# Patient Record
Sex: MALE | Race: WHITE | Employment: OTHER | URBAN - METROPOLITAN AREA
[De-identification: names, ages, dates, MRNs, and addresses within clinical notes are randomized per-mention and may not be internally consistent; named-entity substitution may affect disease eponyms.]

---

## 2023-06-08 ENCOUNTER — TELEPHONE (OUTPATIENT)
Dept: ONCOLOGY | Age: 68
End: 2023-06-08

## 2023-06-09 ENCOUNTER — APPOINTMENT (OUTPATIENT)
Dept: GENERAL RADIOLOGY | Age: 68
DRG: 841 | End: 2023-06-09
Attending: STUDENT IN AN ORGANIZED HEALTH CARE EDUCATION/TRAINING PROGRAM
Payer: COMMERCIAL

## 2023-06-09 ENCOUNTER — HOSPITAL ENCOUNTER (INPATIENT)
Age: 68
LOS: 1 days | Discharge: HOME | DRG: 841 | End: 2023-06-10
Attending: STUDENT IN AN ORGANIZED HEALTH CARE EDUCATION/TRAINING PROGRAM | Admitting: STUDENT IN AN ORGANIZED HEALTH CARE EDUCATION/TRAINING PROGRAM
Payer: COMMERCIAL

## 2023-06-09 DIAGNOSIS — C92.10 CML (CHRONIC MYELOCYTIC LEUKEMIA) (HCC): Primary | ICD-10-CM

## 2023-06-09 PROBLEM — D64.9 SYMPTOMATIC ANEMIA: Status: ACTIVE | Noted: 2023-06-09

## 2023-06-09 PROBLEM — E78.5 DYSLIPIDEMIA: Status: ACTIVE | Noted: 2023-06-09

## 2023-06-09 PROBLEM — D64.9 LOW HEMOGLOBIN: Status: RESOLVED | Noted: 2023-06-09 | Resolved: 2023-06-09

## 2023-06-09 PROBLEM — J96.11 CHRONIC RESPIRATORY FAILURE WITH HYPOXIA (HCC): Status: ACTIVE | Noted: 2023-06-09

## 2023-06-09 PROBLEM — D64.9 LOW HEMOGLOBIN: Status: ACTIVE | Noted: 2023-06-09

## 2023-06-09 LAB
ABO GROUP BLD: NORMAL
ABO GROUP BLD: NORMAL
ALBUMIN SERPL BCG-MCNC: 3.4 G/DL (ref 3.5–5.1)
ALP SERPL-CCNC: 73 U/L (ref 38–126)
ALT SERPL W/O P-5'-P-CCNC: 24 U/L (ref 11–66)
ANION GAP SERPL CALC-SCNC: 11 MEQ/L (ref 8–16)
ANTIBODY SCREEN: NORMAL
AST SERPL-CCNC: 13 U/L (ref 5–40)
AUTO DIFF PNL BLD: ABNORMAL
BASOPHILS ABSOLUTE: 0 THOU/MM3 (ref 0–0.1)
BASOPHILS NFR BLD AUTO: 0 %
BILIRUB SERPL-MCNC: 0.4 MG/DL (ref 0.3–1.2)
BLASTS: 7 % (ref 0–1)
BUN SERPL-MCNC: 26 MG/DL (ref 7–22)
CALCIUM SERPL-MCNC: 8.1 MG/DL (ref 8.5–10.5)
CHLORIDE SERPL-SCNC: 105 MEQ/L (ref 98–111)
CO2 SERPL-SCNC: 24 MEQ/L (ref 23–33)
CREAT SERPL-MCNC: 1.1 MG/DL (ref 0.4–1.2)
CRP SERPL-MCNC: 0.71 MG/DL (ref 0–1)
DAT POLY-SP REAG RBC QL: NORMAL
DEPRECATED RDW RBC AUTO: 64.3 FL (ref 35–45)
DEPRECATED RDW RBC AUTO: 67.9 FL (ref 35–45)
EKG ATRIAL RATE: 74 BPM
EKG P AXIS: 46 DEGREES
EKG P-R INTERVAL: 166 MS
EKG Q-T INTERVAL: 406 MS
EKG QRS DURATION: 90 MS
EKG QTC CALCULATION (BAZETT): 450 MS
EKG R AXIS: 22 DEGREES
EKG T AXIS: 42 DEGREES
EKG VENTRICULAR RATE: 74 BPM
EOSINOPHIL NFR BLD AUTO: 0 %
EOSINOPHILS ABSOLUTE: 0 THOU/MM3 (ref 0–0.4)
ERYTHROCYTE [DISTWIDTH] IN BLOOD BY AUTOMATED COUNT: 18.5 % (ref 11.5–14.5)
ERYTHROCYTE [DISTWIDTH] IN BLOOD BY AUTOMATED COUNT: 19.7 % (ref 11.5–14.5)
FERRITIN SERPL IA-MCNC: 1165 NG/ML (ref 22–322)
FOLATE SERPL-MCNC: 6.9 NG/ML (ref 4.8–24.2)
GFR SERPL CREATININE-BSD FRML MDRD: > 60 ML/MIN/1.73M2
GLUCOSE SERPL-MCNC: 107 MG/DL (ref 70–108)
HCT VFR BLD AUTO: 16.4 % (ref 42–52)
HCT VFR BLD AUTO: 18.8 % (ref 42–52)
HCT VFR BLD AUTO: 18.9 % (ref 42–52)
HCT VFR BLD AUTO: 19.8 % (ref 42–52)
HCT VFR BLD AUTO: 19.9 % (ref 42–52)
HGB BLD-MCNC: 5.4 GM/DL (ref 14–18)
HGB BLD-MCNC: 6.1 GM/DL (ref 14–18)
HGB BLD-MCNC: 6.2 GM/DL (ref 14–18)
HGB BLD-MCNC: 6.5 GM/DL (ref 14–18)
HGB BLD-MCNC: 6.5 GM/DL (ref 14–18)
HGB RETIC QN AUTO: 41.1 PG (ref 28.2–35.7)
IMM RETICS NFR: 14.6 % (ref 2.3–13.4)
IRON SERPL-MCNC: 208 UG/DL (ref 65–195)
LDH SERPL L TO P-CCNC: 529 U/L (ref 100–190)
LYMPHOCYTES ABSOLUTE: 3 THOU/MM3 (ref 1–4.8)
LYMPHOCYTES NFR BLD AUTO: 22 %
MANUAL DIFF BLD: NORMAL
MCH RBC QN AUTO: 33.3 PG (ref 26–33)
MCH RBC QN AUTO: 35.1 PG (ref 26–33)
MCHC RBC AUTO-ENTMCNC: 32.8 GM/DL (ref 32.2–35.5)
MCHC RBC AUTO-ENTMCNC: 32.9 GM/DL (ref 32.2–35.5)
MCV RBC AUTO: 101.6 FL (ref 80–94)
MCV RBC AUTO: 106.5 FL (ref 80–94)
METAMYELOCYTES: 2 %
MONOCYTES ABSOLUTE: 4.5 THOU/MM3 (ref 0.4–1.3)
MONOCYTES NFR BLD AUTO: 33 %
MYELOCYTES: 2 %
NEUTROPHILS NFR BLD AUTO: 34 %
NRBC BLD AUTO-RTO: 0 /100 WBC
NT-PROBNP SERPL IA-MCNC: 273.8 PG/ML (ref 0–124)
PATHOLOGIST REVIEW: ABNORMAL
PH BLDV: 7.45 [PH] (ref 7.31–7.41)
PLATELET # BLD AUTO: 14 THOU/MM3 (ref 130–400)
PLATELET # BLD AUTO: 24 THOU/MM3 (ref 130–400)
PLATELET BLD QL SMEAR: ABNORMAL
PMV BLD AUTO: 10.3 FL (ref 9.4–12.4)
PMV BLD AUTO: 10.8 FL (ref 9.4–12.4)
POTASSIUM SERPL-SCNC: 4.1 MEQ/L (ref 3.5–5.2)
PROCALCITONIN SERPL IA-MCNC: 0.11 NG/ML (ref 0.01–0.09)
PROT SERPL-MCNC: 6.4 G/DL (ref 6.1–8)
RBC # BLD AUTO: 1.54 MILL/MM3 (ref 4.7–6.1)
RBC # BLD AUTO: 1.86 MILL/MM3 (ref 4.7–6.1)
RETICS # AUTO: 11 THOU/MM3 (ref 20–115)
RETICS/RBC NFR AUTO: 0.7 % (ref 0.5–2)
REVIEWED BY: NORMAL
RH BLD: NORMAL
RH BLD: NORMAL
SEGMENTED NEUTROPHILS ABSOLUTE COUNT: 4.6 THOU/MM3 (ref 1.8–7.7)
SMEAR REVIEW: NORMAL
SODIUM SERPL-SCNC: 140 MEQ/L (ref 135–145)
VIT B12 SERPL-MCNC: 1042 PG/ML (ref 211–911)
WBC # BLD AUTO: 13.3 THOU/MM3 (ref 4.8–10.8)
WBC # BLD AUTO: 13.5 THOU/MM3 (ref 4.8–10.8)

## 2023-06-09 PROCEDURE — 93010 ELECTROCARDIOGRAM REPORT: CPT | Performed by: INTERNAL MEDICINE

## 2023-06-09 PROCEDURE — 93005 ELECTROCARDIOGRAM TRACING: CPT | Performed by: NURSE PRACTITIONER

## 2023-06-09 PROCEDURE — 86900 BLOOD TYPING SEROLOGIC ABO: CPT

## 2023-06-09 PROCEDURE — 85025 COMPLETE CBC W/AUTO DIFF WBC: CPT

## 2023-06-09 PROCEDURE — 86850 RBC ANTIBODY SCREEN: CPT

## 2023-06-09 PROCEDURE — 86901 BLOOD TYPING SEROLOGIC RH(D): CPT

## 2023-06-09 PROCEDURE — 85018 HEMOGLOBIN: CPT

## 2023-06-09 PROCEDURE — 82728 ASSAY OF FERRITIN: CPT

## 2023-06-09 PROCEDURE — 80053 COMPREHEN METABOLIC PANEL: CPT

## 2023-06-09 PROCEDURE — 82800 BLOOD PH: CPT

## 2023-06-09 PROCEDURE — 36415 COLL VENOUS BLD VENIPUNCTURE: CPT

## 2023-06-09 PROCEDURE — 6370000000 HC RX 637 (ALT 250 FOR IP): Performed by: STUDENT IN AN ORGANIZED HEALTH CARE EDUCATION/TRAINING PROGRAM

## 2023-06-09 PROCEDURE — 99222 1ST HOSP IP/OBS MODERATE 55: CPT | Performed by: NURSE PRACTITIONER

## 2023-06-09 PROCEDURE — 2580000003 HC RX 258: Performed by: NURSE PRACTITIONER

## 2023-06-09 PROCEDURE — 36430 TRANSFUSION BLD/BLD COMPNT: CPT

## 2023-06-09 PROCEDURE — 83615 LACTATE (LD) (LDH) ENZYME: CPT

## 2023-06-09 PROCEDURE — 82746 ASSAY OF FOLIC ACID SERUM: CPT

## 2023-06-09 PROCEDURE — 96361 HYDRATE IV INFUSION ADD-ON: CPT

## 2023-06-09 PROCEDURE — 94640 AIRWAY INHALATION TREATMENT: CPT

## 2023-06-09 PROCEDURE — G0378 HOSPITAL OBSERVATION PER HR: HCPCS

## 2023-06-09 PROCEDURE — P9037 PLATE PHERES LEUKOREDU IRRAD: HCPCS

## 2023-06-09 PROCEDURE — 85046 RETICYTE/HGB CONCENTRATE: CPT

## 2023-06-09 PROCEDURE — 86140 C-REACTIVE PROTEIN: CPT

## 2023-06-09 PROCEDURE — 86923 COMPATIBILITY TEST ELECTRIC: CPT

## 2023-06-09 PROCEDURE — G0379 DIRECT REFER HOSPITAL OBSERV: HCPCS

## 2023-06-09 PROCEDURE — 83880 ASSAY OF NATRIURETIC PEPTIDE: CPT

## 2023-06-09 PROCEDURE — P9040 RBC LEUKOREDUCED IRRADIATED: HCPCS

## 2023-06-09 PROCEDURE — 85027 COMPLETE CBC AUTOMATED: CPT

## 2023-06-09 PROCEDURE — 83010 ASSAY OF HAPTOGLOBIN QUANT: CPT

## 2023-06-09 PROCEDURE — 2140000000 HC CCU INTERMEDIATE R&B

## 2023-06-09 PROCEDURE — 30233N1 TRANSFUSION OF NONAUTOLOGOUS RED BLOOD CELLS INTO PERIPHERAL VEIN, PERCUTANEOUS APPROACH: ICD-10-PCS | Performed by: STUDENT IN AN ORGANIZED HEALTH CARE EDUCATION/TRAINING PROGRAM

## 2023-06-09 PROCEDURE — 82607 VITAMIN B-12: CPT

## 2023-06-09 PROCEDURE — 83540 ASSAY OF IRON: CPT

## 2023-06-09 PROCEDURE — 86880 COOMBS TEST DIRECT: CPT

## 2023-06-09 PROCEDURE — 96360 HYDRATION IV INFUSION INIT: CPT

## 2023-06-09 PROCEDURE — 30233R1 TRANSFUSION OF NONAUTOLOGOUS PLATELETS INTO PERIPHERAL VEIN, PERCUTANEOUS APPROACH: ICD-10-PCS | Performed by: STUDENT IN AN ORGANIZED HEALTH CARE EDUCATION/TRAINING PROGRAM

## 2023-06-09 PROCEDURE — 85014 HEMATOCRIT: CPT

## 2023-06-09 PROCEDURE — 84145 PROCALCITONIN (PCT): CPT

## 2023-06-09 PROCEDURE — 71045 X-RAY EXAM CHEST 1 VIEW: CPT

## 2023-06-09 RX ORDER — SODIUM CHLORIDE 9 MG/ML
INJECTION, SOLUTION INTRAVENOUS PRN
Status: DISCONTINUED | OUTPATIENT
Start: 2023-06-09 | End: 2023-06-09

## 2023-06-09 RX ORDER — ALLOPURINOL 300 MG/1
300 TABLET ORAL DAILY
Status: DISCONTINUED | OUTPATIENT
Start: 2023-06-09 | End: 2023-06-10 | Stop reason: HOSPADM

## 2023-06-09 RX ORDER — ACETAMINOPHEN 325 MG/1
650 TABLET ORAL EVERY 6 HOURS PRN
Status: DISCONTINUED | OUTPATIENT
Start: 2023-06-09 | End: 2023-06-10 | Stop reason: HOSPADM

## 2023-06-09 RX ORDER — ALBUTEROL SULFATE 2.5 MG/3ML
2.5 SOLUTION RESPIRATORY (INHALATION) EVERY 6 HOURS PRN
Status: DISCONTINUED | OUTPATIENT
Start: 2023-06-09 | End: 2023-06-10 | Stop reason: HOSPADM

## 2023-06-09 RX ORDER — TAMSULOSIN HYDROCHLORIDE 0.4 MG/1
0.8 CAPSULE ORAL DAILY
COMMUNITY
Start: 2023-03-24

## 2023-06-09 RX ORDER — SODIUM CHLORIDE 9 MG/ML
INJECTION, SOLUTION INTRAVENOUS PRN
Status: DISCONTINUED | OUTPATIENT
Start: 2023-06-09 | End: 2023-06-10 | Stop reason: HOSPADM

## 2023-06-09 RX ORDER — QUETIAPINE FUMARATE 25 MG/1
25 TABLET, FILM COATED ORAL NIGHTLY
Status: DISCONTINUED | OUTPATIENT
Start: 2023-06-09 | End: 2023-06-10 | Stop reason: HOSPADM

## 2023-06-09 RX ORDER — ACETAMINOPHEN 325 MG/1
325 TABLET ORAL EVERY 4 HOURS PRN
COMMUNITY

## 2023-06-09 RX ORDER — ATORVASTATIN CALCIUM 80 MG/1
80 TABLET, FILM COATED ORAL DAILY
COMMUNITY
Start: 2023-02-04 | End: 2023-08-16

## 2023-06-09 RX ORDER — ALBUTEROL SULFATE 90 UG/1
2 AEROSOL, METERED RESPIRATORY (INHALATION) EVERY 4 HOURS PRN
COMMUNITY

## 2023-06-09 RX ORDER — HYDROXYUREA 500 MG/1
500 CAPSULE ORAL DAILY
Status: DISCONTINUED | OUTPATIENT
Start: 2023-06-09 | End: 2023-06-10 | Stop reason: HOSPADM

## 2023-06-09 RX ORDER — LORATADINE 10 MG/1
10 TABLET ORAL DAILY
COMMUNITY
Start: 2023-04-14

## 2023-06-09 RX ORDER — ACYCLOVIR 200 MG/1
200 CAPSULE ORAL DAILY
Status: DISCONTINUED | OUTPATIENT
Start: 2023-06-09 | End: 2023-06-10 | Stop reason: HOSPADM

## 2023-06-09 RX ORDER — ALBUTEROL SULFATE 2.5 MG/3ML
2.5 SOLUTION RESPIRATORY (INHALATION) EVERY 6 HOURS PRN
COMMUNITY

## 2023-06-09 RX ORDER — QUETIAPINE FUMARATE 25 MG/1
25 TABLET, FILM COATED ORAL NIGHTLY
COMMUNITY
Start: 2023-06-08

## 2023-06-09 RX ORDER — ALBUTEROL SULFATE 90 UG/1
2 AEROSOL, METERED RESPIRATORY (INHALATION) EVERY 4 HOURS PRN
Status: DISCONTINUED | OUTPATIENT
Start: 2023-06-09 | End: 2023-06-10 | Stop reason: HOSPADM

## 2023-06-09 RX ORDER — CETIRIZINE HYDROCHLORIDE 10 MG/1
10 TABLET ORAL DAILY
Status: DISCONTINUED | OUTPATIENT
Start: 2023-06-09 | End: 2023-06-10 | Stop reason: HOSPADM

## 2023-06-09 RX ORDER — ATORVASTATIN CALCIUM 80 MG/1
80 TABLET, FILM COATED ORAL NIGHTLY
Status: DISCONTINUED | OUTPATIENT
Start: 2023-06-09 | End: 2023-06-10 | Stop reason: HOSPADM

## 2023-06-09 RX ORDER — ONDANSETRON 4 MG/1
4 TABLET, ORALLY DISINTEGRATING ORAL EVERY 8 HOURS PRN
Status: DISCONTINUED | OUTPATIENT
Start: 2023-06-09 | End: 2023-06-10 | Stop reason: HOSPADM

## 2023-06-09 RX ORDER — POLYETHYLENE GLYCOL 3350 17 G/17G
17 POWDER, FOR SOLUTION ORAL DAILY PRN
Status: DISCONTINUED | OUTPATIENT
Start: 2023-06-09 | End: 2023-06-10 | Stop reason: HOSPADM

## 2023-06-09 RX ORDER — AMLODIPINE BESYLATE 5 MG/1
5 TABLET ORAL NIGHTLY
Status: DISCONTINUED | OUTPATIENT
Start: 2023-06-09 | End: 2023-06-10 | Stop reason: HOSPADM

## 2023-06-09 RX ORDER — AMLODIPINE BESYLATE 5 MG/1
5 TABLET ORAL NIGHTLY
COMMUNITY
Start: 2023-05-04 | End: 2023-08-16

## 2023-06-09 RX ORDER — ALLOPURINOL 300 MG/1
300 TABLET ORAL DAILY
COMMUNITY
End: 2023-06-21 | Stop reason: SDUPTHER

## 2023-06-09 RX ORDER — ONDANSETRON 2 MG/ML
4 INJECTION INTRAMUSCULAR; INTRAVENOUS EVERY 6 HOURS PRN
Status: DISCONTINUED | OUTPATIENT
Start: 2023-06-09 | End: 2023-06-10 | Stop reason: HOSPADM

## 2023-06-09 RX ORDER — TAMSULOSIN HYDROCHLORIDE 0.4 MG/1
0.8 CAPSULE ORAL NIGHTLY
Status: DISCONTINUED | OUTPATIENT
Start: 2023-06-09 | End: 2023-06-10 | Stop reason: HOSPADM

## 2023-06-09 RX ORDER — ACETAMINOPHEN 650 MG/1
650 SUPPOSITORY RECTAL EVERY 6 HOURS PRN
Status: DISCONTINUED | OUTPATIENT
Start: 2023-06-09 | End: 2023-06-10 | Stop reason: HOSPADM

## 2023-06-09 RX ORDER — SODIUM CHLORIDE 0.9 % (FLUSH) 0.9 %
5-40 SYRINGE (ML) INJECTION PRN
Status: DISCONTINUED | OUTPATIENT
Start: 2023-06-09 | End: 2023-06-10 | Stop reason: HOSPADM

## 2023-06-09 RX ORDER — SODIUM CHLORIDE 0.9 % (FLUSH) 0.9 %
5-40 SYRINGE (ML) INJECTION EVERY 12 HOURS SCHEDULED
Status: DISCONTINUED | OUTPATIENT
Start: 2023-06-09 | End: 2023-06-10 | Stop reason: HOSPADM

## 2023-06-09 RX ORDER — ACYCLOVIR 200 MG/1
200 CAPSULE ORAL DAILY
COMMUNITY
Start: 2023-05-25 | End: 2023-06-21 | Stop reason: SDUPTHER

## 2023-06-09 RX ORDER — HYDROXYUREA 500 MG/1
500 CAPSULE ORAL DAILY
Status: ON HOLD | COMMUNITY
Start: 2023-05-25 | End: 2023-06-10 | Stop reason: HOSPADM

## 2023-06-09 RX ADMIN — QUETIAPINE FUMARATE 25 MG: 25 TABLET ORAL at 20:42

## 2023-06-09 RX ADMIN — ALLOPURINOL 300 MG: 300 TABLET ORAL at 16:27

## 2023-06-09 RX ADMIN — SODIUM CHLORIDE, PRESERVATIVE FREE 10 ML: 5 INJECTION INTRAVENOUS at 20:42

## 2023-06-09 RX ADMIN — SODIUM CHLORIDE: 9 INJECTION, SOLUTION INTRAVENOUS at 11:17

## 2023-06-09 RX ADMIN — AMLODIPINE BESYLATE 5 MG: 5 TABLET ORAL at 20:42

## 2023-06-09 RX ADMIN — SODIUM CHLORIDE: 9 INJECTION, SOLUTION INTRAVENOUS at 14:57

## 2023-06-09 RX ADMIN — SODIUM CHLORIDE, PRESERVATIVE FREE 10 ML: 5 INJECTION INTRAVENOUS at 11:48

## 2023-06-09 RX ADMIN — ATORVASTATIN CALCIUM 80 MG: 80 TABLET, FILM COATED ORAL at 20:42

## 2023-06-09 RX ADMIN — TAMSULOSIN HYDROCHLORIDE 0.8 MG: 0.4 CAPSULE ORAL at 20:42

## 2023-06-09 RX ADMIN — CETIRIZINE HYDROCHLORIDE 10 MG: 10 TABLET, FILM COATED ORAL at 16:27

## 2023-06-09 RX ADMIN — MOMETASONE FUROATE AND FORMOTEROL FUMARATE DIHYDRATE 2 PUFF: 200; 5 AEROSOL RESPIRATORY (INHALATION) at 17:21

## 2023-06-09 RX ADMIN — ACYCLOVIR 200 MG: 200 CAPSULE ORAL at 16:27

## 2023-06-09 NOTE — PROGRESS NOTES
Spoke with Dr. Dari Carter @ [address] ER re; [place] [place]    C/O weakness and increased SOB.  (2L @ home now requiring 3L)    Was there for same last month and transferred to [facility]. DX with CML    Dr. Dari Carter spoke with hematology here and said they would consult. Hemoglobin=5  Platelets=16    No bleeding noted    98.3, 80, 22, 135/57. 97%-3L    CXR-pulmonary edema. Although DR. Dari Carter says he does not present that clinically.      Accepted on behalf of hospitalist.

## 2023-06-09 NOTE — PROGRESS NOTES
Internal Medicine Resident Progress Note    Patient:  Junaid Mckeon    YOB: 1955  Unit/Bed:3B-33/033-A  MRN: [id number]    Acct: [de-identified]   PCP: YESY Nunez CNP    Date of Admission: 6/9/2023      Assessment/Plan:  Symptomatic anemia: Patient dorsals dyspnea and fatigue likely 2/2 hydroxyurea and bone marrow suppression 2/2 CML. Monitor H&H 4.9/14. Received 2 units PRBC prior to transport. At [facility] has received 2 more units PRBC and 1 unit platelets. Awaiting H&H. No sign of hematochezia, melena.  -H&H every 6 hours  -Transfuse <7  -RBCs leukoreduced and irradiated  Chronic hypoxic respiratory failure: On 2-3 L NC at home. CML type I: Recently diagnosed at [facility]. Scheduled with oncology Dr. Alejandro South in [facility] on 6/13/2023.  -Continue home Zovirax 200 qd, allopurinol 300 qd, currently holding hydroxyurea 500  COPD: Patient on Trelegy at home. Changed to Dulera 200-5 2 puffs bid and Spiriva 2 puffs qd. Albuterol neb or inhaler prn  HLD: Continue home Lipitor 80 mg nightly  HTN: Continue home Norvasc 5 mg nightly  BPH: Continue home Flomax 0.8 mg nightly  Obesity: BMI 39.7    Chronic Conditions (reviewed and stable unless otherwise stated)  Continue home Zyrtec 10 mg qd, Seroquel 25 mg nightly    Expected discharge date:  TBD    Disposition:   [x] Home  [] TCU  [] Rehab  [] Psych  [] SNF  [] [address]  [] Other-    ===================================================================      Chief Complaint: Symptomatic anemia    Hospital Course: Patient is 37-year-old male with PMHx of recently diagnosed CML, former smoker, chronic respiratory failure on 2 L NC, HLD, HTN, COPD, RA who was transferred to [facility] from [address] [place] [address] on 6/9/2023 for 60: Progressive fatigue and shortness of breath. Patient was sent to ED 2/2 symptomatic anemia. See H&P for complete history, in short patient under went pulmonary biopsy 5/17/2023 results CML.   Heme-onc placed patient on --   --  24*     Recent Labs     06/09/23  0338      K 4.1      CO2 24   BUN 26*   CREATININE 1.1   CALCIUM 8.1*     Recent Labs     06/09/23  0338   AST 13   ALT 24   BILITOT 0.4   ALKPHOS 73     No results for input(s): INR in the last 72 hours. No results for input(s): Yeimy Bradley in the last 72 hours. Recent Labs     06/09/23  0338   PROCAL 0.11*    No results found for: Oz Castles, BACTERIA, RBCUA, BLOODU, SPECGRAV, GLUCOSEU    Radiology (48 hours):  XR CHEST PORTABLE    Result Date: 6/9/2023  Impression: Subsegmental left basilar atelectasis and probable scarring in the right upper lobe. This document has been electronically signed by: Andreina Burroughs MD on 06/09/2023 03:18 AM       DVT prophylaxis:    [] Lovenox  [x] SCDs  [] SQ Heparin  [] Encourage ambulation   [] Already on Anticoagulation       Diet: ADULT DIET;  Regular  Code Status: Full Code  PT/OT: No  Tele: Yes  IVF: No    Electronically signed by Dane Larose DO on 6/9/2023 at 5:47 PM    Case was discussed with Attending, Dr. Bailye Nim

## 2023-06-09 NOTE — CONSENT
Informed Consent for Blood Component Transfusion Note    I have discussed with the patient and spouse the rationale for blood component transfusion; its benefits in treating or preventing fatigue, organ damage, or death; and its risk which includes mild transfusion reactions, rare risk of blood borne infection, or more serious but rare reactions. I have discussed the alternatives to transfusion, including the risk and consequences of not receiving transfusion. The patient and spouse had an opportunity to ask questions and had agreed to proceed with transfusion of blood components.     Electronically signed by YESY Hong CNP on 6/9/23 at 4:51 AM EDT

## 2023-06-09 NOTE — CARE COORDINATION
Case Management Assessment  Initial Evaluation    Date/Time of Evaluation: 6/9/2023 1:03 PM  Assessment Completed by: Jamey Guzman RN    If patient is discharged prior to next notation, then this note serves as note for discharge by case management. Patient Name: Eric Andrews                   YOB: 1955  Diagnosis: Low hemoglobin [D64.9]                   Date / Time: 6/9/2023  1:14 AM  Location: [address] [place] [address]     Patient Admission Status: Inpatient   Readmission Risk Low 0-14, Mod 15-19), High > 20: Readmission Risk Score: 14.1    Current PCP: YESY Manzano CNP  PCP verified by CM? Yes    Chart Reviewed: Yes      History Provided by: Patient  Patient Orientation: Alert and Oriented    Patient Cognition: Alert    Hospitalization in the last 30 days (Readmission):  No    If yes, Readmission Assessment in CM Navigator will be completed. Advance Directives:      Code Status: Full Code   Patient's Primary Decision Maker is: Legal Next of Kin (Has paperwork at home)    Primary Decision Maker: Orlena Closs - Spouse - [phone]    Secondary Decision Maker: Violet Caitlin Child - [phone]    Discharge Planning:    Patient lives with: Spouse/Significant Other Type of Home: House  Primary Care Giver: Self  Patient Support Systems include: Spouse/Significant Other, Children   Current Financial resources: Medicare, Medicaid  Current community resources: None  Current services prior to admission: Durable Medical Equipment            Current DME: Oxygen Therapy (Comment) (2L ATC, [employer])            Type of Home Care services:  None    ADLS  Prior functional level: Independent in ADLs/IADLs  Current functional level: Independent in ADLs/IADLs    Family can provide assistance at DC: Yes  Would you like Case Management to discuss the discharge plan with any other family members/significant others, and if so, who?  No  Plans to Return to Present Housing: Yes  Other Identified Issues/Barriers to RETURNING to current housing: None  Potential Assistance needed at discharge: N/A            Potential DME:    Patient expects to discharge to: [address] [place] [address] for transportation at discharge: Family    Financial    Payor: Sujit Ybarra / Plan: Oleg Byrd / Product Type: *No Product type* /     Does insurance require precert for SNF: Yes    Potential assistance Purchasing Medications: No  Meds-to-Beds request: Yes      [address] [address] [phone] Jada Laureano [address]  [address] [place] [address]  Phone: [phone] Fax: [phone]      Notes:    Factors facilitating achievement of predicted outcomes: Family support, Cooperative, Pleasant, and Has needed Durable Medical Equipment at home    Barriers to discharge: Medical complications and Medication managment    Additional Case Management Notes: Transferred from [facility] with SOB and weakness. Pt recently diagnosed with CML. Hgb 5.4 upon arrival, transfused a unit of blood, Hgb up to 6.5. Anticipate more blood being transfused. Procedure:   6/9 CXR: Subsegmental left basilar atelectasis and probable scarring in the right upper lobe. The Plan for Transition of Care is related to the following treatment goals of Low hemoglobin [D64.9]    Patient Goals/Plan/Treatment Preferences: Spoke with pt, he lives at home with his wife. Plans to return there. He has home O2 through [employer] (he thinks), and wears at 2L/nc ATC. Tank in room, but no tag to say where from. Transportation/Food Security/Housekeeping Addressed: No issues identified.      Delisa Harvey RN  Case Management Department

## 2023-06-09 NOTE — PLAN OF CARE
Problem: Discharge Planning  Goal: Discharge to home or other facility with appropriate resources  6/9/2023 1209 by Luis Angel Angela RN  Outcome: Progressing  Flowsheets  Taken 6/9/2023 1209  Discharge to home or other facility with appropriate resources:   Identify barriers to discharge with patient and caregiver   Identify discharge learning needs (meds, wound care, etc)  Taken 6/9/2023 0800  Discharge to home or other facility with appropriate resources:   Identify barriers to discharge with patient and caregiver   Identify discharge learning needs (meds, wound care, etc)  Note: Discharge to home with wife. Continued assessing discharge learning needs. Problem: Safety - Adult  Goal: Free from fall injury  6/9/2023 1209 by Luis Angel Angela RN  Outcome: Progressing  Flowsheets (Taken 6/9/2023 1209)  Free From Fall Injury: Based on caregiver fall risk screen, instruct family/caregiver to ask for assistance with transferring infant if caregiver noted to have fall risk factors  Note: Standard fall precaution observed. Hourly rounds done. Problem: ABCDS Injury Assessment  Goal: Absence of physical injury  6/9/2023 1209 by Luis Angel Angela RN  Outcome: Progressing  Flowsheets (Taken 6/9/2023 1209)  Absence of Physical Injury: Implement safety measures based on patient assessment  Note: Instructed to use call light when getting out on bed. Standard fall precaution applied. Problem: Respiratory - Adult  Goal: Achieves optimal ventilation and oxygenation  6/9/2023 1209 by Luis Angel Angela RN  Outcome: Progressing  Flowsheets  Taken 6/9/2023 1209  Achieves optimal ventilation and oxygenation:   Assess for changes in respiratory status   Position to facilitate oxygenation and minimize respiratory effort  Taken 6/9/2023 0800  Achieves optimal ventilation and oxygenation: Assess for changes in respiratory status  Note: Maintained on O2 via nasal cannula. Watched out for changes in respiratory pattern. Problem: Infection - Adult  Goal: Absence of infection at discharge  6/9/2023 1209 by Raji Sanchez RN  Outcome: Progressing  Flowsheets  Taken 6/9/2023 1209  Absence of infection at discharge:   Assess and monitor for signs and symptoms of infection   Monitor lab/diagnostic results   Administer medications as ordered  Taken 6/9/2023 0800  Absence of infection at discharge: Assess and monitor for signs and symptoms of infection  Note: Prone to infection, hand hygiene observed. Vital signs checked 4 hourly. Problem: Hematologic - Adult  Goal: Maintains hematologic stability  6/9/2023 1209 by Raji Sanchez RN  Outcome: Progressing  Flowsheets  Taken 6/9/2023 1209  Maintains hematologic stability:   Assess for signs and symptoms of bleeding or hemorrhage   Administer blood products/factors as ordered   Monitor labs for bleeding or clotting disorders  Taken 6/9/2023 0800  Maintains hematologic stability: Assess for signs and symptoms of bleeding or hemorrhage  Note: Blood transfusion of 1 unit redblood cells given. Platelet concentrate transfusion done Monitor Hgb and platelet levels post transfusions. Care plan reviewed with patient. Patient verbalize understanding of the plan of care and contribute to goal setting.

## 2023-06-09 NOTE — TELEPHONE ENCOUNTER
Received call from [facility] ED as currently on-call provider. Patient is scheduled as a new patient with Dr. Saba García on 6/13/2023 for newly diagnosed chronic myelomonocytic leukemia. He presented to ED with abnormal labs drawn today per PCP. As noted in Care Everywhere:  WBC count 18.9, Hgb 5.0, Hct 15.6, .1, platelet count 23,765. He was directed to ED and recheck of CBC showed WBC count 14.4, Hgb 4.9, Hct 14.0, .5, platelet count 46,014. No active bleeding reported. Called by ED, Dr. Bill Lombardo. Discussed recommendation for blood transfusion and platelet transfusion. Recommended irradiated blood if able. Discussed recommendation for admission for close monitoring of blood counts and need for continued blood products. Dr. Bill Lombardo states patient is hesitant to be transferred at [address] [place] [address] and prefers local admission. She will discuss with local Hospitalist. Discussed if unable to remain at local hospital recommend transfer to [facility]. Yvonne

## 2023-06-09 NOTE — DISCHARGE INSTRUCTIONS
If you continue to have bloody stools contact GI to have colonoscopy sooner rather than later. Stop taking Hydroxyurea until your Heme/Onc appt.

## 2023-06-09 NOTE — PLAN OF CARE
Problem: Discharge Planning  Goal: Discharge to home or other facility with appropriate resources  6/9/2023 0230 by Charity Santos RN  Outcome: Progressing  6/9/2023 0228 by Charity Santos RN  Outcome: Progressing     Problem: Safety - Adult  Goal: Free from fall injury  Outcome: Progressing     Problem: ABCDS Injury Assessment  Goal: Absence of physical injury  Outcome: Progressing     Problem: Respiratory - Adult  Goal: Achieves optimal ventilation and oxygenation  Outcome: Progressing  Flowsheets (Taken 6/9/2023 0230)  Achieves optimal ventilation and oxygenation:   Assess for changes in respiratory status   Assess for changes in mentation and behavior   Position to facilitate oxygenation and minimize respiratory effort   Encourage broncho-pulmonary hygiene including cough, deep breathe, incentive spirometry   Assess and instruct to report shortness of breath or any respiratory difficulty   Respiratory therapy support as indicated     Problem: Infection - Adult  Goal: Absence of infection at discharge  Outcome: Progressing  Flowsheets (Taken 6/9/2023 0230)  Absence of infection at discharge:   Assess and monitor for signs and symptoms of infection   Monitor lab/diagnostic results   Monitor all insertion sites i.e., indwelling lines, tubes and drains   Administer medications as ordered   Instruct and encourage patient and family to use good hand hygiene technique     Problem: Metabolic/Fluid and Electrolytes - Adult  Goal: Electrolytes maintained within normal limits  Outcome: Progressing  Flowsheets (Taken 6/9/2023 0230)  Electrolytes maintained within normal limits:   Monitor labs and assess patient for signs and symptoms of electrolyte imbalances   Administer electrolyte replacement as ordered     Problem: Hematologic - Adult  Goal: Maintains hematologic stability  Outcome: Progressing  Flowsheets (Taken 6/9/2023 0230)  Maintains hematologic stability:   Assess for signs and symptoms of bleeding

## 2023-06-09 NOTE — H&P
Hospitalist  History and Physical    Patient:  Sam Alvarez  MRN: [id number]    CHIEF COMPLAINT:  abnormal lab results    History Obtained From:  patient, electronic medical record  PCP: YESY Matute CNP    HISTORY OF PRESENT ILLNESS:   The patient is a 79 y.o. male who presents 6/9/2023 to [facility] as a transfer from [facility]. Patient has a past medical history significant for former smoker, chronic respiratory failure continuous oxygen use 2L/NC, dyslipidemia, arthritis,  Recent hospitalization at [facility] with chief complaints of progressive fatigue and shortness of breath. Patient had notable leukocytosis and concern for leukemia. Patient underwent bone marrow biopsy on 5/17/2023 with results consistent with chronic myelomonocytic leukemia. Patient was evaluated by infectious disease for concern of fever with possible density and echocardiogram suspicious for endocarditis. However ID felt fever most likely secondary to leukemia without concern for endocarditis. Blood cultures are negative. Dyspnea on exertion ultimately felt secondary to anemia. Patient was evaluated by hematology/oncology throughout the stay and was started on acyclovir, allopurinol and high hydroxyurea. Patient scheduled for follow-up with hematology in [place] on 6/13/2023. Since discharge patient reports ongoing issues with fatigue and progressive worsening of shortness of breath. More dyspnea on exertion with even shorter distances despite wearing oxygen at 2L/NC consistently. Shortness of breath resolves after a few minutes of rest.  Transitioned from Trelegy to Flovent with nebs in the hospital however during PCP appointment patient was encouraged to restart Trelegy. Home blood pressure monitoring -130.  5/25/2023 hemoglobin was 7.2 with a platelet count of 17. PCP had given prescription to have blood work drawn.   Patient was sitting at home eating dinner when he received a call from PCP office to

## 2023-06-10 VITALS
SYSTOLIC BLOOD PRESSURE: 126 MMHG | HEART RATE: 77 BPM | RESPIRATION RATE: 20 BRPM | HEIGHT: 71 IN | DIASTOLIC BLOOD PRESSURE: 60 MMHG | WEIGHT: 284.9 LBS | BODY MASS INDEX: 39.89 KG/M2 | TEMPERATURE: 98.1 F | OXYGEN SATURATION: 92 %

## 2023-06-10 LAB
ALBUMIN SERPL BCG-MCNC: 3.6 G/DL (ref 3.5–5.1)
ALP SERPL-CCNC: 74 U/L (ref 38–126)
ALT SERPL W/O P-5'-P-CCNC: 22 U/L (ref 11–66)
ANION GAP SERPL CALC-SCNC: 10 MEQ/L (ref 8–16)
AST SERPL-CCNC: 13 U/L (ref 5–40)
BILIRUB CONJ SERPL-MCNC: < 0.2 MG/DL (ref 0–0.3)
BILIRUB SERPL-MCNC: 0.4 MG/DL (ref 0.3–1.2)
BUN SERPL-MCNC: 16 MG/DL (ref 7–22)
CALCIUM SERPL-MCNC: 8.1 MG/DL (ref 8.5–10.5)
CHLORIDE SERPL-SCNC: 106 MEQ/L (ref 98–111)
CO2 SERPL-SCNC: 24 MEQ/L (ref 23–33)
CREAT SERPL-MCNC: 1 MG/DL (ref 0.4–1.2)
DEPRECATED RDW RBC AUTO: 63.7 FL (ref 35–45)
DEPRECATED RDW RBC AUTO: 65.3 FL (ref 35–45)
ERYTHROCYTE [DISTWIDTH] IN BLOOD BY AUTOMATED COUNT: 19.4 % (ref 11.5–14.5)
ERYTHROCYTE [DISTWIDTH] IN BLOOD BY AUTOMATED COUNT: 20.5 % (ref 11.5–14.5)
GFR SERPL CREATININE-BSD FRML MDRD: > 60 ML/MIN/1.73M2
GLUCOSE SERPL-MCNC: 88 MG/DL (ref 70–108)
HAPTOGLOB SERPL-MCNC: 237 MG/DL (ref 30–200)
HCT VFR BLD AUTO: 21.9 % (ref 42–52)
HCT VFR BLD AUTO: 22.5 % (ref 42–52)
HGB BLD-MCNC: 7.3 GM/DL (ref 14–18)
HGB BLD-MCNC: 7.6 GM/DL (ref 14–18)
LDH SERPL L TO P-CCNC: 523 U/L (ref 100–190)
MCH RBC QN AUTO: 32.9 PG (ref 26–33)
MCH RBC QN AUTO: 33.8 PG (ref 26–33)
MCHC RBC AUTO-ENTMCNC: 33.3 GM/DL (ref 32.2–35.5)
MCHC RBC AUTO-ENTMCNC: 33.8 GM/DL (ref 32.2–35.5)
MCV RBC AUTO: 101.4 FL (ref 80–94)
MCV RBC AUTO: 97.4 FL (ref 80–94)
PLATELET # BLD AUTO: 18 THOU/MM3 (ref 130–400)
PLATELET # BLD AUTO: 20 THOU/MM3 (ref 130–400)
PMV BLD AUTO: 10 FL (ref 9.4–12.4)
PMV BLD AUTO: 10.9 FL (ref 9.4–12.4)
POTASSIUM SERPL-SCNC: 4.3 MEQ/L (ref 3.5–5.2)
PROT SERPL-MCNC: 6.5 G/DL (ref 6.1–8)
RBC # BLD AUTO: 2.16 MILL/MM3 (ref 4.7–6.1)
RBC # BLD AUTO: 2.31 MILL/MM3 (ref 4.7–6.1)
SODIUM SERPL-SCNC: 140 MEQ/L (ref 135–145)
WBC # BLD AUTO: 10.5 THOU/MM3 (ref 4.8–10.8)
WBC # BLD AUTO: 12.5 THOU/MM3 (ref 4.8–10.8)

## 2023-06-10 PROCEDURE — 94640 AIRWAY INHALATION TREATMENT: CPT

## 2023-06-10 PROCEDURE — 6370000000 HC RX 637 (ALT 250 FOR IP): Performed by: STUDENT IN AN ORGANIZED HEALTH CARE EDUCATION/TRAINING PROGRAM

## 2023-06-10 PROCEDURE — G0378 HOSPITAL OBSERVATION PER HR: HCPCS

## 2023-06-10 PROCEDURE — 36415 COLL VENOUS BLD VENIPUNCTURE: CPT

## 2023-06-10 PROCEDURE — 82248 BILIRUBIN DIRECT: CPT

## 2023-06-10 PROCEDURE — 85027 COMPLETE CBC AUTOMATED: CPT

## 2023-06-10 PROCEDURE — 2580000003 HC RX 258: Performed by: NURSE PRACTITIONER

## 2023-06-10 PROCEDURE — 99239 HOSP IP/OBS DSCHRG MGMT >30: CPT | Performed by: STUDENT IN AN ORGANIZED HEALTH CARE EDUCATION/TRAINING PROGRAM

## 2023-06-10 PROCEDURE — 80053 COMPREHEN METABOLIC PANEL: CPT

## 2023-06-10 PROCEDURE — 83615 LACTATE (LD) (LDH) ENZYME: CPT

## 2023-06-10 RX ORDER — FOLIC ACID 1 MG/1
1 TABLET ORAL DAILY
Status: DISCONTINUED | OUTPATIENT
Start: 2023-06-10 | End: 2023-06-10 | Stop reason: HOSPADM

## 2023-06-10 RX ORDER — FOLIC ACID 1 MG/1
1 TABLET ORAL DAILY
Qty: 30 TABLET | Refills: 3 | Status: SHIPPED | OUTPATIENT
Start: 2023-06-11 | End: 2023-08-28 | Stop reason: SDUPTHER

## 2023-06-10 RX ADMIN — TIOTROPIUM BROMIDE INHALATION SPRAY 2 PUFF: 3.12 SPRAY, METERED RESPIRATORY (INHALATION) at 08:04

## 2023-06-10 RX ADMIN — ALLOPURINOL 300 MG: 300 TABLET ORAL at 07:48

## 2023-06-10 RX ADMIN — CETIRIZINE HYDROCHLORIDE 10 MG: 10 TABLET, FILM COATED ORAL at 07:48

## 2023-06-10 RX ADMIN — SODIUM CHLORIDE, PRESERVATIVE FREE 10 ML: 5 INJECTION INTRAVENOUS at 07:49

## 2023-06-10 RX ADMIN — ACYCLOVIR 200 MG: 200 CAPSULE ORAL at 07:48

## 2023-06-10 RX ADMIN — MOMETASONE FUROATE AND FORMOTEROL FUMARATE DIHYDRATE 2 PUFF: 200; 5 AEROSOL RESPIRATORY (INHALATION) at 08:05

## 2023-06-10 RX ADMIN — FOLIC ACID 1 MG: 1 TABLET ORAL at 11:09

## 2023-06-10 ASSESSMENT — PAIN SCALES - GENERAL
PAINLEVEL_OUTOF10: 0
PAINLEVEL_OUTOF10: 0

## 2023-06-10 NOTE — PLAN OF CARE
Problem: Discharge Planning  Goal: Discharge to home or other facility with appropriate resources  6/9/2023 2141 by Anatoliy Wolff RN  Outcome: Katie Santiago (Taken 6/9/2023 1209 by Dayo Robertson RN)  Discharge to home or other facility with appropriate resources:   Identify barriers to discharge with patient and caregiver   Identify discharge learning needs (meds, wound care, etc)  6/9/2023 1209 by Dayo Robertson RN  Outcome: Progressing  Flowsheets  Taken 6/9/2023 1209  Discharge to home or other facility with appropriate resources:   Identify barriers to discharge with patient and caregiver   Identify discharge learning needs (meds, wound care, etc)  Taken 6/9/2023 0800  Discharge to home or other facility with appropriate resources:   Identify barriers to discharge with patient and caregiver   Identify discharge learning needs (meds, wound care, etc)  Note: Discharge to home with wife. Continued assessing discharge learning needs. Problem: Safety - Adult  Goal: Free from fall injury  6/9/2023 2141 by Anatoliy Wolff RN  Outcome: Progressing  Flowsheets (Taken 6/9/2023 1209 by Dayo Robertson RN)  Free From Fall Injury: Based on caregiver fall risk screen, instruct family/caregiver to ask for assistance with transferring infant if caregiver noted to have fall risk factors  6/9/2023 1209 by Dayo Robertson RN  Outcome: Progressing  Flowsheets (Taken 6/9/2023 1209)  Free From Fall Injury: Based on caregiver fall risk screen, instruct family/caregiver to ask for assistance with transferring infant if caregiver noted to have fall risk factors  Note: Standard fall precaution observed. Hourly rounds done.      Problem: ABCDS Injury Assessment  Goal: Absence of physical injury  6/9/2023 2141 by Anatoliy Wolff RN  Outcome: Katie Santiago (Taken 6/9/2023 1209 by Dayo Robertson RN)  Absence of Physical Injury: Implement safety measures based on patient assessment  6/9/2023

## 2023-06-10 NOTE — PROGRESS NOTES
Discharge Note    Progress reporting period is from initial evaluation date (please see noted date below) to May 6, 2019.  Linked Episodes   Type: Episode: Status: Noted: Resolved: Last update: Updated by:   PHYSICAL THERAPY low back 4-26-19 Active 4/26/2019 5/6/2019  8:57 AM Miki Ruano, PT      Comments:       Faheem failed to follow up and current status is unknown.  Please see information below for last relevant information on current status.  Patient seen for 3 visits.    SUBJECTIVE  Subjective changes noted by patient:  15 min late  .  Current pain level is 5/10.     Previous pain level was  7/10.   Changes in function:  Yes (See Goal flowsheet attached for changes in current functional level)  Adverse reaction to treatment or activity: None    OBJECTIVE  Changes noted in objective findings: Coret Strength = 2/5 on functiona assessment     ASSESSMENT/PLAN  Diagnosis: acute on chronic LBP   Updated problem list and treatment plan:   Pain - HEP  STG/LTGs have been met or progress has been made towards goals:  Yes, please see goal flowsheet for most current information  Assessment of Progress: current status is unknown.    Last current status: Pt is progressing as expected   Self Management Plans:  HEP  I have re-evaluated this patient and find that the nature, scope, duration and intensity of the therapy is appropriate for the medical condition of the patient.  Faheem continues to require the following intervention to meet STG and LTG's:  HEP.    Recommendations:  Discharge with current home program.  Patient to follow up with MD as needed.    Please refer to the daily flowsheet for treatment today, total treatment time and time spent performing 1:1 timed codes.     Physician Progress Note      PATIENTClmayte Spine  CSN #:                  [id number]  :                       1955  ADMIT DATE:       2023 1:14 AM  DISCH DATE:  RESPONDING  PROVIDER #:        Olive Mata MD          QUERY TEXT:    Pt admitted with anemia. Pt noted to have likely hemorrhoidal bleeding. If   possible, please document in the progress notes and discharge summary if you   are evaluating and/or treating any of the following: The medical record reflects the following:  Risk Factors: anemia  Clinical Indicators: on arrival hgb 5.4 and hct 16.4; melena - likely   hemorrhoidal bleeding  Treatment: Labs, imaging, transfusion PRBC  Options provided:  -- Acute blood loss anemia  -- Chronic blood loss anemia  -- Acute on chronic blood loss anemia  -- Iron deficiency anemia  -- Anemia of chronic disease due to CML  -- Anemia due to antineoplastic chemotherapy  -- Dilutional anemia  -- Precipitous drop in Hemoglobin and Hematocrit  -- Other - I will add my own diagnosis  -- Disagree - Not applicable / Not valid  -- Disagree - Clinically unable to determine / Unknown  -- Refer to Clinical Documentation Reviewer    PROVIDER RESPONSE TEXT:    This patient has anemia of chronic disease due to CML. Query created by: Abelardo Castrejon on 2023 9:37 AM      Electronically signed by:   Olive Mata MD 6/10/2023 8:09 AM

## 2023-06-10 NOTE — PLAN OF CARE
Problem: Respiratory - Adult  Goal: Achieves optimal ventilation and oxygenation  6/10/2023 0948 by Aaron Telles RCP  Outcome: Progressing     Problem: Respiratory - Adult  Goal: Clear lung sounds  Outcome: Progressing     Patient is receiving tiotropium and mometasone-formoterol to promote and preserve a clear, open airway. Patient breath sounds improved post-treatment and will continue to improve with further therapy. Patient mutually agreed on goals.

## 2023-06-10 NOTE — PLAN OF CARE
Problem: Discharge Planning  Goal: Discharge to home or other facility with appropriate resources  6/10/2023 1015 by Rylee Vasquez RN  Outcome: Progressing  Flowsheets (Taken 6/10/2023 1015)  Discharge to home or other facility with appropriate resources:   Identify barriers to discharge with patient and caregiver   Arrange for needed discharge resources and transportation as appropriate   Identify discharge learning needs (meds, wound care, etc)   Refer to discharge planning if patient needs post-hospital services based on physician order or complex needs related to functional status, cognitive ability or social support system     Problem: Safety - Adult  Goal: Free from fall injury  6/10/2023 1015 by Rylee Vasquez RN  Outcome: Progressing  Flowsheets (Taken 6/10/2023 1015)  Free From Fall Injury: Instruct family/caregiver on patient safety  Note: Bed locked & in low position, call light in reach, side-rails up x2, bed/chair alarm utilized, non-slip socks on when ambulating, reminded patient to use call light to call for assistance.       Problem: ABCDS Injury Assessment  Goal: Absence of physical injury  6/10/2023 1015 by Rylee Vasquez RN  Outcome: Progressing  Flowsheets (Taken 6/10/2023 1015)  Absence of Physical Injury: Implement safety measures based on patient assessment     Problem: Respiratory - Adult  Goal: Achieves optimal ventilation and oxygenation  6/10/2023 1015 by Rylee Vasquez RN  Outcome: Progressing  Flowsheets (Taken 6/10/2023 1015)  Achieves optimal ventilation and oxygenation:   Assess for changes in respiratory status   Assess for changes in mentation and behavior   Position to facilitate oxygenation and minimize respiratory effort   Oxygen supplementation based on oxygen saturation or arterial blood gases   Assess and instruct to report shortness of breath or any respiratory difficulty   Respiratory therapy support as indicated  Note: Lung sounds, pulse ox, and breathing monitored

## 2023-06-11 NOTE — DISCHARGE SUMMARY
Discharge Summary     Patient Identification:  [place] Nena De La O  : 1955  MRN: [id number]   Account: [de-identified]     [address] date: 2023  Discharge date: 6/10/2023   Attending provider: No att. providers found        Primary care provider: YESY Wilkinson CNP     Discharge Diagnoses:   Principal Problem (Resolved): Low hemoglobin  Active Problems:    Symptomatic anemia    Chronic respiratory failure with hypoxia (HCC)    CML (chronic myelocytic leukemia) (HCC)    Dyslipidemia    Symptomatic anemia: Patient dorsals dyspnea and fatigue likely 2/2 hydroxyurea and bone marrow suppression 2/2 CML. Monitor H&H 4.. Received 2 units PRBC prior to transport. At [facility] has received 2 more units PRBC and 1 unit platelets. Awaiting H&H. No sign of hematochezia, melena.  -H&H every 6 hours  -Transfuse <7  -RBCs leukoreduced and irradiated  Chronic hypoxic respiratory failure: On 2-3 L NC at home. CML type I: Recently diagnosed at [facility]. Scheduled with oncology Dr. Ramiro Sanchez in [facility] on 2023.  -Continue home Zovirax 200 qd, allopurinol 300 qd, currently holding hydroxyurea 500  COPD: Patient on Trelegy at home. Changed to Dulera 200-5 2 puffs bid and Spiriva 2 puffs qd. Albuterol neb or inhaler prn  HLD: Continue home Lipitor 80 mg nightly  HTN: Continue home Norvasc 5 mg nightly  BPH: Continue home Flomax 0.8 mg nightly  Obesity: BMI 39.7     From prior note: \"  Hospital Course: Patient is 53-year-old male with PMHx of recently diagnosed CML, former smoker, chronic respiratory failure on 2 L NC, HLD, HTN, COPD, RA who was transferred to [facility] from [address] [place] [address] on 2023 for 60: Progressive fatigue and shortness of breath. Patient was sent to ED 2/2 symptomatic anemia. See H&P for complete history, in short patient under went pulmonary biopsy 2023 results CML. Heme-onc placed patient on acyclovir, allopurinol, hydroxyurea.   Patient scheduled to follow-up with Dr. Ramiro Sanchez in [facility] on

## 2023-06-13 ENCOUNTER — HOSPITAL ENCOUNTER (OUTPATIENT)
Dept: INFUSION THERAPY | Age: 68
Discharge: HOME | End: 2023-06-13
Payer: COMMERCIAL

## 2023-06-13 VITALS
SYSTOLIC BLOOD PRESSURE: 139 MMHG | TEMPERATURE: 97.1 F | DIASTOLIC BLOOD PRESSURE: 63 MMHG | BODY MASS INDEX: 39.84 KG/M2 | HEART RATE: 83 BPM | WEIGHT: 284.6 LBS | RESPIRATION RATE: 20 BRPM | OXYGEN SATURATION: 91 % | HEIGHT: 71 IN

## 2023-06-13 DIAGNOSIS — D72.821 MONOCYTOSIS: ICD-10-CM

## 2023-06-13 DIAGNOSIS — D69.6 THROMBOCYTOPENIA (HCC): ICD-10-CM

## 2023-06-13 DIAGNOSIS — D64.9 ANEMIA, UNSPECIFIED TYPE: ICD-10-CM

## 2023-06-13 LAB — SCAN OF BLOOD SMEAR: NORMAL

## 2023-06-13 PROCEDURE — 99211 OFF/OP EST MAY X REQ PHY/QHP: CPT

## 2023-06-13 PROCEDURE — 85025 COMPLETE CBC W/AUTO DIFF WBC: CPT

## 2023-06-13 PROCEDURE — 80053 COMPREHEN METABOLIC PANEL: CPT

## 2023-06-13 PROCEDURE — 81270 JAK2 GENE: CPT

## 2023-06-13 PROCEDURE — 84550 ASSAY OF BLOOD/URIC ACID: CPT

## 2023-06-13 PROCEDURE — 81206 BCR/ABL1 GENE MAJOR BP: CPT

## 2023-06-14 LAB
ALBUMIN SERPL BCG-MCNC: 3.9 G/DL (ref 3.5–5.1)
ALP SERPL-CCNC: 85 U/L (ref 38–126)
ALT SERPL W/O P-5'-P-CCNC: 21 U/L (ref 11–66)
ANION GAP SERPL CALC-SCNC: 12 MEQ/L (ref 8–16)
AST SERPL-CCNC: 12 U/L (ref 5–40)
AUTO DIFF PNL BLD: ABNORMAL
BASOPHILS ABSOLUTE: 0 THOU/MM3 (ref 0–0.1)
BASOPHILS NFR BLD AUTO: 0 %
BILIRUB SERPL-MCNC: 0.3 MG/DL (ref 0.3–1.2)
BLASTS: 5 % (ref 0–1)
BUN SERPL-MCNC: 21 MG/DL (ref 7–22)
CALCIUM SERPL-MCNC: 8.7 MG/DL (ref 8.5–10.5)
CHLORIDE SERPL-SCNC: 102 MEQ/L (ref 98–111)
CO2 SERPL-SCNC: 25 MEQ/L (ref 23–33)
CREAT SERPL-MCNC: 1.2 MG/DL (ref 0.4–1.2)
DEPRECATED RDW RBC AUTO: 61.8 FL (ref 35–45)
EOSINOPHIL NFR BLD AUTO: 1 %
EOSINOPHILS ABSOLUTE: 0.2 THOU/MM3 (ref 0–0.4)
ERYTHROCYTE [DISTWIDTH] IN BLOOD BY AUTOMATED COUNT: 19.1 % (ref 11.5–14.5)
GFR SERPL CREATININE-BSD FRML MDRD: > 60 ML/MIN/1.73M2
GLUCOSE SERPL-MCNC: 88 MG/DL (ref 70–108)
HCT VFR BLD AUTO: 22.5 % (ref 42–52)
HGB BLD-MCNC: 7.8 GM/DL (ref 14–18)
IMM GRANULOCYTES # BLD AUTO: 0.64 THOU/MM3 (ref 0–0.07)
IMM GRANULOCYTES NFR BLD AUTO: 4 %
LYMPHOCYTES ABSOLUTE: 3.5 THOU/MM3 (ref 1–4.8)
LYMPHOCYTES NFR BLD AUTO: 22 %
MCH RBC QN AUTO: 35.9 PG (ref 26–33)
MCHC RBC AUTO-ENTMCNC: 34.7 GM/DL (ref 32.2–35.5)
MCV RBC AUTO: 103.7 FL (ref 80–94)
METAMYELOCYTES: 2 %
MONOCYTES ABSOLUTE: 4.7 THOU/MM3 (ref 0.4–1.3)
MONOCYTES NFR BLD AUTO: 29 %
MYELOCYTES: 2 %
NEUTROPHILS NFR BLD AUTO: 39 %
NRBC BLD AUTO-RTO: 0 /100 WBC
PATHOLOGIST REVIEW: ABNORMAL
PLATELET # BLD AUTO: 21 THOU/MM3 (ref 130–400)
PLATELET BLD QL SMEAR: ABNORMAL
PMV BLD AUTO: 12.1 FL (ref 9.4–12.4)
POTASSIUM SERPL-SCNC: 4.3 MEQ/L (ref 3.5–5.2)
PROT SERPL-MCNC: 7.2 G/DL (ref 6.1–8)
RBC # BLD AUTO: 2.17 MILL/MM3 (ref 4.7–6.1)
SEGMENTED NEUTROPHILS ABSOLUTE COUNT: 6.3 THOU/MM3 (ref 1.8–7.7)
SODIUM SERPL-SCNC: 139 MEQ/L (ref 135–145)
URATE SERPL-MCNC: 4.3 MG/DL (ref 3.7–7)
WBC # BLD AUTO: 16.1 THOU/MM3 (ref 4.8–10.8)

## 2023-06-20 LAB — MISC. #1 REFERENCE GROUP TEST: NORMAL

## 2023-06-21 ENCOUNTER — HOSPITAL ENCOUNTER (OUTPATIENT)
Dept: INFUSION THERAPY | Age: 68
Discharge: HOME | End: 2023-06-21
Payer: COMMERCIAL

## 2023-06-21 ENCOUNTER — OFFICE VISIT (OUTPATIENT)
Dept: ONCOLOGY | Age: 68
End: 2023-06-21
Payer: MEDICARE

## 2023-06-21 VITALS
DIASTOLIC BLOOD PRESSURE: 67 MMHG | TEMPERATURE: 99.1 F | OXYGEN SATURATION: 90 % | SYSTOLIC BLOOD PRESSURE: 138 MMHG | RESPIRATION RATE: 20 BRPM | HEART RATE: 98 BPM

## 2023-06-21 VITALS
RESPIRATION RATE: 20 BRPM | WEIGHT: 277.4 LBS | TEMPERATURE: 99.1 F | DIASTOLIC BLOOD PRESSURE: 67 MMHG | SYSTOLIC BLOOD PRESSURE: 138 MMHG | BODY MASS INDEX: 38.84 KG/M2 | OXYGEN SATURATION: 90 % | HEART RATE: 98 BPM | HEIGHT: 71 IN

## 2023-06-21 DIAGNOSIS — D69.6 THROMBOCYTOPENIA (HCC): ICD-10-CM

## 2023-06-21 DIAGNOSIS — D63.8 ANEMIA IN OTHER CHRONIC DISEASES CLASSIFIED ELSEWHERE: Primary | ICD-10-CM

## 2023-06-21 DIAGNOSIS — D72.821 MONOCYTOSIS: ICD-10-CM

## 2023-06-21 DIAGNOSIS — D46.9 MDS (MYELODYSPLASTIC SYNDROME) (HCC): ICD-10-CM

## 2023-06-21 DIAGNOSIS — D64.9 ANEMIA, UNSPECIFIED TYPE: ICD-10-CM

## 2023-06-21 DIAGNOSIS — D46.Z MDS (MYELODYSPLASTIC SYNDROME), HIGH GRADE (HCC): Primary | ICD-10-CM

## 2023-06-21 DIAGNOSIS — D64.9 ANEMIA, UNSPECIFIED TYPE: Primary | ICD-10-CM

## 2023-06-21 LAB
JAK2 P.V617F BLD/T QL: NOT DETECTED
MANUAL DIFF BLD: NORMAL
SPECIMEN SOURCE: NORMAL

## 2023-06-21 PROCEDURE — 99214 OFFICE O/P EST MOD 30 MIN: CPT | Performed by: INTERNAL MEDICINE

## 2023-06-21 PROCEDURE — 1036F TOBACCO NON-USER: CPT | Performed by: INTERNAL MEDICINE

## 2023-06-21 PROCEDURE — 85027 COMPLETE CBC AUTOMATED: CPT

## 2023-06-21 PROCEDURE — 1123F ACP DISCUSS/DSCN MKR DOCD: CPT | Performed by: INTERNAL MEDICINE

## 2023-06-21 PROCEDURE — G8417 CALC BMI ABV UP PARAM F/U: HCPCS | Performed by: INTERNAL MEDICINE

## 2023-06-21 PROCEDURE — 99211 OFF/OP EST MAY X REQ PHY/QHP: CPT

## 2023-06-21 PROCEDURE — 1111F DSCHRG MED/CURRENT MED MERGE: CPT | Performed by: INTERNAL MEDICINE

## 2023-06-21 PROCEDURE — 3017F COLORECTAL CA SCREEN DOC REV: CPT | Performed by: INTERNAL MEDICINE

## 2023-06-21 PROCEDURE — G8427 DOCREV CUR MEDS BY ELIG CLIN: HCPCS | Performed by: INTERNAL MEDICINE

## 2023-06-21 RX ORDER — FAMOTIDINE 10 MG/ML
20 INJECTION, SOLUTION INTRAVENOUS
OUTPATIENT
Start: 2023-06-28

## 2023-06-21 RX ORDER — ALBUTEROL SULFATE 90 UG/1
4 AEROSOL, METERED RESPIRATORY (INHALATION) PRN
OUTPATIENT
Start: 2023-06-27

## 2023-06-21 RX ORDER — ALLOPURINOL 300 MG/1
300 TABLET ORAL DAILY
Qty: 30 TABLET | Refills: 5 | Status: SHIPPED | OUTPATIENT
Start: 2023-06-21

## 2023-06-21 RX ORDER — HEPARIN SODIUM (PORCINE) LOCK FLUSH IV SOLN 100 UNIT/ML 100 UNIT/ML
500 SOLUTION INTRAVENOUS PRN
OUTPATIENT
Start: 2023-06-27

## 2023-06-21 RX ORDER — ONDANSETRON 2 MG/ML
8 INJECTION INTRAMUSCULAR; INTRAVENOUS
OUTPATIENT
Start: 2023-06-30

## 2023-06-21 RX ORDER — ONDANSETRON 4 MG/1
8 TABLET, ORALLY DISINTEGRATING ORAL ONCE
OUTPATIENT
Start: 2023-06-28 | End: 2023-06-28

## 2023-06-21 RX ORDER — EPINEPHRINE 1 MG/ML
0.3 INJECTION, SOLUTION, CONCENTRATE INTRAVENOUS PRN
OUTPATIENT
Start: 2023-06-28

## 2023-06-21 RX ORDER — ACYCLOVIR 200 MG/1
200 CAPSULE ORAL 2 TIMES DAILY
Qty: 60 CAPSULE | Refills: 5 | Status: SHIPPED | OUTPATIENT
Start: 2023-06-21 | End: 2023-12-18

## 2023-06-21 RX ORDER — DIPHENHYDRAMINE HYDROCHLORIDE 50 MG/ML
50 INJECTION INTRAMUSCULAR; INTRAVENOUS
OUTPATIENT
Start: 2023-06-27

## 2023-06-21 RX ORDER — DIPHENHYDRAMINE HYDROCHLORIDE 50 MG/ML
50 INJECTION INTRAMUSCULAR; INTRAVENOUS
OUTPATIENT
Start: 2023-06-26

## 2023-06-21 RX ORDER — HEPARIN SODIUM (PORCINE) LOCK FLUSH IV SOLN 100 UNIT/ML 100 UNIT/ML
500 SOLUTION INTRAVENOUS PRN
OUTPATIENT
Start: 2023-06-26

## 2023-06-21 RX ORDER — SODIUM CHLORIDE 9 MG/ML
INJECTION, SOLUTION INTRAVENOUS CONTINUOUS
OUTPATIENT
Start: 2023-06-28

## 2023-06-21 RX ORDER — SODIUM CHLORIDE 9 MG/ML
5-250 INJECTION, SOLUTION INTRAVENOUS PRN
OUTPATIENT
Start: 2023-06-27

## 2023-06-21 RX ORDER — MEPERIDINE HYDROCHLORIDE 50 MG/ML
12.5 INJECTION INTRAMUSCULAR; INTRAVENOUS; SUBCUTANEOUS PRN
OUTPATIENT
Start: 2023-06-30

## 2023-06-21 RX ORDER — EPINEPHRINE 1 MG/ML
0.3 INJECTION, SOLUTION, CONCENTRATE INTRAVENOUS PRN
OUTPATIENT
Start: 2023-06-27

## 2023-06-21 RX ORDER — SODIUM CHLORIDE 0.9 % (FLUSH) 0.9 %
5-40 SYRINGE (ML) INJECTION PRN
OUTPATIENT
Start: 2023-06-26

## 2023-06-21 RX ORDER — SODIUM CHLORIDE 9 MG/ML
INJECTION, SOLUTION INTRAVENOUS CONTINUOUS
OUTPATIENT
Start: 2023-06-26

## 2023-06-21 RX ORDER — ONDANSETRON 2 MG/ML
8 INJECTION INTRAMUSCULAR; INTRAVENOUS
OUTPATIENT
Start: 2023-06-28

## 2023-06-21 RX ORDER — EPINEPHRINE 1 MG/ML
0.3 INJECTION, SOLUTION, CONCENTRATE INTRAVENOUS PRN
OUTPATIENT
Start: 2023-06-26

## 2023-06-21 RX ORDER — ACETAMINOPHEN 325 MG/1
650 TABLET ORAL
OUTPATIENT
Start: 2023-06-30

## 2023-06-21 RX ORDER — ACETAMINOPHEN 325 MG/1
650 TABLET ORAL
OUTPATIENT
Start: 2023-06-29

## 2023-06-21 RX ORDER — EPINEPHRINE 1 MG/ML
0.3 INJECTION, SOLUTION, CONCENTRATE INTRAVENOUS PRN
OUTPATIENT
Start: 2023-06-30

## 2023-06-21 RX ORDER — DIPHENHYDRAMINE HYDROCHLORIDE 50 MG/ML
50 INJECTION INTRAMUSCULAR; INTRAVENOUS
OUTPATIENT
Start: 2023-06-29

## 2023-06-21 RX ORDER — ACETAMINOPHEN 325 MG/1
650 TABLET ORAL
OUTPATIENT
Start: 2023-06-26

## 2023-06-21 RX ORDER — SODIUM CHLORIDE 9 MG/ML
INJECTION, SOLUTION INTRAVENOUS CONTINUOUS
OUTPATIENT
Start: 2023-06-30

## 2023-06-21 RX ORDER — ONDANSETRON 4 MG/1
8 TABLET, ORALLY DISINTEGRATING ORAL ONCE
OUTPATIENT
Start: 2023-06-30 | End: 2023-06-30

## 2023-06-21 RX ORDER — ONDANSETRON 2 MG/ML
8 INJECTION INTRAMUSCULAR; INTRAVENOUS
OUTPATIENT
Start: 2023-06-26

## 2023-06-21 RX ORDER — ALBUTEROL SULFATE 90 UG/1
4 AEROSOL, METERED RESPIRATORY (INHALATION) PRN
OUTPATIENT
Start: 2023-06-26

## 2023-06-21 RX ORDER — MEPERIDINE HYDROCHLORIDE 50 MG/ML
12.5 INJECTION INTRAMUSCULAR; INTRAVENOUS; SUBCUTANEOUS PRN
OUTPATIENT
Start: 2023-06-27

## 2023-06-21 RX ORDER — SODIUM CHLORIDE 9 MG/ML
5-250 INJECTION, SOLUTION INTRAVENOUS PRN
OUTPATIENT
Start: 2023-06-29

## 2023-06-21 RX ORDER — SODIUM CHLORIDE 0.9 % (FLUSH) 0.9 %
5-40 SYRINGE (ML) INJECTION PRN
OUTPATIENT
Start: 2023-06-29

## 2023-06-21 RX ORDER — SODIUM CHLORIDE 9 MG/ML
INJECTION, SOLUTION INTRAVENOUS CONTINUOUS
OUTPATIENT
Start: 2023-06-29

## 2023-06-21 RX ORDER — ACETAMINOPHEN 325 MG/1
650 TABLET ORAL
OUTPATIENT
Start: 2023-06-28

## 2023-06-21 RX ORDER — SODIUM CHLORIDE 0.9 % (FLUSH) 0.9 %
5-40 SYRINGE (ML) INJECTION PRN
OUTPATIENT
Start: 2023-06-30

## 2023-06-21 RX ORDER — ALBUTEROL SULFATE 90 UG/1
4 AEROSOL, METERED RESPIRATORY (INHALATION) PRN
OUTPATIENT
Start: 2023-06-30

## 2023-06-21 RX ORDER — ONDANSETRON 2 MG/ML
8 INJECTION INTRAMUSCULAR; INTRAVENOUS
OUTPATIENT
Start: 2023-06-29

## 2023-06-21 RX ORDER — HEPARIN SODIUM (PORCINE) LOCK FLUSH IV SOLN 100 UNIT/ML 100 UNIT/ML
500 SOLUTION INTRAVENOUS PRN
OUTPATIENT
Start: 2023-06-28

## 2023-06-21 RX ORDER — SODIUM CHLORIDE 9 MG/ML
5-250 INJECTION, SOLUTION INTRAVENOUS PRN
OUTPATIENT
Start: 2023-06-26

## 2023-06-21 RX ORDER — FAMOTIDINE 10 MG/ML
20 INJECTION, SOLUTION INTRAVENOUS
OUTPATIENT
Start: 2023-06-30

## 2023-06-21 RX ORDER — DEXAMETHASONE 0.5 MG/1
12 TABLET ORAL ONCE
OUTPATIENT
Start: 2023-06-29 | End: 2023-06-29

## 2023-06-21 RX ORDER — FAMOTIDINE 10 MG/ML
20 INJECTION, SOLUTION INTRAVENOUS
OUTPATIENT
Start: 2023-06-27

## 2023-06-21 RX ORDER — ONDANSETRON 2 MG/ML
8 INJECTION INTRAMUSCULAR; INTRAVENOUS
OUTPATIENT
Start: 2023-06-27

## 2023-06-21 RX ORDER — DEXAMETHASONE 0.5 MG/1
12 TABLET ORAL ONCE
OUTPATIENT
Start: 2023-06-28 | End: 2023-06-28

## 2023-06-21 RX ORDER — ALBUTEROL SULFATE 90 UG/1
4 AEROSOL, METERED RESPIRATORY (INHALATION) PRN
OUTPATIENT
Start: 2023-06-28

## 2023-06-21 RX ORDER — ACETAMINOPHEN 325 MG/1
650 TABLET ORAL
OUTPATIENT
Start: 2023-06-27

## 2023-06-21 RX ORDER — DIPHENHYDRAMINE HYDROCHLORIDE 50 MG/ML
50 INJECTION INTRAMUSCULAR; INTRAVENOUS
OUTPATIENT
Start: 2023-06-28

## 2023-06-21 RX ORDER — DEXAMETHASONE 0.5 MG/1
12 TABLET ORAL ONCE
OUTPATIENT
Start: 2023-06-30 | End: 2023-06-30

## 2023-06-21 RX ORDER — SODIUM CHLORIDE 9 MG/ML
5-250 INJECTION, SOLUTION INTRAVENOUS PRN
OUTPATIENT
Start: 2023-06-28

## 2023-06-21 RX ORDER — SODIUM CHLORIDE 0.9 % (FLUSH) 0.9 %
5-40 SYRINGE (ML) INJECTION PRN
OUTPATIENT
Start: 2023-06-27

## 2023-06-21 RX ORDER — MEPERIDINE HYDROCHLORIDE 50 MG/ML
12.5 INJECTION INTRAMUSCULAR; INTRAVENOUS; SUBCUTANEOUS PRN
OUTPATIENT
Start: 2023-06-26

## 2023-06-21 RX ORDER — ONDANSETRON 4 MG/1
8 TABLET, ORALLY DISINTEGRATING ORAL ONCE
OUTPATIENT
Start: 2023-06-26 | End: 2023-06-26

## 2023-06-21 RX ORDER — ONDANSETRON 4 MG/1
8 TABLET, ORALLY DISINTEGRATING ORAL ONCE
OUTPATIENT
Start: 2023-06-29 | End: 2023-06-29

## 2023-06-21 RX ORDER — HEPARIN SODIUM (PORCINE) LOCK FLUSH IV SOLN 100 UNIT/ML 100 UNIT/ML
500 SOLUTION INTRAVENOUS PRN
OUTPATIENT
Start: 2023-06-30

## 2023-06-21 RX ORDER — DEXAMETHASONE 0.5 MG/1
12 TABLET ORAL ONCE
OUTPATIENT
Start: 2023-06-27 | End: 2023-06-27

## 2023-06-21 RX ORDER — FAMOTIDINE 10 MG/ML
20 INJECTION, SOLUTION INTRAVENOUS
OUTPATIENT
Start: 2023-06-29

## 2023-06-21 RX ORDER — MEPERIDINE HYDROCHLORIDE 50 MG/ML
12.5 INJECTION INTRAMUSCULAR; INTRAVENOUS; SUBCUTANEOUS PRN
OUTPATIENT
Start: 2023-06-28

## 2023-06-21 RX ORDER — SODIUM CHLORIDE 9 MG/ML
5-250 INJECTION, SOLUTION INTRAVENOUS PRN
OUTPATIENT
Start: 2023-06-30

## 2023-06-21 RX ORDER — EPINEPHRINE 1 MG/ML
0.3 INJECTION, SOLUTION, CONCENTRATE INTRAVENOUS PRN
OUTPATIENT
Start: 2023-06-29

## 2023-06-21 RX ORDER — SODIUM CHLORIDE 9 MG/ML
INJECTION, SOLUTION INTRAVENOUS CONTINUOUS
OUTPATIENT
Start: 2023-06-27

## 2023-06-21 RX ORDER — FAMOTIDINE 10 MG/ML
20 INJECTION, SOLUTION INTRAVENOUS
OUTPATIENT
Start: 2023-06-26

## 2023-06-21 RX ORDER — ALBUTEROL SULFATE 90 UG/1
4 AEROSOL, METERED RESPIRATORY (INHALATION) PRN
OUTPATIENT
Start: 2023-06-29

## 2023-06-21 RX ORDER — HEPARIN SODIUM (PORCINE) LOCK FLUSH IV SOLN 100 UNIT/ML 100 UNIT/ML
500 SOLUTION INTRAVENOUS PRN
OUTPATIENT
Start: 2023-06-29

## 2023-06-21 RX ORDER — DEXAMETHASONE 0.5 MG/1
12 TABLET ORAL ONCE
OUTPATIENT
Start: 2023-06-26 | End: 2023-06-26

## 2023-06-21 RX ORDER — ONDANSETRON 4 MG/1
8 TABLET, ORALLY DISINTEGRATING ORAL ONCE
OUTPATIENT
Start: 2023-06-27 | End: 2023-06-27

## 2023-06-21 RX ORDER — MEPERIDINE HYDROCHLORIDE 50 MG/ML
12.5 INJECTION INTRAMUSCULAR; INTRAVENOUS; SUBCUTANEOUS PRN
OUTPATIENT
Start: 2023-06-29

## 2023-06-21 RX ORDER — DIPHENHYDRAMINE HYDROCHLORIDE 50 MG/ML
50 INJECTION INTRAMUSCULAR; INTRAVENOUS
OUTPATIENT
Start: 2023-06-30

## 2023-06-21 RX ORDER — SODIUM CHLORIDE 0.9 % (FLUSH) 0.9 %
5-40 SYRINGE (ML) INJECTION PRN
OUTPATIENT
Start: 2023-06-28

## 2023-06-21 NOTE — PROGRESS NOTES
[address] [facility]  [address] [facility] [address]  Dept: [phone]  Loc: [phone]   Hematology/Oncology Progress Note (Clinic)        Darion Grahambutch  1955 6/21/2023     YESY Giraldo CNP     Diagnosis:   LEUKOCYTOSIS  ANEMIA  THROMOBOCYTOPENIA  MIXED MYELODYSPLASTIC/MYELOPROLIFERATIVE NEOPLASM                                                       Treatment:     Treatment plan:  BCR/ABL;JAK2;CBC;CMP;URIC ACID TODAY    Followable Disease:   CBC      Comorbidities:    Arthritis, COPD, hypertension    Subjective:     HPI:  The patient is a 80-year-old male who was initially seen by [facility] in [place] by Carol Rowley. He was found to have an abnormal CBC with leukocytosis, anemia and thrombocytopenia and a diagnosis of chronic myelogenous leukemia was made. He also received some blood transfusions according to the patient, with 5 units of packed red blood cells and 1 unit of platelets. A bone marrow biopsy and aspirate were done on May 16, 2023 at [facility] in [place]. We received the report with a final pathologic diagnosis of mixed myelodysplastic/myeloproliferative neoplasm. Blasts were 3% in the peripheral blood and 6% in the bone marrow. A comment was made that the presence of absolute monocytes in the peripheral blood is morphologically most compatible with chronic myelomonocytic leukemia type I. Correlation was recommended with cytogenetic FISH and mutational results. Bone marrow aspirate described as a dry tap. Touch prep of dysgranulopoiesis manifesting as markedly hypergranular granulocytic elements and increased blasts. On a 500 cell count there were 6% blasts 91% myeloids 2% erythroids and 1% lymphs. Decalcified bone marrow biopsy showed markedly increased granulopoiesis and disc granulopoiesis with monocytic differentiation.   Erythropoiesis was markedly

## 2023-06-21 NOTE — PATIENT INSTRUCTIONS
ASSESSMENT/PLAN:    1: Diagnosis:   MIXED MYELODYSPLASTIC/MYELOPROLIFERATIVE NEOPLASM  LEUKOCYTOSIS  ANEMIA  THROMOBOCYTOPENIA                                               2) Treatment goal:      Treatment plan:      5-azacytidine days 1 through 7 (day 1 through 5; 8, 9) every 28 days      Retacrit 40,000 units weekly for Hgb<11      CBC weekly       Transfuse one unit of PRBC's tomorrow for Hgb <8.0 and severe hypoxic chronic lung disease      3) Follow Up: Office appointment 3 weeks

## 2023-06-22 LAB
AUTO DIFF PNL BLD: ABNORMAL
BASOPHILS ABSOLUTE: 0 THOU/MM3 (ref 0–0.1)
BASOPHILS NFR BLD AUTO: 0 %
BLASTS: 4 % (ref 0–1)
DEPRECATED RDW RBC AUTO: 60.7 FL (ref 35–45)
EOSINOPHIL NFR BLD AUTO: 1 %
EOSINOPHILS ABSOLUTE: 0.3 THOU/MM3 (ref 0–0.4)
ERYTHROCYTE [DISTWIDTH] IN BLOOD BY AUTOMATED COUNT: 18.7 % (ref 11.5–14.5)
HCT VFR BLD AUTO: 22.1 % (ref 42–52)
HGB BLD-MCNC: 7.3 GM/DL (ref 14–18)
LYMPHOCYTES ABSOLUTE: 8.6 THOU/MM3 (ref 1–4.8)
LYMPHOCYTES NFR BLD AUTO: 26 %
MCH RBC QN AUTO: 34.6 PG (ref 26–33)
MCHC RBC AUTO-ENTMCNC: 33 GM/DL (ref 32.2–35.5)
MCV RBC AUTO: 104.7 FL (ref 80–94)
METAMYELOCYTES: 3 %
MONOCYTES ABSOLUTE: 1 THOU/MM3 (ref 0.4–1.3)
MONOCYTES NFR BLD AUTO: 3 %
MYELOCYTES: 15 %
NEUTROPHILS NFR BLD AUTO: 48 %
NRBC BLD AUTO-RTO: 0 /100 WBC
PATHOLOGIST REVIEW: ABNORMAL
PLATELET # BLD AUTO: 36 THOU/MM3 (ref 130–400)
PLATELET BLD QL SMEAR: ABNORMAL
PMV BLD AUTO: 11.6 FL (ref 9.4–12.4)
RBC # BLD AUTO: 2.11 MILL/MM3 (ref 4.7–6.1)
SEGMENTED NEUTROPHILS ABSOLUTE COUNT: 15.9 THOU/MM3 (ref 1.8–7.7)
VARIANT LYMPHS BLD QL SMEAR: ABNORMAL %
WBC # BLD AUTO: 33.1 THOU/MM3 (ref 4.8–10.8)

## 2023-06-26 ENCOUNTER — HOSPITAL ENCOUNTER (OUTPATIENT)
Dept: INFUSION THERAPY | Age: 68
Discharge: HOME | End: 2023-06-26
Payer: COMMERCIAL

## 2023-06-26 VITALS
OXYGEN SATURATION: 95 % | WEIGHT: 281 LBS | TEMPERATURE: 98.9 F | DIASTOLIC BLOOD PRESSURE: 63 MMHG | HEIGHT: 71 IN | SYSTOLIC BLOOD PRESSURE: 137 MMHG | RESPIRATION RATE: 18 BRPM | BODY MASS INDEX: 39.34 KG/M2 | HEART RATE: 87 BPM

## 2023-06-26 DIAGNOSIS — D46.9 MDS (MYELODYSPLASTIC SYNDROME) (HCC): Primary | ICD-10-CM

## 2023-06-26 LAB
ALBUMIN SERPL BCG-MCNC: 3.6 G/DL (ref 3.5–5.1)
ALP SERPL-CCNC: 86 U/L (ref 38–126)
ALT SERPL W/O P-5'-P-CCNC: 14 U/L (ref 11–66)
AST SERPL-CCNC: 15 U/L (ref 5–40)
BILIRUB CONJ SERPL-MCNC: < 0.2 MG/DL (ref 0–0.3)
BILIRUB SERPL-MCNC: 0.2 MG/DL (ref 0.3–1.2)
BUN BLDP-MCNC: 16 MG/DL (ref 8–26)
CHLORIDE BLD-SCNC: 102 MEQ/L (ref 98–109)
CREAT BLD-MCNC: 1.4 MG/DL (ref 0.5–1.2)
GENE XXX MUT ANL BLD/T: NOT DETECTED
GFR SERPL CREATININE-BSD FRML MDRD: 55 ML/MIN/1.73M2
GLUCOSE BLD-MCNC: 156 MG/DL (ref 70–108)
IONIZED CALCIUM, WHOLE BLOOD: 1.17 MMOL/L (ref 1.12–1.32)
MANUAL DIFF BLD: NORMAL
MPL GENE MUT TESTED BLD/T: NOT DETECTED
POTASSIUM BLD-SCNC: 3.6 MEQ/L (ref 3.5–4.9)
PROT SERPL-MCNC: 7.2 G/DL (ref 6.1–8)
SODIUM BLD-SCNC: 137 MEQ/L (ref 138–146)
TOTAL CO2, WHOLE BLOOD: 28 MEQ/L (ref 23–33)

## 2023-06-26 PROCEDURE — 6360000002 HC RX W HCPCS: Performed by: INTERNAL MEDICINE

## 2023-06-26 PROCEDURE — 87340 HEPATITIS B SURFACE AG IA: CPT

## 2023-06-26 PROCEDURE — 99212 OFFICE O/P EST SF 10 MIN: CPT

## 2023-06-26 PROCEDURE — 86706 HEP B SURFACE ANTIBODY: CPT

## 2023-06-26 PROCEDURE — 2580000003 HC RX 258: Performed by: INTERNAL MEDICINE

## 2023-06-26 PROCEDURE — 96401 CHEMO ANTI-NEOPL SQ/IM: CPT

## 2023-06-26 PROCEDURE — 86704 HEP B CORE ANTIBODY TOTAL: CPT

## 2023-06-26 PROCEDURE — 80076 HEPATIC FUNCTION PANEL: CPT

## 2023-06-26 PROCEDURE — 6370000000 HC RX 637 (ALT 250 FOR IP): Performed by: INTERNAL MEDICINE

## 2023-06-26 PROCEDURE — 85025 COMPLETE CBC W/AUTO DIFF WBC: CPT

## 2023-06-26 PROCEDURE — 80047 BASIC METABLC PNL IONIZED CA: CPT

## 2023-06-26 RX ORDER — DEXAMETHASONE 4 MG/1
12 TABLET ORAL ONCE
Status: COMPLETED | OUTPATIENT
Start: 2023-06-26 | End: 2023-06-26

## 2023-06-26 RX ORDER — ONDANSETRON 4 MG/1
4 TABLET, FILM COATED ORAL EVERY 8 HOURS PRN
COMMUNITY
End: 2023-06-26 | Stop reason: SDUPTHER

## 2023-06-26 RX ORDER — ONDANSETRON 4 MG/1
4 TABLET, FILM COATED ORAL EVERY 8 HOURS PRN
Qty: 90 TABLET | Refills: 3 | Status: SHIPPED | OUTPATIENT
Start: 2023-06-26

## 2023-06-26 RX ORDER — ONDANSETRON 4 MG/1
8 TABLET, ORALLY DISINTEGRATING ORAL ONCE
Status: COMPLETED | OUTPATIENT
Start: 2023-06-26 | End: 2023-06-26

## 2023-06-26 RX ADMIN — ONDANSETRON 8 MG: 4 TABLET, ORALLY DISINTEGRATING ORAL at 12:14

## 2023-06-26 RX ADMIN — WATER 188.25 MG: 1 INJECTION INTRAMUSCULAR; INTRAVENOUS; SUBCUTANEOUS at 12:29

## 2023-06-26 RX ADMIN — DEXAMETHASONE 12 MG: 4 TABLET ORAL at 12:14

## 2023-06-27 ENCOUNTER — HOSPITAL ENCOUNTER (OUTPATIENT)
Dept: INFUSION THERAPY | Age: 68
Discharge: HOME | End: 2023-06-27
Payer: COMMERCIAL

## 2023-06-27 VITALS
OXYGEN SATURATION: 95 % | HEART RATE: 95 BPM | SYSTOLIC BLOOD PRESSURE: 114 MMHG | TEMPERATURE: 98 F | DIASTOLIC BLOOD PRESSURE: 55 MMHG | RESPIRATION RATE: 18 BRPM

## 2023-06-27 DIAGNOSIS — D63.8 ANEMIA IN OTHER CHRONIC DISEASES CLASSIFIED ELSEWHERE: ICD-10-CM

## 2023-06-27 DIAGNOSIS — D46.9 MDS (MYELODYSPLASTIC SYNDROME) (HCC): Primary | ICD-10-CM

## 2023-06-27 LAB
AUTO DIFF PNL BLD: ABNORMAL
BASOPHILS ABSOLUTE: 0 THOU/MM3 (ref 0–0.1)
BASOPHILS NFR BLD AUTO: 0 %
DEPRECATED RDW RBC AUTO: 60.9 FL (ref 35–45)
EOSINOPHIL NFR BLD AUTO: 0 %
EOSINOPHILS ABSOLUTE: 0 THOU/MM3 (ref 0–0.4)
ERYTHROCYTE [DISTWIDTH] IN BLOOD BY AUTOMATED COUNT: 18.6 % (ref 11.5–14.5)
HBV CORE IGG+IGM SERPL QL IA: NONREACTIVE
HBV SURFACE AB SER QL IA: NEGATIVE
HBV SURFACE AG SERPL QL IA: NEGATIVE
HCT VFR BLD AUTO: 23.2 % (ref 42–52)
HGB BLD-MCNC: 7.6 GM/DL (ref 14–18)
LYMPHOCYTES ABSOLUTE: 3.7 THOU/MM3 (ref 1–4.8)
LYMPHOCYTES NFR BLD AUTO: 7 %
MCH RBC QN AUTO: 34.9 PG (ref 26–33)
MCHC RBC AUTO-ENTMCNC: 32.8 GM/DL (ref 32.2–35.5)
MCV RBC AUTO: 106.4 FL (ref 80–94)
METAMYELOCYTES: 15 %
MONOCYTES ABSOLUTE: 13.9 THOU/MM3 (ref 0.4–1.3)
MONOCYTES NFR BLD AUTO: 26 %
MYELOCYTES: 22 %
NEUTROPHILS NFR BLD AUTO: 30 %
NRBC BLD AUTO-RTO: 0 /100 WBC
PATHOLOGIST REVIEW: ABNORMAL
PLATELET # BLD AUTO: 25 THOU/MM3 (ref 130–400)
PMV BLD AUTO: 11.6 FL (ref 9.4–12.4)
RBC # BLD AUTO: 2.18 MILL/MM3 (ref 4.7–6.1)
SEGMENTED NEUTROPHILS ABSOLUTE COUNT: 16 THOU/MM3 (ref 1.8–7.7)
WBC # BLD AUTO: 53.4 THOU/MM3 (ref 4.8–10.8)

## 2023-06-27 PROCEDURE — 6360000002 HC RX W HCPCS: Performed by: INTERNAL MEDICINE

## 2023-06-27 PROCEDURE — 6370000000 HC RX 637 (ALT 250 FOR IP): Performed by: INTERNAL MEDICINE

## 2023-06-27 PROCEDURE — 96401 CHEMO ANTI-NEOPL SQ/IM: CPT

## 2023-06-27 PROCEDURE — 2580000003 HC RX 258: Performed by: INTERNAL MEDICINE

## 2023-06-27 PROCEDURE — 96372 THER/PROPH/DIAG INJ SC/IM: CPT

## 2023-06-27 PROCEDURE — 6360000002 HC RX W HCPCS: Performed by: NURSE PRACTITIONER

## 2023-06-27 RX ORDER — SODIUM CHLORIDE 9 MG/ML
INJECTION, SOLUTION INTRAVENOUS CONTINUOUS
Status: CANCELLED | OUTPATIENT
Start: 2023-07-04

## 2023-06-27 RX ORDER — DIPHENHYDRAMINE HYDROCHLORIDE 50 MG/ML
50 INJECTION INTRAMUSCULAR; INTRAVENOUS
Status: CANCELLED | OUTPATIENT
Start: 2023-07-04

## 2023-06-27 RX ORDER — ONDANSETRON 2 MG/ML
8 INJECTION INTRAMUSCULAR; INTRAVENOUS
Status: CANCELLED | OUTPATIENT
Start: 2023-07-04

## 2023-06-27 RX ORDER — ONDANSETRON 4 MG/1
8 TABLET, ORALLY DISINTEGRATING ORAL ONCE
Status: COMPLETED | OUTPATIENT
Start: 2023-06-27 | End: 2023-06-27

## 2023-06-27 RX ORDER — ALBUTEROL SULFATE 90 UG/1
4 AEROSOL, METERED RESPIRATORY (INHALATION) PRN
Status: CANCELLED | OUTPATIENT
Start: 2023-07-04

## 2023-06-27 RX ORDER — DEXAMETHASONE 4 MG/1
12 TABLET ORAL ONCE
Status: COMPLETED | OUTPATIENT
Start: 2023-06-27 | End: 2023-06-27

## 2023-06-27 RX ORDER — ACETAMINOPHEN 325 MG/1
650 TABLET ORAL
Status: CANCELLED | OUTPATIENT
Start: 2023-07-04

## 2023-06-27 RX ORDER — EPINEPHRINE 1 MG/ML
0.3 INJECTION, SOLUTION INTRAMUSCULAR; SUBCUTANEOUS PRN
Status: CANCELLED | OUTPATIENT
Start: 2023-07-04

## 2023-06-27 RX ADMIN — ONDANSETRON 8 MG: 4 TABLET, ORALLY DISINTEGRATING ORAL at 08:39

## 2023-06-27 RX ADMIN — AZACITIDINE 188.25 MG: 100 INJECTION, POWDER, LYOPHILIZED, FOR SOLUTION INTRAVENOUS; SUBCUTANEOUS at 09:05

## 2023-06-27 RX ADMIN — EPOETIN ALFA-EPBX 40000 UNITS: 40000 INJECTION, SOLUTION INTRAVENOUS; SUBCUTANEOUS at 08:40

## 2023-06-27 RX ADMIN — DEXAMETHASONE 12 MG: 4 TABLET ORAL at 08:39

## 2023-06-28 ENCOUNTER — HOSPITAL ENCOUNTER (OUTPATIENT)
Dept: INFUSION THERAPY | Age: 68
Discharge: HOME | End: 2023-06-28
Payer: COMMERCIAL

## 2023-06-28 VITALS
OXYGEN SATURATION: 95 % | TEMPERATURE: 97.8 F | DIASTOLIC BLOOD PRESSURE: 63 MMHG | SYSTOLIC BLOOD PRESSURE: 130 MMHG | HEART RATE: 92 BPM | RESPIRATION RATE: 22 BRPM

## 2023-06-28 DIAGNOSIS — D46.9 MDS (MYELODYSPLASTIC SYNDROME) (HCC): Primary | ICD-10-CM

## 2023-06-28 PROCEDURE — 2580000003 HC RX 258: Performed by: INTERNAL MEDICINE

## 2023-06-28 PROCEDURE — 6360000002 HC RX W HCPCS: Performed by: INTERNAL MEDICINE

## 2023-06-28 PROCEDURE — 96401 CHEMO ANTI-NEOPL SQ/IM: CPT

## 2023-06-28 PROCEDURE — 6370000000 HC RX 637 (ALT 250 FOR IP): Performed by: INTERNAL MEDICINE

## 2023-06-28 RX ORDER — ACETAMINOPHEN 325 MG/1
650 TABLET ORAL
OUTPATIENT
Start: 2023-08-01

## 2023-06-28 RX ORDER — EPINEPHRINE 1 MG/ML
0.3 INJECTION, SOLUTION, CONCENTRATE INTRAVENOUS PRN
OUTPATIENT
Start: 2023-07-25

## 2023-06-28 RX ORDER — ACETAMINOPHEN 325 MG/1
650 TABLET ORAL
OUTPATIENT
Start: 2023-07-24

## 2023-06-28 RX ORDER — DEXAMETHASONE 4 MG/1
12 TABLET ORAL ONCE
Status: COMPLETED | OUTPATIENT
Start: 2023-06-28 | End: 2023-06-28

## 2023-06-28 RX ORDER — HEPARIN SODIUM (PORCINE) LOCK FLUSH IV SOLN 100 UNIT/ML 100 UNIT/ML
500 SOLUTION INTRAVENOUS PRN
OUTPATIENT
Start: 2023-07-24

## 2023-06-28 RX ORDER — ONDANSETRON 4 MG/1
8 TABLET, ORALLY DISINTEGRATING ORAL ONCE
OUTPATIENT
Start: 2023-07-31 | End: 2023-07-31

## 2023-06-28 RX ORDER — ACETAMINOPHEN 325 MG/1
650 TABLET ORAL
OUTPATIENT
Start: 2023-07-28

## 2023-06-28 RX ORDER — DEXAMETHASONE 0.5 MG/1
12 TABLET ORAL ONCE
OUTPATIENT
Start: 2023-07-03 | End: 2023-07-03

## 2023-06-28 RX ORDER — ONDANSETRON 4 MG/1
8 TABLET, ORALLY DISINTEGRATING ORAL ONCE
OUTPATIENT
Start: 2023-07-04 | End: 2023-07-04

## 2023-06-28 RX ORDER — SODIUM CHLORIDE 0.9 % (FLUSH) 0.9 %
5-40 SYRINGE (ML) INJECTION PRN
OUTPATIENT
Start: 2023-07-03

## 2023-06-28 RX ORDER — ONDANSETRON 4 MG/1
8 TABLET, ORALLY DISINTEGRATING ORAL ONCE
Status: COMPLETED | OUTPATIENT
Start: 2023-06-28 | End: 2023-06-28

## 2023-06-28 RX ORDER — SODIUM CHLORIDE 9 MG/ML
INJECTION, SOLUTION INTRAVENOUS CONTINUOUS
OUTPATIENT
Start: 2023-07-25

## 2023-06-28 RX ORDER — FAMOTIDINE 10 MG/ML
20 INJECTION, SOLUTION INTRAVENOUS
OUTPATIENT
Start: 2023-07-26

## 2023-06-28 RX ORDER — SODIUM CHLORIDE 0.9 % (FLUSH) 0.9 %
5-40 SYRINGE (ML) INJECTION PRN
OUTPATIENT
Start: 2023-07-24

## 2023-06-28 RX ORDER — MEPERIDINE HYDROCHLORIDE 50 MG/ML
12.5 INJECTION INTRAMUSCULAR; INTRAVENOUS; SUBCUTANEOUS PRN
OUTPATIENT
Start: 2023-07-03

## 2023-06-28 RX ORDER — FAMOTIDINE 10 MG/ML
20 INJECTION, SOLUTION INTRAVENOUS
OUTPATIENT
Start: 2023-07-25

## 2023-06-28 RX ORDER — ALBUTEROL SULFATE 90 UG/1
4 AEROSOL, METERED RESPIRATORY (INHALATION) PRN
OUTPATIENT
Start: 2023-07-03

## 2023-06-28 RX ORDER — EPINEPHRINE 1 MG/ML
0.3 INJECTION, SOLUTION, CONCENTRATE INTRAVENOUS PRN
OUTPATIENT
Start: 2023-08-01

## 2023-06-28 RX ORDER — MEPERIDINE HYDROCHLORIDE 50 MG/ML
12.5 INJECTION INTRAMUSCULAR; INTRAVENOUS; SUBCUTANEOUS PRN
OUTPATIENT
Start: 2023-07-04

## 2023-06-28 RX ORDER — ALBUTEROL SULFATE 90 UG/1
4 AEROSOL, METERED RESPIRATORY (INHALATION) PRN
OUTPATIENT
Start: 2023-07-04

## 2023-06-28 RX ORDER — SODIUM CHLORIDE 9 MG/ML
INJECTION, SOLUTION INTRAVENOUS CONTINUOUS
OUTPATIENT
Start: 2023-07-31

## 2023-06-28 RX ORDER — MEPERIDINE HYDROCHLORIDE 50 MG/ML
12.5 INJECTION INTRAMUSCULAR; INTRAVENOUS; SUBCUTANEOUS PRN
OUTPATIENT
Start: 2023-07-31

## 2023-06-28 RX ORDER — ONDANSETRON 4 MG/1
8 TABLET, ORALLY DISINTEGRATING ORAL ONCE
OUTPATIENT
Start: 2023-08-01 | End: 2023-08-01

## 2023-06-28 RX ORDER — EPINEPHRINE 1 MG/ML
0.3 INJECTION, SOLUTION, CONCENTRATE INTRAVENOUS PRN
OUTPATIENT
Start: 2023-07-27

## 2023-06-28 RX ORDER — MEPERIDINE HYDROCHLORIDE 50 MG/ML
12.5 INJECTION INTRAMUSCULAR; INTRAVENOUS; SUBCUTANEOUS PRN
OUTPATIENT
Start: 2023-07-26

## 2023-06-28 RX ORDER — SODIUM CHLORIDE 9 MG/ML
INJECTION, SOLUTION INTRAVENOUS CONTINUOUS
OUTPATIENT
Start: 2023-07-27

## 2023-06-28 RX ORDER — ONDANSETRON 4 MG/1
8 TABLET, ORALLY DISINTEGRATING ORAL ONCE
OUTPATIENT
Start: 2023-07-25 | End: 2023-07-25

## 2023-06-28 RX ORDER — SODIUM CHLORIDE 0.9 % (FLUSH) 0.9 %
5-40 SYRINGE (ML) INJECTION PRN
OUTPATIENT
Start: 2023-08-01

## 2023-06-28 RX ORDER — ONDANSETRON 2 MG/ML
8 INJECTION INTRAMUSCULAR; INTRAVENOUS
OUTPATIENT
Start: 2023-07-04

## 2023-06-28 RX ORDER — EPINEPHRINE 1 MG/ML
0.3 INJECTION, SOLUTION, CONCENTRATE INTRAVENOUS PRN
OUTPATIENT
Start: 2023-07-31

## 2023-06-28 RX ORDER — ONDANSETRON 4 MG/1
8 TABLET, ORALLY DISINTEGRATING ORAL ONCE
OUTPATIENT
Start: 2023-07-26 | End: 2023-07-26

## 2023-06-28 RX ORDER — MEPERIDINE HYDROCHLORIDE 50 MG/ML
12.5 INJECTION INTRAMUSCULAR; INTRAVENOUS; SUBCUTANEOUS PRN
OUTPATIENT
Start: 2023-07-25

## 2023-06-28 RX ORDER — SODIUM CHLORIDE 9 MG/ML
5-250 INJECTION, SOLUTION INTRAVENOUS PRN
OUTPATIENT
Start: 2023-07-31

## 2023-06-28 RX ORDER — FAMOTIDINE 10 MG/ML
20 INJECTION, SOLUTION INTRAVENOUS
OUTPATIENT
Start: 2023-07-04

## 2023-06-28 RX ORDER — ACETAMINOPHEN 325 MG/1
650 TABLET ORAL
OUTPATIENT
Start: 2023-07-25

## 2023-06-28 RX ORDER — MEPERIDINE HYDROCHLORIDE 50 MG/ML
12.5 INJECTION INTRAMUSCULAR; INTRAVENOUS; SUBCUTANEOUS PRN
OUTPATIENT
Start: 2023-08-01

## 2023-06-28 RX ORDER — ALBUTEROL SULFATE 90 UG/1
4 AEROSOL, METERED RESPIRATORY (INHALATION) PRN
OUTPATIENT
Start: 2023-07-26

## 2023-06-28 RX ORDER — HEPARIN SODIUM (PORCINE) LOCK FLUSH IV SOLN 100 UNIT/ML 100 UNIT/ML
500 SOLUTION INTRAVENOUS PRN
OUTPATIENT
Start: 2023-08-01

## 2023-06-28 RX ORDER — HEPARIN SODIUM (PORCINE) LOCK FLUSH IV SOLN 100 UNIT/ML 100 UNIT/ML
500 SOLUTION INTRAVENOUS PRN
OUTPATIENT
Start: 2023-07-26

## 2023-06-28 RX ORDER — HEPARIN SODIUM (PORCINE) LOCK FLUSH IV SOLN 100 UNIT/ML 100 UNIT/ML
500 SOLUTION INTRAVENOUS PRN
OUTPATIENT
Start: 2023-07-27

## 2023-06-28 RX ORDER — HEPARIN SODIUM (PORCINE) LOCK FLUSH IV SOLN 100 UNIT/ML 100 UNIT/ML
500 SOLUTION INTRAVENOUS PRN
OUTPATIENT
Start: 2023-07-31

## 2023-06-28 RX ORDER — FAMOTIDINE 10 MG/ML
20 INJECTION, SOLUTION INTRAVENOUS
OUTPATIENT
Start: 2023-07-31

## 2023-06-28 RX ORDER — ACETAMINOPHEN 325 MG/1
650 TABLET ORAL
OUTPATIENT
Start: 2023-07-04

## 2023-06-28 RX ORDER — SODIUM CHLORIDE 0.9 % (FLUSH) 0.9 %
5-40 SYRINGE (ML) INJECTION PRN
OUTPATIENT
Start: 2023-07-31

## 2023-06-28 RX ORDER — ACETAMINOPHEN 325 MG/1
650 TABLET ORAL
OUTPATIENT
Start: 2023-07-26

## 2023-06-28 RX ORDER — ONDANSETRON 2 MG/ML
8 INJECTION INTRAMUSCULAR; INTRAVENOUS
OUTPATIENT
Start: 2023-07-24

## 2023-06-28 RX ORDER — SODIUM CHLORIDE 0.9 % (FLUSH) 0.9 %
5-40 SYRINGE (ML) INJECTION PRN
OUTPATIENT
Start: 2023-07-04

## 2023-06-28 RX ORDER — ONDANSETRON 2 MG/ML
8 INJECTION INTRAMUSCULAR; INTRAVENOUS
OUTPATIENT
Start: 2023-07-03

## 2023-06-28 RX ORDER — ONDANSETRON 2 MG/ML
8 INJECTION INTRAMUSCULAR; INTRAVENOUS
OUTPATIENT
Start: 2023-07-28

## 2023-06-28 RX ORDER — SODIUM CHLORIDE 9 MG/ML
5-250 INJECTION, SOLUTION INTRAVENOUS PRN
OUTPATIENT
Start: 2023-07-26

## 2023-06-28 RX ORDER — DEXAMETHASONE 0.5 MG/1
12 TABLET ORAL ONCE
OUTPATIENT
Start: 2023-08-01 | End: 2023-08-01

## 2023-06-28 RX ORDER — EPINEPHRINE 1 MG/ML
0.3 INJECTION, SOLUTION, CONCENTRATE INTRAVENOUS PRN
OUTPATIENT
Start: 2023-07-26

## 2023-06-28 RX ORDER — SODIUM CHLORIDE 9 MG/ML
INJECTION, SOLUTION INTRAVENOUS CONTINUOUS
OUTPATIENT
Start: 2023-07-03

## 2023-06-28 RX ORDER — DIPHENHYDRAMINE HYDROCHLORIDE 50 MG/ML
50 INJECTION INTRAMUSCULAR; INTRAVENOUS
OUTPATIENT
Start: 2023-07-28

## 2023-06-28 RX ORDER — ALBUTEROL SULFATE 90 UG/1
4 AEROSOL, METERED RESPIRATORY (INHALATION) PRN
OUTPATIENT
Start: 2023-07-31

## 2023-06-28 RX ORDER — SODIUM CHLORIDE 9 MG/ML
5-250 INJECTION, SOLUTION INTRAVENOUS PRN
OUTPATIENT
Start: 2023-07-27

## 2023-06-28 RX ORDER — ACETAMINOPHEN 325 MG/1
650 TABLET ORAL
OUTPATIENT
Start: 2023-07-31

## 2023-06-28 RX ORDER — ONDANSETRON 4 MG/1
8 TABLET, ORALLY DISINTEGRATING ORAL ONCE
OUTPATIENT
Start: 2023-07-27 | End: 2023-07-27

## 2023-06-28 RX ORDER — FAMOTIDINE 10 MG/ML
20 INJECTION, SOLUTION INTRAVENOUS
OUTPATIENT
Start: 2023-07-28

## 2023-06-28 RX ORDER — ACETAMINOPHEN 325 MG/1
650 TABLET ORAL
OUTPATIENT
Start: 2023-07-27

## 2023-06-28 RX ORDER — SODIUM CHLORIDE 9 MG/ML
5-250 INJECTION, SOLUTION INTRAVENOUS PRN
OUTPATIENT
Start: 2023-07-03

## 2023-06-28 RX ORDER — HEPARIN SODIUM (PORCINE) LOCK FLUSH IV SOLN 100 UNIT/ML 100 UNIT/ML
500 SOLUTION INTRAVENOUS PRN
OUTPATIENT
Start: 2023-07-03

## 2023-06-28 RX ORDER — SODIUM CHLORIDE 9 MG/ML
5-250 INJECTION, SOLUTION INTRAVENOUS PRN
OUTPATIENT
Start: 2023-07-24

## 2023-06-28 RX ORDER — DEXAMETHASONE 0.5 MG/1
12 TABLET ORAL ONCE
OUTPATIENT
Start: 2023-07-28 | End: 2023-07-28

## 2023-06-28 RX ORDER — DIPHENHYDRAMINE HYDROCHLORIDE 50 MG/ML
50 INJECTION INTRAMUSCULAR; INTRAVENOUS
OUTPATIENT
Start: 2023-07-24

## 2023-06-28 RX ORDER — ONDANSETRON 4 MG/1
8 TABLET, ORALLY DISINTEGRATING ORAL ONCE
OUTPATIENT
Start: 2023-07-03 | End: 2023-07-03

## 2023-06-28 RX ORDER — ALBUTEROL SULFATE 90 UG/1
4 AEROSOL, METERED RESPIRATORY (INHALATION) PRN
OUTPATIENT
Start: 2023-07-25

## 2023-06-28 RX ORDER — SODIUM CHLORIDE 9 MG/ML
INJECTION, SOLUTION INTRAVENOUS CONTINUOUS
OUTPATIENT
Start: 2023-07-28

## 2023-06-28 RX ORDER — DIPHENHYDRAMINE HYDROCHLORIDE 50 MG/ML
50 INJECTION INTRAMUSCULAR; INTRAVENOUS
OUTPATIENT
Start: 2023-07-03

## 2023-06-28 RX ORDER — FAMOTIDINE 10 MG/ML
20 INJECTION, SOLUTION INTRAVENOUS
OUTPATIENT
Start: 2023-07-27

## 2023-06-28 RX ORDER — SODIUM CHLORIDE 0.9 % (FLUSH) 0.9 %
5-40 SYRINGE (ML) INJECTION PRN
OUTPATIENT
Start: 2023-07-25

## 2023-06-28 RX ORDER — FAMOTIDINE 10 MG/ML
20 INJECTION, SOLUTION INTRAVENOUS
OUTPATIENT
Start: 2023-08-01

## 2023-06-28 RX ORDER — SODIUM CHLORIDE 9 MG/ML
INJECTION, SOLUTION INTRAVENOUS CONTINUOUS
OUTPATIENT
Start: 2023-08-01

## 2023-06-28 RX ORDER — SODIUM CHLORIDE 0.9 % (FLUSH) 0.9 %
5-40 SYRINGE (ML) INJECTION PRN
OUTPATIENT
Start: 2023-07-27

## 2023-06-28 RX ORDER — EPINEPHRINE 1 MG/ML
0.3 INJECTION, SOLUTION, CONCENTRATE INTRAVENOUS PRN
OUTPATIENT
Start: 2023-07-04

## 2023-06-28 RX ORDER — HEPARIN SODIUM (PORCINE) LOCK FLUSH IV SOLN 100 UNIT/ML 100 UNIT/ML
500 SOLUTION INTRAVENOUS PRN
OUTPATIENT
Start: 2023-07-04

## 2023-06-28 RX ORDER — DIPHENHYDRAMINE HYDROCHLORIDE 50 MG/ML
50 INJECTION INTRAMUSCULAR; INTRAVENOUS
OUTPATIENT
Start: 2023-07-04

## 2023-06-28 RX ORDER — ONDANSETRON 2 MG/ML
8 INJECTION INTRAMUSCULAR; INTRAVENOUS
OUTPATIENT
Start: 2023-07-26

## 2023-06-28 RX ORDER — SODIUM CHLORIDE 0.9 % (FLUSH) 0.9 %
5-40 SYRINGE (ML) INJECTION PRN
OUTPATIENT
Start: 2023-07-26

## 2023-06-28 RX ORDER — DIPHENHYDRAMINE HYDROCHLORIDE 50 MG/ML
50 INJECTION INTRAMUSCULAR; INTRAVENOUS
OUTPATIENT
Start: 2023-07-31

## 2023-06-28 RX ORDER — EPINEPHRINE 1 MG/ML
0.3 INJECTION, SOLUTION, CONCENTRATE INTRAVENOUS PRN
OUTPATIENT
Start: 2023-07-03

## 2023-06-28 RX ORDER — HEPARIN SODIUM (PORCINE) LOCK FLUSH IV SOLN 100 UNIT/ML 100 UNIT/ML
500 SOLUTION INTRAVENOUS PRN
OUTPATIENT
Start: 2023-07-25

## 2023-06-28 RX ORDER — DIPHENHYDRAMINE HYDROCHLORIDE 50 MG/ML
50 INJECTION INTRAMUSCULAR; INTRAVENOUS
OUTPATIENT
Start: 2023-07-26

## 2023-06-28 RX ORDER — FAMOTIDINE 10 MG/ML
20 INJECTION, SOLUTION INTRAVENOUS
OUTPATIENT
Start: 2023-07-24

## 2023-06-28 RX ORDER — SODIUM CHLORIDE 9 MG/ML
5-250 INJECTION, SOLUTION INTRAVENOUS PRN
OUTPATIENT
Start: 2023-08-01

## 2023-06-28 RX ORDER — ONDANSETRON 2 MG/ML
8 INJECTION INTRAMUSCULAR; INTRAVENOUS
OUTPATIENT
Start: 2023-07-27

## 2023-06-28 RX ORDER — DEXAMETHASONE 0.5 MG/1
12 TABLET ORAL ONCE
OUTPATIENT
Start: 2023-07-27 | End: 2023-07-27

## 2023-06-28 RX ORDER — HEPARIN SODIUM (PORCINE) LOCK FLUSH IV SOLN 100 UNIT/ML 100 UNIT/ML
500 SOLUTION INTRAVENOUS PRN
OUTPATIENT
Start: 2023-07-28

## 2023-06-28 RX ORDER — DEXAMETHASONE 0.5 MG/1
12 TABLET ORAL ONCE
OUTPATIENT
Start: 2023-07-24 | End: 2023-07-24

## 2023-06-28 RX ORDER — DIPHENHYDRAMINE HYDROCHLORIDE 50 MG/ML
50 INJECTION INTRAMUSCULAR; INTRAVENOUS
OUTPATIENT
Start: 2023-08-01

## 2023-06-28 RX ORDER — SODIUM CHLORIDE 9 MG/ML
5-250 INJECTION, SOLUTION INTRAVENOUS PRN
OUTPATIENT
Start: 2023-07-28

## 2023-06-28 RX ORDER — ONDANSETRON 4 MG/1
8 TABLET, ORALLY DISINTEGRATING ORAL ONCE
OUTPATIENT
Start: 2023-07-28 | End: 2023-07-28

## 2023-06-28 RX ORDER — ALBUTEROL SULFATE 90 UG/1
4 AEROSOL, METERED RESPIRATORY (INHALATION) PRN
OUTPATIENT
Start: 2023-08-01

## 2023-06-28 RX ORDER — SODIUM CHLORIDE 0.9 % (FLUSH) 0.9 %
5-40 SYRINGE (ML) INJECTION PRN
OUTPATIENT
Start: 2023-07-28

## 2023-06-28 RX ORDER — FAMOTIDINE 10 MG/ML
20 INJECTION, SOLUTION INTRAVENOUS
OUTPATIENT
Start: 2023-07-03

## 2023-06-28 RX ORDER — ONDANSETRON 2 MG/ML
8 INJECTION INTRAMUSCULAR; INTRAVENOUS
OUTPATIENT
Start: 2023-07-31

## 2023-06-28 RX ORDER — SODIUM CHLORIDE 9 MG/ML
INJECTION, SOLUTION INTRAVENOUS CONTINUOUS
OUTPATIENT
Start: 2023-07-24

## 2023-06-28 RX ORDER — ALBUTEROL SULFATE 90 UG/1
4 AEROSOL, METERED RESPIRATORY (INHALATION) PRN
OUTPATIENT
Start: 2023-07-27

## 2023-06-28 RX ORDER — DEXAMETHASONE 0.5 MG/1
12 TABLET ORAL ONCE
OUTPATIENT
Start: 2023-07-26 | End: 2023-07-26

## 2023-06-28 RX ORDER — SODIUM CHLORIDE 9 MG/ML
5-250 INJECTION, SOLUTION INTRAVENOUS PRN
OUTPATIENT
Start: 2023-07-25

## 2023-06-28 RX ORDER — SODIUM CHLORIDE 9 MG/ML
INJECTION, SOLUTION INTRAVENOUS CONTINUOUS
OUTPATIENT
Start: 2023-07-04

## 2023-06-28 RX ORDER — ALBUTEROL SULFATE 90 UG/1
4 AEROSOL, METERED RESPIRATORY (INHALATION) PRN
OUTPATIENT
Start: 2023-07-24

## 2023-06-28 RX ORDER — ONDANSETRON 2 MG/ML
8 INJECTION INTRAMUSCULAR; INTRAVENOUS
OUTPATIENT
Start: 2023-07-25

## 2023-06-28 RX ORDER — DIPHENHYDRAMINE HYDROCHLORIDE 50 MG/ML
50 INJECTION INTRAMUSCULAR; INTRAVENOUS
OUTPATIENT
Start: 2023-07-27

## 2023-06-28 RX ORDER — EPINEPHRINE 1 MG/ML
0.3 INJECTION, SOLUTION, CONCENTRATE INTRAVENOUS PRN
OUTPATIENT
Start: 2023-07-28

## 2023-06-28 RX ORDER — ONDANSETRON 4 MG/1
8 TABLET, ORALLY DISINTEGRATING ORAL ONCE
OUTPATIENT
Start: 2023-07-24 | End: 2023-07-24

## 2023-06-28 RX ORDER — SODIUM CHLORIDE 9 MG/ML
INJECTION, SOLUTION INTRAVENOUS CONTINUOUS
OUTPATIENT
Start: 2023-07-26

## 2023-06-28 RX ORDER — DEXAMETHASONE 0.5 MG/1
12 TABLET ORAL ONCE
OUTPATIENT
Start: 2023-07-04 | End: 2023-07-04

## 2023-06-28 RX ORDER — DIPHENHYDRAMINE HYDROCHLORIDE 50 MG/ML
50 INJECTION INTRAMUSCULAR; INTRAVENOUS
OUTPATIENT
Start: 2023-07-25

## 2023-06-28 RX ORDER — ALBUTEROL SULFATE 90 UG/1
4 AEROSOL, METERED RESPIRATORY (INHALATION) PRN
OUTPATIENT
Start: 2023-07-28

## 2023-06-28 RX ORDER — MEPERIDINE HYDROCHLORIDE 50 MG/ML
12.5 INJECTION INTRAMUSCULAR; INTRAVENOUS; SUBCUTANEOUS PRN
OUTPATIENT
Start: 2023-07-28

## 2023-06-28 RX ORDER — DEXAMETHASONE 0.5 MG/1
12 TABLET ORAL ONCE
OUTPATIENT
Start: 2023-07-25 | End: 2023-07-25

## 2023-06-28 RX ORDER — DEXAMETHASONE 0.5 MG/1
12 TABLET ORAL ONCE
OUTPATIENT
Start: 2023-07-31 | End: 2023-07-31

## 2023-06-28 RX ORDER — MEPERIDINE HYDROCHLORIDE 50 MG/ML
12.5 INJECTION INTRAMUSCULAR; INTRAVENOUS; SUBCUTANEOUS PRN
OUTPATIENT
Start: 2023-07-24

## 2023-06-28 RX ORDER — ACETAMINOPHEN 325 MG/1
650 TABLET ORAL
OUTPATIENT
Start: 2023-07-03

## 2023-06-28 RX ORDER — ONDANSETRON 2 MG/ML
8 INJECTION INTRAMUSCULAR; INTRAVENOUS
OUTPATIENT
Start: 2023-08-01

## 2023-06-28 RX ORDER — SODIUM CHLORIDE 9 MG/ML
5-250 INJECTION, SOLUTION INTRAVENOUS PRN
OUTPATIENT
Start: 2023-07-04

## 2023-06-28 RX ORDER — EPINEPHRINE 1 MG/ML
0.3 INJECTION, SOLUTION, CONCENTRATE INTRAVENOUS PRN
OUTPATIENT
Start: 2023-07-24

## 2023-06-28 RX ORDER — MEPERIDINE HYDROCHLORIDE 50 MG/ML
12.5 INJECTION INTRAMUSCULAR; INTRAVENOUS; SUBCUTANEOUS PRN
OUTPATIENT
Start: 2023-07-27

## 2023-06-28 RX ADMIN — AZACITIDINE 188.25 MG: 100 INJECTION, POWDER, LYOPHILIZED, FOR SOLUTION INTRAVENOUS; SUBCUTANEOUS at 08:54

## 2023-06-28 RX ADMIN — DEXAMETHASONE 12 MG: 4 TABLET ORAL at 08:40

## 2023-06-28 RX ADMIN — ONDANSETRON 4 MG: 4 TABLET, ORALLY DISINTEGRATING ORAL at 08:40

## 2023-06-29 ENCOUNTER — HOSPITAL ENCOUNTER (OUTPATIENT)
Dept: INFUSION THERAPY | Age: 68
Discharge: HOME | End: 2023-06-29
Payer: COMMERCIAL

## 2023-06-29 VITALS
OXYGEN SATURATION: 94 % | RESPIRATION RATE: 20 BRPM | TEMPERATURE: 98 F | DIASTOLIC BLOOD PRESSURE: 55 MMHG | SYSTOLIC BLOOD PRESSURE: 116 MMHG | HEART RATE: 93 BPM

## 2023-06-29 DIAGNOSIS — D46.9 MDS (MYELODYSPLASTIC SYNDROME) (HCC): Primary | ICD-10-CM

## 2023-06-29 PROCEDURE — 96401 CHEMO ANTI-NEOPL SQ/IM: CPT

## 2023-06-29 PROCEDURE — 6360000002 HC RX W HCPCS: Performed by: INTERNAL MEDICINE

## 2023-06-29 PROCEDURE — 6370000000 HC RX 637 (ALT 250 FOR IP): Performed by: INTERNAL MEDICINE

## 2023-06-29 PROCEDURE — 2580000003 HC RX 258: Performed by: INTERNAL MEDICINE

## 2023-06-29 RX ORDER — ONDANSETRON 4 MG/1
8 TABLET, ORALLY DISINTEGRATING ORAL ONCE
Status: COMPLETED | OUTPATIENT
Start: 2023-06-29 | End: 2023-06-29

## 2023-06-29 RX ORDER — DEXAMETHASONE 4 MG/1
12 TABLET ORAL ONCE
Status: COMPLETED | OUTPATIENT
Start: 2023-06-29 | End: 2023-06-29

## 2023-06-29 RX ADMIN — ONDANSETRON 8 MG: 4 TABLET, ORALLY DISINTEGRATING ORAL at 13:53

## 2023-06-29 RX ADMIN — DEXAMETHASONE 12 MG: 4 TABLET ORAL at 13:53

## 2023-06-29 RX ADMIN — AZACITIDINE 188.25 MG: 100 INJECTION, POWDER, LYOPHILIZED, FOR SOLUTION INTRAVENOUS; SUBCUTANEOUS at 14:15

## 2023-06-30 ENCOUNTER — HOSPITAL ENCOUNTER (OUTPATIENT)
Dept: INFUSION THERAPY | Age: 68
Discharge: HOME | End: 2023-06-30
Payer: COMMERCIAL

## 2023-06-30 VITALS
TEMPERATURE: 98.9 F | BODY MASS INDEX: 39.34 KG/M2 | DIASTOLIC BLOOD PRESSURE: 62 MMHG | OXYGEN SATURATION: 94 % | WEIGHT: 281 LBS | HEART RATE: 88 BPM | HEIGHT: 71 IN | SYSTOLIC BLOOD PRESSURE: 128 MMHG | RESPIRATION RATE: 20 BRPM

## 2023-06-30 DIAGNOSIS — D46.9 MDS (MYELODYSPLASTIC SYNDROME) (HCC): Primary | ICD-10-CM

## 2023-06-30 PROCEDURE — 2580000003 HC RX 258: Performed by: INTERNAL MEDICINE

## 2023-06-30 PROCEDURE — 6360000002 HC RX W HCPCS: Performed by: INTERNAL MEDICINE

## 2023-06-30 PROCEDURE — 96401 CHEMO ANTI-NEOPL SQ/IM: CPT

## 2023-06-30 PROCEDURE — 6370000000 HC RX 637 (ALT 250 FOR IP): Performed by: INTERNAL MEDICINE

## 2023-06-30 RX ORDER — ONDANSETRON 4 MG/1
8 TABLET, ORALLY DISINTEGRATING ORAL ONCE
Status: COMPLETED | OUTPATIENT
Start: 2023-06-30 | End: 2023-06-30

## 2023-06-30 RX ORDER — DEXAMETHASONE 4 MG/1
12 TABLET ORAL ONCE
Status: COMPLETED | OUTPATIENT
Start: 2023-06-30 | End: 2023-06-30

## 2023-06-30 RX ADMIN — AZACITIDINE 188.25 MG: 100 INJECTION, POWDER, LYOPHILIZED, FOR SOLUTION INTRAVENOUS; SUBCUTANEOUS at 13:55

## 2023-06-30 RX ADMIN — ONDANSETRON 8 MG: 4 TABLET, ORALLY DISINTEGRATING ORAL at 13:32

## 2023-06-30 RX ADMIN — DEXAMETHASONE 12 MG: 4 TABLET ORAL at 13:32

## 2023-07-03 ENCOUNTER — HOSPITAL ENCOUNTER (OUTPATIENT)
Dept: INFUSION THERAPY | Age: 68
Discharge: HOME | End: 2023-07-03
Payer: COMMERCIAL

## 2023-07-03 VITALS
BODY MASS INDEX: 39.19 KG/M2 | HEART RATE: 94 BPM | HEIGHT: 71 IN | SYSTOLIC BLOOD PRESSURE: 110 MMHG | OXYGEN SATURATION: 95 % | DIASTOLIC BLOOD PRESSURE: 56 MMHG | TEMPERATURE: 98.4 F | RESPIRATION RATE: 22 BRPM

## 2023-07-03 DIAGNOSIS — D72.821 MONOCYTOSIS: ICD-10-CM

## 2023-07-03 DIAGNOSIS — D46.9 MDS (MYELODYSPLASTIC SYNDROME) (HCC): Primary | ICD-10-CM

## 2023-07-03 DIAGNOSIS — D64.9 ANEMIA, UNSPECIFIED TYPE: ICD-10-CM

## 2023-07-03 DIAGNOSIS — D63.8 ANEMIA IN OTHER CHRONIC DISEASES CLASSIFIED ELSEWHERE: ICD-10-CM

## 2023-07-03 DIAGNOSIS — D69.6 THROMBOCYTOPENIA (HCC): ICD-10-CM

## 2023-07-03 LAB — MANUAL DIFF BLD: NORMAL

## 2023-07-03 PROCEDURE — 85027 COMPLETE CBC AUTOMATED: CPT

## 2023-07-03 PROCEDURE — 96372 THER/PROPH/DIAG INJ SC/IM: CPT

## 2023-07-03 PROCEDURE — 99211 OFF/OP EST MAY X REQ PHY/QHP: CPT

## 2023-07-03 PROCEDURE — 6360000002 HC RX W HCPCS: Performed by: NURSE PRACTITIONER

## 2023-07-03 RX ORDER — ACETAMINOPHEN 325 MG/1
650 TABLET ORAL
Status: CANCELLED | OUTPATIENT
Start: 2023-07-04

## 2023-07-03 RX ORDER — SODIUM CHLORIDE 9 MG/ML
INJECTION, SOLUTION INTRAVENOUS CONTINUOUS
Status: CANCELLED | OUTPATIENT
Start: 2023-07-04

## 2023-07-03 RX ORDER — ONDANSETRON 2 MG/ML
8 INJECTION INTRAMUSCULAR; INTRAVENOUS
Status: CANCELLED | OUTPATIENT
Start: 2023-07-04

## 2023-07-03 RX ORDER — ALBUTEROL SULFATE 90 UG/1
4 AEROSOL, METERED RESPIRATORY (INHALATION) PRN
Status: CANCELLED | OUTPATIENT
Start: 2023-07-04

## 2023-07-03 RX ORDER — DIPHENHYDRAMINE HYDROCHLORIDE 50 MG/ML
50 INJECTION INTRAMUSCULAR; INTRAVENOUS
Status: CANCELLED | OUTPATIENT
Start: 2023-07-04

## 2023-07-03 RX ORDER — EPINEPHRINE 1 MG/ML
0.3 INJECTION, SOLUTION INTRAMUSCULAR; SUBCUTANEOUS PRN
Status: CANCELLED | OUTPATIENT
Start: 2023-07-04

## 2023-07-03 RX ADMIN — EPOETIN ALFA-EPBX 40000 UNITS: 40000 INJECTION, SOLUTION INTRAVENOUS; SUBCUTANEOUS at 09:24

## 2023-07-03 NOTE — PROGRESS NOTES
Labs and bleeding precautions discussed with patient. Verbalized understanding. Instructed to go to [facility] blood and platelet transfusion. Retacrit given as charted, tolerated well. Discharged in satisfactory condition.  Assisted off unit in wheelchair

## 2023-07-03 NOTE — PLAN OF CARE
Care plan reviewed with patient. Patient verbalized understanding of the plan of care and contribute to goal setting. Problem: Discharge Planning  Goal: Discharge to home or other facility with appropriate resources  7/3/2023 1144 by Simone Ceron RN  Outcome: Adequate for Discharge  Flowsheets (Taken 7/3/2023 1144)  Discharge to home or other facility with appropriate resources:   Identify barriers to discharge with patient and caregiver   Identify discharge learning needs (meds, wound care, etc)   Arrange for needed discharge resources and transportation as appropriate  Note: Verbalized understanding of discharge instructions, follow ups and when to call doctor   7/3/2023 1144 by Simone Ceron RN  Outcome: Adequate for Discharge  Flowsheets (Taken 7/3/2023 1144)  Discharge to home or other facility with appropriate resources:   Identify barriers to discharge with patient and caregiver   Identify discharge learning needs (meds, wound care, etc)   Arrange for needed discharge resources and transportation as appropriate     Problem: Safety - Adult  Goal: Free from fall injury  7/3/2023 1144 by Simone Ceron RN  Outcome: Adequate for Discharge  Note: Free from falls while in infusion center.  Verbalized understanding of fall prevention and to ask for assistance with ambulation   7/3/2023 1144 by Simone Ceron RN  Outcome: Adequate for Discharge  Flowsheets (Taken 7/3/2023 1144)  Free From Fall Injury:   Based on caregiver fall risk screen, instruct family/caregiver to ask for assistance with transferring infant if caregiver noted to have fall risk factors   Instruct family/caregiver on patient safety     Problem: Chronic Conditions and Co-morbidities  Goal: Patient's chronic conditions and co-morbidity symptoms are monitored and maintained or improved  7/3/2023 1144 by Simone Ceron RN  Outcome: Adequate for Discharge  Flowsheets (Taken 7/3/2023 1144)  Care Plan - Patient's Chronic Conditions and Co-Morbidity

## 2023-07-04 LAB
ANISOCYTOSIS BLD QL SMEAR: PRESENT
AUTO DIFF PNL BLD: ABNORMAL
BASOPHILS ABSOLUTE: 0 THOU/MM3 (ref 0–0.1)
BASOPHILS NFR BLD AUTO: 0 %
BLASTS: 4 % (ref 0–1)
DEPRECATED RDW RBC AUTO: 61.6 FL (ref 35–45)
EOSINOPHIL NFR BLD AUTO: 0 %
EOSINOPHILS ABSOLUTE: 0 THOU/MM3 (ref 0–0.4)
ERYTHROCYTE [DISTWIDTH] IN BLOOD BY AUTOMATED COUNT: 18 % (ref 11.5–14.5)
HCT VFR BLD AUTO: 20.5 % (ref 42–52)
HGB BLD-MCNC: 6.4 GM/DL (ref 14–18)
HYPOCHROMIA BLD QL SMEAR: PRESENT
LYMPHOCYTES ABSOLUTE: 12 THOU/MM3 (ref 1–4.8)
LYMPHOCYTES NFR BLD AUTO: 15 %
MCH RBC QN AUTO: 32.3 PG (ref 26–33)
MCHC RBC AUTO-ENTMCNC: 31.2 GM/DL (ref 32.2–35.5)
MCV RBC AUTO: 103.5 FL (ref 80–94)
METAMYELOCYTES: 8 %
MONOCYTES ABSOLUTE: 23.3 THOU/MM3 (ref 0.4–1.3)
MONOCYTES NFR BLD AUTO: 29 %
MYELOCYTES: 10 %
NEUTROPHILS NFR BLD AUTO: 33 %
NRBC BLD AUTO-RTO: 0 /100 WBC
PATHOLOGIST REVIEW: ABNORMAL
PLATELET # BLD AUTO: 10 THOU/MM3 (ref 130–400)
PMV BLD AUTO: 11.8 FL (ref 9.4–12.4)
RBC # BLD AUTO: 1.98 MILL/MM3 (ref 4.7–6.1)
SEGMENTED NEUTROPHILS ABSOLUTE COUNT: 26.5 THOU/MM3 (ref 1.8–7.7)
WBC # BLD AUTO: 80.2 THOU/MM3 (ref 4.8–10.8)

## 2023-07-05 ENCOUNTER — HOSPITAL ENCOUNTER (OUTPATIENT)
Dept: INFUSION THERAPY | Age: 68
Discharge: HOME | End: 2023-07-05
Payer: COMMERCIAL

## 2023-07-05 VITALS
DIASTOLIC BLOOD PRESSURE: 55 MMHG | RESPIRATION RATE: 28 BRPM | OXYGEN SATURATION: 95 % | TEMPERATURE: 100.3 F | HEART RATE: 98 BPM | SYSTOLIC BLOOD PRESSURE: 114 MMHG

## 2023-07-05 DIAGNOSIS — D72.821 MONOCYTOSIS: ICD-10-CM

## 2023-07-05 DIAGNOSIS — D69.6 THROMBOCYTOPENIA (HCC): ICD-10-CM

## 2023-07-05 DIAGNOSIS — D64.9 ANEMIA, UNSPECIFIED TYPE: ICD-10-CM

## 2023-07-05 LAB
AUTO DIFF PNL BLD: ABNORMAL
BASOPHILS ABSOLUTE: 0 THOU/MM3 (ref 0–0.1)
BASOPHILS NFR BLD AUTO: 0 %
BLASTS: 2 % (ref 0–1)
DEPRECATED RDW RBC AUTO: 60.9 FL (ref 35–45)
EOSINOPHIL NFR BLD AUTO: 0 %
EOSINOPHILS ABSOLUTE: 0 THOU/MM3 (ref 0–0.4)
ERYTHROCYTE [DISTWIDTH] IN BLOOD BY AUTOMATED COUNT: 17.9 % (ref 11.5–14.5)
HCT VFR BLD AUTO: 21 % (ref 42–52)
HGB BLD-MCNC: 6.5 GM/DL (ref 14–18)
LYMPHOCYTES ABSOLUTE: 5.2 THOU/MM3 (ref 1–4.8)
LYMPHOCYTES NFR BLD AUTO: 6 %
MANUAL DIFF BLD: NORMAL
MCH RBC QN AUTO: 31.7 PG (ref 26–33)
MCHC RBC AUTO-ENTMCNC: 31 GM/DL (ref 32.2–35.5)
MCV RBC AUTO: 102.4 FL (ref 80–94)
METAMYELOCYTES: 2 %
MONOCYTES ABSOLUTE: 58.4 THOU/MM3 (ref 0.4–1.3)
MONOCYTES NFR BLD AUTO: 68 %
MYELOCYTES: 2 %
NEUTROPHILS NFR BLD AUTO: 20 %
NRBC BLD AUTO-RTO: 0 /100 WBC
PATHOLOGIST REVIEW: ABNORMAL
PLATELET # BLD AUTO: 19 THOU/MM3 (ref 130–400)
PLATELET BLD QL SMEAR: ABNORMAL
PMV BLD AUTO: 12.2 FL (ref 9.4–12.4)
RBC # BLD AUTO: 2.05 MILL/MM3 (ref 4.7–6.1)
SEGMENTED NEUTROPHILS ABSOLUTE COUNT: 17.2 THOU/MM3 (ref 1.8–7.7)
WBC # BLD AUTO: 85.9 THOU/MM3 (ref 4.8–10.8)

## 2023-07-05 PROCEDURE — 99211 OFF/OP EST MAY X REQ PHY/QHP: CPT

## 2023-07-05 PROCEDURE — 85027 COMPLETE CBC AUTOMATED: CPT

## 2023-07-05 NOTE — PLAN OF CARE
Problem: Discharge Planning  Goal: Discharge to home or other facility with appropriate resources  Outcome: Adequate for Discharge  Flowsheets (Taken 7/5/2023 1045)  Discharge to home or other facility with appropriate resources:   Identify barriers to discharge with patient and caregiver   Identify discharge learning needs (meds, wound care, etc)  Note: Patient and wife verbalized understanding to go to the emergency room immediately      Problem: Safety - Adult  Goal: Free from fall injury  Outcome: Adequate for Discharge  Flowsheets (Taken 7/5/2023 1045)  Free From Fall Injury: Instruct family/caregiver on patient safety  Note: No falls this admission Patient aware of fall precautions for here and at home -call light in reach while here       Problem: Chronic Conditions and Co-morbidities  Goal: Patient's chronic conditions and co-morbidity symptoms are monitored and maintained or improved  Outcome: Adequate for Discharge  Flowsheets (Taken 7/5/2023 1045)  Care Plan - Patient's Chronic Conditions and Co-Morbidity Symptoms are Monitored and Maintained or Improved:   Monitor and assess patient's chronic conditions and comorbid symptoms for stability, deterioration, or improvement   Collaborate with multidisciplinary team to address chronic and comorbid conditions and prevent exacerbation or deterioration  Note: Patient and wife will head directly to the er    Care plan reviewed with patient and wife . Patient and wofe verbalize understanding of the plan of care and contribute to goal setting.

## 2023-07-10 ENCOUNTER — HOSPITAL ENCOUNTER (OUTPATIENT)
Dept: INFUSION THERAPY | Age: 68
Discharge: HOME | End: 2023-07-10
Payer: COMMERCIAL

## 2023-07-10 VITALS
BODY MASS INDEX: 38.08 KG/M2 | WEIGHT: 272 LBS | RESPIRATION RATE: 20 BRPM | SYSTOLIC BLOOD PRESSURE: 115 MMHG | HEIGHT: 71 IN | DIASTOLIC BLOOD PRESSURE: 60 MMHG | OXYGEN SATURATION: 94 % | HEART RATE: 82 BPM | TEMPERATURE: 98.2 F

## 2023-07-10 DIAGNOSIS — D64.9 ANEMIA, UNSPECIFIED TYPE: Primary | ICD-10-CM

## 2023-07-10 DIAGNOSIS — D69.6 THROMBOCYTOPENIA (HCC): ICD-10-CM

## 2023-07-10 DIAGNOSIS — D63.8 ANEMIA IN OTHER CHRONIC DISEASES CLASSIFIED ELSEWHERE: ICD-10-CM

## 2023-07-10 DIAGNOSIS — D72.821 MONOCYTOSIS: ICD-10-CM

## 2023-07-10 DIAGNOSIS — D46.9 MDS (MYELODYSPLASTIC SYNDROME) (HCC): ICD-10-CM

## 2023-07-10 LAB
ANISOCYTOSIS BLD QL SMEAR: PRESENT
AUTO DIFF PNL BLD: ABNORMAL
BASOPHILS ABSOLUTE: 0 THOU/MM3 (ref 0–0.1)
BASOPHILS NFR BLD AUTO: 0 %
BLASTS: 4 % (ref 0–1)
DEPRECATED RDW RBC AUTO: 65.9 FL (ref 35–45)
EOSINOPHIL NFR BLD AUTO: 0 %
EOSINOPHILS ABSOLUTE: 0 THOU/MM3 (ref 0–0.4)
ERYTHROCYTE [DISTWIDTH] IN BLOOD BY AUTOMATED COUNT: 19.7 % (ref 11.5–14.5)
HCT VFR BLD AUTO: 29.1 % (ref 42–52)
HGB BLD-MCNC: 9.1 GM/DL (ref 14–18)
LYMPHOCYTES ABSOLUTE: 6.8 THOU/MM3 (ref 1–4.8)
LYMPHOCYTES NFR BLD AUTO: 18 %
MANUAL DIFF BLD: NORMAL
MCH RBC QN AUTO: 30.2 PG (ref 26–33)
MCHC RBC AUTO-ENTMCNC: 31.3 GM/DL (ref 32.2–35.5)
MCV RBC AUTO: 96.7 FL (ref 80–94)
METAMYELOCYTES: 7 %
MONOCYTES ABSOLUTE: 1.9 THOU/MM3 (ref 0.4–1.3)
MONOCYTES NFR BLD AUTO: 5 %
MYELOCYTES: 6 %
NEUTROPHILS NFR BLD AUTO: 60 %
NRBC BLD AUTO-RTO: 0 /100 WBC
PATHOLOGIST REVIEW: ABNORMAL
PLATELET # BLD AUTO: 15 THOU/MM3 (ref 130–400)
PMV BLD AUTO: 12.6 FL (ref 9.4–12.4)
RBC # BLD AUTO: 3.01 MILL/MM3 (ref 4.7–6.1)
ROULEAUX BLD QL SMEAR: SLIGHT
SEGMENTED NEUTROPHILS ABSOLUTE COUNT: 22.8 THOU/MM3 (ref 1.8–7.7)
WBC # BLD AUTO: 38 THOU/MM3 (ref 4.8–10.8)

## 2023-07-10 PROCEDURE — 99211 OFF/OP EST MAY X REQ PHY/QHP: CPT

## 2023-07-10 PROCEDURE — 85027 COMPLETE CBC AUTOMATED: CPT

## 2023-07-10 PROCEDURE — 6360000002 HC RX W HCPCS: Performed by: NURSE PRACTITIONER

## 2023-07-10 PROCEDURE — 96372 THER/PROPH/DIAG INJ SC/IM: CPT

## 2023-07-10 RX ORDER — ALBUTEROL SULFATE 90 UG/1
4 AEROSOL, METERED RESPIRATORY (INHALATION) PRN
Status: CANCELLED | OUTPATIENT
Start: 2023-07-11

## 2023-07-10 RX ORDER — ACETAMINOPHEN 325 MG/1
650 TABLET ORAL
Status: CANCELLED | OUTPATIENT
Start: 2023-07-11

## 2023-07-10 RX ORDER — DIPHENHYDRAMINE HYDROCHLORIDE 50 MG/ML
50 INJECTION INTRAMUSCULAR; INTRAVENOUS
Status: CANCELLED | OUTPATIENT
Start: 2023-07-11

## 2023-07-10 RX ORDER — EPINEPHRINE 1 MG/ML
0.3 INJECTION, SOLUTION INTRAMUSCULAR; SUBCUTANEOUS PRN
Status: CANCELLED | OUTPATIENT
Start: 2023-07-11

## 2023-07-10 RX ORDER — SODIUM CHLORIDE 9 MG/ML
INJECTION, SOLUTION INTRAVENOUS CONTINUOUS
Status: CANCELLED | OUTPATIENT
Start: 2023-07-11

## 2023-07-10 RX ORDER — ONDANSETRON 2 MG/ML
8 INJECTION INTRAMUSCULAR; INTRAVENOUS
Status: CANCELLED | OUTPATIENT
Start: 2023-07-11

## 2023-07-10 RX ADMIN — EPOETIN ALFA-EPBX 40000 UNITS: 40000 INJECTION, SOLUTION INTRAVENOUS; SUBCUTANEOUS at 09:58

## 2023-07-10 NOTE — PLAN OF CARE
Problem: Discharge Planning  Goal: Discharge to home or other facility with appropriate resources  Outcome: Adequate for Discharge  Flowsheets (Taken 7/10/2023 0955)  Discharge to home or other facility with appropriate resources:   Identify barriers to discharge with patient and caregiver   Arrange for needed discharge resources and transportation as appropriate   Identify discharge learning needs (meds, wound care, etc)  Note: Discharge instructions given and reviewed with patient. All questions answered. Patient verbalized understanding Patient and family member able to teach back follow up appointments and when to call the doctor. Patient offers no questions at this time      Problem: Safety - Adult  Goal: Free from fall injury  Outcome: Adequate for Discharge  Flowsheets (Taken 7/10/2023 0955)  Free From Fall Injury: Instruct family/caregiver on patient safety  Note: No falls this admission Patient aware of fall precautions for here and at home -call light in reach while here       Problem: Chronic Conditions and Co-morbidities  Goal: Patient's chronic conditions and co-morbidity symptoms are monitored and maintained or improved  Outcome: Adequate for Discharge  Flowsheets (Taken 7/10/2023 0955)  Care Plan - Patient's Chronic Conditions and Co-Morbidity Symptoms are Monitored and Maintained or Improved:   Monitor and assess patient's chronic conditions and comorbid symptoms for stability, deterioration, or improvement   Collaborate with multidisciplinary team to address chronic and comorbid conditions and prevent exacerbation or deterioration  Note: Reviewed retacrit injection  with patient, patient offered no questions or concerns. Patient verbalized understanding of drug being administered. Reviewed bleeding precautions and when to call the doctor   Care plan reviewed with patient and wife. Patient and wife verbalize understanding of the plan of care and contribute to goal setting.

## 2023-07-10 NOTE — PROGRESS NOTES
Patient tolerated retacrit without any complications. Patient denies any bleeding. Discussed bleeding precautions and when to call the doctor. Patient verbalizes understanding of discharge instructions, ambulated off unit with family and belongings.

## 2023-07-10 NOTE — DISCHARGE INSTRUCTIONS
Please contact your Oncologist if you have any questions regarding the retacrit that you received today. Patient instructed if experience any of the symptoms following today's retacrit / to notify MD immediately or go to emergency department.     * dizziness/lightheadedness  *acute nausea/vomiting - not relieved with medication  *headache - not relieved from Tylenol/pain medication  *chest pain/pressure  *rash/itching  *shortness of breath        Drink fluids - 48oz fluids daily  Call if develop fever/ chills/ signs or symptoms of infection

## 2023-07-12 ENCOUNTER — HOSPITAL ENCOUNTER (OUTPATIENT)
Dept: INFUSION THERAPY | Age: 68
Discharge: HOME | End: 2023-07-12
Payer: COMMERCIAL

## 2023-07-12 ENCOUNTER — OFFICE VISIT (OUTPATIENT)
Dept: ONCOLOGY | Age: 68
End: 2023-07-12
Payer: COMMERCIAL

## 2023-07-12 VITALS
WEIGHT: 268.4 LBS | HEART RATE: 98 BPM | HEIGHT: 71 IN | TEMPERATURE: 97.2 F | DIASTOLIC BLOOD PRESSURE: 62 MMHG | RESPIRATION RATE: 20 BRPM | BODY MASS INDEX: 37.57 KG/M2 | SYSTOLIC BLOOD PRESSURE: 126 MMHG | OXYGEN SATURATION: 92 %

## 2023-07-12 VITALS
OXYGEN SATURATION: 92 % | HEART RATE: 98 BPM | RESPIRATION RATE: 20 BRPM | DIASTOLIC BLOOD PRESSURE: 62 MMHG | WEIGHT: 268.4 LBS | TEMPERATURE: 97.2 F | SYSTOLIC BLOOD PRESSURE: 126 MMHG | BODY MASS INDEX: 37.57 KG/M2 | HEIGHT: 71 IN

## 2023-07-12 DIAGNOSIS — D63.8 ANEMIA IN OTHER CHRONIC DISEASES CLASSIFIED ELSEWHERE: ICD-10-CM

## 2023-07-12 DIAGNOSIS — D47.1 MPN (MYELOPROLIFERATIVE NEOPLASM) (HCC): ICD-10-CM

## 2023-07-12 DIAGNOSIS — D69.6 THROMBOCYTOPENIA (HCC): ICD-10-CM

## 2023-07-12 DIAGNOSIS — D46.9 MDS (MYELODYSPLASTIC SYNDROME) (HCC): ICD-10-CM

## 2023-07-12 DIAGNOSIS — D46.Z MDS (MYELODYSPLASTIC SYNDROME), HIGH GRADE (HCC): Primary | ICD-10-CM

## 2023-07-12 DIAGNOSIS — D72.818 OTHER DECREASED WHITE BLOOD CELL (WBC) COUNT: ICD-10-CM

## 2023-07-12 DIAGNOSIS — D64.9 ANEMIA, UNSPECIFIED TYPE: ICD-10-CM

## 2023-07-12 LAB
DEPRECATED RDW RBC AUTO: 62.5 FL (ref 35–45)
ERYTHROCYTE [DISTWIDTH] IN BLOOD BY AUTOMATED COUNT: 19.1 % (ref 11.5–14.5)
HCT VFR BLD AUTO: 26.7 % (ref 42–52)
HGB BLD-MCNC: 8.6 GM/DL (ref 14–18)
MCH RBC QN AUTO: 31.3 PG (ref 26–33)
MCHC RBC AUTO-ENTMCNC: 32.2 GM/DL (ref 32.2–35.5)
MCV RBC AUTO: 97.1 FL (ref 80–94)
PATHOLOGIST REVIEW: ABNORMAL
PLATELET # BLD AUTO: 9 THOU/MM3 (ref 130–400)
PMV BLD AUTO: 12.6 FL (ref 9.4–12.4)
RBC # BLD AUTO: 2.75 MILL/MM3 (ref 4.7–6.1)
SCAN OF BLOOD SMEAR: NORMAL
WBC # BLD AUTO: 46.1 THOU/MM3 (ref 4.8–10.8)

## 2023-07-12 PROCEDURE — 1036F TOBACCO NON-USER: CPT | Performed by: INTERNAL MEDICINE

## 2023-07-12 PROCEDURE — 99214 OFFICE O/P EST MOD 30 MIN: CPT | Performed by: INTERNAL MEDICINE

## 2023-07-12 PROCEDURE — G8427 DOCREV CUR MEDS BY ELIG CLIN: HCPCS | Performed by: INTERNAL MEDICINE

## 2023-07-12 PROCEDURE — 1123F ACP DISCUSS/DSCN MKR DOCD: CPT | Performed by: INTERNAL MEDICINE

## 2023-07-12 PROCEDURE — 99211 OFF/OP EST MAY X REQ PHY/QHP: CPT

## 2023-07-12 PROCEDURE — G8417 CALC BMI ABV UP PARAM F/U: HCPCS | Performed by: INTERNAL MEDICINE

## 2023-07-12 PROCEDURE — 85027 COMPLETE CBC AUTOMATED: CPT

## 2023-07-12 PROCEDURE — 3017F COLORECTAL CA SCREEN DOC REV: CPT | Performed by: INTERNAL MEDICINE

## 2023-07-12 ASSESSMENT — ENCOUNTER SYMPTOMS
NAUSEA: 0
RECTAL PAIN: 0
APNEA: 0
SHORTNESS OF BREATH: 1
SORE THROAT: 0
CHOKING: 0
ABDOMINAL PAIN: 0
EYE PAIN: 0
CHEST TIGHTNESS: 0
COLOR CHANGE: 0
BACK PAIN: 0
EYE DISCHARGE: 0
TROUBLE SWALLOWING: 0
WHEEZING: 0
VOMITING: 0
FACIAL SWELLING: 0
ANAL BLEEDING: 0
COUGH: 0
CONSTIPATION: 0
BLOOD IN STOOL: 0
DIARRHEA: 0
SINUS PAIN: 0
ABDOMINAL DISTENTION: 0
STRIDOR: 0

## 2023-07-12 NOTE — PROGRESS NOTES
[address]  [address] [address]  Dept: [phone]  Loc: [phone]   Hematology/Oncology Progress Note (Clinic)        [facility]  1955 7/12/2023     No ref. provider found   Shakira Ortiz APRN - CNP     Diagnosis:   LEUKOCYTOSIS  ANEMIA  THROMOBOCYTOPENIA  MIXED MYELODYSPLASTIC/MYELOPROLIFERATIVE NEOPLASM                                                       Treatment:      Treatment plan:      5-azacytidine days 1 through 7 (day 1 through 5; 8, 9) every 28 days-patient only received 5 days on Cycle #1 d/t severe anemia and thrombocytopenia      Cycle 2 of 5-azacytidine due on July 28, 2023      Cycle 1 of 5-azacytidine was initiated on June 26, 2023      Retacrit 40,000 units weekly for Hgb<11    Followable Disease:   CBC      Comorbidities:  Arthritis, COPD, hypertension      Subjective: Hospitalized 4 days last week at [facility] with pneumonia;had 3 additional PRBC's and one unit of platelets;feels much better this week    Has not needed oxygen for 3 days;pulse ox 93%    Mowed yard yesterday with riding mower, but very SOB with exertion;    ROS:  Review of Systems   Constitutional:  Positive for fatigue. Negative for appetite change, chills, diaphoresis, fever and unexpected weight change. HENT:  Negative for drooling, ear pain, facial swelling, mouth sores, nosebleeds, sinus pain, sore throat and trouble swallowing. Eyes:  Negative for pain, discharge and visual disturbance. Respiratory:  Positive for shortness of breath. Negative for apnea, cough, choking, chest tightness, wheezing and stridor. Shortness of breath with exertion has climbing stairs    Has not needed oxygen supplementation for the last 3 days, and pulse ox today in the clinic was 93% on room air. Cardiovascular:  Negative for chest pain, palpitations and leg swelling.    Gastrointestinal:  Negative for

## 2023-07-12 NOTE — PATIENT INSTRUCTIONS
ASSESSMENT/PLAN:    1: Diagnosis:   MIXED MYELODYSPLASTIC/MYELOPROLIFERATIVE NEOPLASM  LEUKOCYTOSIS  ANEMIA  THROMOBOCYTOPENIA                                                  2) Treatment goal:      Treatment plan:      1. Platelet transfusion today      2. Retacrit 40,000 units was given 7/10/2023;continue every 7 days      3. Cycle #2 of azacitidine beginning July 24, 2023 for 7 days      4.   CBC every Monday and Wednesday        3) Follow Up: Office appointment 3 weeks

## 2023-07-17 ENCOUNTER — HOSPITAL ENCOUNTER (OUTPATIENT)
Dept: INFUSION THERAPY | Age: 68
Discharge: HOME | End: 2023-07-17
Payer: COMMERCIAL

## 2023-07-17 VITALS
TEMPERATURE: 98.3 F | HEART RATE: 81 BPM | OXYGEN SATURATION: 92 % | SYSTOLIC BLOOD PRESSURE: 120 MMHG | DIASTOLIC BLOOD PRESSURE: 56 MMHG | RESPIRATION RATE: 18 BRPM

## 2023-07-17 DIAGNOSIS — D46.9 MDS (MYELODYSPLASTIC SYNDROME) (HCC): ICD-10-CM

## 2023-07-17 DIAGNOSIS — D64.9 ANEMIA, UNSPECIFIED TYPE: Primary | ICD-10-CM

## 2023-07-17 DIAGNOSIS — D69.6 THROMBOCYTOPENIA (HCC): ICD-10-CM

## 2023-07-17 DIAGNOSIS — D72.821 MONOCYTOSIS: ICD-10-CM

## 2023-07-17 DIAGNOSIS — D63.8 ANEMIA IN OTHER CHRONIC DISEASES CLASSIFIED ELSEWHERE: ICD-10-CM

## 2023-07-17 LAB
AUTO DIFF PNL BLD: ABNORMAL
BASOPHILS ABSOLUTE: 0 THOU/MM3 (ref 0–0.1)
BASOPHILS NFR BLD AUTO: 0 %
BLASTS: 4 % (ref 0–1)
DEPRECATED RDW RBC AUTO: 61.1 FL (ref 35–45)
EOSINOPHIL NFR BLD AUTO: 2 %
EOSINOPHILS ABSOLUTE: 1 THOU/MM3 (ref 0–0.4)
ERYTHROCYTE [DISTWIDTH] IN BLOOD BY AUTOMATED COUNT: 19.2 % (ref 11.5–14.5)
HCT VFR BLD AUTO: 24.8 % (ref 42–52)
HGB BLD-MCNC: 7.9 GM/DL (ref 14–18)
LYMPHOCYTES ABSOLUTE: 6.1 THOU/MM3 (ref 1–4.8)
LYMPHOCYTES NFR BLD AUTO: 12 %
MANUAL DIFF BLD: NORMAL
MCH RBC QN AUTO: 30.9 PG (ref 26–33)
MCHC RBC AUTO-ENTMCNC: 31.9 GM/DL (ref 32.2–35.5)
MCV RBC AUTO: 96.9 FL (ref 80–94)
METAMYELOCYTES: ABNORMAL %
MONOCYTES ABSOLUTE: 24.4 THOU/MM3 (ref 0.4–1.3)
MONOCYTES NFR BLD AUTO: 48 %
MYELOCYTES: 3 %
NEUTROPHILS NFR BLD AUTO: 31 %
NRBC BLD AUTO-RTO: 0 /100 WBC
PATHOLOGIST REVIEW: ABNORMAL
PLATELET # BLD AUTO: 10 THOU/MM3 (ref 130–400)
PLATELET BLD QL SMEAR: ABNORMAL
PMV BLD AUTO: 12.2 FL (ref 9.4–12.4)
RBC # BLD AUTO: 2.56 MILL/MM3 (ref 4.7–6.1)
SEGMENTED NEUTROPHILS ABSOLUTE COUNT: 15.7 THOU/MM3 (ref 1.8–7.7)
WBC # BLD AUTO: 50.8 THOU/MM3 (ref 4.8–10.8)

## 2023-07-17 PROCEDURE — 85027 COMPLETE CBC AUTOMATED: CPT

## 2023-07-17 PROCEDURE — 88184 FLOWCYTOMETRY/ TC 1 MARKER: CPT

## 2023-07-17 PROCEDURE — 99211 OFF/OP EST MAY X REQ PHY/QHP: CPT

## 2023-07-17 PROCEDURE — 96372 THER/PROPH/DIAG INJ SC/IM: CPT

## 2023-07-17 PROCEDURE — 6360000002 HC RX W HCPCS: Performed by: NURSE PRACTITIONER

## 2023-07-17 PROCEDURE — 88185 FLOWCYTOMETRY/TC ADD-ON: CPT

## 2023-07-17 RX ORDER — SODIUM CHLORIDE 9 MG/ML
INJECTION, SOLUTION INTRAVENOUS CONTINUOUS
Status: CANCELLED | OUTPATIENT
Start: 2023-07-19

## 2023-07-17 RX ORDER — ACETAMINOPHEN 325 MG/1
650 TABLET ORAL
Status: CANCELLED | OUTPATIENT
Start: 2023-07-19

## 2023-07-17 RX ORDER — EPINEPHRINE 1 MG/ML
0.3 INJECTION, SOLUTION INTRAMUSCULAR; SUBCUTANEOUS PRN
Status: CANCELLED | OUTPATIENT
Start: 2023-07-19

## 2023-07-17 RX ORDER — DIPHENHYDRAMINE HYDROCHLORIDE 50 MG/ML
50 INJECTION INTRAMUSCULAR; INTRAVENOUS
Status: CANCELLED | OUTPATIENT
Start: 2023-07-19

## 2023-07-17 RX ORDER — ALBUTEROL SULFATE 90 UG/1
4 AEROSOL, METERED RESPIRATORY (INHALATION) PRN
Status: CANCELLED | OUTPATIENT
Start: 2023-07-19

## 2023-07-17 RX ORDER — ONDANSETRON 2 MG/ML
8 INJECTION INTRAMUSCULAR; INTRAVENOUS
Status: CANCELLED | OUTPATIENT
Start: 2023-07-19

## 2023-07-17 RX ADMIN — EPOETIN ALFA-EPBX 40000 UNITS: 40000 INJECTION, SOLUTION INTRAVENOUS; SUBCUTANEOUS at 10:43

## 2023-07-17 NOTE — PROGRESS NOTES
Pt discharged in stable condition with verbalization of discharge instructions all questions answered and all  belongings sent with patient. Patient tolerated retacrit injection  without any complications or signs of a reaction. Patient accompanied off unit by family.

## 2023-07-17 NOTE — PLAN OF CARE
Problem: Discharge Planning  Goal: Discharge to home or other facility with appropriate resources  Outcome: Adequate for Discharge  Flowsheets (Taken 7/17/2023 1701)  Discharge to home or other facility with appropriate resources:   Identify barriers to discharge with patient and caregiver   Identify discharge learning needs (meds, wound care, etc)   Arrange for needed discharge resources and transportation as appropriate  Note: Discharge instructions given and reviewed with patient. All questions answered. Patient verbalized understanding Patient and family member able to teach back follow up appointments and when to call the doctor. Patient offers no questions at this time      Problem: Safety - Adult  Goal: Free from fall injury  Outcome: Adequate for Discharge  Flowsheets (Taken 7/17/2023 1701)  Free From Fall Injury: Instruct family/caregiver on patient safety  Note: No falls this admission Patient aware of fall precautions for here and at home -call light in reach while here       Problem: Chronic Conditions and Co-morbidities  Goal: Patient's chronic conditions and co-morbidity symptoms are monitored and maintained or improved  Outcome: Adequate for Discharge  Flowsheets (Taken 7/17/2023 1701)  Care Plan - Patient's Chronic Conditions and Co-Morbidity Symptoms are Monitored and Maintained or Improved:   Monitor and assess patient's chronic conditions and comorbid symptoms for stability, deterioration, or improvement   Collaborate with multidisciplinary team to address chronic and comorbid conditions and prevent exacerbation or deterioration  Note: Reviewed retacirt injection with patient, patient offered no questions or concerns. Patient verbalized understanding of drug being administered. Care plan reviewed with patient and wife. Patient and wife verbalized understanding of the plan of care and contributed to goal setting.

## 2023-07-17 NOTE — PROGRESS NOTES
Patient and wife informed will not get any transfusion today but to go er with any bleeding and will return wednesday for repeat blood work and possible transfusion. They verbalized understanding of follow up appointments and when to call the doctor.

## 2023-07-17 NOTE — DISCHARGE INSTRUCTIONS
Please contact your Oncologist if you have any questions regarding the chemotherapy of retacrit that you received today. Patient instructed if experience any of the symptoms following today's injection  / to notify MD immediately or go to emergency department.     * dizziness/lightheadedness  *acute nausea/vomiting - not relieved with medication  *headache - not relieved from Tylenol/pain medication  *chest pain/pressure  *rash/itching  *shortness of breath        Drink fluids - 48oz fluids daily  Call if develop fever/ chills/ signs or symptoms of infection

## 2023-07-19 ENCOUNTER — HOSPITAL ENCOUNTER (OUTPATIENT)
Dept: INFUSION THERAPY | Age: 68
Discharge: HOME | End: 2023-07-19
Payer: COMMERCIAL

## 2023-07-19 VITALS
HEART RATE: 91 BPM | TEMPERATURE: 98 F | DIASTOLIC BLOOD PRESSURE: 58 MMHG | SYSTOLIC BLOOD PRESSURE: 109 MMHG | WEIGHT: 271.8 LBS | RESPIRATION RATE: 18 BRPM | OXYGEN SATURATION: 94 % | BODY MASS INDEX: 37.91 KG/M2

## 2023-07-19 DIAGNOSIS — D63.8 ANEMIA IN OTHER CHRONIC DISEASES CLASSIFIED ELSEWHERE: ICD-10-CM

## 2023-07-19 DIAGNOSIS — D46.9 MDS (MYELODYSPLASTIC SYNDROME) (HCC): ICD-10-CM

## 2023-07-19 LAB
ERYTHROCYTE [DISTWIDTH] IN BLOOD BY AUTOMATED COUNT: 18.5 % (ref 11.5–14.5)
HCT VFR BLD AUTO: 22.1 % (ref 42–52)
HGB BLD-MCNC: 7.3 GM/DL (ref 14–18)
MCH RBC QN AUTO: 30.7 PG (ref 26–33)
MCHC RBC AUTO-ENTMCNC: 33 GM/DL (ref 32.2–35.5)
MCV RBC AUTO: 93 FL (ref 80–94)
PLATELET # BLD AUTO: 9 THOU/MM3 (ref 130–400)
PMV BLD AUTO: 12.5 FL (ref 9.4–12.4)
RBC # BLD AUTO: 2.38 MILL/MM3 (ref 4.7–6.1)
WBC # BLD AUTO: 56.1 THOU/MM3 (ref 4.8–10.8)

## 2023-07-19 PROCEDURE — 85027 COMPLETE CBC AUTOMATED: CPT

## 2023-07-19 PROCEDURE — 99211 OFF/OP EST MAY X REQ PHY/QHP: CPT

## 2023-07-19 NOTE — PLAN OF CARE
Problem: Discharge Planning  Goal: Discharge to home or other facility with appropriate resources  Outcome: Adequate for Discharge  Flowsheets (Taken 7/19/2023 1704)  Discharge to home or other facility with appropriate resources:   Identify barriers to discharge with patient and caregiver   Arrange for needed discharge resources and transportation as appropriate  Note: Patient verbalizes understanding of discharge instructions, follow up appointment, and when to call physician if needed      Problem: Safety - Adult  Goal: Free from fall injury  Outcome: Adequate for Discharge  Flowsheets (Taken 7/19/2023 1704)  Free From Fall Injury: Instruct family/caregiver on patient safety  Note: Patient free of falls this visit. Fall risks assessed. Precautions discussed. Care plan reviewed with patient. Patient verbalizes understanding of the plan of care and contributes to goal setting.

## 2023-07-19 NOTE — PROGRESS NOTES
Patient presented for lab work and nurse assess. CBC results and assessment discussed with BEATRICE Pearson. Order obtained for 1 unit platelets and 1 unit PRBCs to be infused at [facility] today or tomorrow. Plan of care discussed with patient. Bleeding precautions discussed. Patient verbalizes understanding. Discharge instructions given to patient and patient discharged off unit accompanied by family member with belongings.

## 2023-07-20 LAB — LEUK/LYMPH PHENOTYPING WB: NORMAL

## 2023-07-24 ENCOUNTER — HOSPITAL ENCOUNTER (OUTPATIENT)
Dept: INFUSION THERAPY | Age: 68
Discharge: HOME | End: 2023-07-24
Payer: COMMERCIAL

## 2023-07-24 ENCOUNTER — OFFICE VISIT (OUTPATIENT)
Dept: ONCOLOGY | Age: 68
End: 2023-07-24
Payer: COMMERCIAL

## 2023-07-24 ENCOUNTER — TELEPHONE (OUTPATIENT)
Dept: ONCOLOGY | Age: 68
End: 2023-07-24

## 2023-07-24 VITALS
SYSTOLIC BLOOD PRESSURE: 106 MMHG | TEMPERATURE: 98.2 F | WEIGHT: 276 LBS | RESPIRATION RATE: 24 BRPM | DIASTOLIC BLOOD PRESSURE: 58 MMHG | HEART RATE: 90 BPM | OXYGEN SATURATION: 96 % | HEIGHT: 71 IN | BODY MASS INDEX: 38.64 KG/M2

## 2023-07-24 VITALS
OXYGEN SATURATION: 96 % | HEIGHT: 71 IN | BODY MASS INDEX: 38.64 KG/M2 | DIASTOLIC BLOOD PRESSURE: 58 MMHG | TEMPERATURE: 98.2 F | HEART RATE: 90 BPM | WEIGHT: 276 LBS | SYSTOLIC BLOOD PRESSURE: 106 MMHG | RESPIRATION RATE: 24 BRPM

## 2023-07-24 DIAGNOSIS — D46.9 MDS (MYELODYSPLASTIC SYNDROME) (HCC): Primary | ICD-10-CM

## 2023-07-24 DIAGNOSIS — D63.8 ANEMIA IN OTHER CHRONIC DISEASES CLASSIFIED ELSEWHERE: ICD-10-CM

## 2023-07-24 DIAGNOSIS — D46.Z MDS (MYELODYSPLASTIC SYNDROME), HIGH GRADE (HCC): Primary | ICD-10-CM

## 2023-07-24 LAB
ALBUMIN SERPL BCG-MCNC: 3.4 G/DL (ref 3.5–5.1)
ALP SERPL-CCNC: 77 U/L (ref 38–126)
ALT SERPL W/O P-5'-P-CCNC: 26 U/L (ref 11–66)
AST SERPL-CCNC: 18 U/L (ref 5–40)
BASOPHILS ABSOLUTE: 0.1 THOU/MM3 (ref 0–0.1)
BASOPHILS NFR BLD AUTO: 0.1 %
BILIRUB CONJ SERPL-MCNC: < 0.2 MG/DL (ref 0–0.3)
BILIRUB SERPL-MCNC: < 0.2 MG/DL (ref 0.3–1.2)
BUN BLDP-MCNC: 22 MG/DL (ref 8–26)
CHLORIDE BLD-SCNC: 106 MEQ/L (ref 98–109)
CREAT BLD-MCNC: 1.4 MG/DL (ref 0.5–1.2)
DEPRECATED RDW RBC AUTO: 59.8 FL (ref 35–45)
EOSINOPHIL NFR BLD AUTO: 0 %
EOSINOPHILS ABSOLUTE: 0 THOU/MM3 (ref 0–0.4)
ERYTHROCYTE [DISTWIDTH] IN BLOOD BY AUTOMATED COUNT: 18.2 % (ref 11.5–14.5)
GFR SERPL CREATININE-BSD FRML MDRD: 55 ML/MIN/1.73M2
GLUCOSE BLD-MCNC: 181 MG/DL (ref 70–108)
HCT VFR BLD AUTO: 22.8 % (ref 42–52)
HGB BLD-MCNC: 7.2 GM/DL (ref 14–18)
IMM GRANULOCYTES # BLD AUTO: 10.71 THOU/MM3 (ref 0–0.07)
IMM GRANULOCYTES NFR BLD AUTO: 15.6 %
IONIZED CALCIUM, WHOLE BLOOD: 1.17 MMOL/L (ref 1.12–1.32)
LYMPHOCYTES ABSOLUTE: 15.9 THOU/MM3 (ref 1–4.8)
LYMPHOCYTES NFR BLD AUTO: 23.1 %
MCH RBC QN AUTO: 30.1 PG (ref 26–33)
MCHC RBC AUTO-ENTMCNC: 31.6 GM/DL (ref 32.2–35.5)
MCV RBC AUTO: 95.4 FL (ref 80–94)
MONOCYTES ABSOLUTE: 19.8 THOU/MM3 (ref 0.4–1.3)
MONOCYTES NFR BLD AUTO: 28.8 %
NEUTROPHILS NFR BLD AUTO: 32.4 %
NRBC BLD AUTO-RTO: 0 /100 WBC
PLATELET # BLD AUTO: 17 THOU/MM3 (ref 130–400)
PMV BLD AUTO: 10.6 FL (ref 9.4–12.4)
POTASSIUM BLD-SCNC: 3.5 MEQ/L (ref 3.5–4.9)
PROT SERPL-MCNC: 6.8 G/DL (ref 6.1–8)
RBC # BLD AUTO: 2.39 MILL/MM3 (ref 4.7–6.1)
SEGMENTED NEUTROPHILS ABSOLUTE COUNT: 22.3 THOU/MM3 (ref 1.8–7.7)
SODIUM BLD-SCNC: 140 MEQ/L (ref 138–146)
TOTAL CO2, WHOLE BLOOD: 27 MEQ/L (ref 23–33)
WBC # BLD AUTO: 68.7 THOU/MM3 (ref 4.8–10.8)

## 2023-07-24 PROCEDURE — 99215 OFFICE O/P EST HI 40 MIN: CPT | Performed by: NURSE PRACTITIONER

## 2023-07-24 PROCEDURE — 80047 BASIC METABLC PNL IONIZED CA: CPT

## 2023-07-24 PROCEDURE — 3017F COLORECTAL CA SCREEN DOC REV: CPT | Performed by: NURSE PRACTITIONER

## 2023-07-24 PROCEDURE — 80076 HEPATIC FUNCTION PANEL: CPT

## 2023-07-24 PROCEDURE — G8417 CALC BMI ABV UP PARAM F/U: HCPCS | Performed by: NURSE PRACTITIONER

## 2023-07-24 PROCEDURE — 1123F ACP DISCUSS/DSCN MKR DOCD: CPT | Performed by: NURSE PRACTITIONER

## 2023-07-24 PROCEDURE — 99211 OFF/OP EST MAY X REQ PHY/QHP: CPT

## 2023-07-24 PROCEDURE — 1036F TOBACCO NON-USER: CPT | Performed by: NURSE PRACTITIONER

## 2023-07-24 PROCEDURE — G8427 DOCREV CUR MEDS BY ELIG CLIN: HCPCS | Performed by: NURSE PRACTITIONER

## 2023-07-24 PROCEDURE — 6360000002 HC RX W HCPCS: Performed by: NURSE PRACTITIONER

## 2023-07-24 PROCEDURE — 96372 THER/PROPH/DIAG INJ SC/IM: CPT

## 2023-07-24 PROCEDURE — 85025 COMPLETE CBC W/AUTO DIFF WBC: CPT

## 2023-07-24 RX ORDER — ALBUTEROL SULFATE 90 UG/1
4 AEROSOL, METERED RESPIRATORY (INHALATION) PRN
OUTPATIENT
Start: 2023-07-26

## 2023-07-24 RX ORDER — SODIUM CHLORIDE 9 MG/ML
INJECTION, SOLUTION INTRAVENOUS CONTINUOUS
OUTPATIENT
Start: 2023-07-26

## 2023-07-24 RX ORDER — EPINEPHRINE 1 MG/ML
0.3 INJECTION, SOLUTION INTRAMUSCULAR; SUBCUTANEOUS PRN
OUTPATIENT
Start: 2023-07-26

## 2023-07-24 RX ORDER — ONDANSETRON 2 MG/ML
8 INJECTION INTRAMUSCULAR; INTRAVENOUS
OUTPATIENT
Start: 2023-07-26

## 2023-07-24 RX ORDER — ACETAMINOPHEN 325 MG/1
650 TABLET ORAL
OUTPATIENT
Start: 2023-07-26

## 2023-07-24 RX ORDER — DIPHENHYDRAMINE HYDROCHLORIDE 50 MG/ML
50 INJECTION INTRAMUSCULAR; INTRAVENOUS
OUTPATIENT
Start: 2023-07-26

## 2023-07-24 RX ADMIN — EPOETIN ALFA-EPBX 40000 UNITS: 40000 INJECTION, SOLUTION INTRAVENOUS; SUBCUTANEOUS at 12:12

## 2023-07-24 NOTE — PATIENT INSTRUCTIONS
HOLD treatment  Schedule PRBCs and platelet transfusion tomorrow at [facility]  Schedule bone marrow biopsy at [facility] with platelet transfusion during procedure  Return to clinic in one week  Notify the office of any new or worsening symptoms

## 2023-07-24 NOTE — PROGRESS NOTES
[address]  [address]  Dept: [phone]  Loc: [phone]       Visit Date:7/24/2023     Chilo Clark is a 79 y.o. male who presents today for:   Chief Complaint   Patient presents with    Follow-up     MDS (myelodysplastic syndrome), high grade ([address])        HPI:   Chilo Clark is a 79 y.o. male with leukocytosis, anemia and thrombocytopenia. The patient has a history of COPD, HTN and chronic arthritis. The patient was initially seen by Dr. Jeancarlos Orozco of [address] [place] [address] for evaluation of an abnormal CBC with leukocytosis, anemia and thrombocytopenia.      05/17/2023 the patient underwent a peripheral flow cytometry which revealed the presence of 5% blasts, 4% promonocytes, absolute monocytosis, dysgranulopoiesis, thrombocytopenia and circulating dysplastic nucleated red blood cells. Further evaluation with a bone marrow biopsy was highly recommended. 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 Blasts were 3% in the peripheral blood and 6% in the bone marrow. The patient was seen by hematology oncology at [facility] and started on treatment with acyclovir 400 mg by mouth twice daily, allopurinol 300 mg by mouth daily and hydroxyurea 500 mg by mouth twice daily. 06/13/2023 the patient was seen for initial evaluation with Dr. Tristen Dorsey. 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 FISH for AML, CBFB/MYH11, inversion 16 was not detected. 06/26/2023 the patient initiated treatment with azacitidine 75 Mg/m2 daily x7 days every 28 days. The patient would also require supportive care with PRBCs and platelet transfusions. He would also received treatment with Procrit 40,000 units by SQ injection every 7 days pending lab results.     The patient

## 2023-07-24 NOTE — TELEPHONE ENCOUNTER
I called erika to let him know his bone marrow biopsy is on Friday July 28th. Let him know all the preps. Patient called back and is aware of his appointment. Orders faxed to [facility] and outpatient nursing for platelets during.

## 2023-07-24 NOTE — PLAN OF CARE
Care plan reviewed with patient. Patient  verbalized understanding of the plan of care and contribute to goal setting. Problem: Discharge Planning  Goal: Discharge to home or other facility with appropriate resources  Outcome: Adequate for Discharge  Flowsheets (Taken 7/24/2023 1438)  Discharge to home or other facility with appropriate resources:   Identify discharge learning needs (meds, wound care, etc)   Identify barriers to discharge with patient and caregiver   Arrange for needed discharge resources and transportation as appropriate  Note: Verbalized understanding of discharge instructions, follow ups and when to call doctor      Problem: Safety - Adult  Goal: Free from fall injury  Outcome: Adequate for Discharge  Flowsheets (Taken 7/24/2023 1438)  Free From Fall Injury:   Instruct family/caregiver on patient safety   Based on caregiver fall risk screen, instruct family/caregiver to ask for assistance with transferring infant if caregiver noted to have fall risk factors  Note: Free from falls while in infusion center. Verbalized understanding of fall prevention and to ask for assistance with ambulation      Problem: Chronic Conditions and Co-morbidities  Goal: Patient's chronic conditions and co-morbidity symptoms are monitored and maintained or improved  Outcome: Adequate for Discharge  Flowsheets (Taken 7/24/2023 1438)  Care Plan - Patient's Chronic Conditions and Co-Morbidity Symptoms are Monitored and Maintained or Improved:   Collaborate with multidisciplinary team to address chronic and comorbid conditions and prevent exacerbation or deterioration   Monitor and assess patient's chronic conditions and comorbid symptoms for stability, deterioration, or improvement  Note: Patient verbalizes understanding to verbal information given on retacrit,action and possible side effects. Aware to call MD if develop complications.

## 2023-07-24 NOTE — PROGRESS NOTES
Retacrit given as charted, tolerated well. Ardyth Leyden R NP came and talked to patient, no treatment today patient will be scheduled for blood and platelets today and a bone marrow biopsy soon. Patient instructed to call for any signs of symptoms of bleeding. Verbalized understanding of above. Discharged in satisfactory condition. Ambulated off unit per self.

## 2023-07-24 NOTE — DISCHARGE INSTRUCTIONS
Go to [facility] for blood and platelets  Please contact your Oncologist if you have any questions regarding the retacrit that you received today. Patient instructed if experience any of the symptoms following today's retacrit/ to notify MD immediately or go to emergency department.     * dizziness/lightheadedness  *acute nausea/vomiting - not relieved with medication  *headache - not relieved from Tylenol/pain medication  *chest pain/pressure  *rash/itching  *shortness of breath        Drink fluids - 48oz fluids daily  Call if develop fever/ chills/ signs or symptoms of infection

## 2023-07-28 ENCOUNTER — APPOINTMENT (OUTPATIENT)
Dept: GENERAL RADIOLOGY | Age: 68
DRG: 189 | End: 2023-07-28
Payer: COMMERCIAL

## 2023-07-28 ENCOUNTER — HOSPITAL ENCOUNTER (INPATIENT)
Age: 68
LOS: 4 days | Discharge: ANOTHER HOSPITAL | DRG: 189 | End: 2023-08-01
Attending: EMERGENCY MEDICINE | Admitting: INTERNAL MEDICINE
Payer: COMMERCIAL

## 2023-07-28 ENCOUNTER — APPOINTMENT (OUTPATIENT)
Dept: CT IMAGING | Age: 68
DRG: 189 | End: 2023-07-28
Payer: COMMERCIAL

## 2023-07-28 DIAGNOSIS — C92.10 CML (CHRONIC MYELOID LEUKEMIA) (HCC): ICD-10-CM

## 2023-07-28 DIAGNOSIS — C92.10 CML (CHRONIC MYELOCYTIC LEUKEMIA) (HCC): Primary | ICD-10-CM

## 2023-07-28 DIAGNOSIS — J18.9 PNEUMONIA DUE TO INFECTIOUS ORGANISM, UNSPECIFIED LATERALITY, UNSPECIFIED PART OF LUNG: ICD-10-CM

## 2023-07-28 DIAGNOSIS — J44.1 COPD EXACERBATION (HCC): Primary | ICD-10-CM

## 2023-07-28 PROBLEM — R09.02 HYPOXIA: Status: ACTIVE | Noted: 2023-07-28

## 2023-07-28 LAB
ABO: NORMAL
ALBUMIN SERPL BCG-MCNC: 3.8 G/DL (ref 3.5–5.1)
ALP SERPL-CCNC: 100 U/L (ref 38–126)
ALT SERPL W/O P-5'-P-CCNC: 27 U/L (ref 11–66)
ANION GAP SERPL CALC-SCNC: 13 MEQ/L (ref 8–16)
ANTIBODY SCREEN: NORMAL
AST SERPL-CCNC: 18 U/L (ref 5–40)
AUTO DIFF PNL BLD: ABNORMAL
BACTERIA: ABNORMAL
BASOPHILS ABSOLUTE: 0 THOU/MM3 (ref 0–0.1)
BASOPHILS NFR BLD AUTO: 0 %
BILIRUB CONJ SERPL-MCNC: < 0.2 MG/DL (ref 0–0.3)
BILIRUB SERPL-MCNC: 0.2 MG/DL (ref 0.3–1.2)
BILIRUB UR QL STRIP: NEGATIVE
BLASTS: 5 % (ref 0–1)
BUN SERPL-MCNC: 13 MG/DL (ref 7–22)
CALCIUM SERPL-MCNC: 9.1 MG/DL (ref 8.5–10.5)
CASTS #/AREA URNS LPF: ABNORMAL /LPF
CASTS #/AREA URNS LPF: ABNORMAL /LPF
CHARACTER UR: CLEAR
CHARCOAL URNS QL MICRO: ABNORMAL
CHLORIDE SERPL-SCNC: 101 MEQ/L (ref 98–111)
CO2 SERPL-SCNC: 23 MEQ/L (ref 23–33)
COLOR UR: YELLOW
CREAT SERPL-MCNC: 1.4 MG/DL (ref 0.4–1.2)
CREAT UR-MCNC: 119.7 MG/DL
CRYSTALS URNS QL MICRO: ABNORMAL
DEPRECATED RDW RBC AUTO: 56 FL (ref 35–45)
EOSINOPHIL NFR BLD AUTO: 0 %
EOSINOPHILS ABSOLUTE: 0 THOU/MM3 (ref 0–0.4)
EPITHELIAL CELLS, UA: ABNORMAL /HPF
ERYTHROCYTE [DISTWIDTH] IN BLOOD BY AUTOMATED COUNT: 17.8 % (ref 11.5–14.5)
GFR SERPL CREATININE-BSD FRML MDRD: 55 ML/MIN/1.73M2
GLUCOSE SERPL-MCNC: 100 MG/DL (ref 70–108)
GLUCOSE UR QL STRIP.AUTO: NEGATIVE MG/DL
HCT VFR BLD AUTO: 24.7 % (ref 42–52)
HGB BLD-MCNC: 8.1 GM/DL (ref 14–18)
HGB UR QL STRIP.AUTO: NEGATIVE
KETONES UR QL STRIP.AUTO: NEGATIVE
LACTATE SERPL-SCNC: 0.9 MMOL/L (ref 0.5–2)
LEUKOCYTE ESTERASE UR QL STRIP.AUTO: NEGATIVE
LYMPHOCYTES ABSOLUTE: 12.5 THOU/MM3 (ref 1–4.8)
LYMPHOCYTES NFR BLD AUTO: 7 %
MANUAL DIFF BLD: NORMAL
MCH RBC QN AUTO: 30 PG (ref 26–33)
MCHC RBC AUTO-ENTMCNC: 32.8 GM/DL (ref 32.2–35.5)
MCV RBC AUTO: 91.5 FL (ref 80–94)
METAMYELOCYTES: 4 %
MONOCYTES ABSOLUTE: 102.1 THOU/MM3 (ref 0.4–1.3)
MONOCYTES NFR BLD AUTO: 57 %
MYELOCYTES: 7 %
NEUTROPHILS NFR BLD AUTO: 19 %
NITRITE UR QL STRIP.AUTO: NEGATIVE
NRBC BLD AUTO-RTO: 0 /100 WBC
OSMOLALITY SERPL CALC.SUM OF ELEC: 274 MOSMOL/KG (ref 275–300)
PATHOLOGIST REVIEW: ABNORMAL
PH UR STRIP.AUTO: 5 [PH] (ref 5–9)
PLATELET # BLD AUTO: 29 THOU/MM3 (ref 130–400)
PLATELET BLD QL SMEAR: ABNORMAL
PMV BLD AUTO: 11.3 FL (ref 9.4–12.4)
POTASSIUM SERPL-SCNC: 4.2 MEQ/L (ref 3.5–5.2)
PROMYELOCYTES: 1 %
PROT SERPL-MCNC: 7.4 G/DL (ref 6.1–8)
PROT UR STRIP.AUTO-MCNC: 30 MG/DL
RBC # BLD AUTO: 2.7 MILL/MM3 (ref 4.7–6.1)
RBC #/AREA URNS HPF: ABNORMAL /HPF
RENAL EPI CELLS #/AREA URNS HPF: ABNORMAL /[HPF]
RH FACTOR: NORMAL
SEGMENTED NEUTROPHILS ABSOLUTE COUNT: 34 THOU/MM3 (ref 1.8–7.7)
SODIUM SERPL-SCNC: 137 MEQ/L (ref 135–145)
SODIUM UR-SCNC: 77 MEQ/L
SP GR UR REFRACT.AUTO: > 1.03 (ref 1–1.03)
TROPONIN, HIGH SENSITIVITY: 22 NG/L (ref 0–12)
TROPONIN, HIGH SENSITIVITY: 38 NG/L (ref 0–12)
UROBILINOGEN UR QL STRIP.AUTO: 0.2 EU/DL (ref 0–1)
WBC # BLD AUTO: 179.1 THOU/MM3 (ref 4.8–10.8)
WBC #/AREA URNS HPF: ABNORMAL /HPF
YEAST LIKE FUNGI URNS QL MICRO: ABNORMAL

## 2023-07-28 PROCEDURE — 84300 ASSAY OF URINE SODIUM: CPT

## 2023-07-28 PROCEDURE — 99285 EMERGENCY DEPT VISIT HI MDM: CPT

## 2023-07-28 PROCEDURE — 74177 CT ABD & PELVIS W/CONTRAST: CPT

## 2023-07-28 PROCEDURE — 6360000002 HC RX W HCPCS: Performed by: STUDENT IN AN ORGANIZED HEALTH CARE EDUCATION/TRAINING PROGRAM

## 2023-07-28 PROCEDURE — 2580000003 HC RX 258

## 2023-07-28 PROCEDURE — 87040 BLOOD CULTURE FOR BACTERIA: CPT

## 2023-07-28 PROCEDURE — 81001 URINALYSIS AUTO W/SCOPE: CPT

## 2023-07-28 PROCEDURE — 1200000000 HC SEMI PRIVATE

## 2023-07-28 PROCEDURE — 36415 COLL VENOUS BLD VENIPUNCTURE: CPT

## 2023-07-28 PROCEDURE — 96365 THER/PROPH/DIAG IV INF INIT: CPT

## 2023-07-28 PROCEDURE — 93005 ELECTROCARDIOGRAM TRACING: CPT | Performed by: STUDENT IN AN ORGANIZED HEALTH CARE EDUCATION/TRAINING PROGRAM

## 2023-07-28 PROCEDURE — 6360000004 HC RX CONTRAST MEDICATION: Performed by: EMERGENCY MEDICINE

## 2023-07-28 PROCEDURE — 96375 TX/PRO/DX INJ NEW DRUG ADDON: CPT

## 2023-07-28 PROCEDURE — 6370000000 HC RX 637 (ALT 250 FOR IP)

## 2023-07-28 PROCEDURE — 96367 TX/PROPH/DG ADDL SEQ IV INF: CPT

## 2023-07-28 PROCEDURE — 94640 AIRWAY INHALATION TREATMENT: CPT

## 2023-07-28 PROCEDURE — 71275 CT ANGIOGRAPHY CHEST: CPT

## 2023-07-28 PROCEDURE — 99223 1ST HOSP IP/OBS HIGH 75: CPT | Performed by: INTERNAL MEDICINE

## 2023-07-28 PROCEDURE — 82248 BILIRUBIN DIRECT: CPT

## 2023-07-28 PROCEDURE — 86901 BLOOD TYPING SEROLOGIC RH(D): CPT

## 2023-07-28 PROCEDURE — 86850 RBC ANTIBODY SCREEN: CPT

## 2023-07-28 PROCEDURE — 71046 X-RAY EXAM CHEST 2 VIEWS: CPT

## 2023-07-28 PROCEDURE — 86900 BLOOD TYPING SEROLOGIC ABO: CPT

## 2023-07-28 PROCEDURE — 82570 ASSAY OF URINE CREATININE: CPT

## 2023-07-28 PROCEDURE — 93010 ELECTROCARDIOGRAM REPORT: CPT | Performed by: INTERNAL MEDICINE

## 2023-07-28 PROCEDURE — 6360000002 HC RX W HCPCS

## 2023-07-28 PROCEDURE — 2580000003 HC RX 258: Performed by: STUDENT IN AN ORGANIZED HEALTH CARE EDUCATION/TRAINING PROGRAM

## 2023-07-28 PROCEDURE — 83605 ASSAY OF LACTIC ACID: CPT

## 2023-07-28 PROCEDURE — P9040 RBC LEUKOREDUCED IRRADIATED: HCPCS

## 2023-07-28 PROCEDURE — 84484 ASSAY OF TROPONIN QUANT: CPT

## 2023-07-28 PROCEDURE — 85025 COMPLETE CBC W/AUTO DIFF WBC: CPT

## 2023-07-28 PROCEDURE — 96361 HYDRATE IV INFUSION ADD-ON: CPT

## 2023-07-28 PROCEDURE — 30233N1 TRANSFUSION OF NONAUTOLOGOUS RED BLOOD CELLS INTO PERIPHERAL VEIN, PERCUTANEOUS APPROACH: ICD-10-PCS | Performed by: INTERNAL MEDICINE

## 2023-07-28 PROCEDURE — 80053 COMPREHEN METABOLIC PANEL: CPT

## 2023-07-28 PROCEDURE — 86923 COMPATIBILITY TEST ELECTRIC: CPT

## 2023-07-28 RX ORDER — FOLIC ACID 1 MG/1
1 TABLET ORAL DAILY
Status: DISCONTINUED | OUTPATIENT
Start: 2023-07-29 | End: 2023-08-01 | Stop reason: HOSPADM

## 2023-07-28 RX ORDER — ALBUTEROL SULFATE 90 UG/1
4 AEROSOL, METERED RESPIRATORY (INHALATION) PRN
OUTPATIENT
Start: 2023-07-28

## 2023-07-28 RX ORDER — EPINEPHRINE 1 MG/ML
0.3 INJECTION, SOLUTION INTRAMUSCULAR; SUBCUTANEOUS PRN
OUTPATIENT
Start: 2023-07-28

## 2023-07-28 RX ORDER — SODIUM CHLORIDE 0.9 % (FLUSH) 0.9 %
5-40 SYRINGE (ML) INJECTION EVERY 12 HOURS SCHEDULED
Status: DISCONTINUED | OUTPATIENT
Start: 2023-07-28 | End: 2023-08-01 | Stop reason: HOSPADM

## 2023-07-28 RX ORDER — DIPHENHYDRAMINE HYDROCHLORIDE 50 MG/ML
50 INJECTION INTRAMUSCULAR; INTRAVENOUS
OUTPATIENT
Start: 2023-07-28

## 2023-07-28 RX ORDER — ONDANSETRON 2 MG/ML
4 INJECTION INTRAMUSCULAR; INTRAVENOUS EVERY 6 HOURS PRN
Status: DISCONTINUED | OUTPATIENT
Start: 2023-07-28 | End: 2023-08-01 | Stop reason: HOSPADM

## 2023-07-28 RX ORDER — HYDROCODONE BITARTRATE AND ACETAMINOPHEN 5; 325 MG/1; MG/1
1 TABLET ORAL EVERY 6 HOURS PRN
Status: DISCONTINUED | OUTPATIENT
Start: 2023-07-28 | End: 2023-07-30

## 2023-07-28 RX ORDER — 0.9 % SODIUM CHLORIDE 0.9 %
500 INTRAVENOUS SOLUTION INTRAVENOUS ONCE
Status: COMPLETED | OUTPATIENT
Start: 2023-07-28 | End: 2023-07-28

## 2023-07-28 RX ORDER — ACYCLOVIR 200 MG/1
200 CAPSULE ORAL 2 TIMES DAILY
Status: DISCONTINUED | OUTPATIENT
Start: 2023-07-28 | End: 2023-08-01 | Stop reason: HOSPADM

## 2023-07-28 RX ORDER — CETIRIZINE HYDROCHLORIDE 10 MG/1
10 TABLET ORAL DAILY
Status: DISCONTINUED | OUTPATIENT
Start: 2023-07-29 | End: 2023-08-01 | Stop reason: HOSPADM

## 2023-07-28 RX ORDER — ACETAMINOPHEN 325 MG/1
650 TABLET ORAL
OUTPATIENT
Start: 2023-07-28

## 2023-07-28 RX ORDER — ALBUTEROL SULFATE 2.5 MG/3ML
2.5 SOLUTION RESPIRATORY (INHALATION) EVERY 6 HOURS PRN
Status: DISCONTINUED | OUTPATIENT
Start: 2023-07-28 | End: 2023-08-01 | Stop reason: HOSPADM

## 2023-07-28 RX ORDER — QUETIAPINE FUMARATE 25 MG/1
25 TABLET, FILM COATED ORAL NIGHTLY
Status: DISCONTINUED | OUTPATIENT
Start: 2023-07-28 | End: 2023-08-01 | Stop reason: HOSPADM

## 2023-07-28 RX ORDER — SODIUM CHLORIDE 9 MG/ML
25 INJECTION, SOLUTION INTRAVENOUS PRN
Status: CANCELLED | OUTPATIENT
Start: 2023-07-28

## 2023-07-28 RX ORDER — ONDANSETRON 4 MG/1
4 TABLET, ORALLY DISINTEGRATING ORAL EVERY 8 HOURS PRN
Status: DISCONTINUED | OUTPATIENT
Start: 2023-07-28 | End: 2023-08-01 | Stop reason: HOSPADM

## 2023-07-28 RX ORDER — ALLOPURINOL 300 MG/1
300 TABLET ORAL DAILY
Status: DISCONTINUED | OUTPATIENT
Start: 2023-07-29 | End: 2023-08-01 | Stop reason: HOSPADM

## 2023-07-28 RX ORDER — HYDROCODONE BITARTRATE AND ACETAMINOPHEN 5; 325 MG/1; MG/1
2 TABLET ORAL EVERY 6 HOURS PRN
Status: DISCONTINUED | OUTPATIENT
Start: 2023-07-28 | End: 2023-07-30

## 2023-07-28 RX ORDER — SODIUM CHLORIDE 9 MG/ML
20 INJECTION, SOLUTION INTRAVENOUS CONTINUOUS
Status: CANCELLED | OUTPATIENT
Start: 2023-07-28

## 2023-07-28 RX ORDER — ALBUTEROL SULFATE 90 UG/1
2 AEROSOL, METERED RESPIRATORY (INHALATION) EVERY 4 HOURS PRN
Status: DISCONTINUED | OUTPATIENT
Start: 2023-07-28 | End: 2023-08-01 | Stop reason: HOSPADM

## 2023-07-28 RX ORDER — SODIUM CHLORIDE 9 MG/ML
INJECTION, SOLUTION INTRAVENOUS CONTINUOUS
Status: ACTIVE | OUTPATIENT
Start: 2023-07-28 | End: 2023-07-29

## 2023-07-28 RX ORDER — SODIUM CHLORIDE 0.9 % (FLUSH) 0.9 %
10 SYRINGE (ML) INJECTION PRN
Status: DISCONTINUED | OUTPATIENT
Start: 2023-07-28 | End: 2023-08-01 | Stop reason: HOSPADM

## 2023-07-28 RX ORDER — SODIUM CHLORIDE 9 MG/ML
INJECTION, SOLUTION INTRAVENOUS PRN
Status: DISCONTINUED | OUTPATIENT
Start: 2023-07-28 | End: 2023-08-01 | Stop reason: HOSPADM

## 2023-07-28 RX ORDER — ACETAMINOPHEN 650 MG/1
650 SUPPOSITORY RECTAL EVERY 6 HOURS PRN
Status: DISCONTINUED | OUTPATIENT
Start: 2023-07-28 | End: 2023-08-01 | Stop reason: HOSPADM

## 2023-07-28 RX ORDER — ENOXAPARIN SODIUM 100 MG/ML
30 INJECTION SUBCUTANEOUS 2 TIMES DAILY
Status: DISCONTINUED | OUTPATIENT
Start: 2023-07-28 | End: 2023-08-01 | Stop reason: HOSPADM

## 2023-07-28 RX ORDER — ATORVASTATIN CALCIUM 80 MG/1
80 TABLET, FILM COATED ORAL DAILY
Status: DISCONTINUED | OUTPATIENT
Start: 2023-07-29 | End: 2023-08-01 | Stop reason: HOSPADM

## 2023-07-28 RX ORDER — TAMSULOSIN HYDROCHLORIDE 0.4 MG/1
0.8 CAPSULE ORAL DAILY
Status: DISCONTINUED | OUTPATIENT
Start: 2023-07-28 | End: 2023-08-01 | Stop reason: HOSPADM

## 2023-07-28 RX ORDER — ACETAMINOPHEN 325 MG/1
650 TABLET ORAL EVERY 6 HOURS PRN
Status: DISCONTINUED | OUTPATIENT
Start: 2023-07-28 | End: 2023-08-01 | Stop reason: HOSPADM

## 2023-07-28 RX ORDER — ONDANSETRON 2 MG/ML
8 INJECTION INTRAMUSCULAR; INTRAVENOUS
OUTPATIENT
Start: 2023-07-28

## 2023-07-28 RX ORDER — POLYETHYLENE GLYCOL 3350 17 G/17G
17 POWDER, FOR SOLUTION ORAL DAILY PRN
Status: DISCONTINUED | OUTPATIENT
Start: 2023-07-28 | End: 2023-07-30

## 2023-07-28 RX ORDER — AMLODIPINE BESYLATE 5 MG/1
5 TABLET ORAL NIGHTLY
Status: DISCONTINUED | OUTPATIENT
Start: 2023-07-28 | End: 2023-08-01 | Stop reason: HOSPADM

## 2023-07-28 RX ORDER — SODIUM CHLORIDE 9 MG/ML
INJECTION, SOLUTION INTRAVENOUS CONTINUOUS
OUTPATIENT
Start: 2023-07-28

## 2023-07-28 RX ORDER — SODIUM CHLORIDE 0.9 % (FLUSH) 0.9 %
5-40 SYRINGE (ML) INJECTION PRN
Status: CANCELLED | OUTPATIENT
Start: 2023-07-28

## 2023-07-28 RX ADMIN — CEFTRIAXONE SODIUM 1000 MG: 1 INJECTION, POWDER, FOR SOLUTION INTRAMUSCULAR; INTRAVENOUS at 12:19

## 2023-07-28 RX ADMIN — SODIUM CHLORIDE, PRESERVATIVE FREE 10 ML: 5 INJECTION INTRAVENOUS at 21:09

## 2023-07-28 RX ADMIN — SODIUM CHLORIDE: 9 INJECTION, SOLUTION INTRAVENOUS at 16:30

## 2023-07-28 RX ADMIN — IOPAMIDOL 80 ML: 755 INJECTION, SOLUTION INTRAVENOUS at 10:07

## 2023-07-28 RX ADMIN — AZITHROMYCIN MONOHYDRATE 500 MG: 500 INJECTION, POWDER, LYOPHILIZED, FOR SOLUTION INTRAVENOUS at 13:28

## 2023-07-28 RX ADMIN — TAMSULOSIN HYDROCHLORIDE 0.8 MG: 0.4 CAPSULE ORAL at 19:50

## 2023-07-28 RX ADMIN — HYDROMORPHONE HYDROCHLORIDE 1 MG: 1 INJECTION, SOLUTION INTRAMUSCULAR; INTRAVENOUS; SUBCUTANEOUS at 12:20

## 2023-07-28 RX ADMIN — HYDROCODONE BITARTRATE AND ACETAMINOPHEN 1 TABLET: 5; 325 TABLET ORAL at 16:22

## 2023-07-28 RX ADMIN — HYDROCODONE BITARTRATE AND ACETAMINOPHEN 2 TABLET: 5; 325 TABLET ORAL at 21:19

## 2023-07-28 RX ADMIN — ACYCLOVIR 200 MG: 200 CAPSULE ORAL at 21:19

## 2023-07-28 RX ADMIN — QUETIAPINE FUMARATE 25 MG: 25 TABLET ORAL at 21:20

## 2023-07-28 RX ADMIN — AMLODIPINE BESYLATE 5 MG: 5 TABLET ORAL at 21:20

## 2023-07-28 RX ADMIN — ALBUTEROL SULFATE 2.5 MG: 2.5 SOLUTION RESPIRATORY (INHALATION) at 21:15

## 2023-07-28 RX ADMIN — SODIUM CHLORIDE 500 ML: 9 INJECTION, SOLUTION INTRAVENOUS at 09:10

## 2023-07-28 ASSESSMENT — PAIN DESCRIPTION - DESCRIPTORS
DESCRIPTORS: ACHING
DESCRIPTORS: CRAMPING;PENETRATING

## 2023-07-28 ASSESSMENT — PAIN SCALES - GENERAL
PAINLEVEL_OUTOF10: 5
PAINLEVEL_OUTOF10: 5
PAINLEVEL_OUTOF10: 2
PAINLEVEL_OUTOF10: 5
PAINLEVEL_OUTOF10: 4
PAINLEVEL_OUTOF10: 3
PAINLEVEL_OUTOF10: 4
PAINLEVEL_OUTOF10: 7

## 2023-07-28 ASSESSMENT — PAIN DESCRIPTION - ORIENTATION
ORIENTATION: LEFT
ORIENTATION: LEFT;ANTERIOR

## 2023-07-28 ASSESSMENT — PAIN - FUNCTIONAL ASSESSMENT
PAIN_FUNCTIONAL_ASSESSMENT: ACTIVITIES ARE NOT PREVENTED
PAIN_FUNCTIONAL_ASSESSMENT: 0-10
PAIN_FUNCTIONAL_ASSESSMENT: ACTIVITIES ARE NOT PREVENTED
PAIN_FUNCTIONAL_ASSESSMENT: 0-10

## 2023-07-28 ASSESSMENT — PAIN DESCRIPTION - LOCATION
LOCATION: CHEST
LOCATION: ABDOMEN
LOCATION: ABDOMEN

## 2023-07-28 ASSESSMENT — PAIN DESCRIPTION - FREQUENCY: FREQUENCY: CONTINUOUS

## 2023-07-28 NOTE — ED NOTES
Pt resting in bed awake. Pt requested something to drink, order received from Dr. Shantal sanchez for pt to have some water. Water provided to pt at this time.      Tawanda Shahid RN  07/28/23 0211

## 2023-07-28 NOTE — H&P
Hospitalist History and Physical          Patient:  Wes Sen  YOB: 1955    MRN: [id number]     Acct: [de-identified]    PCP: YESY Conteh CNP    Date of Admission: 7/28/2023    Date of Service: Pt seen/examined on 07/28/23  and Admitted to Inpatient with expected LOS greater than two midnights due to medical therapy. Chief Complaint:  Abdominal Pain, SOB    ASSESSMENT/PLAN:    Acute on Chronic Hypoxic Respiratory Failure: Unspecified etiology. He has chronic hypoxia likely due to COPD, but no definite etiology for acute exacerbation identified. Patient does not have symptoms or signs suggestive of active infection. No volume overload or history to suggest CHF exacerbation. No wheezing or cardinal symptoms to suggest COPD exacerbation. No PE identified on CTA chest. Of note, patient has R perihilar soft tissue thickening/mass with consolidation in R lung that may be a contributing factor.  - Wean O2 as tolerated with goal SpO2 > 90%. - Incentive spirometry, Acapella  - Patient received 1 dose of IV azithromycin + ceftriaxone in the ED. No further Abx therapy indicated at this time.  - No diuretic or steroid therapy indicated at this time. - Treatment of COPD as described below. 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 This syndrome is associated with leukocytosis, anemia, and thrombocytopenia. On CBC, anemia and thrombocytopenia are relatively stable compared to baseline, but had markedly elevated leukocytosis 179 with predominance of immature monocytes. Blasts at 5% per Path review, not consistent with crisis. Patient does not meet any criteria for hyperviscosity syndrome. Of note, patient is being treated with azacitidine cycles for chemotherapy but had not been able to get his last treatment due to low cell counts.   - Oncology

## 2023-07-28 NOTE — ED PROVIDER NOTES
[address] [place] [address]  EMERGENCY MEDICINE ATTENDING ATTESTATION      Evaluation of Wes Needs. Case discussed and care plan developed with resident physician. I agree with the resident physician documentation and plan as documented by him, except if my documentation differs. Patient seen, interviewed and examined by me. I reviewed the medical, surgical, family and social history, medications and allergies. I have reviewed and interpreted all available lab, radiology and ekg results available at the moment. I have reviewed the nursing documentation. Brief H&P   Patient c/o pain to his left lower chest and left upper abdomen, with worse with deep inspiration, associated with shortness of breath since earlier today. Patient has history of CML, on chemotherapy, last session was 1 month ago. Patient also had a platelet transfusion 1 week ago. Physical exam is notable for well appearing, tachycardic and tachypneic on arrival, speaks full sentences. There is a petechial rash with longitudinal distribution to his left upper quadrant without blistering and is not tender to touch. Medical Decision Making   MDM:   Petechial rash  Possible early zoster  Rule out PE  Plan:   IV line, labs  Imaging  Analgesia if needed, patient declines at this moment  Observation in the ED while awaiting results    Please see the resident physician completed note for final disposition except as documented on this attestation. I have reviewed and interpreted all available lab, radiology and ekg results available at the moment. Diagnosis, treatment and disposition plans were discussed and agreed upon by patient. This transcription was electronically signed. It was dictated by use of voice recognition software and electronically transcribed. The transcription may contain errors not detected in proofreading.      I performed direct supervision and was present for the critical portion

## 2023-07-28 NOTE — ED TRIAGE NOTES
Pt to rm 21 per intake- sent down from OP nursing after pt reported increased SOB and chest pain this morning. Pt was scheduled for bone marrow biopsy and platelet transfusion. On arrival pt appears SOB, RR 32-36 on home dose O2 2LNC. O2 bumped to 4LNC to help work of breathing. Pt states having this left sided chest pain and left upper ABD pain since approx midnight last night. Denies other recent illnesses, no known sick contacts. Reports SOB had gotten better after his last platelet transfusion on Tuesday but it quickly got worse again Wednesday into Thursday- petechial rash noted over bilateral upper ABD and right lower ABD. Pt on his break from chemo at this time. EKG and assessment complete. Wife at bedside.

## 2023-07-28 NOTE — ED NOTES
Pt continues restful on cart in position of comfort w/ wife at bedside. RR continue 36-30 on 4LNC. Provider updated. Pt denies current needs or concerns.       Yaneli Mccray RN  07/28/23 [id number]

## 2023-07-28 NOTE — ED NOTES
Pt in bed watching TV with wife at bedside. Reviewed over the POC, questions and concerns addressed.      Shawnee Britt RN  07/28/23 6474

## 2023-07-28 NOTE — ED NOTES
Pt updated on POC/ waiting oncology to call back for admission. Pt verbalized understanding. Tempeture turned down in room, pt sweating stating that it is too hot.        Valerie Gilbert RN  07/28/23 8424

## 2023-07-28 NOTE — ED NOTES
Pt restful on cart watching TV. Work of breathing slightly improved on 4LNC- pt updated on POC.      Arna Hatchet, RN  07/28/23 4068

## 2023-07-28 NOTE — ED NOTES
ED to inpatient nurses report    Chief Complaint   Patient presents with    Chest Pain    Shortness of Breath    Other     Scheduled for platelets and bone marrow biopsy this am      Present to ED from home  LOC: alert and orientated to name, place, date  Vital signs   Vitals:    07/28/23 1110 07/28/23 1150 07/28/23 1220 07/28/23 1306   BP: 120/73 119/67 121/63 123/74   Pulse: 98 94 93 88   Resp: 28 30 30 24   Temp:       TempSrc:       SpO2: 97% 97% 96% 96%   Weight:       Height:          Oxygen Baseline 2LNC    Current needs required 4LNC Bipap/Cpap No  LDAs:   Peripheral IV 07/28/23 Right Antecubital (Active)   Site Assessment Clean, dry & intact 07/28/23 0803   Line Status Specimen collected;Brisk blood return;Flushed 07/28/23 0803     Mobility: Independent  Pending ED orders: UA when able  Present condition: Restful on cart watching TV. States comfortable at this time after medicated for pain. Pt RR continues 24-30- provider aware.    C-SSRS Risk of Suicide: No Risk  Swallow Screening    Preferred Language: English     Electronically signed by Randee Talbot RN on 7/28/2023 at 2:31 PM       Randee Talbot RN  07/28/23 [id number]

## 2023-07-29 PROBLEM — J98.59 MEDIASTINAL MASS: Status: ACTIVE | Noted: 2023-07-29

## 2023-07-29 PROBLEM — J44.1 COPD EXACERBATION (HCC): Status: ACTIVE | Noted: 2023-07-29

## 2023-07-29 LAB
ANION GAP SERPL CALC-SCNC: 14 MEQ/L (ref 8–16)
BUN SERPL-MCNC: 13 MG/DL (ref 7–22)
CALCIUM SERPL-MCNC: 8 MG/DL (ref 8.5–10.5)
CHLORIDE SERPL-SCNC: 102 MEQ/L (ref 98–111)
CO2 SERPL-SCNC: 25 MEQ/L (ref 23–33)
CREAT SERPL-MCNC: 1.5 MG/DL (ref 0.4–1.2)
GFR SERPL CREATININE-BSD FRML MDRD: 51 ML/MIN/1.73M2
GLUCOSE SERPL-MCNC: 91 MG/DL (ref 70–108)
MAGNESIUM SERPL-MCNC: 1.6 MG/DL (ref 1.6–2.4)
MANUAL DIFF BLD: NORMAL
POTASSIUM SERPL-SCNC: 2.5 MEQ/L (ref 3.5–5.2)
SODIUM SERPL-SCNC: 141 MEQ/L (ref 135–145)

## 2023-07-29 PROCEDURE — 99222 1ST HOSP IP/OBS MODERATE 55: CPT | Performed by: INTERNAL MEDICINE

## 2023-07-29 PROCEDURE — 6370000000 HC RX 637 (ALT 250 FOR IP): Performed by: PHYSICIAN ASSISTANT

## 2023-07-29 PROCEDURE — 99233 SBSQ HOSP IP/OBS HIGH 50: CPT | Performed by: PHYSICIAN ASSISTANT

## 2023-07-29 PROCEDURE — 94761 N-INVAS EAR/PLS OXIMETRY MLT: CPT

## 2023-07-29 PROCEDURE — 6370000000 HC RX 637 (ALT 250 FOR IP)

## 2023-07-29 PROCEDURE — 80048 BASIC METABOLIC PNL TOTAL CA: CPT

## 2023-07-29 PROCEDURE — 85025 COMPLETE CBC W/AUTO DIFF WBC: CPT

## 2023-07-29 PROCEDURE — 36415 COLL VENOUS BLD VENIPUNCTURE: CPT

## 2023-07-29 PROCEDURE — 94669 MECHANICAL CHEST WALL OSCILL: CPT

## 2023-07-29 PROCEDURE — 6360000002 HC RX W HCPCS: Performed by: PHYSICIAN ASSISTANT

## 2023-07-29 PROCEDURE — 1200000000 HC SEMI PRIVATE

## 2023-07-29 PROCEDURE — 2580000003 HC RX 258

## 2023-07-29 PROCEDURE — 94640 AIRWAY INHALATION TREATMENT: CPT

## 2023-07-29 PROCEDURE — 2700000000 HC OXYGEN THERAPY PER DAY

## 2023-07-29 PROCEDURE — 6360000002 HC RX W HCPCS

## 2023-07-29 PROCEDURE — 83735 ASSAY OF MAGNESIUM: CPT

## 2023-07-29 RX ORDER — POTASSIUM CHLORIDE 20 MEQ/1
20 TABLET, EXTENDED RELEASE ORAL PRN
Status: DISCONTINUED | OUTPATIENT
Start: 2023-07-29 | End: 2023-08-01 | Stop reason: HOSPADM

## 2023-07-29 RX ORDER — OXYMETAZOLINE HYDROCHLORIDE 0.05 G/100ML
2 SPRAY NASAL 2 TIMES DAILY PRN
Status: DISPENSED | OUTPATIENT
Start: 2023-07-29 | End: 2023-07-31

## 2023-07-29 RX ORDER — POTASSIUM CHLORIDE 7.45 MG/ML
10 INJECTION INTRAVENOUS PRN
Status: DISCONTINUED | OUTPATIENT
Start: 2023-07-29 | End: 2023-08-01 | Stop reason: HOSPADM

## 2023-07-29 RX ORDER — POTASSIUM CHLORIDE 20 MEQ/1
40 TABLET, EXTENDED RELEASE ORAL PRN
Status: DISCONTINUED | OUTPATIENT
Start: 2023-07-29 | End: 2023-08-01 | Stop reason: HOSPADM

## 2023-07-29 RX ORDER — DOCUSATE SODIUM 100 MG/1
100 CAPSULE, LIQUID FILLED ORAL 2 TIMES DAILY PRN
Status: DISCONTINUED | OUTPATIENT
Start: 2023-07-29 | End: 2023-07-30

## 2023-07-29 RX ADMIN — SODIUM CHLORIDE, PRESERVATIVE FREE 10 ML: 5 INJECTION INTRAVENOUS at 21:21

## 2023-07-29 RX ADMIN — SODIUM CHLORIDE: 9 INJECTION, SOLUTION INTRAVENOUS at 02:35

## 2023-07-29 RX ADMIN — CETIRIZINE HYDROCHLORIDE 10 MG: 10 TABLET ORAL at 09:20

## 2023-07-29 RX ADMIN — HYDROCODONE BITARTRATE AND ACETAMINOPHEN 2 TABLET: 5; 325 TABLET ORAL at 10:55

## 2023-07-29 RX ADMIN — HYDROCODONE BITARTRATE AND ACETAMINOPHEN 2 TABLET: 5; 325 TABLET ORAL at 22:55

## 2023-07-29 RX ADMIN — FOLIC ACID 1 MG: 1 TABLET ORAL at 09:20

## 2023-07-29 RX ADMIN — POLYETHYLENE GLYCOL 3350 17 G: 17 POWDER, FOR SOLUTION ORAL at 09:20

## 2023-07-29 RX ADMIN — POTASSIUM CHLORIDE 10 MEQ: 7.46 INJECTION, SOLUTION INTRAVENOUS at 12:07

## 2023-07-29 RX ADMIN — MOMETASONE FUROATE AND FORMOTEROL FUMARATE DIHYDRATE 2 PUFF: 200; 5 AEROSOL RESPIRATORY (INHALATION) at 08:25

## 2023-07-29 RX ADMIN — ACYCLOVIR 200 MG: 200 CAPSULE ORAL at 21:28

## 2023-07-29 RX ADMIN — POTASSIUM CHLORIDE 10 MEQ: 7.46 INJECTION, SOLUTION INTRAVENOUS at 14:29

## 2023-07-29 RX ADMIN — AMLODIPINE BESYLATE 5 MG: 5 TABLET ORAL at 21:28

## 2023-07-29 RX ADMIN — POTASSIUM CHLORIDE 10 MEQ: 7.46 INJECTION, SOLUTION INTRAVENOUS at 15:50

## 2023-07-29 RX ADMIN — ATORVASTATIN CALCIUM 80 MG: 80 TABLET, FILM COATED ORAL at 09:20

## 2023-07-29 RX ADMIN — HYDROCODONE BITARTRATE AND ACETAMINOPHEN 2 TABLET: 5; 325 TABLET ORAL at 03:56

## 2023-07-29 RX ADMIN — ACYCLOVIR 200 MG: 200 CAPSULE ORAL at 09:20

## 2023-07-29 RX ADMIN — TAMSULOSIN HYDROCHLORIDE 0.8 MG: 0.4 CAPSULE ORAL at 09:20

## 2023-07-29 RX ADMIN — QUETIAPINE FUMARATE 25 MG: 25 TABLET ORAL at 21:28

## 2023-07-29 RX ADMIN — DOCUSATE SODIUM 100 MG: 100 CAPSULE, LIQUID FILLED ORAL at 18:24

## 2023-07-29 RX ADMIN — ALLOPURINOL 300 MG: 300 TABLET ORAL at 09:20

## 2023-07-29 RX ADMIN — HYDROCODONE BITARTRATE AND ACETAMINOPHEN 2 TABLET: 5; 325 TABLET ORAL at 16:54

## 2023-07-29 RX ADMIN — MOMETASONE FUROATE AND FORMOTEROL FUMARATE DIHYDRATE 2 PUFF: 200; 5 AEROSOL RESPIRATORY (INHALATION) at 16:49

## 2023-07-29 RX ADMIN — POTASSIUM CHLORIDE 10 MEQ: 7.46 INJECTION, SOLUTION INTRAVENOUS at 09:32

## 2023-07-29 RX ADMIN — TIOTROPIUM BROMIDE INHALATION SPRAY 2 PUFF: 3.12 SPRAY, METERED RESPIRATORY (INHALATION) at 08:18

## 2023-07-29 RX ADMIN — POTASSIUM CHLORIDE 10 MEQ: 7.46 INJECTION, SOLUTION INTRAVENOUS at 13:13

## 2023-07-29 RX ADMIN — POTASSIUM CHLORIDE 10 MEQ: 7.46 INJECTION, SOLUTION INTRAVENOUS at 10:39

## 2023-07-29 RX ADMIN — OXYMETAZOLINE HCL 2 SPRAY: 0.05 SPRAY NASAL at 21:20

## 2023-07-29 RX ADMIN — ALBUTEROL SULFATE 2.5 MG: 2.5 SOLUTION RESPIRATORY (INHALATION) at 22:03

## 2023-07-29 RX ADMIN — OXYMETAZOLINE HCL 2 SPRAY: 0.05 SPRAY NASAL at 07:45

## 2023-07-29 ASSESSMENT — PAIN SCALES - GENERAL
PAINLEVEL_OUTOF10: 7
PAINLEVEL_OUTOF10: 8
PAINLEVEL_OUTOF10: 7
PAINLEVEL_OUTOF10: 7
PAINLEVEL_OUTOF10: 3
PAINLEVEL_OUTOF10: 3
PAINLEVEL_OUTOF10: 7

## 2023-07-29 ASSESSMENT — PAIN DESCRIPTION - LOCATION
LOCATION: ABDOMEN

## 2023-07-29 ASSESSMENT — PAIN DESCRIPTION - FREQUENCY
FREQUENCY: CONTINUOUS
FREQUENCY: CONTINUOUS

## 2023-07-29 ASSESSMENT — PAIN DESCRIPTION - ORIENTATION
ORIENTATION: ANTERIOR;LEFT
ORIENTATION: ANTERIOR;LEFT
ORIENTATION: LEFT;ANTERIOR
ORIENTATION: ANTERIOR;LEFT

## 2023-07-29 ASSESSMENT — PAIN - FUNCTIONAL ASSESSMENT
PAIN_FUNCTIONAL_ASSESSMENT: ACTIVITIES ARE NOT PREVENTED
PAIN_FUNCTIONAL_ASSESSMENT: ACTIVITIES ARE NOT PREVENTED

## 2023-07-29 ASSESSMENT — PAIN DESCRIPTION - DESCRIPTORS
DESCRIPTORS: ACHING;DULL
DESCRIPTORS: DISCOMFORT
DESCRIPTORS: ACHING
DESCRIPTORS: ACHING;DISCOMFORT
DESCRIPTORS: ACHING;CRAMPING

## 2023-07-29 NOTE — PROGRESS NOTES
CULTRESP  Aerobic and Anaerobic :  No results found for: LABAERO  No results found for: [facility]    Radiology Reports:  CT ABDOMEN PELVIS W IV CONTRAST Additional Contrast? None   Final Result   1. No filling defects are noted within the pulmonary arterial vasculature to suggest the presence of the urinary embolus. Bulky mediastinal lymphadenopathy measures up to 26 x 37 mm. Right perihilar soft tissue thickening/mass measures 32 x 61 mm with    associated consolidation in the right lung extending from the right hilum. Enlarged lateral axillary, retroperitoneal, and iliac chain lymph nodes suggest lymphoproliferative disorder. The spleen is enlarged measuring 17 cm in length. Correlate with    hematologic factors. 2. Colonic diverticulosis without diverticulitis. Hepatomegaly and hepatic steatosis. Chronic findings are discussed. **This report has been created using voice recognition software. It may contain minor errors which are inherent in voice recognition technology. **      Final report electronically signed by Dr Wandy Mejia on 7/28/2023 10:28 AM      CTA CHEST W WO CONTRAST PE Eval   Final Result   1. No filling defects are noted within the pulmonary arterial vasculature to suggest the presence of the urinary embolus. Bulky mediastinal lymphadenopathy measures up to 26 x 37 mm. Right perihilar soft tissue thickening/mass measures 32 x 61 mm with    associated consolidation in the right lung extending from the right hilum. Enlarged lateral axillary, retroperitoneal, and iliac chain lymph nodes suggest lymphoproliferative disorder. The spleen is enlarged measuring 17 cm in length. Correlate with    hematologic factors. 2. Colonic diverticulosis without diverticulitis. Hepatomegaly and hepatic steatosis. Chronic findings are discussed. **This report has been created using voice recognition software.   It may contain minor errors which are inherent in voice recognition measures 20 mm. No renal calculus, hydronephrosis, or hydroureter is observed. The urinary bladder is unremarkable. Gastrointestinal:  No bowel obstruction, free fluid, fluid collection, or free air is observed. Colonic diverticulosis without diverticulitis is present. The appendix is normal. Retroperitoneum / lymph nodes: The aorta is not dilated. Multiple enlarged retroperitoneal lymph nodes measure up to 15 mm in short axis. Enlarged bilateral pelvic sidewall lymph nodes measure up to 14 mm. Prominent bilateral inguinal lymph nodes measure  up to 10 mm. Pelvis: The prostate gland is not enlarged. Musculoskeletal: Diffuse osteopenia is present. Multilevel degenerative disc disease is noted in the thoracic and lumbar spine. The visualized skeletal structures appear intact. 1. No filling defects are noted within the pulmonary arterial vasculature to suggest the presence of the urinary embolus. Bulky mediastinal lymphadenopathy measures up to 26 x 37 mm. Right perihilar soft tissue thickening/mass measures 32 x 61 mm with associated consolidation in the right lung extending from the right hilum. Enlarged lateral axillary, retroperitoneal, and iliac chain lymph nodes suggest lymphoproliferative disorder. The spleen is enlarged measuring 17 cm in length. Correlate with hematologic factors. 2. Colonic diverticulosis without diverticulitis. Hepatomegaly and hepatic steatosis. Chronic findings are discussed. **This report has been created using voice recognition software. It may contain minor errors which are inherent in voice recognition technology. ** Final report electronically signed by Dr César Ritchie on 7/28/2023 10:28 AM    CT ABDOMEN PELVIS W IV CONTRAST Additional Contrast? None    Result Date: 7/28/2023  PROCEDURE: CTA CHEST W WO CONTRAST, CT ABDOMEN PELVIS W IV CONTRAST CLINICAL INFORMATION: Chest pain. Splenomegaly. COMPARISON: Chest x-ray 7/28/2023.  TECHNIQUE: 1.5 mm axial images were obtained through

## 2023-07-29 NOTE — PLAN OF CARE
Problem: Safety - Adult  Goal: Free from fall injury  7/29/2023 0455 by Lurlean Nageotte, RN  Outcome: Progressing  7/29/2023 0454 by Lurlean Nageotte, RN  Problem: ABCDS Injury Assessment  Goal: Absence of physical injury  7/29/2023 0455 by Lurlean Nageotte, RN  Outcome: Progressing       Problem: Chronic Conditions and Co-morbidities  Goal: Patient's chronic conditions and co-morbidity symptoms are monitored and maintained or improved  7/29/2023 0455 by Lurlean Nageotte, RN  Outcome: Progressing     Problem: Respiratory - Adult  Goal: Achieves optimal ventilation and oxygenation  7/29/2023 0455 by Lurlean Nageotte, RN  Outcome: Progressing    Problem: Cardiovascular - Adult  Goal: Maintains optimal cardiac output and hemodynamic stability  7/29/2023 0455 by Lurlean Nageotte, RN  Outcome: Progressing       Problem: Musculoskeletal - Adult  Goal: Return mobility to safest level of function  7/29/2023 0455 by Lurlean Nageotte, RN  Outcome: Progressing  Goal: Return ADL status to a safe level of function  7/29/2023 0455 by Lurlean Nageotte, RN  Outcome: Progressing       Problem: Gastrointestinal - Adult  Goal: Maintains adequate nutritional intake  7/29/2023 0455 by Lurlean Nageotte, RN  Outcome: Progressing    Outcome: Progressing     Problem: Infection - Adult  Goal: Absence of infection at discharge  7/29/2023 0455 by Lurlean Nageotte, RN  Outcome: Progressing  Problem: Metabolic/Fluid and Electrolytes - Adult  Goal: Electrolytes maintained within normal limits  7/29/2023 0455 by Lurlean Nageotte, RN  Outcome: Progressing       Problem: Hematologic - Adult  Goal: Maintains hematologic stability  7/29/2023 0455 by Lurlean Nageotte, RN  Outcome: Progressing       Problem: Pain  Goal: Verbalizes/displays adequate comfort level or baseline comfort level  7/29/2023 0455 by Lurlean Nageotte, RN  Outcome:

## 2023-07-29 NOTE — PLAN OF CARE
Problem: Safety - Adult  Goal: Free from fall injury  7/29/2023 1557 by Madelin Urena RN  Outcome: Progressing  Flowsheets (Taken 7/29/2023 1557)  Free From Fall Injury: Instruct family/caregiver on patient safety     Problem: ABCDS Injury Assessment  Goal: Absence of physical injury  7/29/2023 1557 by Madelin Urena RN  Outcome: Progressing  Flowsheets (Taken 7/29/2023 1557)  Absence of Physical Injury: Implement safety measures based on patient assessment     Problem: Chronic Conditions and Co-morbidities  Goal: Patient's chronic conditions and co-morbidity symptoms are monitored and maintained or improved  7/29/2023 1557 by Madelin Urena RN  Outcome: Progressing  Flowsheets (Taken 7/29/2023 1557)  Care Plan - Patient's Chronic Conditions and Co-Morbidity Symptoms are Monitored and Maintained or Improved: Monitor and assess patient's chronic conditions and comorbid symptoms for stability, deterioration, or improvement     Problem: Respiratory - Adult  Goal: Achieves optimal ventilation and oxygenation  7/29/2023 1557 by Madelin Urena RN  Outcome: Progressing  Flowsheets (Taken 7/29/2023 1557)  Achieves optimal ventilation and oxygenation:   Assess for changes in respiratory status   Position to facilitate oxygenation and minimize respiratory effort     Problem: Cardiovascular - Adult  Goal: Maintains optimal cardiac output and hemodynamic stability  7/29/2023 1557 by Madelin Urena RN  Outcome: Progressing  Flowsheets (Taken 7/29/2023 1557)  Maintains optimal cardiac output and hemodynamic stability: Monitor blood pressure and heart rate     Problem: Musculoskeletal - Adult  Goal: Return mobility to safest level of function  7/29/2023 1557 by Madelin Urena RN  Outcome: Progressing  Flowsheets (Taken 7/29/2023 1557)  Return Mobility to Safest Level of Function: Assess patient stability and activity tolerance for standing, transferring and ambulating with or without assistive

## 2023-07-30 LAB
ANION GAP SERPL CALC-SCNC: 13 MEQ/L (ref 8–16)
ANISOCYTOSIS BLD QL SMEAR: PRESENT
BASOPHILS ABSOLUTE: 0 THOU/MM3 (ref 0–0.1)
BASOPHILS ABSOLUTE: ABNORMAL THOU/MM3 (ref 0–0.1)
BASOPHILS NFR BLD AUTO: 0 %
BASOPHILS NFR BLD AUTO: ABNORMAL %
BLASTS: 2 % (ref 0–1)
BLASTS: ABNORMAL % (ref 0–1)
BUN SERPL-MCNC: 13 MG/DL (ref 7–22)
CALCIUM SERPL-MCNC: 8.2 MG/DL (ref 8.5–10.5)
CHLORIDE SERPL-SCNC: 104 MEQ/L (ref 98–111)
CO2 SERPL-SCNC: 25 MEQ/L (ref 23–33)
CREAT SERPL-MCNC: 1.5 MG/DL (ref 0.4–1.2)
DEPRECATED RDW RBC AUTO: 58.8 FL (ref 35–45)
DEPRECATED RDW RBC AUTO: 59.2 FL (ref 35–45)
EOSINOPHIL NFR BLD AUTO: 0 %
EOSINOPHIL NFR BLD AUTO: ABNORMAL %
EOSINOPHILS ABSOLUTE: 0 THOU/MM3 (ref 0–0.4)
EOSINOPHILS ABSOLUTE: ABNORMAL THOU/MM3 (ref 0–0.4)
ERYTHROCYTE [DISTWIDTH] IN BLOOD BY AUTOMATED COUNT: 17.8 % (ref 11.5–14.5)
ERYTHROCYTE [DISTWIDTH] IN BLOOD BY AUTOMATED COUNT: 18 % (ref 11.5–14.5)
GFR SERPL CREATININE-BSD FRML MDRD: 51 ML/MIN/1.73M2
GLUCOSE SERPL-MCNC: 110 MG/DL (ref 70–108)
HCT VFR BLD AUTO: 20.1 % (ref 42–52)
HCT VFR BLD AUTO: 21.1 % (ref 42–52)
HCT VFR BLD AUTO: 23.6 % (ref 42–52)
HGB BLD-MCNC: 6.6 GM/DL (ref 14–18)
HGB BLD-MCNC: 6.7 GM/DL (ref 14–18)
HGB BLD-MCNC: 7.6 GM/DL (ref 14–18)
LYMPHOCYTES ABSOLUTE: 14.8 THOU/MM3 (ref 1–4.8)
LYMPHOCYTES ABSOLUTE: ABNORMAL THOU/MM3 (ref 1–4.8)
LYMPHOCYTES NFR BLD AUTO: 8 %
LYMPHOCYTES NFR BLD AUTO: ABNORMAL %
MANUAL DIFF BLD: NORMAL
MCH RBC QN AUTO: 29.8 PG (ref 26–33)
MCH RBC QN AUTO: 30.4 PG (ref 26–33)
MCHC RBC AUTO-ENTMCNC: 31.8 GM/DL (ref 32.2–35.5)
MCHC RBC AUTO-ENTMCNC: 32.8 GM/DL (ref 32.2–35.5)
MCV RBC AUTO: 92.6 FL (ref 80–94)
MCV RBC AUTO: 93.8 FL (ref 80–94)
METAMYELOCYTES: 3 %
METAMYELOCYTES: ABNORMAL %
MONOCYTES ABSOLUTE: 138.5 THOU/MM3 (ref 0.4–1.3)
MONOCYTES ABSOLUTE: ABNORMAL THOU/MM3 (ref 0.4–1.3)
MONOCYTES NFR BLD AUTO: 75 %
MONOCYTES NFR BLD AUTO: ABNORMAL %
MYELOCYTES: 2 %
MYELOCYTES: ABNORMAL %
NEUTROPHILS NFR BLD AUTO: 10 %
NEUTROPHILS NFR BLD AUTO: ABNORMAL %
NRBC BLD AUTO-RTO: 0 /100 WBC
NRBC BLD AUTO-RTO: ABNORMAL /100 WBC
PATHOLOGIST REVIEW: ABNORMAL
PLATELET # BLD AUTO: 20 THOU/MM3 (ref 130–400)
PLATELET # BLD AUTO: 22 THOU/MM3 (ref 130–400)
PLATELET BLD QL SMEAR: ABNORMAL
PMV BLD AUTO: 10.7 FL (ref 9.4–12.4)
PMV BLD AUTO: 10.8 FL (ref 9.4–12.4)
POTASSIUM SERPL-SCNC: 3.8 MEQ/L (ref 3.5–5.2)
RBC # BLD AUTO: 2.17 MILL/MM3 (ref 4.7–6.1)
RBC # BLD AUTO: 2.25 MILL/MM3 (ref 4.7–6.1)
SCAN OF BLOOD SMEAR: NORMAL
SEGMENTED NEUTROPHILS ABSOLUTE COUNT: 18.5 THOU/MM3 (ref 1.8–7.7)
SEGMENTED NEUTROPHILS ABSOLUTE COUNT: ABNORMAL THOU/MM3 (ref 1.8–7.7)
SODIUM SERPL-SCNC: 142 MEQ/L (ref 135–145)
VARIANT LYMPHS BLD QL SMEAR: ABNORMAL %
WBC # BLD AUTO: 172.6 THOU/MM3 (ref 4.8–10.8)
WBC # BLD AUTO: 184.7 THOU/MM3 (ref 4.8–10.8)

## 2023-07-30 PROCEDURE — 85014 HEMATOCRIT: CPT

## 2023-07-30 PROCEDURE — 80048 BASIC METABOLIC PNL TOTAL CA: CPT

## 2023-07-30 PROCEDURE — 6370000000 HC RX 637 (ALT 250 FOR IP): Performed by: PHYSICIAN ASSISTANT

## 2023-07-30 PROCEDURE — 36430 TRANSFUSION BLD/BLD COMPNT: CPT

## 2023-07-30 PROCEDURE — 36415 COLL VENOUS BLD VENIPUNCTURE: CPT

## 2023-07-30 PROCEDURE — 94640 AIRWAY INHALATION TREATMENT: CPT

## 2023-07-30 PROCEDURE — 99233 SBSQ HOSP IP/OBS HIGH 50: CPT | Performed by: PHYSICIAN ASSISTANT

## 2023-07-30 PROCEDURE — 6370000000 HC RX 637 (ALT 250 FOR IP)

## 2023-07-30 PROCEDURE — 1200000000 HC SEMI PRIVATE

## 2023-07-30 PROCEDURE — 2580000003 HC RX 258

## 2023-07-30 PROCEDURE — 85025 COMPLETE CBC W/AUTO DIFF WBC: CPT

## 2023-07-30 PROCEDURE — 6360000002 HC RX W HCPCS: Performed by: PHYSICIAN ASSISTANT

## 2023-07-30 PROCEDURE — 6360000002 HC RX W HCPCS

## 2023-07-30 PROCEDURE — 2700000000 HC OXYGEN THERAPY PER DAY

## 2023-07-30 PROCEDURE — 85018 HEMOGLOBIN: CPT

## 2023-07-30 PROCEDURE — 94760 N-INVAS EAR/PLS OXIMETRY 1: CPT

## 2023-07-30 RX ORDER — DOCUSATE SODIUM 100 MG/1
200 CAPSULE, LIQUID FILLED ORAL DAILY
Status: DISCONTINUED | OUTPATIENT
Start: 2023-07-31 | End: 2023-08-01 | Stop reason: HOSPADM

## 2023-07-30 RX ORDER — POLYETHYLENE GLYCOL 3350 17 G/17G
17 POWDER, FOR SOLUTION ORAL DAILY
Status: DISCONTINUED | OUTPATIENT
Start: 2023-07-31 | End: 2023-08-01 | Stop reason: HOSPADM

## 2023-07-30 RX ORDER — GUAIFENESIN 600 MG/1
600 TABLET, EXTENDED RELEASE ORAL 2 TIMES DAILY
Status: DISCONTINUED | OUTPATIENT
Start: 2023-07-30 | End: 2023-08-01 | Stop reason: HOSPADM

## 2023-07-30 RX ORDER — HYDROCODONE BITARTRATE AND ACETAMINOPHEN 5; 325 MG/1; MG/1
1 TABLET ORAL EVERY 4 HOURS PRN
Status: DISCONTINUED | OUTPATIENT
Start: 2023-07-30 | End: 2023-08-01 | Stop reason: HOSPADM

## 2023-07-30 RX ORDER — SENNA AND DOCUSATE SODIUM 50; 8.6 MG/1; MG/1
2 TABLET, FILM COATED ORAL DAILY PRN
Status: DISCONTINUED | OUTPATIENT
Start: 2023-07-30 | End: 2023-08-01 | Stop reason: HOSPADM

## 2023-07-30 RX ORDER — ALBUTEROL SULFATE 2.5 MG/3ML
2.5 SOLUTION RESPIRATORY (INHALATION)
Status: DISCONTINUED | OUTPATIENT
Start: 2023-07-30 | End: 2023-08-01 | Stop reason: HOSPADM

## 2023-07-30 RX ORDER — HYDROCODONE BITARTRATE AND ACETAMINOPHEN 5; 325 MG/1; MG/1
2 TABLET ORAL EVERY 4 HOURS PRN
Status: DISCONTINUED | OUTPATIENT
Start: 2023-07-30 | End: 2023-08-01 | Stop reason: HOSPADM

## 2023-07-30 RX ORDER — FUROSEMIDE 10 MG/ML
20 INJECTION INTRAMUSCULAR; INTRAVENOUS SEE ADMIN INSTRUCTIONS
Status: COMPLETED | OUTPATIENT
Start: 2023-07-30 | End: 2023-07-30

## 2023-07-30 RX ORDER — SODIUM CHLORIDE 9 MG/ML
INJECTION, SOLUTION INTRAVENOUS PRN
Status: DISCONTINUED | OUTPATIENT
Start: 2023-07-30 | End: 2023-08-01 | Stop reason: HOSPADM

## 2023-07-30 RX ORDER — BISACODYL 10 MG
10 SUPPOSITORY, RECTAL RECTAL DAILY PRN
Status: DISCONTINUED | OUTPATIENT
Start: 2023-07-30 | End: 2023-08-01 | Stop reason: HOSPADM

## 2023-07-30 RX ORDER — MORPHINE SULFATE 2 MG/ML
1 INJECTION, SOLUTION INTRAMUSCULAR; INTRAVENOUS EVERY 4 HOURS PRN
Status: DISCONTINUED | OUTPATIENT
Start: 2023-07-30 | End: 2023-08-01 | Stop reason: HOSPADM

## 2023-07-30 RX ADMIN — MORPHINE SULFATE 1 MG: 2 INJECTION, SOLUTION INTRAMUSCULAR; INTRAVENOUS at 23:42

## 2023-07-30 RX ADMIN — GUAIFENESIN 600 MG: 600 TABLET, EXTENDED RELEASE ORAL at 20:21

## 2023-07-30 RX ADMIN — POLYETHYLENE GLYCOL 3350 17 G: 17 POWDER, FOR SOLUTION ORAL at 08:58

## 2023-07-30 RX ADMIN — QUETIAPINE FUMARATE 25 MG: 25 TABLET ORAL at 20:21

## 2023-07-30 RX ADMIN — MORPHINE SULFATE 1 MG: 2 INJECTION, SOLUTION INTRAMUSCULAR; INTRAVENOUS at 14:31

## 2023-07-30 RX ADMIN — HYDROCODONE BITARTRATE AND ACETAMINOPHEN 2 TABLET: 5; 325 TABLET ORAL at 11:24

## 2023-07-30 RX ADMIN — HYDROCODONE BITARTRATE AND ACETAMINOPHEN 2 TABLET: 5; 325 TABLET ORAL at 20:21

## 2023-07-30 RX ADMIN — GUAIFENESIN 600 MG: 600 TABLET, EXTENDED RELEASE ORAL at 12:07

## 2023-07-30 RX ADMIN — OXYMETAZOLINE HCL 2 SPRAY: 0.05 SPRAY NASAL at 12:09

## 2023-07-30 RX ADMIN — ALBUTEROL SULFATE 2.5 MG: 2.5 SOLUTION RESPIRATORY (INHALATION) at 16:04

## 2023-07-30 RX ADMIN — ACYCLOVIR 200 MG: 200 CAPSULE ORAL at 08:59

## 2023-07-30 RX ADMIN — TAMSULOSIN HYDROCHLORIDE 0.8 MG: 0.4 CAPSULE ORAL at 08:58

## 2023-07-30 RX ADMIN — SODIUM CHLORIDE, PRESERVATIVE FREE 10 ML: 5 INJECTION INTRAVENOUS at 20:21

## 2023-07-30 RX ADMIN — MORPHINE SULFATE 1 MG: 2 INJECTION, SOLUTION INTRAMUSCULAR; INTRAVENOUS at 19:16

## 2023-07-30 RX ADMIN — OXYMETAZOLINE HCL 2 SPRAY: 0.05 SPRAY NASAL at 20:30

## 2023-07-30 RX ADMIN — AMLODIPINE BESYLATE 5 MG: 5 TABLET ORAL at 20:21

## 2023-07-30 RX ADMIN — SODIUM CHLORIDE, PRESERVATIVE FREE 10 ML: 5 INJECTION INTRAVENOUS at 14:32

## 2023-07-30 RX ADMIN — HYDROCODONE BITARTRATE AND ACETAMINOPHEN 2 TABLET: 5; 325 TABLET ORAL at 15:36

## 2023-07-30 RX ADMIN — FUROSEMIDE 20 MG: 10 INJECTION, SOLUTION INTRAMUSCULAR; INTRAVENOUS at 14:02

## 2023-07-30 RX ADMIN — ATORVASTATIN CALCIUM 80 MG: 80 TABLET, FILM COATED ORAL at 08:59

## 2023-07-30 RX ADMIN — FOLIC ACID 1 MG: 1 TABLET ORAL at 08:59

## 2023-07-30 RX ADMIN — ALBUTEROL SULFATE 2.5 MG: 2.5 SOLUTION RESPIRATORY (INHALATION) at 08:26

## 2023-07-30 RX ADMIN — ALLOPURINOL 300 MG: 300 TABLET ORAL at 08:59

## 2023-07-30 RX ADMIN — ACYCLOVIR 200 MG: 200 CAPSULE ORAL at 20:21

## 2023-07-30 RX ADMIN — SODIUM CHLORIDE, PRESERVATIVE FREE 10 ML: 5 INJECTION INTRAVENOUS at 08:15

## 2023-07-30 RX ADMIN — DOCUSATE SODIUM 100 MG: 100 CAPSULE, LIQUID FILLED ORAL at 06:04

## 2023-07-30 RX ADMIN — CETIRIZINE HYDROCHLORIDE 10 MG: 10 TABLET ORAL at 08:59

## 2023-07-30 RX ADMIN — HYDROCODONE BITARTRATE AND ACETAMINOPHEN 2 TABLET: 5; 325 TABLET ORAL at 06:04

## 2023-07-30 ASSESSMENT — PAIN SCALES - GENERAL
PAINLEVEL_OUTOF10: 7
PAINLEVEL_OUTOF10: 5
PAINLEVEL_OUTOF10: 8
PAINLEVEL_OUTOF10: 8
PAINLEVEL_OUTOF10: 7
PAINLEVEL_OUTOF10: 7
PAINLEVEL_OUTOF10: 9
PAINLEVEL_OUTOF10: 2
PAINLEVEL_OUTOF10: 4
PAINLEVEL_OUTOF10: 7
PAINLEVEL_OUTOF10: 4
PAINLEVEL_OUTOF10: 5

## 2023-07-30 ASSESSMENT — PAIN DESCRIPTION - LOCATION
LOCATION: ABDOMEN

## 2023-07-30 ASSESSMENT — PAIN DESCRIPTION - DESCRIPTORS
DESCRIPTORS: PENETRATING
DESCRIPTORS: SHARP
DESCRIPTORS: ACHING
DESCRIPTORS: SHARP
DESCRIPTORS: DISCOMFORT;ACHING;SHARP
DESCRIPTORS: DISCOMFORT;SHARP;SHOOTING
DESCRIPTORS: SHARP
DESCRIPTORS: SHARP;DISCOMFORT
DESCRIPTORS: DISCOMFORT;SHARP

## 2023-07-30 ASSESSMENT — PAIN DESCRIPTION - ORIENTATION
ORIENTATION: LEFT
ORIENTATION: LOWER;LEFT
ORIENTATION: LEFT
ORIENTATION: LEFT;LOWER
ORIENTATION: LEFT;ANTERIOR
ORIENTATION: LEFT
ORIENTATION: LEFT;LOWER
ORIENTATION: ANTERIOR
ORIENTATION: LEFT

## 2023-07-30 ASSESSMENT — PAIN DESCRIPTION - FREQUENCY
FREQUENCY: INTERMITTENT
FREQUENCY: CONTINUOUS
FREQUENCY: INTERMITTENT

## 2023-07-30 ASSESSMENT — PAIN DESCRIPTION - ONSET
ONSET: SUDDEN
ONSET: ON-GOING
ONSET: ON-GOING

## 2023-07-30 ASSESSMENT — PAIN DESCRIPTION - PAIN TYPE
TYPE: CHRONIC PAIN

## 2023-07-30 ASSESSMENT — PAIN - FUNCTIONAL ASSESSMENT
PAIN_FUNCTIONAL_ASSESSMENT: PREVENTS OR INTERFERES SOME ACTIVE ACTIVITIES AND ADLS
PAIN_FUNCTIONAL_ASSESSMENT: PREVENTS OR INTERFERES SOME ACTIVE ACTIVITIES AND ADLS

## 2023-07-30 NOTE — PROGRESS NOTES
Perfect serve message to Dr. Randy Andrade:  Oncology has been consulted for this patient. Patient has not seen anyone yet. Hospitalist requesting provider come see patient. Hgb 6.6. Thank you. Return reply. Yes. Will see tomorrow, we do not round on weekends.

## 2023-07-30 NOTE — PROGRESS NOTES
Hospitalist Progress Note    Patient:  Grant Gray    YOB: 1955  Unit/Bed:5K-18/018-A  Date of Admission: 7/28/2023      Assessment/Plan:    Acute on chronic hypoxic respiratory failure: Unclear etiology. Baseline 2L NC. No wheezing on exam, afebrile, denies productive cough, no signs of fluid overload. Received IV azithromycin, ceftriaxone in ED. No further indication for ABX at this time. No PE on CTA Chest. R perihilar soft tissue thickening/mass with consolidation may be contributing. Pulmonary consulted, plan for bronchoscopy with EBUS 7/31. New right perihilar soft tissue thickening/mass, bulky mediastinal LAP:   Measuring 32 x 61mm. Cannot rule out metastatic disease. Pulmonary consulted. Bulky LAP likely related to myeloproliferative disorder. Plan for bronc w/ EBUS 7/31 to r/o alterative etiology. Oncology following. Mixed myeloproliferative disorder / Chronic myeloid leukemia:   Per previous Oncology note, pt with mixed myelodysplastic/myeloproliferative syndrome that is functionally similar to CML  On azacitidine. Unable to get last treatment d/t low cell counts. Continue prophylactic acyclovir, allopurinol. Oncology consulted. Leukocytosis worse than baseline. Chronic anemia, thrombocytopenia. Hgb 6.6 today; 1U PRBC ordered. Transfuse for Hgb < 7.0, platelets < 20 or hemodynamic instability. Needs irradiated, leuko-reduced transfusions      PETRA vs CKDII: Cr 1.5, baseline 1.2. Urine lytes suggestive of pre-renal etiology. Avoid nephrotoxins. No change with IVF, will discontinue. Anemia possibly contributing? Repeat BMP in AM. Consider renal US if no improvement. Acute hyperkalemia: Unclear etiology, not on diuretics, no signs of GI loss. Received IV replacement 7/29. Mg wnl. Resolved. Splenomegaly: likely the cause of pts left sided abdominal pain. Secondary to #3. Pain control. Oncology consulted. COPD: FEV1 49% noted on PFTs 9/21. Liver/gallbladder/bilary tree: Hepatomegaly and hepatic steatosis are observed. No liver lesions are identified. No radiopaque gallstones or biliary ductal dilatation is observed. Pancreas: Normal. Spleen : Enlarged measuring 17 cm in length. Adrenal glands: Normal. Kidneys/ ureters/ bladder: A simple cyst in the left kidney measures 25 mm. A simple cyst in the right kidney measures 20 mm. No renal calculus, hydronephrosis, or hydroureter is observed. The urinary bladder is unremarkable. Gastrointestinal:  No bowel obstruction, free fluid, fluid collection, or free air is observed. Colonic diverticulosis without diverticulitis is present. The appendix is normal. Retroperitoneum / lymph nodes: The aorta is not dilated. Multiple enlarged retroperitoneal lymph nodes measure up to 15 mm in short axis. Enlarged bilateral pelvic sidewall lymph nodes measure up to 14 mm. Prominent bilateral inguinal lymph nodes measure  up to 10 mm. Pelvis: The prostate gland is not enlarged. Musculoskeletal: Diffuse osteopenia is present. Multilevel degenerative disc disease is noted in the thoracic and lumbar spine. The visualized skeletal structures appear intact. 1. No filling defects are noted within the pulmonary arterial vasculature to suggest the presence of the urinary embolus. Bulky mediastinal lymphadenopathy measures up to 26 x 37 mm. Right perihilar soft tissue thickening/mass measures 32 x 61 mm with associated consolidation in the right lung extending from the right hilum. Enlarged lateral axillary, retroperitoneal, and iliac chain lymph nodes suggest lymphoproliferative disorder. The spleen is enlarged measuring 17 cm in length. Correlate with hematologic factors. 2. Colonic diverticulosis without diverticulitis. Hepatomegaly and hepatic steatosis. Chronic findings are discussed. **This report has been created using voice recognition software.   It may contain minor errors which are inherent in voice recognition

## 2023-07-30 NOTE — PROGRESS NOTES
Received perfectserve message regarding inpatient consult. Pt admitted after chest pain during planned bone marrow biopsy on Friday. Pt sent to ED. Known history of CML + MDS. Pt last seen in our office on 7/24/2023. HPI from that note:      05/17/2023 the patient underwent a peripheral flow cytometry which revealed the presence of 5% blasts, 4% promonocytes, absolute monocytosis, dysgranulopoiesis, thrombocytopenia and circulating dysplastic nucleated red blood cells. Further evaluation with a bone marrow biopsy was highly recommended. 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 Blasts were 3% in the peripheral blood and 6% in the bone marrow. The patient was seen by hematology oncology at [facility] and started on treatment with acyclovir 400 mg by mouth twice daily, allopurinol 300 mg by mouth daily and hydroxyurea 500 mg by mouth twice daily. 06/13/2023 the patient was seen for initial evaluation with Dr. Lori Segundo. 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 FISH for AML, CBFB/MYH11, inversion 16 was not detected. 06/26/2023 the patient initiated treatment with azacitidine 75 Mg/m2 daily x7 days every 28 days. The patient would also require supportive care with PRBCs and platelet transfusions. He would also received treatment with Procrit 40,000 units by SQ injection every 7 days pending lab results. The patient only received days 1-5 of cycle 1 due to severe anemia and thrombocytopenia. CBC results reveal WBC 80.2, Hgb 6.4, HCT 20.5%, .5, RDW 18% and platelet count 08,383.     07/03/2023 the patient received 1 unit PRBCs and 1 unit of platelets. 7/12/2023 CBC results reveal WBC 46.58, Hgb 8.6, HCT 25.9% and platelet count 16,056. The patient received 1 unit of platelets.      07/19/2023 CBC results reveal WBC 56.1, Hgb 7.3, HCT 22.1% and platelet count 6466. The patient received treatment with 1 unit PRBCs and 1 unit of platelets. Pt returned for treatment on 7/24/2023, scheduled for treatment with Mari Shahid. He agreed to repeat BM biopsy d/t mixed results. Pt scheduled, arrived on 7/28/2023, had subsequent CP. He was sent to ED. CXR (+) blunting suggestive of bilateral pleural effusions. CT Chest/Abd/Pelvis (-) PE; (+) bulky mediastinal lymphadenopathy and right perihilar soft tissue thickening/mass with associated consolidation in right lung; enlarged lymphadenopathy in chest.      Discussed case with Dr. Radha Pina on 7/28/2023 before pt admitted about transfer to [facility] vs treating here. We agreed ok for treatment here with blasts at 5% (> 20% would recommend [facility] transfer for immediate inpatient treatment that is not within our capabilities). Empiric IV Rocephin + Zithromax given. Pulmonology consulted, plans EBUS on Monday. Recommend 1 unit PRBCs for Hgb < 7.0. Recommend 1 pack platelets for platelet count < 20. Pt will need irradiated, leuko-reduced transfusions. Full consult to follow tomorrow. Please feel free to call with questions/concerns. Discussed with KYLER Barros today.     Electronically signed by YESY Vincent CNP on 7/30/2023 at 12:16 PM

## 2023-07-30 NOTE — PLAN OF CARE
Problem: Respiratory - Adult  Goal: Clear lung sounds  Outcome: Progressing   Aerosols changed to BID and PRN per protocol due to history of COPD, pt does at home and to help clear lung sounds/improve aeration. Patient mutually agreed on goals.

## 2023-07-30 NOTE — PLAN OF CARE
Problem: Safety - Adult  Goal: Free from fall injury  Outcome: Progressing     Problem: ABCDS Injury Assessment  Goal: Absence of physical injury  Outcome: Progressing     Problem: Chronic Conditions and Co-morbidities  Goal: Patient's chronic conditions and co-morbidity symptoms are monitored and maintained or improved  Outcome: Progressing     Problem: Respiratory - Adult  Goal: Achieves optimal ventilation and oxygenation  Outcome: Progressing     Problem: Cardiovascular - Adult  Goal: Maintains optimal cardiac output and hemodynamic stability  Outcome: Progressing     Problem: Musculoskeletal - Adult  Goal: Return mobility to safest level of function  Outcome: Progressing  Goal: Return ADL status to a safe level of function  Outcome: Progressing     Problem: Gastrointestinal - Adult  Goal: Maintains adequate nutritional intake  Outcome: Progressing     Problem: Infection - Adult  Goal: Absence of infection at discharge  Outcome: Progressing     Problem: Metabolic/Fluid and Electrolytes - Adult  Goal: Electrolytes maintained within normal limits  Outcome: Progressing     Problem: Hematologic - Adult  Goal: Maintains hematologic stability  Outcome: Progressing     Problem: Pain  Goal: Verbalizes/displays adequate comfort level or baseline comfort level  Outcome: Progressing   Patient care plan discussed with patient and agreeable to POC.  Patient is anticipating to taking inpatient rehab

## 2023-07-30 NOTE — CONSENT
Informed Consent for Blood Component Transfusion Note    I have discussed with the patient the rationale for blood component transfusion; its benefits in treating or preventing fatigue, organ damage, or death; and its risk which includes mild transfusion reactions, rare risk of blood borne infection, or more serious but rare reactions. I have discussed the alternatives to transfusion, including the risk and consequences of not receiving transfusion. The patient had an opportunity to ask questions and had agreed to proceed with transfusion of blood components.     Electronically signed by Jimmy Gilbert PA-C on 7/30/23 at 12:16 PM EDT

## 2023-07-30 NOTE — PLAN OF CARE
Problem: Respiratory - Adult  Goal: Clear lung sounds  7/30/2023 1705 by Alejandro Barreto RCP  Outcome: Progressing   Aerosols changed to BID and PRN per protocol due to history of COPD, pt does at home and to help clear lung sounds/improve aeration. Patient mutually agreed on goals.

## 2023-07-31 ENCOUNTER — APPOINTMENT (OUTPATIENT)
Dept: GENERAL RADIOLOGY | Age: 68
DRG: 189 | End: 2023-07-31
Payer: COMMERCIAL

## 2023-07-31 ENCOUNTER — HOSPITAL ENCOUNTER (OUTPATIENT)
Dept: INFUSION THERAPY | Age: 68
Discharge: HOME | End: 2023-07-31

## 2023-07-31 PROBLEM — E44.0 MODERATE MALNUTRITION (HCC): Status: ACTIVE | Noted: 2023-07-31

## 2023-07-31 PROBLEM — J96.01 ACUTE RESPIRATORY FAILURE WITH HYPOXIA (HCC): Status: ACTIVE | Noted: 2023-07-31

## 2023-07-31 LAB
ALBUMIN SERPL BCG-MCNC: 3.7 G/DL (ref 3.5–5.1)
ALP SERPL-CCNC: 112 U/L (ref 38–126)
ALT SERPL W/O P-5'-P-CCNC: 23 U/L (ref 11–66)
ANION GAP SERPL CALC-SCNC: 15 MEQ/L (ref 8–16)
ANION GAP SERPL CALC-SCNC: 16 MEQ/L (ref 8–16)
APTT PPP: 30.3 SECONDS (ref 22–38)
AST SERPL-CCNC: 21 U/L (ref 5–40)
AUTO DIFF PNL BLD: ABNORMAL
BACTERIA: ABNORMAL
BASOPHILS ABSOLUTE: 0 THOU/MM3 (ref 0–0.1)
BASOPHILS NFR BLD AUTO: 0 %
BILIRUB SERPL-MCNC: 0.3 MG/DL (ref 0.3–1.2)
BILIRUB UR QL STRIP: NEGATIVE
BLASTS: 2 % (ref 0–1)
BUN SERPL-MCNC: 15 MG/DL (ref 7–22)
BUN SERPL-MCNC: 21 MG/DL (ref 7–22)
CALCIUM SERPL-MCNC: 8 MG/DL (ref 8.5–10.5)
CALCIUM SERPL-MCNC: 8.6 MG/DL (ref 8.5–10.5)
CASTS #/AREA URNS LPF: ABNORMAL /LPF
CASTS #/AREA URNS LPF: ABNORMAL /LPF
CHARACTER UR: ABNORMAL
CHARCOAL URNS QL MICRO: ABNORMAL
CHLORIDE SERPL-SCNC: 100 MEQ/L (ref 98–111)
CHLORIDE SERPL-SCNC: 99 MEQ/L (ref 98–111)
CO2 SERPL-SCNC: 23 MEQ/L (ref 23–33)
CO2 SERPL-SCNC: 24 MEQ/L (ref 23–33)
COLOR UR: YELLOW
CREAT SERPL-MCNC: 1.5 MG/DL (ref 0.4–1.2)
CREAT SERPL-MCNC: 1.8 MG/DL (ref 0.4–1.2)
CRYSTALS URNS QL MICRO: ABNORMAL
DEPRECATED RDW RBC AUTO: 57.2 FL (ref 35–45)
EOSINOPHIL NFR BLD AUTO: 0 %
EOSINOPHILS ABSOLUTE: 0 THOU/MM3 (ref 0–0.4)
EPITHELIAL CELLS, UA: ABNORMAL /HPF
ERYTHROCYTE [DISTWIDTH] IN BLOOD BY AUTOMATED COUNT: 17.9 % (ref 11.5–14.5)
FIBRINOGEN PPP-MCNC: 357 MG/100ML (ref 155–475)
GFR SERPL CREATININE-BSD FRML MDRD: 41 ML/MIN/1.73M2
GFR SERPL CREATININE-BSD FRML MDRD: 51 ML/MIN/1.73M2
GLUCOSE SERPL-MCNC: 101 MG/DL (ref 70–108)
GLUCOSE SERPL-MCNC: 153 MG/DL (ref 70–108)
GLUCOSE UR QL STRIP.AUTO: NEGATIVE MG/DL
HCT VFR BLD AUTO: 21.6 % (ref 42–52)
HGB BLD-MCNC: 7.2 GM/DL (ref 14–18)
HGB UR QL STRIP.AUTO: NEGATIVE
INR PPP: 1.22 (ref 0.85–1.13)
KETONES UR QL STRIP.AUTO: NEGATIVE
LACTATE SERPL-SCNC: 1.1 MMOL/L (ref 0.5–2)
LACTATE SERPL-SCNC: 1.9 MMOL/L (ref 0.5–2)
LDH SERPL L TO P-CCNC: 838 U/L (ref 100–190)
LEUKOCYTE ESTERASE UR QL STRIP.AUTO: NEGATIVE
LYMPHOCYTES ABSOLUTE: 2.2 THOU/MM3 (ref 1–4.8)
LYMPHOCYTES NFR BLD AUTO: 1 %
MANUAL DIFF BLD: NORMAL
MANUAL DIFF BLD: NORMAL
MCH RBC QN AUTO: 30.4 PG (ref 26–33)
MCHC RBC AUTO-ENTMCNC: 33.3 GM/DL (ref 32.2–35.5)
MCV RBC AUTO: 91.1 FL (ref 80–94)
METAMYELOCYTES: 3 %
MONOCYTES ABSOLUTE: 172.2 THOU/MM3 (ref 0.4–1.3)
MONOCYTES NFR BLD AUTO: 80 %
MRSA DNA SPEC QL NAA+PROBE: NEGATIVE
MYELOCYTES: 3 %
NEUTROPHILS NFR BLD AUTO: 11 %
NITRITE UR QL STRIP.AUTO: NEGATIVE
NRBC BLD AUTO-RTO: 0 /100 WBC
PATHOLOGIST REVIEW: ABNORMAL
PH UR STRIP.AUTO: 5 [PH] (ref 5–9)
PLATELET # BLD AUTO: 20 THOU/MM3 (ref 130–400)
PLATELET BLD QL SMEAR: ABNORMAL
PMV BLD AUTO: 11.4 FL (ref 9.4–12.4)
POIKILOCYTES: ABNORMAL
POTASSIUM SERPL-SCNC: 3.1 MEQ/L (ref 3.5–5.2)
POTASSIUM SERPL-SCNC: 3.5 MEQ/L (ref 3.5–5.2)
PROT SERPL-MCNC: 6.6 G/DL (ref 6.1–8)
PROT UR STRIP.AUTO-MCNC: 100 MG/DL
RBC # BLD AUTO: 2.37 MILL/MM3 (ref 4.7–6.1)
RBC #/AREA URNS HPF: ABNORMAL /HPF
RENAL EPI CELLS #/AREA URNS HPF: ABNORMAL /[HPF]
ROULEAUX BLD QL SMEAR: SLIGHT
SEGMENTED NEUTROPHILS ABSOLUTE COUNT: 23.7 THOU/MM3 (ref 1.8–7.7)
SODIUM SERPL-SCNC: 138 MEQ/L (ref 135–145)
SODIUM SERPL-SCNC: 139 MEQ/L (ref 135–145)
SPECIFIC GRAVITY UA: 1.02 (ref 1–1.03)
SPHEROCYTES BLD QL SMEAR: ABNORMAL
URATE SERPL-MCNC: 7.9 MG/DL (ref 3.7–7)
UROBILINOGEN, URINE: 0.2 EU/DL (ref 0–1)
WBC # BLD AUTO: 215.3 THOU/MM3 (ref 4.8–10.8)
WBC #/AREA URNS HPF: ABNORMAL /HPF
YEAST LIKE FUNGI URNS QL MICRO: ABNORMAL

## 2023-07-31 PROCEDURE — 87040 BLOOD CULTURE FOR BACTERIA: CPT

## 2023-07-31 PROCEDURE — 99233 SBSQ HOSP IP/OBS HIGH 50: CPT | Performed by: PHYSICIAN ASSISTANT

## 2023-07-31 PROCEDURE — 6370000000 HC RX 637 (ALT 250 FOR IP): Performed by: STUDENT IN AN ORGANIZED HEALTH CARE EDUCATION/TRAINING PROGRAM

## 2023-07-31 PROCEDURE — 6370000000 HC RX 637 (ALT 250 FOR IP): Performed by: PHYSICIAN ASSISTANT

## 2023-07-31 PROCEDURE — 85384 FIBRINOGEN ACTIVITY: CPT

## 2023-07-31 PROCEDURE — 87899 AGENT NOS ASSAY W/OPTIC: CPT

## 2023-07-31 PROCEDURE — 99223 1ST HOSP IP/OBS HIGH 75: CPT | Performed by: NURSE PRACTITIONER

## 2023-07-31 PROCEDURE — 85610 PROTHROMBIN TIME: CPT

## 2023-07-31 PROCEDURE — 2580000003 HC RX 258

## 2023-07-31 PROCEDURE — 85730 THROMBOPLASTIN TIME PARTIAL: CPT

## 2023-07-31 PROCEDURE — 87086 URINE CULTURE/COLONY COUNT: CPT

## 2023-07-31 PROCEDURE — 84550 ASSAY OF BLOOD/URIC ACID: CPT

## 2023-07-31 PROCEDURE — 1200000000 HC SEMI PRIVATE

## 2023-07-31 PROCEDURE — 71046 X-RAY EXAM CHEST 2 VIEWS: CPT

## 2023-07-31 PROCEDURE — 6370000000 HC RX 637 (ALT 250 FOR IP)

## 2023-07-31 PROCEDURE — 83605 ASSAY OF LACTIC ACID: CPT

## 2023-07-31 PROCEDURE — 87449 NOS EACH ORGANISM AG IA: CPT

## 2023-07-31 PROCEDURE — 2580000003 HC RX 258: Performed by: STUDENT IN AN ORGANIZED HEALTH CARE EDUCATION/TRAINING PROGRAM

## 2023-07-31 PROCEDURE — 2580000003 HC RX 258: Performed by: PHYSICIAN ASSISTANT

## 2023-07-31 PROCEDURE — 99233 SBSQ HOSP IP/OBS HIGH 50: CPT | Performed by: INTERNAL MEDICINE

## 2023-07-31 PROCEDURE — 6360000002 HC RX W HCPCS: Performed by: STUDENT IN AN ORGANIZED HEALTH CARE EDUCATION/TRAINING PROGRAM

## 2023-07-31 PROCEDURE — 85025 COMPLETE CBC W/AUTO DIFF WBC: CPT

## 2023-07-31 PROCEDURE — 94640 AIRWAY INHALATION TREATMENT: CPT

## 2023-07-31 PROCEDURE — 6360000002 HC RX W HCPCS: Performed by: PHYSICIAN ASSISTANT

## 2023-07-31 PROCEDURE — 6360000002 HC RX W HCPCS

## 2023-07-31 PROCEDURE — 87641 MR-STAPH DNA AMP PROBE: CPT

## 2023-07-31 PROCEDURE — 81001 URINALYSIS AUTO W/SCOPE: CPT

## 2023-07-31 PROCEDURE — 80053 COMPREHEN METABOLIC PANEL: CPT

## 2023-07-31 PROCEDURE — 83615 LACTATE (LD) (LDH) ENZYME: CPT

## 2023-07-31 PROCEDURE — 36415 COLL VENOUS BLD VENIPUNCTURE: CPT

## 2023-07-31 RX ORDER — SODIUM CHLORIDE, SODIUM LACTATE, POTASSIUM CHLORIDE, CALCIUM CHLORIDE 600; 310; 30; 20 MG/100ML; MG/100ML; MG/100ML; MG/100ML
INJECTION, SOLUTION INTRAVENOUS CONTINUOUS
Status: DISCONTINUED | OUTPATIENT
Start: 2023-07-31 | End: 2023-08-01 | Stop reason: HOSPADM

## 2023-07-31 RX ORDER — ACETAMINOPHEN 500 MG
1000 TABLET ORAL ONCE
Status: COMPLETED | OUTPATIENT
Start: 2023-07-31 | End: 2023-07-31

## 2023-07-31 RX ORDER — SODIUM CHLORIDE 9 MG/ML
INJECTION, SOLUTION INTRAVENOUS PRN
Status: DISCONTINUED | OUTPATIENT
Start: 2023-07-31 | End: 2023-08-01 | Stop reason: HOSPADM

## 2023-07-31 RX ADMIN — ATORVASTATIN CALCIUM 80 MG: 80 TABLET, FILM COATED ORAL at 10:25

## 2023-07-31 RX ADMIN — SODIUM CHLORIDE, PRESERVATIVE FREE 10 ML: 5 INJECTION INTRAVENOUS at 10:17

## 2023-07-31 RX ADMIN — AMLODIPINE BESYLATE 5 MG: 5 TABLET ORAL at 21:52

## 2023-07-31 RX ADMIN — MORPHINE SULFATE 1 MG: 2 INJECTION, SOLUTION INTRAMUSCULAR; INTRAVENOUS at 22:17

## 2023-07-31 RX ADMIN — ALLOPURINOL 300 MG: 300 TABLET ORAL at 10:18

## 2023-07-31 RX ADMIN — ALBUTEROL SULFATE 2.5 MG: 2.5 SOLUTION RESPIRATORY (INHALATION) at 07:49

## 2023-07-31 RX ADMIN — SODIUM CHLORIDE, POTASSIUM CHLORIDE, SODIUM LACTATE AND CALCIUM CHLORIDE: 600; 310; 30; 20 INJECTION, SOLUTION INTRAVENOUS at 16:41

## 2023-07-31 RX ADMIN — GUAIFENESIN 600 MG: 600 TABLET, EXTENDED RELEASE ORAL at 10:25

## 2023-07-31 RX ADMIN — ACYCLOVIR 200 MG: 200 CAPSULE ORAL at 21:52

## 2023-07-31 RX ADMIN — FOLIC ACID 1 MG: 1 TABLET ORAL at 10:25

## 2023-07-31 RX ADMIN — SODIUM CHLORIDE, PRESERVATIVE FREE 10 ML: 5 INJECTION INTRAVENOUS at 16:17

## 2023-07-31 RX ADMIN — HYDROCODONE BITARTRATE AND ACETAMINOPHEN 2 TABLET: 5; 325 TABLET ORAL at 11:22

## 2023-07-31 RX ADMIN — POTASSIUM CHLORIDE 40 MEQ: 1500 TABLET, EXTENDED RELEASE ORAL at 19:42

## 2023-07-31 RX ADMIN — ACETAMINOPHEN 1000 MG: 500 TABLET ORAL at 19:30

## 2023-07-31 RX ADMIN — ACETAMINOPHEN 650 MG: 325 TABLET ORAL at 16:25

## 2023-07-31 RX ADMIN — CETIRIZINE HYDROCHLORIDE 10 MG: 10 TABLET ORAL at 10:25

## 2023-07-31 RX ADMIN — SODIUM CHLORIDE, PRESERVATIVE FREE 10 ML: 5 INJECTION INTRAVENOUS at 11:20

## 2023-07-31 RX ADMIN — ACYCLOVIR 200 MG: 200 CAPSULE ORAL at 10:25

## 2023-07-31 RX ADMIN — QUETIAPINE FUMARATE 25 MG: 25 TABLET ORAL at 21:52

## 2023-07-31 RX ADMIN — MORPHINE SULFATE 1 MG: 2 INJECTION, SOLUTION INTRAMUSCULAR; INTRAVENOUS at 16:17

## 2023-07-31 RX ADMIN — CEFEPIME 2000 MG: 2 INJECTION, POWDER, FOR SOLUTION INTRAVENOUS at 19:47

## 2023-07-31 RX ADMIN — HYDROCODONE BITARTRATE AND ACETAMINOPHEN 2 TABLET: 5; 325 TABLET ORAL at 03:02

## 2023-07-31 RX ADMIN — POLYETHYLENE GLYCOL 3350 17 G: 17 POWDER, FOR SOLUTION ORAL at 10:18

## 2023-07-31 RX ADMIN — GUAIFENESIN 600 MG: 600 TABLET, EXTENDED RELEASE ORAL at 21:51

## 2023-07-31 RX ADMIN — AZITHROMYCIN 500 MG: 500 INJECTION, POWDER, LYOPHILIZED, FOR SOLUTION INTRAVENOUS at 10:37

## 2023-07-31 RX ADMIN — ACETAMINOPHEN 650 MG: 325 TABLET ORAL at 08:25

## 2023-07-31 RX ADMIN — TAMSULOSIN HYDROCHLORIDE 0.8 MG: 0.4 CAPSULE ORAL at 10:25

## 2023-07-31 RX ADMIN — MAGNESIUM HYDROXIDE 30 ML: 1200 LIQUID ORAL at 10:16

## 2023-07-31 RX ADMIN — ALBUTEROL SULFATE 2.5 MG: 2.5 SOLUTION RESPIRATORY (INHALATION) at 16:40

## 2023-07-31 RX ADMIN — CEFTRIAXONE SODIUM 1000 MG: 1 INJECTION, POWDER, FOR SOLUTION INTRAMUSCULAR; INTRAVENOUS at 10:48

## 2023-07-31 RX ADMIN — DOCUSATE SODIUM 200 MG: 100 CAPSULE, LIQUID FILLED ORAL at 10:17

## 2023-07-31 ASSESSMENT — PAIN DESCRIPTION - LOCATION
LOCATION: ABDOMEN

## 2023-07-31 ASSESSMENT — PAIN DESCRIPTION - ORIENTATION
ORIENTATION: LEFT;LOWER
ORIENTATION: LEFT
ORIENTATION: LEFT;ANTERIOR;LOWER
ORIENTATION: LEFT
ORIENTATION: LEFT
ORIENTATION: LEFT;LOWER

## 2023-07-31 ASSESSMENT — PAIN DESCRIPTION - FREQUENCY
FREQUENCY: INTERMITTENT
FREQUENCY: CONTINUOUS
FREQUENCY: INTERMITTENT

## 2023-07-31 ASSESSMENT — PAIN - FUNCTIONAL ASSESSMENT
PAIN_FUNCTIONAL_ASSESSMENT: PREVENTS OR INTERFERES WITH MANY ACTIVE NOT PASSIVE ACTIVITIES
PAIN_FUNCTIONAL_ASSESSMENT: PREVENTS OR INTERFERES SOME ACTIVE ACTIVITIES AND ADLS
PAIN_FUNCTIONAL_ASSESSMENT: ACTIVITIES ARE NOT PREVENTED
PAIN_FUNCTIONAL_ASSESSMENT: PREVENTS OR INTERFERES SOME ACTIVE ACTIVITIES AND ADLS

## 2023-07-31 ASSESSMENT — PAIN DESCRIPTION - ONSET
ONSET: ON-GOING
ONSET: GRADUAL
ONSET: GRADUAL

## 2023-07-31 ASSESSMENT — PAIN SCALES - GENERAL
PAINLEVEL_OUTOF10: 4
PAINLEVEL_OUTOF10: 8
PAINLEVEL_OUTOF10: 7
PAINLEVEL_OUTOF10: 6
PAINLEVEL_OUTOF10: 8
PAINLEVEL_OUTOF10: 7

## 2023-07-31 ASSESSMENT — PAIN DESCRIPTION - DESCRIPTORS
DESCRIPTORS: PENETRATING
DESCRIPTORS: ACHING;SHARP;DISCOMFORT
DESCRIPTORS: SHARP
DESCRIPTORS: ACHING
DESCRIPTORS: ACHING;SHARP
DESCRIPTORS: ACHING;DISCOMFORT

## 2023-07-31 ASSESSMENT — PAIN DESCRIPTION - PAIN TYPE
TYPE: CHRONIC PAIN
TYPE: ACUTE PAIN

## 2023-07-31 NOTE — PLAN OF CARE
Problem: Safety - Adult  Goal: Free from fall injury  Outcome: Progressing  Fall assessment completed. Patient using call light appropriately to call for assistance with ambulation to bathroom. Personal items within reach. Patient is also compliant with use of non-skid slippers. Problem: Chronic Conditions and Co-morbidities  Goal: Patient's chronic conditions and co-morbidity symptoms are monitored and maintained or improved  Outcome: Progressing  Flowsheets (Taken 7/30/2023 2016)  Care Plan - Patient's Chronic Conditions and Co-Morbidity Symptoms are Monitored and Maintained or Improved:   Monitor and assess patient's chronic conditions and comorbid symptoms for stability, deterioration, or improvement   Collaborate with multidisciplinary team to address chronic and comorbid conditions and prevent exacerbation or deterioration   Update acute care plan with appropriate goals if chronic or comorbid symptoms are exacerbated and prevent overall improvement and discharge  Kept monitored for untoward signs and symptoms. Problem: Respiratory - Adult  Goal: Clear lung sounds  7/30/2023 1705 by Hiale Lozada RCP  Outcome: Progressing  Patient on oxygen per nasal cannula at 3 LPM. Kept monitored. Problem: Musculoskeletal - Adult  Goal: Return mobility to safest level of function  Outcome: Progressing  Flowsheets (Taken 7/30/2023 2016)  Return Mobility to Safest Level of Function:   Assess patient stability and activity tolerance for standing, transferring and ambulating with or without assistive devices   Assist with transfers and ambulation using safe patient handling equipment as needed   Instruct patient/family in ordered activity level  Assisted patient as needed.      Problem: Pain  Goal: Verbalizes/displays adequate comfort level or baseline comfort level  Outcome: Progressing  Flowsheets (Taken 7/30/2023 2016)  Verbalizes/displays adequate comfort level or baseline comfort level:   Encourage

## 2023-07-31 NOTE — PROGRESS NOTES
Perfect Serve message to JOSE JUAN CHÁVEZ:  Patient throwing a MEWS of 6. , temp 101.3 will give Tylenol, bp 138/68, Resp 21. Just also gave morphine for pain 8/10.   Return message to order LR @100

## 2023-07-31 NOTE — PROGRESS NOTES
lymphoproliferative disorder. The spleen is enlarged measuring 17 cm in length. Correlate with    hematologic factors. 2. Colonic diverticulosis without diverticulitis. Hepatomegaly and hepatic steatosis. Chronic findings are discussed. **This report has been created using voice recognition software. It may contain minor errors which are inherent in voice recognition technology. **      Final report electronically signed by Dr Teresita Rodriguez on 7/28/2023 10:28 AM      CTA CHEST W WO CONTRAST PE Eval   Final Result   1. No filling defects are noted within the pulmonary arterial vasculature to suggest the presence of the urinary embolus. Bulky mediastinal lymphadenopathy measures up to 26 x 37 mm. Right perihilar soft tissue thickening/mass measures 32 x 61 mm with    associated consolidation in the right lung extending from the right hilum. Enlarged lateral axillary, retroperitoneal, and iliac chain lymph nodes suggest lymphoproliferative disorder. The spleen is enlarged measuring 17 cm in length. Correlate with    hematologic factors. 2. Colonic diverticulosis without diverticulitis. Hepatomegaly and hepatic steatosis. Chronic findings are discussed. **This report has been created using voice recognition software. It may contain minor errors which are inherent in voice recognition technology. **      Final report electronically signed by Dr Teresita Rodriguez on 7/28/2023 10:28 AM      XR CHEST (2 VW)   Final Result   1. Patchy airspace opacities are present within the right upper lobe marginating the minor fissure. This may represent focal atelectasis or pneumonia. Recommend follow-up to document resolution. 2. Mild blunting of the posterior costophrenic sulci on the lateral view suggests possible small bilateral pleural effusions. **This report has been created using voice recognition software.   It may contain minor errors which are inherent in voice recognition 1. No filling defects are noted within the pulmonary arterial vasculature to suggest the presence of the urinary embolus. Bulky mediastinal lymphadenopathy measures up to 26 x 37 mm. Right perihilar soft tissue thickening/mass measures 32 x 61 mm with associated consolidation in the right lung extending from the right hilum. Enlarged lateral axillary, retroperitoneal, and iliac chain lymph nodes suggest lymphoproliferative disorder. The spleen is enlarged measuring 17 cm in length. Correlate with hematologic factors. 2. Colonic diverticulosis without diverticulitis. Hepatomegaly and hepatic steatosis. Chronic findings are discussed. **This report has been created using voice recognition software. It may contain minor errors which are inherent in voice recognition technology. ** Final report electronically signed by Dr Deangelo Salcido on 7/28/2023 10:28 AM          DVT prophylaxis: Held for possible biopsy  Diet: ADULT DIET;  Regular  ADULT ORAL NUTRITION SUPPLEMENT; Breakfast, Lunch, Dinner; Standard High Calorie/High Protein Oral Supplement  Diet NPO Exceptions are: Sips of Water with Meds  PT/OT: No  Tele: No  IVF: Yes  Code Status: Full Code      Electronically signed by Branden Bartlett PA-C on 7/31/2023 at 12:39 PM

## 2023-07-31 NOTE — PROGRESS NOTES
9/21.  Echo    No records   Cultures    Procalcitonin  Lab Results   Component Value Date/Time    PROCAL 0.11 06/09/2023 03:38 AM      No growth on blood cultures at 48 hours. Radiology    CXR          CT Scans            Assessment   Acute on chronic hypoxic respiratory failure  Bulky mediastinal LAP and right perihilar LAP with atelectasis  Mixed myeloproliferative disorder/Chronic myeloid Leukemia  PETRA  Hypokalemia  COPD FEV1 49%-reports being on Trelegy and albuterol at home  Thrombocytopenia   Recommendations   Acute on chronic hypoxia likely related to atelectasis and spurious hypoxemia secondary to severely elevated WBC  Bulky LAP related to myeloproliferative disorder,  EBUS cancelled at this time. Discussed with Henry Finley, oncology APRN-CNP, patient will need a higher level of care to manage his oncology needs. Recommend EBUS at [facility]. Continue bronchodilators as needed. Thank you for the consult and allowing us to participate in the care of your patient. Discussed with Dr. Jamar Blood. Case discussed with nurse and patient. Questions and concerns addressed. Meds and Orders reviewed. Electronically signed by   Mathieu Florian DO on 7/31/2023 at 10:10 AM    Addendum by Dr. Alene Schilder, MD:  Patient seen by me independently including key components of medical care. Face to face evaluation and examination was performed. Case discussed with Nadja Grimes DO-resident physician. Agree with resident's findings and plan as documented in the resident's note. Italicized font, if present,  represents changes to the note made by me. More than 50% of the encounter time involved with patient care by the Pulmonary & Critical care service team spent by me. Please see my modifications mentioned below:  Patient is on 4 L of oxygen via nasal cannula  Not in any acute distress  Patient was planned for EBUS procedure tomorrow at 3 PM in endoscopy suite with general anesthesia.   However, patient being transferred to [facility] by medical oncology service. We will cancel EBUS procedure pending transfer of patient to [facility]. Patient was informed about my impression plan. All questions were answered.     Electronically signed by   Maribel Dobbs MD on 7/31/2023 at 6:44 PM

## 2023-07-31 NOTE — PROGRESS NOTES
Perfect serve message to Dr. Jay Peers:  Wanting to clarify platelet order: 2 orders are in. Is he to only get the modification that states platelets to be given tomorrow 1 hr prior to procedure? No

## 2023-07-31 NOTE — PROGRESS NOTES
Comprehensive Nutrition Assessment    Type and Reason for Visit:  Initial, Positive Nutrition Screen (weight loss, poor po)    Nutrition Recommendations/Plan:   Recommend diet as tolerated. Continue Ensure Enlive TID. Encouraged po, ONS, good nutrition at best efforts. Will monitor need for additional nutrition interventions. Malnutrition Assessment:  Malnutrition Status: Moderate malnutrition (07/31/23 1134)    Context:  Acute Illness     Findings of the 6 clinical characteristics of malnutrition:  Energy Intake:  75% or less of estimated energy requirements for 7 or more days  Weight Loss:   (-8.4% in 2.5 months)     Body Fat Loss:  Mild body fat loss Orbital   Muscle Mass Loss:  No significant muscle mass loss    Fluid Accumulation:  Unable to assess     Strength:  Not Performed    Nutrition Assessment:     Pt. moderately malnourished AEB criteria as listed above. At risk for further nutrition compromise r/t catabolic illness (cancer; new perihilar mass) and underlying medical condition (hx cancer, chemotherapy, COPD, HTN). Nutrition Related Findings:      Wound Type: None     Pt. Report/Treatments/Miscellaneous: pt. Seen - states appetite/intake decreased since cancer diagnosis ~3 months ago; denies any trouble tolerating diet; states tries to eat 3 meals/day; reports eating really well prior to cancer diagnosis; recently picked up some Ensure; agrees to receive; per staff - plan to transfer pt. To [facility] when bed available  GI Status: 1 BM noted past 24 hours  Pertinent Labs: 7/31: Glucose 101, BUN 15, Cr 1.5  Pertinent Meds: ATB, Colace, Senokot, Seroquel, Folvite, Acyclovir      Current Nutrition Intake & Therapies:    Average Meal Intake: %     ADULT DIET;  Regular  ADULT ORAL NUTRITION SUPPLEMENT; Breakfast, Lunch, Dinner; Standard High Calorie/High Protein Oral Supplement  Diet NPO Exceptions are: Sips of Water with Meds    Anthropometric Measures:  Height: 5' 11\" (180.3 cm)  Ideal Body Weight (IBW): 172 lbs (78 kg)    Admission Body Weight: 272 lb (123.4 kg) (7/28 no edema, bedscale)  Current Body Weight: 272 lb (123.4 kg) (7/28 no edema, bedscale),      Current BMI (kg/m2): 38  Usual Body Weight:  (per pt. ~284-286#; per EMR: 4/14/23: 292#, 5/16/23: 297#, 6/9/23: 284# 14 oz, 7/12/23: 268# 6 oz)                       BMI Categories: Obese Class 2 (BMI 35.0 -39.9)    Estimated Daily Nutrient Needs:  Energy Requirements Based On: Kcal/kg  Weight Used for Energy Requirements: Other (Comment) (123)  Energy (kcal/day): 4430-4372 kcals (15-18)  Weight Used for Protein Requirements: Ideal (78)  Protein (g/day):  gm protein (1.2-1.4)       Nutrition Diagnosis:   Moderate malnutrition related to catabolic illness as evidenced by Criteria as identified in malnutrition assessment    Nutrition Interventions:   Food and/or Nutrient Delivery: Continue Current Diet, Start Oral Nutrition Supplement  Nutrition Education/Counseling: Education initiated (Stressed importance of po, ONS at best efforts.)  Coordination of Nutrition Care: Continue to monitor while inpatient       Goals:     Goals: PO intake 75% or greater, by next RD assessment       Nutrition Monitoring and Evaluation:      Food/Nutrient Intake Outcomes: Diet Advancement/Tolerance, Food and Nutrient Intake, Supplement Intake  Physical Signs/Symptoms Outcomes: Biochemical Data, Chewing or Swallowing, GI Status, Fluid Status or Edema, Nutrition Focused Physical Findings, Skin, Weight    Discharge Planning:     Too soon to determine     Aranza Olea RD, LD  Contact: [phone]

## 2023-07-31 NOTE — CONSULTS
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 male admitted 2023. He was in procedure for bone marrow biopsy and developed chest pain. Procedure aborted and pt sent to ED. WBC count upon admission 179.1, up from prior 4 days before that at 68.7. H/H 8.1, platelets 29. Blasts at 5%. CXR (+) patchy airspace opacities RUL, atelectasis vs PNA; small bilateral pleural effusions. CTA Chest (+) bulky mediastinal lymphadenopathy; right perihilar soft tissue thickening/mass with associated consolidation in right lung; enlarged lymph nodes suggest lymphoproliferative disorder; splenomegaly 17 cm; hepatomegaly. Bld Cx x 2 (-) prelim. EKG ST. Trop high sens elevated at 38, 22.  IV ATB given in ED x 1 dose. Pulmonology consulted, plans Bronch/EBUS for Tuesday. Oncology consulted. 2023:  Pt resting in bed, no family present. Pt reports feeling worse. Increased WOB. CP improved. Febrile at 101.8. Increased SOB, on baseline 2 lpm nc, increased O2 requirements today at 3 lpm.  He reports constipation, on norco + morphine, not eating much. He reports loss of appetite.   He denies headaches, dizziness, Kate Devi on 7/28/2023 10:28 AM    CT ABDOMEN PELVIS W IV CONTRAST Additional Contrast? None    Result Date: 7/28/2023  PROCEDURE: CTA CHEST W WO CONTRAST, CT ABDOMEN PELVIS W IV CONTRAST CLINICAL INFORMATION: Chest pain. Splenomegaly. COMPARISON: Chest x-ray 7/28/2023. TECHNIQUE: 1.5 mm axial images were obtained through the chest after the administration of IV contrast. A non-contrast localizer was obtained. 2mm MIP reconstructions of the chest performed in coronal and sagittal planes. Axial contrast enhanced CT images were obtained through the abdomen and pelvis. Coronal and sagittal reconstructions were obtained. All CT scans at this facility use dose modulation, iterative reconstruction, and/or weight based dosing when appropriate to reduce the radiation dose to as low as reasonably achievable. CONTRAST: 80 cc Isovue-370. FINDINGS: Pulmonary arteries: No filling defects are noted within the pulmonary arterial vasculature to suggest the presence of pulmonary embolus. Heart/mediastinum: The heart size is normal. Coronary artery calcifications are observed. No pericardial effusion is identified. No aortic aneurysm or dissection is observed. Bulky mediastinal lymphadenopathy measures up to 26 x 37 mm. Enlarged hilar lymph nodes measure up to 20 mm in short axis. Bilateral axillary lymph nodes measure up to 10 mm in short axis. Right perihilar soft tissue thickening measures 32 x 61 mm (series 601, image 100). Lungs: Right perihilar thickening and consolidation extends to the right upper lobe. Volume loss is noted in the right hemithorax. Scarring is noted at the lung bases. A left lower lobe subpleural nodule measures 9 mm (series 601, image 159). Calcified pleural plaques are present. Liver/gallbladder/bilary tree: Hepatomegaly and hepatic steatosis are observed. No liver lesions are identified. No radiopaque gallstones or biliary ductal dilatation is observed.  Pancreas: Normal. Spleen : Enlarged measuring

## 2023-07-31 NOTE — PROGRESS NOTES
07/31/23 1010   Encounter Summary   Encounter Overview/Reason  Spiritual/Emotional Needs   Service Provided For: Patient   [facility]   Support System Spouse; Children   Last Encounter  07/31/23   Complexity of Encounter Low   Begin Time 1005   End Time  1010   Total Time Calculated 5 min   Encounter    Type Initial Screen/Assessment   Spiritual/Emotional needs   Type Spiritual Support   Assessment/Intervention/Outcome   Assessment Unable to assess   Intervention Prayer (assurance of)/[place]     During my encounter with the 79  yr old patient, I attempted to visit with the pt on  5K. The patient appears to be resting (not responsive) now and I didn't want to disturb the patient. I or another Isaiah Cho will attempt to visit the patient or the family at another time. The pt was admitted due to hypoxia.

## 2023-07-31 NOTE — PROGRESS NOTES
Reply back via Perfect SErve from Dr. Lito Tejeda: We are cancelling the EBUS for tomorrow. So no need to give what the Pulmonology team ordered for pre-procedure.  Otherwise platelet management will be up to the oncology and primary teams

## 2023-07-31 NOTE — PROGRESS NOTES
Perfect serve message to Ruperto Dixon PA:  Patient still throwing MEWS of 6. States pain is increasing, 1hr later Morphine and tylenol did not help at all.  current vitals:  102.8 axillary, he's eating,  115,  22 132/60. Turned O2 to 5L to get to 90%.

## 2023-07-31 NOTE — PROGRESS NOTES
Perfect serve message to Dr. Radha Beavers. 3 attempts made by 2 different RN's to insert NG tube. Unsuccessful.   Patient has crooked septum and not able to get past.  Please advise

## 2023-07-31 NOTE — CONSENT
Informed Consent for Blood Component Transfusion Note    I have discussed with the patient the rationale for blood component transfusion; its benefits in treating or preventing fatigue, organ damage, or death; and its risk which includes mild transfusion reactions, rare risk of blood borne infection, or more serious but rare reactions. I have discussed the alternatives to transfusion, including the risk and consequences of not receiving transfusion. The patient had an opportunity to ask questions and had agreed to proceed with transfusion of blood components.     Electronically signed by Rochell Holstein, MD on 7/31/23 at 8:48 AM EDT

## 2023-07-31 NOTE — PLAN OF CARE
Problem: Safety - Adult  Goal: Free from fall injury  7/31/2023 0823 by Felicia Hastings RN  Outcome: Progressing  7/31/2023 0102 by Tabitha Slater RN  Outcome: Progressing     Problem: ABCDS Injury Assessment  Goal: Absence of physical injury  7/31/2023 0823 by Felicia Hastings RN  Outcome: Progressing  7/31/2023 0102 by Tabitha Slater RN  Outcome: Progressing     Problem: Chronic Conditions and Co-morbidities  Goal: Patient's chronic conditions and co-morbidity symptoms are monitored and maintained or improved  7/31/2023 0823 by Felicia Hastings RN  Outcome: Progressing  7/31/2023 0102 by Tabitha Slater RN  Outcome: Progressing  Flowsheets (Taken 7/30/2023 2016)  Care Plan - Patient's Chronic Conditions and Co-Morbidity Symptoms are Monitored and Maintained or Improved:   Monitor and assess patient's chronic conditions and comorbid symptoms for stability, deterioration, or improvement   Collaborate with multidisciplinary team to address chronic and comorbid conditions and prevent exacerbation or deterioration   Update acute care plan with appropriate goals if chronic or comorbid symptoms are exacerbated and prevent overall improvement and discharge     Problem: Respiratory - Adult  Goal: Clear lung sounds  7/31/2023 0752 by Candice Shen RCP  Outcome: Progressing     Problem: Cardiovascular - Adult  Goal: Maintains optimal cardiac output and hemodynamic stability  7/31/2023 0823 by Felicia Hastings RN  Outcome: Progressing  7/31/2023 0102 by Tabitha Slater RN  Outcome: Progressing     Problem: Musculoskeletal - Adult  Goal: Return mobility to safest level of function  7/31/2023 0823 by Felicia Hastings RN  Outcome: Progressing  7/31/2023 0102 by Tabitha Slater RN  Outcome: Progressing  Flowsheets (Taken 7/30/2023 2016)  Return Mobility to Safest Level of Function:   Assess patient stability and activity tolerance for standing, transferring and ambulating with or without assistive devices   Assist with transfers and ambulation using safe patient handling equipment as needed   Instruct patient/family in ordered activity level  Goal: Return ADL status to a safe level of function  7/31/2023 0823 by Felicia Hastings RN  Outcome: Progressing  7/31/2023 0102 by Tabitha Slater RN  Outcome: Progressing     Problem: Gastrointestinal - Adult  Goal: Maintains adequate nutritional intake  7/31/2023 0823 by Felicia Hastings RN  Outcome: Progressing  7/31/2023 0102 by Tabitha Slater RN  Outcome: Progressing  Flowsheets (Taken 7/30/2023 2016)  Maintains adequate nutritional intake: Monitor percentage of each meal consumed     Problem: Infection - Adult  Goal: Absence of infection at discharge  7/31/2023 0823 by Felicia Hastings RN  Outcome: Progressing  7/31/2023 0102 by Tabitha Slater RN  Outcome: Progressing  Flowsheets (Taken 7/30/2023 2016)  Absence of infection at discharge:   Assess and monitor for signs and symptoms of infection   Monitor lab/diagnostic results   Instruct and encourage patient and family to use good hand hygiene technique     Problem: Metabolic/Fluid and Electrolytes - Adult  Goal: Electrolytes maintained within normal limits  7/31/2023 0823 by Felicia Hastings RN  Outcome: Progressing  7/31/2023 0102 by Tabitha Slater RN  Outcome: Progressing     Problem: Hematologic - Adult  Goal: Maintains hematologic stability  7/31/2023 0823 by Felicia Hastings RN  Outcome: Progressing  7/31/2023 0102 by Tabitha Slater RN  Outcome: Progressing     Problem: Pain  Goal: Verbalizes/displays adequate comfort level or baseline comfort level  7/31/2023 0823 by Felicia Hastings RN  Outcome: Progressing  7/31/2023 0102 by Tabitha Slater RN  Outcome: Progressing  Flowsheets (Taken 7/30/2023 2016)  Verbalizes/displays adequate comfort level or baseline comfort level:   Encourage patient to monitor pain and request assistance   Assess pain using appropriate pain scale   Administer analgesics based on type and severity of pain and evaluate response   Implement

## 2023-07-31 NOTE — PLAN OF CARE
Problem: Respiratory - Adult  Goal: Clear lung sounds  7/31/2023 1644 by Silvio Gonzalez RCP  Outcome: Progressing

## 2023-08-01 VITALS
SYSTOLIC BLOOD PRESSURE: 135 MMHG | HEIGHT: 71 IN | OXYGEN SATURATION: 92 % | BODY MASS INDEX: 38.08 KG/M2 | RESPIRATION RATE: 22 BRPM | WEIGHT: 272 LBS | HEART RATE: 112 BPM | DIASTOLIC BLOOD PRESSURE: 65 MMHG | TEMPERATURE: 101.8 F

## 2023-08-01 LAB
BASOPHILS ABSOLUTE: 0 THOU/MM3 (ref 0–0.1)
BASOPHILS NFR BLD AUTO: 0 %
BLASTS: 1 % (ref 0–1)
DEPRECATED RDW RBC AUTO: 56.6 FL (ref 35–45)
EOSINOPHIL NFR BLD AUTO: 0 %
EOSINOPHILS ABSOLUTE: 0 THOU/MM3 (ref 0–0.4)
ERYTHROCYTE [DISTWIDTH] IN BLOOD BY AUTOMATED COUNT: 17.7 % (ref 11.5–14.5)
HCT VFR BLD AUTO: 21.3 % (ref 42–52)
HGB BLD-MCNC: 7.1 GM/DL (ref 14–18)
L PNEUMO1 AG UR QL IA.RAPID: NEGATIVE
LYMPHOCYTES ABSOLUTE: 10.6 THOU/MM3 (ref 1–4.8)
LYMPHOCYTES NFR BLD AUTO: 5 %
MCH RBC QN AUTO: 30.5 PG (ref 26–33)
MCHC RBC AUTO-ENTMCNC: 33.3 GM/DL (ref 32.2–35.5)
MCV RBC AUTO: 91.4 FL (ref 80–94)
MONOCYTES ABSOLUTE: 167.2 THOU/MM3 (ref 0.4–1.3)
MONOCYTES NFR BLD AUTO: 79 %
MYELOCYTES: 2 %
NEUTROPHILS NFR BLD AUTO: 13 %
NRBC BLD AUTO-RTO: 0 /100 WBC
PATHOLOGIST REVIEW: ABNORMAL
PLATELET # BLD AUTO: 21 THOU/MM3 (ref 130–400)
PMV BLD AUTO: 10.6 FL (ref 9.4–12.4)
RBC # BLD AUTO: 2.33 MILL/MM3 (ref 4.7–6.1)
SEGMENTED NEUTROPHILS ABSOLUTE COUNT: 27.5 THOU/MM3 (ref 1.8–7.7)
WBC # BLD AUTO: 211.7 THOU/MM3 (ref 4.8–10.8)

## 2023-08-01 PROCEDURE — 6360000002 HC RX W HCPCS: Performed by: STUDENT IN AN ORGANIZED HEALTH CARE EDUCATION/TRAINING PROGRAM

## 2023-08-01 PROCEDURE — 6360000002 HC RX W HCPCS: Performed by: PHYSICIAN ASSISTANT

## 2023-08-01 PROCEDURE — 6370000000 HC RX 637 (ALT 250 FOR IP): Performed by: PHYSICIAN ASSISTANT

## 2023-08-01 PROCEDURE — 6360000002 HC RX W HCPCS

## 2023-08-01 PROCEDURE — 2580000003 HC RX 258: Performed by: STUDENT IN AN ORGANIZED HEALTH CARE EDUCATION/TRAINING PROGRAM

## 2023-08-01 PROCEDURE — 2580000003 HC RX 258: Performed by: PHYSICIAN ASSISTANT

## 2023-08-01 PROCEDURE — 6370000000 HC RX 637 (ALT 250 FOR IP)

## 2023-08-01 RX ORDER — ACETAMINOPHEN 325 MG/1
325 TABLET ORAL ONCE
Status: COMPLETED | OUTPATIENT
Start: 2023-08-01 | End: 2023-08-01

## 2023-08-01 RX ORDER — MORPHINE SULFATE 2 MG/ML
2 INJECTION, SOLUTION INTRAMUSCULAR; INTRAVENOUS ONCE
Status: COMPLETED | OUTPATIENT
Start: 2023-08-01 | End: 2023-08-01

## 2023-08-01 RX ADMIN — MORPHINE SULFATE 1 MG: 2 INJECTION, SOLUTION INTRAMUSCULAR; INTRAVENOUS at 03:08

## 2023-08-01 RX ADMIN — MORPHINE SULFATE 2 MG: 2 INJECTION, SOLUTION INTRAMUSCULAR; INTRAVENOUS at 05:25

## 2023-08-01 RX ADMIN — HYDROCODONE BITARTRATE AND ACETAMINOPHEN 2 TABLET: 5; 325 TABLET ORAL at 00:21

## 2023-08-01 RX ADMIN — ACETAMINOPHEN 325 MG: 325 TABLET ORAL at 00:53

## 2023-08-01 RX ADMIN — CEFEPIME 2000 MG: 2 INJECTION, POWDER, FOR SOLUTION INTRAVENOUS at 02:54

## 2023-08-01 RX ADMIN — SODIUM CHLORIDE, POTASSIUM CHLORIDE, SODIUM LACTATE AND CALCIUM CHLORIDE: 600; 310; 30; 20 INJECTION, SOLUTION INTRAVENOUS at 02:27

## 2023-08-01 ASSESSMENT — PAIN - FUNCTIONAL ASSESSMENT
PAIN_FUNCTIONAL_ASSESSMENT: PREVENTS OR INTERFERES SOME ACTIVE ACTIVITIES AND ADLS
PAIN_FUNCTIONAL_ASSESSMENT: ACTIVITIES ARE NOT PREVENTED

## 2023-08-01 ASSESSMENT — PAIN DESCRIPTION - PAIN TYPE
TYPE: ACUTE PAIN

## 2023-08-01 ASSESSMENT — PAIN DESCRIPTION - ORIENTATION
ORIENTATION: ANTERIOR;LEFT;LOWER
ORIENTATION: LEFT
ORIENTATION: LEFT

## 2023-08-01 ASSESSMENT — PAIN DESCRIPTION - DESCRIPTORS
DESCRIPTORS: DISCOMFORT;PENETRATING
DESCRIPTORS: CRUSHING;SHARP
DESCRIPTORS: CRUSHING;SHARP

## 2023-08-01 ASSESSMENT — PAIN SCALES - GENERAL
PAINLEVEL_OUTOF10: 6
PAINLEVEL_OUTOF10: 8
PAINLEVEL_OUTOF10: 7

## 2023-08-01 ASSESSMENT — PAIN DESCRIPTION - LOCATION
LOCATION: ABDOMEN
LOCATION: ABDOMEN;SHOULDER
LOCATION: ABDOMEN
LOCATION: ABDOMEN

## 2023-08-01 ASSESSMENT — PAIN DESCRIPTION - ONSET: ONSET: ON-GOING

## 2023-08-01 ASSESSMENT — PAIN DESCRIPTION - FREQUENCY
FREQUENCY: CONTINUOUS
FREQUENCY: CONTINUOUS

## 2023-08-01 NOTE — PLAN OF CARE
Problem: Safety - Adult  Goal: Free from fall injury  8/1/2023 0713 by Joseline Hays RN  Outcome: Adequate for Discharge  8/1/2023 0247 by Joseline Hays RN  Outcome: Progressing     Problem: ABCDS Injury Assessment  Goal: Absence of physical injury  8/1/2023 0713 by Joseline Hays RN  Outcome: Adequate for Discharge  8/1/2023 0247 by Joseline Hays RN  Outcome: Progressing     Problem: Chronic Conditions and Co-morbidities  Goal: Patient's chronic conditions and co-morbidity symptoms are monitored and maintained or improved  8/1/2023 0713 by Joseline Hays RN  Outcome: Adequate for Discharge  8/1/2023 0247 by Joseline Hays RN  Outcome: Progressing     Problem: Cardiovascular - Adult  Goal: Maintains optimal cardiac output and hemodynamic stability  8/1/2023 0713 by Joseline Hays RN  Outcome: Adequate for Discharge  8/1/2023 0247 by Joseline Hays RN  Outcome: Progressing     Problem: Musculoskeletal - Adult  Goal: Return mobility to safest level of function  8/1/2023 0713 by Joseline Hays RN  Outcome: Adequate for Discharge  8/1/2023 0247 by Joseline Hays RN  Outcome: Progressing  Goal: Return ADL status to a safe level of function  8/1/2023 0713 by Joseline Hays RN  Outcome: Adequate for Discharge  8/1/2023 0247 by Joseline Hays RN  Outcome: Progressing     Problem: Gastrointestinal - Adult  Goal: Maintains adequate nutritional intake  8/1/2023 0713 by Joseline Hays RN  Outcome: Adequate for Discharge  8/1/2023 0247 by Joseline Hays RN  Outcome: Progressing     Problem: Infection - Adult  Goal: Absence of infection at discharge  8/1/2023 0713 by Joseline Hays RN  Outcome: Adequate for Discharge  8/1/2023 0247 by Joseline Hays RN  Outcome: Progressing     Problem: Metabolic/Fluid and Electrolytes - Adult  Goal: Electrolytes

## 2023-08-01 NOTE — CARE COORDINATION
07/29/23 1030   Service Assessment   Patient Orientation Alert and Oriented;Person;Place;Situation;Self   Cognition Alert   History Provided By Patient   Primary Caregiver Self   Accompanied By/Relationship None   Support Systems Children; Other (Comment)  (Ex  wife)   Juana Erlin Ramierz is: Legal Next of Kin   PCP Verified by CM Yes   Last Visit to PCP Within last 3 months   Prior Functional Level Independent in ADLs/IADLs   Current Functional Level Independent in ADLs/IADLs   Can patient return to prior living arrangement Yes   Ability to make needs known: Good   Family able to assist with home care needs: Yes   Would you like for me to discuss the discharge plan with any other family members/significant others, and if so, who? Yes  (Son and exwife)   Financial Resources Medicare; Food [place]   Community Resources None   Social/Functional History   Lives With Alone   Type of Home House   Home Layout Two level   Home Access Level entry   Bathroom Shower/Tub Tub/Shower unit   Bathroom Toilet Handicap height   Bathroom Equipment Grab bars in shower   Bathroom Accessibility Walker accessible   [facility] Responsibilities Yes   Meal Prep Responsibility Primary   Laundry Responsibility Primary   Cleaning Responsibility Primary   Bill Paying/Finance Responsibility Primary   [address] Management Primary   Ambulation Assistance Independent   Transfer Assistance Independent   Active  Yes   Mode of Transportation Truck   Occupation Retired   Type of Occupation Construction   Discharge [place] Prior To Admission [address] Needed N/A   DME Ordered?  Oxygen therapy (comment)   Potential Assistance Purchasing Medications No   Type of Home Care Services
8/1/23, 7:07 AM EDT    Patient goals/plan/ treatment preferences discussed by  and . Patient goals/plan/ treatment preferences reviewed with patient/ family. Patient/ family verbalize understanding of discharge plan and are in agreement with goal/plan/treatment preferences. Understanding was demonstrated using the teach back method. AVS provided by RN at time of discharge, which includes all necessary medical information pertaining to the patients current course of illness, treatment, post-discharge goals of care, and treatment preferences. Services At/After Discharge: Acute Hospital       IMM Letter  IMM Letter given to Patient/Family/Significant other/Guardian/POA/by[de-identified] Staff  IMM Letter date given[de-identified] 07/28/23  IMM Letter time given[de-identified] 2548     Patient discharged to [facility].
planned for transfer to [facility]. Transportation/Food Security/Housekeeping Addressed: No issues identified.      Dg Golden RN  Case Management Department

## 2023-08-01 NOTE — PROGRESS NOTES
Patient report called to RN Kari Alfonso for [facility] room 6410. Patient will be having brother attend his care later in the day. LR will remain in place at 100ml/hr. Most recent labs provided to RN along with patient administered 2mg IVP morphine prior to transport for the left quadrant, left scapular pain.  Patient baseline 02 need will be with 5 liters nasal canula in place, A&Ox4

## 2023-08-01 NOTE — PLAN OF CARE
Problem: Safety - Adult  Goal: Free from fall injury  Outcome: Progressing     Problem: ABCDS Injury Assessment  Goal: Absence of physical injury  Outcome: Progressing     Problem: Chronic Conditions and Co-morbidities  Goal: Patient's chronic conditions and co-morbidity symptoms are monitored and maintained or improved  Outcome: Progressing     Problem: Respiratory - Adult  Goal: Clear lung sounds  7/31/2023 1644 by Mallika Atkinson RCP  Outcome: Progressing     Problem: Cardiovascular - Adult  Goal: Maintains optimal cardiac output and hemodynamic stability  Outcome: Progressing     Problem: Musculoskeletal - Adult  Goal: Return mobility to safest level of function  Outcome: Progressing  Goal: Return ADL status to a safe level of function  Outcome: Progressing     Problem: Gastrointestinal - Adult  Goal: Maintains adequate nutritional intake  Outcome: Progressing     Problem: Infection - Adult  Goal: Absence of infection at discharge  Outcome: Progressing     Problem: Metabolic/Fluid and Electrolytes - Adult  Goal: Electrolytes maintained within normal limits  Outcome: Progressing     Problem: Hematologic - Adult  Goal: Maintains hematologic stability  Outcome: Progressing     Problem: Pain  Goal: Verbalizes/displays adequate comfort level or baseline comfort level  Outcome: Progressing     Problem: Discharge Planning  Goal: Discharge to home or other facility with appropriate resources  Outcome: Progressing     Problem: Skin/Tissue Integrity  Goal: Absence of new skin breakdown  Outcome: Progressing     Problem: Nutrition Deficit:  Goal: Optimize nutritional status  Outcome: Progressing   Patient is on continued temp management, endorsement by patient and care provider is for [facility] transfer at 0600 to 0630. Patient is alert, oriented although WBC count has raised, temperature has been difficult to reduce, oxygen demand in past 24 hours has went up to 5 liters N/C.  Pain to LLQ is constant and currently with

## 2023-08-02 LAB
BACTERIA BLD AEROBE CULT: NORMAL
BACTERIA BLD AEROBE CULT: NORMAL
BACTERIA UR CULT: NORMAL

## 2023-08-04 LAB
EKG ATRIAL RATE: 102 BPM
EKG P AXIS: 7 DEGREES
EKG P-R INTERVAL: 154 MS
EKG Q-T INTERVAL: 356 MS
EKG QRS DURATION: 84 MS
EKG QTC CALCULATION (BAZETT): 463 MS
EKG R AXIS: 32 DEGREES
EKG T AXIS: 56 DEGREES
EKG VENTRICULAR RATE: 102 BPM

## 2023-08-05 LAB
BACTERIA BLD AEROBE CULT: NORMAL
BACTERIA BLD AEROBE CULT: NORMAL

## 2023-08-09 LAB — STREP PNEUMO AG, UR: NEGATIVE

## 2023-08-14 ENCOUNTER — HOSPITAL ENCOUNTER (OUTPATIENT)
Age: 68
Discharge: HOME | End: 2023-08-14
Payer: COMMERCIAL

## 2023-08-14 LAB
ALBUMIN SERPL BCG-MCNC: 3.5 G/DL (ref 3.5–5.1)
ALP SERPL-CCNC: 67 U/L (ref 38–126)
ALT SERPL W/O P-5'-P-CCNC: 36 U/L (ref 11–66)
AST SERPL-CCNC: 13 U/L (ref 5–40)
BILIRUB CONJ SERPL-MCNC: < 0.2 MG/DL (ref 0–0.3)
BILIRUB SERPL-MCNC: 0.6 MG/DL (ref 0.3–1.2)
BUN BLDP-MCNC: 26 MG/DL (ref 8–26)
CHLORIDE BLD-SCNC: 107 MEQ/L (ref 98–109)
CREAT BLD-MCNC: 1 MG/DL (ref 0.5–1.2)
GFR SERPL CREATININE-BSD FRML MDRD: > 60 ML/MIN/1.73M2
GLUCOSE BLD-MCNC: 97 MG/DL (ref 70–108)
IONIZED CALCIUM, WHOLE BLOOD: 1.13 MMOL/L (ref 1.12–1.32)
POTASSIUM BLD-SCNC: 5.1 MEQ/L (ref 3.5–4.9)
PROT SERPL-MCNC: 6.2 G/DL (ref 6.1–8)
SODIUM BLD-SCNC: 140 MEQ/L (ref 138–146)
TOTAL CO2, WHOLE BLOOD: 25 MEQ/L (ref 23–33)

## 2023-08-14 PROCEDURE — 85025 COMPLETE CBC W/AUTO DIFF WBC: CPT

## 2023-08-14 PROCEDURE — 80076 HEPATIC FUNCTION PANEL: CPT

## 2023-08-14 PROCEDURE — 80047 BASIC METABLC PNL IONIZED CA: CPT

## 2023-08-15 LAB
AUTO DIFF PNL BLD: ABNORMAL
BASOPHILS ABSOLUTE: 0 THOU/MM3 (ref 0–0.1)
BASOPHILS NFR BLD AUTO: 0 %
DEPRECATED RDW RBC AUTO: 51.3 FL (ref 35–45)
EOSINOPHIL NFR BLD AUTO: 0 %
EOSINOPHILS ABSOLUTE: 0 THOU/MM3 (ref 0–0.4)
ERYTHROCYTE [DISTWIDTH] IN BLOOD BY AUTOMATED COUNT: 14.8 % (ref 11.5–14.5)
HCT VFR BLD AUTO: 23.5 % (ref 42–52)
HGB BLD-MCNC: 7.4 GM/DL (ref 14–18)
IMM GRANULOCYTES # BLD AUTO: 0.01 THOU/MM3 (ref 0–0.07)
IMM GRANULOCYTES NFR BLD AUTO: 1 %
LYMPHOCYTES ABSOLUTE: 0.5 THOU/MM3 (ref 1–4.8)
LYMPHOCYTES NFR BLD AUTO: 50 %
MCH RBC QN AUTO: 29.6 PG (ref 26–33)
MCHC RBC AUTO-ENTMCNC: 31.5 GM/DL (ref 32.2–35.5)
MCV RBC AUTO: 94 FL (ref 80–94)
MONOCYTES ABSOLUTE: 0 THOU/MM3 (ref 0.4–1.3)
MONOCYTES NFR BLD AUTO: 2 %
NEUTROPHILS NFR BLD AUTO: 47 %
NRBC BLD AUTO-RTO: 0 /100 WBC
PATHOLOGIST REVIEW: ABNORMAL
PLATELET # BLD AUTO: 4 THOU/MM3 (ref 130–400)
PLATELET BLD QL SMEAR: ABNORMAL
PMV BLD AUTO: 12.6 FL (ref 9.4–12.4)
RBC # BLD AUTO: 2.5 MILL/MM3 (ref 4.7–6.1)
SCAN OF BLOOD SMEAR: NORMAL
SEGMENTED NEUTROPHILS ABSOLUTE COUNT: 0.5 THOU/MM3 (ref 1.8–7.7)
WBC # BLD AUTO: 1 THOU/MM3 (ref 4.8–10.8)

## 2023-08-15 NOTE — PROGRESS NOTES
[facility]  [address]  [address] [address]  Dept: [phone]  Loc: [phone]   Hematology/Oncology Progress Note (Clinic)        [facility]  1955  No ref. provider found   Pilar ChopraYESY CNP     Diagnosis/CC:   Chief Complaint   Patient presents with    Follow-up     MDS (myelodysplastic syndrome), high grade ([address])     HPI:  Laura Manzanares is a 79 y.o. male with leukocytosis, anemia and thrombocytopenia. The patient has a history of COPD, HTN and chronic arthritis. The patient was initially seen by Dr. Raysa Lynn of [address] [place] [address] for evaluation of an abnormal CBC with leukocytosis, anemia and thrombocytopenia.       05/17/2023 the patient underwent a peripheral flow cytometry which revealed the presence of 5% blasts, 4% promonocytes, absolute monocytosis, dysgranulopoiesis, thrombocytopenia and circulating dysplastic nucleated red blood cells. Further evaluation with a bone marrow biopsy was highly recommended. 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 Blasts were 3% in the peripheral blood and 6% in the bone marrow. The patient was seen by hematology oncology at [facility] and started on treatment with acyclovir 400 mg by mouth twice daily, allopurinol 300 mg by mouth daily and hydroxyurea 500 mg by mouth twice daily. 06/13/2023 the patient was seen for initial evaluation with Dr. Joel Luevano. 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 FISH for AML, CBFB/MYH11, inversion 16 was not detected. 06/26/2023 the patient initiated treatment with azacitidine 75 Mg/m2 daily x7 days every 28 days. The patient would also require supportive care with PRBCs and platelet transfusions.   He would also received treatment with Procrit 40,000 units by SQ

## 2023-08-16 ENCOUNTER — HOSPITAL ENCOUNTER (OUTPATIENT)
Dept: INFUSION THERAPY | Age: 68
Discharge: HOME | End: 2023-08-16
Payer: COMMERCIAL

## 2023-08-16 ENCOUNTER — TELEPHONE (OUTPATIENT)
Dept: ONCOLOGY | Age: 68
End: 2023-08-16

## 2023-08-16 ENCOUNTER — OFFICE VISIT (OUTPATIENT)
Dept: ONCOLOGY | Age: 68
End: 2023-08-16
Payer: COMMERCIAL

## 2023-08-16 VITALS
RESPIRATION RATE: 16 BRPM | DIASTOLIC BLOOD PRESSURE: 65 MMHG | TEMPERATURE: 97.7 F | HEART RATE: 77 BPM | SYSTOLIC BLOOD PRESSURE: 129 MMHG | OXYGEN SATURATION: 94 %

## 2023-08-16 VITALS
HEART RATE: 77 BPM | TEMPERATURE: 97.7 F | HEIGHT: 71 IN | DIASTOLIC BLOOD PRESSURE: 65 MMHG | RESPIRATION RATE: 16 BRPM | WEIGHT: 272.8 LBS | BODY MASS INDEX: 38.19 KG/M2 | OXYGEN SATURATION: 94 % | SYSTOLIC BLOOD PRESSURE: 129 MMHG

## 2023-08-16 DIAGNOSIS — D46.9 MDS (MYELODYSPLASTIC SYNDROME) (HCC): ICD-10-CM

## 2023-08-16 LAB
ANISOCYTOSIS BLD QL SMEAR: PRESENT
BASOPHILS ABSOLUTE: 0 THOU/MM3 (ref 0–0.1)
BASOPHILS NFR BLD AUTO: 0 %
BUN BLDP-MCNC: 24 MG/DL (ref 8–26)
CHLORIDE BLD-SCNC: 108 MEQ/L (ref 98–109)
CREAT BLD-MCNC: 0.9 MG/DL (ref 0.5–1.2)
DEPRECATED RDW RBC AUTO: 53.3 FL (ref 35–45)
EOSINOPHIL NFR BLD AUTO: 0 %
EOSINOPHILS ABSOLUTE: 0 THOU/MM3 (ref 0–0.4)
ERYTHROCYTE [DISTWIDTH] IN BLOOD BY AUTOMATED COUNT: 15.4 % (ref 11.5–14.5)
GFR SERPL CREATININE-BSD FRML MDRD: > 60 ML/MIN/1.73M2
GLUCOSE BLD-MCNC: 124 MG/DL (ref 70–108)
HCT VFR BLD AUTO: 23.4 % (ref 42–52)
HGB BLD-MCNC: 7.5 GM/DL (ref 14–18)
IMM GRANULOCYTES # BLD AUTO: 0.01 THOU/MM3 (ref 0–0.07)
IMM GRANULOCYTES NFR BLD AUTO: 0.9 %
IONIZED CALCIUM, WHOLE BLOOD: 1.14 MMOL/L (ref 1.12–1.32)
LYMPHOCYTES ABSOLUTE: 0.5 THOU/MM3 (ref 1–4.8)
LYMPHOCYTES NFR BLD AUTO: 46.8 %
MCH RBC QN AUTO: 30.1 PG (ref 26–33)
MCHC RBC AUTO-ENTMCNC: 32.1 GM/DL (ref 32.2–35.5)
MCV RBC AUTO: 94 FL (ref 80–94)
MONOCYTES ABSOLUTE: 0.1 THOU/MM3 (ref 0.4–1.3)
MONOCYTES NFR BLD AUTO: 4.6 %
NEUTROPHILS NFR BLD AUTO: 47.7 %
NRBC BLD AUTO-RTO: 0 /100 WBC
PLATELET # BLD AUTO: 8 THOU/MM3 (ref 130–400)
PLATELET BLD QL SMEAR: ABNORMAL
PMV BLD AUTO: 12.2 FL (ref 9.4–12.4)
POTASSIUM BLD-SCNC: 4.4 MEQ/L (ref 3.5–4.9)
RBC # BLD AUTO: 2.49 MILL/MM3 (ref 4.7–6.1)
SCAN OF BLOOD SMEAR: NORMAL
SEGMENTED NEUTROPHILS ABSOLUTE COUNT: 0.5 THOU/MM3 (ref 1.8–7.7)
SODIUM BLD-SCNC: 142 MEQ/L (ref 138–146)
TOTAL CO2, WHOLE BLOOD: 25 MEQ/L (ref 23–33)
WBC # BLD AUTO: 1.1 THOU/MM3 (ref 4.8–10.8)

## 2023-08-16 PROCEDURE — 85025 COMPLETE CBC W/AUTO DIFF WBC: CPT

## 2023-08-16 PROCEDURE — 99215 OFFICE O/P EST HI 40 MIN: CPT | Performed by: INTERNAL MEDICINE

## 2023-08-16 PROCEDURE — 87340 HEPATITIS B SURFACE AG IA: CPT

## 2023-08-16 PROCEDURE — 99211 OFF/OP EST MAY X REQ PHY/QHP: CPT

## 2023-08-16 PROCEDURE — 86704 HEP B CORE ANTIBODY TOTAL: CPT

## 2023-08-16 PROCEDURE — 80047 BASIC METABLC PNL IONIZED CA: CPT

## 2023-08-16 PROCEDURE — 84550 ASSAY OF BLOOD/URIC ACID: CPT

## 2023-08-16 PROCEDURE — 80076 HEPATIC FUNCTION PANEL: CPT

## 2023-08-16 PROCEDURE — 84100 ASSAY OF PHOSPHORUS: CPT

## 2023-08-16 PROCEDURE — 86706 HEP B SURFACE ANTIBODY: CPT

## 2023-08-16 PROCEDURE — 1123F ACP DISCUSS/DSCN MKR DOCD: CPT | Performed by: INTERNAL MEDICINE

## 2023-08-16 RX ORDER — HEPARIN SODIUM (PORCINE) LOCK FLUSH IV SOLN 100 UNIT/ML 100 UNIT/ML
500 SOLUTION INTRAVENOUS PRN
OUTPATIENT
Start: 2023-08-17

## 2023-08-16 RX ORDER — EPINEPHRINE 1 MG/ML
0.3 INJECTION, SOLUTION, CONCENTRATE INTRAVENOUS PRN
OUTPATIENT
Start: 2023-08-16

## 2023-08-16 RX ORDER — ALBUTEROL SULFATE 90 UG/1
4 AEROSOL, METERED RESPIRATORY (INHALATION) PRN
OUTPATIENT
Start: 2023-08-17

## 2023-08-16 RX ORDER — SODIUM CHLORIDE 9 MG/ML
5-250 INJECTION, SOLUTION INTRAVENOUS PRN
OUTPATIENT
Start: 2023-08-16

## 2023-08-16 RX ORDER — DIPHENHYDRAMINE HYDROCHLORIDE 50 MG/ML
50 INJECTION INTRAMUSCULAR; INTRAVENOUS
OUTPATIENT
Start: 2023-08-20

## 2023-08-16 RX ORDER — SODIUM CHLORIDE 9 MG/ML
5-250 INJECTION, SOLUTION INTRAVENOUS PRN
OUTPATIENT
Start: 2023-08-18

## 2023-08-16 RX ORDER — ONDANSETRON 2 MG/ML
8 INJECTION INTRAMUSCULAR; INTRAVENOUS ONCE
OUTPATIENT
Start: 2023-08-20 | End: 2023-08-20

## 2023-08-16 RX ORDER — LORAZEPAM 2 MG/ML
0.5 INJECTION INTRAMUSCULAR
OUTPATIENT
Start: 2023-08-17

## 2023-08-16 RX ORDER — SODIUM CHLORIDE 9 MG/ML
5-250 INJECTION, SOLUTION INTRAVENOUS PRN
OUTPATIENT
Start: 2023-08-20

## 2023-08-16 RX ORDER — MEPERIDINE HYDROCHLORIDE 50 MG/ML
12.5 INJECTION INTRAMUSCULAR; INTRAVENOUS; SUBCUTANEOUS PRN
OUTPATIENT
Start: 2023-08-16

## 2023-08-16 RX ORDER — ONDANSETRON 2 MG/ML
8 INJECTION INTRAMUSCULAR; INTRAVENOUS ONCE
OUTPATIENT
Start: 2023-08-19 | End: 2023-08-19

## 2023-08-16 RX ORDER — LORAZEPAM 2 MG/ML
0.5 INJECTION INTRAMUSCULAR
OUTPATIENT
Start: 2023-08-16

## 2023-08-16 RX ORDER — SODIUM CHLORIDE 9 MG/ML
5-250 INJECTION, SOLUTION INTRAVENOUS PRN
OUTPATIENT
Start: 2023-08-17

## 2023-08-16 RX ORDER — ROSUVASTATIN CALCIUM 10 MG/1
10 TABLET, COATED ORAL DAILY
COMMUNITY
Start: 2023-08-11

## 2023-08-16 RX ORDER — LORAZEPAM 2 MG/ML
0.5 INJECTION INTRAMUSCULAR
OUTPATIENT
Start: 2023-08-18

## 2023-08-16 RX ORDER — DIPHENHYDRAMINE HYDROCHLORIDE 50 MG/ML
50 INJECTION INTRAMUSCULAR; INTRAVENOUS
OUTPATIENT
Start: 2023-08-19

## 2023-08-16 RX ORDER — ONDANSETRON 2 MG/ML
8 INJECTION INTRAMUSCULAR; INTRAVENOUS
OUTPATIENT
Start: 2023-08-17

## 2023-08-16 RX ORDER — ACETAMINOPHEN 325 MG/1
650 TABLET ORAL
OUTPATIENT
Start: 2023-08-19

## 2023-08-16 RX ORDER — DIPHENHYDRAMINE HYDROCHLORIDE 50 MG/ML
50 INJECTION INTRAMUSCULAR; INTRAVENOUS
OUTPATIENT
Start: 2023-08-18

## 2023-08-16 RX ORDER — POTASSIUM CHLORIDE 20 MEQ/1
TABLET, EXTENDED RELEASE ORAL
COMMUNITY
Start: 2023-08-11

## 2023-08-16 RX ORDER — ONDANSETRON 2 MG/ML
8 INJECTION INTRAMUSCULAR; INTRAVENOUS
OUTPATIENT
Start: 2023-08-18

## 2023-08-16 RX ORDER — MEPERIDINE HYDROCHLORIDE 50 MG/ML
12.5 INJECTION INTRAMUSCULAR; INTRAVENOUS; SUBCUTANEOUS PRN
OUTPATIENT
Start: 2023-08-19

## 2023-08-16 RX ORDER — LORAZEPAM 2 MG/ML
0.5 INJECTION INTRAMUSCULAR
OUTPATIENT
Start: 2023-08-20

## 2023-08-16 RX ORDER — FAMOTIDINE 10 MG/ML
20 INJECTION, SOLUTION INTRAVENOUS
OUTPATIENT
Start: 2023-08-20

## 2023-08-16 RX ORDER — PROCHLORPERAZINE EDISYLATE 5 MG/ML
10 INJECTION INTRAMUSCULAR; INTRAVENOUS
OUTPATIENT
Start: 2023-08-16

## 2023-08-16 RX ORDER — FAMOTIDINE 10 MG/ML
20 INJECTION, SOLUTION INTRAVENOUS
OUTPATIENT
Start: 2023-08-17

## 2023-08-16 RX ORDER — ONDANSETRON 2 MG/ML
8 INJECTION INTRAMUSCULAR; INTRAVENOUS ONCE
OUTPATIENT
Start: 2023-08-18 | End: 2023-08-18

## 2023-08-16 RX ORDER — ACETAMINOPHEN 325 MG/1
650 TABLET ORAL
OUTPATIENT
Start: 2023-08-20

## 2023-08-16 RX ORDER — FAMOTIDINE 10 MG/ML
20 INJECTION, SOLUTION INTRAVENOUS
OUTPATIENT
Start: 2023-08-16

## 2023-08-16 RX ORDER — EPINEPHRINE 1 MG/ML
0.3 INJECTION, SOLUTION, CONCENTRATE INTRAVENOUS PRN
OUTPATIENT
Start: 2023-08-18

## 2023-08-16 RX ORDER — SODIUM CHLORIDE 9 MG/ML
5-250 INJECTION, SOLUTION INTRAVENOUS PRN
OUTPATIENT
Start: 2023-08-19

## 2023-08-16 RX ORDER — ACETAMINOPHEN 325 MG/1
650 TABLET ORAL
OUTPATIENT
Start: 2023-08-17

## 2023-08-16 RX ORDER — SODIUM CHLORIDE 0.9 % (FLUSH) 0.9 %
5-40 SYRINGE (ML) INJECTION PRN
OUTPATIENT
Start: 2023-08-20

## 2023-08-16 RX ORDER — SODIUM CHLORIDE 9 MG/ML
INJECTION, SOLUTION INTRAVENOUS CONTINUOUS
OUTPATIENT
Start: 2023-08-18

## 2023-08-16 RX ORDER — SODIUM CHLORIDE 9 MG/ML
INJECTION, SOLUTION INTRAVENOUS CONTINUOUS
OUTPATIENT
Start: 2023-08-16

## 2023-08-16 RX ORDER — PREDNISONE 10 MG/1
30 TABLET ORAL DAILY
COMMUNITY
Start: 2023-08-11

## 2023-08-16 RX ORDER — PROCHLORPERAZINE EDISYLATE 5 MG/ML
10 INJECTION INTRAMUSCULAR; INTRAVENOUS
OUTPATIENT
Start: 2023-08-19

## 2023-08-16 RX ORDER — LEVOFLOXACIN 500 MG/1
500 TABLET, FILM COATED ORAL DAILY
Qty: 30 TABLET | Refills: 2 | COMMUNITY
Start: 2023-08-11 | End: 2023-11-09

## 2023-08-16 RX ORDER — FAMOTIDINE 10 MG/ML
20 INJECTION, SOLUTION INTRAVENOUS
OUTPATIENT
Start: 2023-08-19

## 2023-08-16 RX ORDER — HEPARIN SODIUM (PORCINE) LOCK FLUSH IV SOLN 100 UNIT/ML 100 UNIT/ML
500 SOLUTION INTRAVENOUS PRN
OUTPATIENT
Start: 2023-08-20

## 2023-08-16 RX ORDER — PROCHLORPERAZINE EDISYLATE 5 MG/ML
10 INJECTION INTRAMUSCULAR; INTRAVENOUS
OUTPATIENT
Start: 2023-08-20

## 2023-08-16 RX ORDER — SODIUM CHLORIDE 0.9 % (FLUSH) 0.9 %
5-40 SYRINGE (ML) INJECTION PRN
OUTPATIENT
Start: 2023-08-16

## 2023-08-16 RX ORDER — SENNOSIDES A AND B 8.6 MG/1
8.6 TABLET, FILM COATED ORAL
COMMUNITY
Start: 2023-08-10

## 2023-08-16 RX ORDER — DIPHENHYDRAMINE HYDROCHLORIDE 50 MG/ML
50 INJECTION INTRAMUSCULAR; INTRAVENOUS
OUTPATIENT
Start: 2023-08-17

## 2023-08-16 RX ORDER — EPINEPHRINE 1 MG/ML
0.3 INJECTION, SOLUTION, CONCENTRATE INTRAVENOUS PRN
OUTPATIENT
Start: 2023-08-17

## 2023-08-16 RX ORDER — ACETAMINOPHEN 325 MG/1
650 TABLET ORAL
OUTPATIENT
Start: 2023-08-16

## 2023-08-16 RX ORDER — ONDANSETRON 2 MG/ML
8 INJECTION INTRAMUSCULAR; INTRAVENOUS ONCE
OUTPATIENT
Start: 2023-08-16 | End: 2023-08-16

## 2023-08-16 RX ORDER — MEPERIDINE HYDROCHLORIDE 50 MG/ML
12.5 INJECTION INTRAMUSCULAR; INTRAVENOUS; SUBCUTANEOUS PRN
OUTPATIENT
Start: 2023-08-20

## 2023-08-16 RX ORDER — SODIUM CHLORIDE 9 MG/ML
INJECTION, SOLUTION INTRAVENOUS CONTINUOUS
OUTPATIENT
Start: 2023-08-20

## 2023-08-16 RX ORDER — LANOLIN ALCOHOL/MO/W.PET/CERES
3 CREAM (GRAM) TOPICAL
COMMUNITY
Start: 2023-08-10

## 2023-08-16 RX ORDER — SODIUM CHLORIDE 0.9 % (FLUSH) 0.9 %
5-40 SYRINGE (ML) INJECTION PRN
OUTPATIENT
Start: 2023-08-18

## 2023-08-16 RX ORDER — MAGNESIUM HYDROXIDE/ALUMINUM HYDROXICE/SIMETHICONE 120; 1200; 1200 MG/30ML; MG/30ML; MG/30ML
30 SUSPENSION ORAL EVERY 6 HOURS PRN
COMMUNITY
Start: 2023-08-10

## 2023-08-16 RX ORDER — ACETAMINOPHEN 325 MG/1
650 TABLET ORAL
OUTPATIENT
Start: 2023-08-18

## 2023-08-16 RX ORDER — FAMOTIDINE 10 MG/ML
20 INJECTION, SOLUTION INTRAVENOUS
OUTPATIENT
Start: 2023-08-18

## 2023-08-16 RX ORDER — ONDANSETRON 2 MG/ML
8 INJECTION INTRAMUSCULAR; INTRAVENOUS
OUTPATIENT
Start: 2023-08-20

## 2023-08-16 RX ORDER — ALBUTEROL SULFATE 90 UG/1
4 AEROSOL, METERED RESPIRATORY (INHALATION) PRN
OUTPATIENT
Start: 2023-08-16

## 2023-08-16 RX ORDER — DIPHENHYDRAMINE HYDROCHLORIDE 50 MG/ML
50 INJECTION INTRAMUSCULAR; INTRAVENOUS
OUTPATIENT
Start: 2023-08-16

## 2023-08-16 RX ORDER — SODIUM CHLORIDE 0.9 % (FLUSH) 0.9 %
5-40 SYRINGE (ML) INJECTION PRN
OUTPATIENT
Start: 2023-08-19

## 2023-08-16 RX ORDER — MAGNESIUM OXIDE 400 MG/1
400 TABLET ORAL DAILY
COMMUNITY
Start: 2023-08-11

## 2023-08-16 RX ORDER — LORAZEPAM 2 MG/ML
0.5 INJECTION INTRAMUSCULAR
OUTPATIENT
Start: 2023-08-19

## 2023-08-16 RX ORDER — POLYETHYLENE GLYCOL 3350
17 POWDER (GRAM) MISCELLANEOUS EVERY 12 HOURS
COMMUNITY
Start: 2023-08-10

## 2023-08-16 RX ORDER — PSEUDOEPHEDRINE HCL 30 MG
100000 TABLET ORAL 2 TIMES DAILY PRN
COMMUNITY
Start: 2023-08-10

## 2023-08-16 RX ORDER — POSACONAZOLE 100 MG/1
300 TABLET, DELAYED RELEASE ORAL EVERY 24 HOURS
COMMUNITY
Start: 2023-08-09

## 2023-08-16 RX ORDER — ONDANSETRON 2 MG/ML
8 INJECTION INTRAMUSCULAR; INTRAVENOUS
OUTPATIENT
Start: 2023-08-19

## 2023-08-16 RX ORDER — EPINEPHRINE 1 MG/ML
0.3 INJECTION, SOLUTION, CONCENTRATE INTRAVENOUS PRN
OUTPATIENT
Start: 2023-08-19

## 2023-08-16 RX ORDER — SODIUM CHLORIDE 9 MG/ML
INJECTION, SOLUTION INTRAVENOUS CONTINUOUS
OUTPATIENT
Start: 2023-08-19

## 2023-08-16 RX ORDER — ACYCLOVIR 800 MG/1
TABLET ORAL
COMMUNITY
Start: 2023-08-10

## 2023-08-16 RX ORDER — HEPARIN SODIUM (PORCINE) LOCK FLUSH IV SOLN 100 UNIT/ML 100 UNIT/ML
500 SOLUTION INTRAVENOUS PRN
OUTPATIENT
Start: 2023-08-18

## 2023-08-16 RX ORDER — SODIUM CHLORIDE 9 MG/ML
INJECTION, SOLUTION INTRAVENOUS CONTINUOUS
OUTPATIENT
Start: 2023-08-17

## 2023-08-16 RX ORDER — ALBUTEROL SULFATE 90 UG/1
4 AEROSOL, METERED RESPIRATORY (INHALATION) PRN
OUTPATIENT
Start: 2023-08-20

## 2023-08-16 RX ORDER — ONDANSETRON 2 MG/ML
8 INJECTION INTRAMUSCULAR; INTRAVENOUS
OUTPATIENT
Start: 2023-08-16

## 2023-08-16 RX ORDER — PROCHLORPERAZINE EDISYLATE 5 MG/ML
10 INJECTION INTRAMUSCULAR; INTRAVENOUS
OUTPATIENT
Start: 2023-08-18

## 2023-08-16 RX ORDER — ALBUTEROL SULFATE 90 UG/1
4 AEROSOL, METERED RESPIRATORY (INHALATION) PRN
OUTPATIENT
Start: 2023-08-18

## 2023-08-16 RX ORDER — HEPARIN SODIUM (PORCINE) LOCK FLUSH IV SOLN 100 UNIT/ML 100 UNIT/ML
500 SOLUTION INTRAVENOUS PRN
OUTPATIENT
Start: 2023-08-19

## 2023-08-16 RX ORDER — PROCHLORPERAZINE EDISYLATE 5 MG/ML
10 INJECTION INTRAMUSCULAR; INTRAVENOUS
OUTPATIENT
Start: 2023-08-17

## 2023-08-16 RX ORDER — EPINEPHRINE 1 MG/ML
0.3 INJECTION, SOLUTION, CONCENTRATE INTRAVENOUS PRN
OUTPATIENT
Start: 2023-08-20

## 2023-08-16 RX ORDER — MEPERIDINE HYDROCHLORIDE 50 MG/ML
12.5 INJECTION INTRAMUSCULAR; INTRAVENOUS; SUBCUTANEOUS PRN
OUTPATIENT
Start: 2023-08-18

## 2023-08-16 RX ORDER — HEPARIN SODIUM (PORCINE) LOCK FLUSH IV SOLN 100 UNIT/ML 100 UNIT/ML
500 SOLUTION INTRAVENOUS PRN
OUTPATIENT
Start: 2023-08-16

## 2023-08-16 RX ORDER — MEPERIDINE HYDROCHLORIDE 50 MG/ML
12.5 INJECTION INTRAMUSCULAR; INTRAVENOUS; SUBCUTANEOUS PRN
OUTPATIENT
Start: 2023-08-17

## 2023-08-16 RX ORDER — ONDANSETRON 2 MG/ML
8 INJECTION INTRAMUSCULAR; INTRAVENOUS ONCE
OUTPATIENT
Start: 2023-08-17 | End: 2023-08-17

## 2023-08-16 RX ORDER — SODIUM CHLORIDE 0.9 % (FLUSH) 0.9 %
5-40 SYRINGE (ML) INJECTION PRN
OUTPATIENT
Start: 2023-08-17

## 2023-08-16 RX ORDER — ALBUTEROL SULFATE 90 UG/1
4 AEROSOL, METERED RESPIRATORY (INHALATION) PRN
OUTPATIENT
Start: 2023-08-19

## 2023-08-16 NOTE — PATIENT INSTRUCTIONS
Please get the transfusions again. Bone marrow biopsy in 2 weeks. Continue with weekly labs for now, next on Monday. We will resume chemotherapy here in about 3 weeks.    Need bone marrow transplant eval in in-[facility] hospital.

## 2023-08-17 LAB
ALBUMIN SERPL BCG-MCNC: 3.3 G/DL (ref 3.5–5.1)
ALP SERPL-CCNC: 67 U/L (ref 38–126)
ALT SERPL W/O P-5'-P-CCNC: 28 U/L (ref 11–66)
AST SERPL-CCNC: 10 U/L (ref 5–40)
BILIRUB CONJ SERPL-MCNC: < 0.2 MG/DL (ref 0–0.3)
BILIRUB SERPL-MCNC: 0.7 MG/DL (ref 0.3–1.2)
HBV CORE IGG+IGM SERPL QL IA: NONREACTIVE
HBV SURFACE AB SER QL IA: NEGATIVE
HBV SURFACE AG SERPL QL IA: NEGATIVE
PHOSPHATE SERPL-MCNC: 2.5 MG/DL (ref 2.4–4.7)
PROT SERPL-MCNC: 6.1 G/DL (ref 6.1–8)
URATE SERPL-MCNC: 1.7 MG/DL (ref 3.7–7)

## 2023-08-21 ENCOUNTER — HOSPITAL ENCOUNTER (OUTPATIENT)
Dept: INFUSION THERAPY | Age: 68
Discharge: HOME | End: 2023-08-21
Payer: COMMERCIAL

## 2023-08-21 VITALS
HEART RATE: 69 BPM | RESPIRATION RATE: 18 BRPM | WEIGHT: 257 LBS | BODY MASS INDEX: 35.84 KG/M2 | SYSTOLIC BLOOD PRESSURE: 113 MMHG | DIASTOLIC BLOOD PRESSURE: 61 MMHG | TEMPERATURE: 98.4 F | OXYGEN SATURATION: 96 %

## 2023-08-21 DIAGNOSIS — D63.8 ANEMIA IN OTHER CHRONIC DISEASES CLASSIFIED ELSEWHERE: ICD-10-CM

## 2023-08-21 DIAGNOSIS — D46.9 MDS (MYELODYSPLASTIC SYNDROME) (HCC): Primary | ICD-10-CM

## 2023-08-21 LAB
ALBUMIN SERPL BCG-MCNC: 3.5 G/DL (ref 3.5–5.1)
ALP SERPL-CCNC: 77 U/L (ref 38–126)
ALT SERPL W/O P-5'-P-CCNC: 23 U/L (ref 11–66)
ANISOCYTOSIS BLD QL SMEAR: PRESENT
AST SERPL-CCNC: 6 U/L (ref 5–40)
AUTO DIFF PNL BLD: ABNORMAL
BASOPHILS ABSOLUTE: 0 THOU/MM3 (ref 0–0.1)
BASOPHILS NFR BLD AUTO: 0 %
BILIRUB CONJ SERPL-MCNC: 0.3 MG/DL (ref 0–0.3)
BILIRUB SERPL-MCNC: 0.8 MG/DL (ref 0.3–1.2)
BLASTS: 2 % (ref 0–1)
BUN BLDP-MCNC: 25 MG/DL (ref 8–26)
CHLORIDE BLD-SCNC: 106 MEQ/L (ref 98–109)
CREAT BLD-MCNC: 1 MG/DL (ref 0.5–1.2)
DEPRECATED RDW RBC AUTO: 47.7 FL (ref 35–45)
EOSINOPHIL NFR BLD AUTO: 0 %
EOSINOPHILS ABSOLUTE: 0 THOU/MM3 (ref 0–0.4)
ERYTHROCYTE [DISTWIDTH] IN BLOOD BY AUTOMATED COUNT: 14.4 % (ref 11.5–14.5)
GFR SERPL CREATININE-BSD FRML MDRD: > 60 ML/MIN/1.73M2
GLUCOSE BLD-MCNC: 193 MG/DL (ref 70–108)
HCT VFR BLD AUTO: 23.5 % (ref 42–52)
HGB BLD-MCNC: 7.7 GM/DL (ref 14–18)
IONIZED CALCIUM, WHOLE BLOOD: 1.21 MMOL/L (ref 1.12–1.32)
LDH SERPL L TO P-CCNC: 269 U/L (ref 100–190)
LYMPHOCYTES ABSOLUTE: 0.6 THOU/MM3 (ref 1–4.8)
LYMPHOCYTES NFR BLD AUTO: 64 %
MANUAL DIFF BLD: NORMAL
MCH RBC QN AUTO: 29.8 PG (ref 26–33)
MCHC RBC AUTO-ENTMCNC: 32.8 GM/DL (ref 32.2–35.5)
MCV RBC AUTO: 91.1 FL (ref 80–94)
METAMYELOCYTES: 1 %
MONOCYTES ABSOLUTE: 0 THOU/MM3 (ref 0.4–1.3)
MONOCYTES NFR BLD AUTO: 4 %
MYELOCYTES: 1 %
NEUTROPHILS NFR BLD AUTO: 28 %
NRBC BLD AUTO-RTO: 0 /100 WBC
PATHOLOGIST REVIEW: ABNORMAL
PLATELET # BLD AUTO: 7 THOU/MM3 (ref 130–400)
PMV BLD AUTO: 12.9 FL (ref 9.4–12.4)
POTASSIUM BLD-SCNC: 4.4 MEQ/L (ref 3.5–4.9)
PROT SERPL-MCNC: 6.5 G/DL (ref 6.1–8)
RBC # BLD AUTO: 2.58 MILL/MM3 (ref 4.7–6.1)
ROULEAUX BLD QL SMEAR: SLIGHT
SEGMENTED NEUTROPHILS ABSOLUTE COUNT: 0.3 THOU/MM3 (ref 1.8–7.7)
SODIUM BLD-SCNC: 138 MEQ/L (ref 138–146)
TOTAL CO2, WHOLE BLOOD: 24 MEQ/L (ref 23–33)
URATE SERPL-MCNC: 2.5 MG/DL (ref 3.7–7)
VARIANT LYMPHS BLD QL SMEAR: ABNORMAL %
WBC # BLD AUTO: 0.9 THOU/MM3 (ref 4.8–10.8)

## 2023-08-21 PROCEDURE — 96372 THER/PROPH/DIAG INJ SC/IM: CPT

## 2023-08-21 PROCEDURE — 83615 LACTATE (LD) (LDH) ENZYME: CPT

## 2023-08-21 PROCEDURE — 80076 HEPATIC FUNCTION PANEL: CPT

## 2023-08-21 PROCEDURE — 80047 BASIC METABLC PNL IONIZED CA: CPT

## 2023-08-21 PROCEDURE — 85025 COMPLETE CBC W/AUTO DIFF WBC: CPT

## 2023-08-21 PROCEDURE — 84550 ASSAY OF BLOOD/URIC ACID: CPT

## 2023-08-21 PROCEDURE — 6360000002 HC RX W HCPCS: Performed by: NURSE PRACTITIONER

## 2023-08-21 PROCEDURE — 99211 OFF/OP EST MAY X REQ PHY/QHP: CPT

## 2023-08-21 RX ORDER — ACETAMINOPHEN 325 MG/1
650 TABLET ORAL
Status: CANCELLED | OUTPATIENT
Start: 2023-08-23

## 2023-08-21 RX ORDER — DIPHENHYDRAMINE HYDROCHLORIDE 50 MG/ML
50 INJECTION INTRAMUSCULAR; INTRAVENOUS
Status: CANCELLED | OUTPATIENT
Start: 2023-08-23

## 2023-08-21 RX ORDER — EPINEPHRINE 1 MG/ML
0.3 INJECTION, SOLUTION INTRAMUSCULAR; SUBCUTANEOUS PRN
Status: CANCELLED | OUTPATIENT
Start: 2023-08-23

## 2023-08-21 RX ORDER — ONDANSETRON 2 MG/ML
8 INJECTION INTRAMUSCULAR; INTRAVENOUS
Status: CANCELLED | OUTPATIENT
Start: 2023-08-23

## 2023-08-21 RX ORDER — SODIUM CHLORIDE 9 MG/ML
INJECTION, SOLUTION INTRAVENOUS CONTINUOUS
Status: CANCELLED | OUTPATIENT
Start: 2023-08-23

## 2023-08-21 RX ORDER — ALBUTEROL SULFATE 90 UG/1
4 AEROSOL, METERED RESPIRATORY (INHALATION) PRN
Status: CANCELLED | OUTPATIENT
Start: 2023-08-23

## 2023-08-21 RX ADMIN — EPOETIN ALFA-EPBX 40000 UNITS: 40000 INJECTION, SOLUTION INTRAVENOUS; SUBCUTANEOUS at 10:41

## 2023-08-21 NOTE — PROGRESS NOTES
Patient and wife informed will need platelet transfusion but not a blood transfusion - arrangements made for transfusion at [facility].patient informed of signs and symptoms of increasing anemia and thrombocytopenia and when to call the doctor.  Patient verbalized understanding of this and is agreeable to plan of care

## 2023-08-21 NOTE — DISCHARGE INSTRUCTIONS
Please contact your Oncologist if you have any questions regarding the retacrit injection  that you received today. Patient instructed if experience any of the symptoms following today's injection / to notify MD immediately or go to emergency department.     * dizziness/lightheadedness  *acute nausea/vomiting - not relieved with medication  *headache - not relieved from Tylenol/pain medication  *chest pain/pressure  *rash/itching  *shortness of breath        Drink fluids - 48oz fluids daily  Call if develop fever/ chills/ signs or symptoms of infection

## 2023-08-21 NOTE — PLAN OF CARE
Problem: Discharge Planning  Goal: Discharge to home or other facility with appropriate resources  Outcome: Adequate for Discharge  Flowsheets (Taken 8/21/2023 1658)  Discharge to home or other facility with appropriate resources:   Identify barriers to discharge with patient and caregiver   Identify discharge learning needs (meds, wound care, etc)   Arrange for needed discharge resources and transportation as appropriate  Note: Patient and family member able to teach back follow up appointments and when to call the doctor. Patient offers no questions at this time Discharge instructions given and reviewed with patient. All questions answered. Patient verbalized understanding      Problem: Safety - Adult  Goal: Free from fall injury  Outcome: Adequate for Discharge  Flowsheets (Taken 8/21/2023 1658)  Free From Fall Injury: Instruct family/caregiver on patient safety  Note: No falls this admission Patient aware of fall precautions for here and at home -call light in reach while here       Problem: Chronic Conditions and Co-morbidities  Goal: Patient's chronic conditions and co-morbidity symptoms are monitored and maintained or improved  Outcome: Adequate for Discharge  Flowsheets (Taken 8/21/2023 1658)  Care Plan - Patient's Chronic Conditions and Co-Morbidity Symptoms are Monitored and Maintained or Improved:   Monitor and assess patient's chronic conditions and comorbid symptoms for stability, deterioration, or improvement   Collaborate with multidisciplinary team to address chronic and comorbid conditions and prevent exacerbation or deterioration  Note: Reviewed retacrit injection with patient, patient offered no questions or concerns. Patient verbalized understanding of drug being administered. Care plan reviewed with patient and wife. Patient and wife verbalize understanding of the plan of care and contribute to goal setting.

## 2023-08-22 ENCOUNTER — OFFICE VISIT (OUTPATIENT)
Dept: CARDIOLOGY CLINIC | Age: 68
End: 2023-08-22
Payer: COMMERCIAL

## 2023-08-22 VITALS
WEIGHT: 256 LBS | BODY MASS INDEX: 35.84 KG/M2 | HEIGHT: 71 IN | SYSTOLIC BLOOD PRESSURE: 142 MMHG | HEART RATE: 72 BPM | DIASTOLIC BLOOD PRESSURE: 74 MMHG

## 2023-08-22 DIAGNOSIS — I10 PRIMARY HYPERTENSION: Primary | ICD-10-CM

## 2023-08-22 DIAGNOSIS — I48.0 PAROXYSMAL ATRIAL FIBRILLATION (HCC): ICD-10-CM

## 2023-08-22 DIAGNOSIS — E78.01 FAMILIAL HYPERCHOLESTEROLEMIA: ICD-10-CM

## 2023-08-22 PROCEDURE — 3078F DIAST BP <80 MM HG: CPT | Performed by: NUCLEAR MEDICINE

## 2023-08-22 PROCEDURE — G8427 DOCREV CUR MEDS BY ELIG CLIN: HCPCS | Performed by: NUCLEAR MEDICINE

## 2023-08-22 PROCEDURE — G8417 CALC BMI ABV UP PARAM F/U: HCPCS | Performed by: NUCLEAR MEDICINE

## 2023-08-22 PROCEDURE — 3077F SYST BP >= 140 MM HG: CPT | Performed by: NUCLEAR MEDICINE

## 2023-08-22 PROCEDURE — 1111F DSCHRG MED/CURRENT MED MERGE: CPT | Performed by: NUCLEAR MEDICINE

## 2023-08-22 PROCEDURE — 99204 OFFICE O/P NEW MOD 45 MIN: CPT | Performed by: NUCLEAR MEDICINE

## 2023-08-22 PROCEDURE — 1036F TOBACCO NON-USER: CPT | Performed by: NUCLEAR MEDICINE

## 2023-08-22 PROCEDURE — 1123F ACP DISCUSS/DSCN MKR DOCD: CPT | Performed by: NUCLEAR MEDICINE

## 2023-08-22 PROCEDURE — 3017F COLORECTAL CA SCREEN DOC REV: CPT | Performed by: NUCLEAR MEDICINE

## 2023-08-22 ASSESSMENT — ENCOUNTER SYMPTOMS
ABDOMINAL PAIN: 0
NAUSEA: 0
CONSTIPATION: 0
DIARRHEA: 0
PHOTOPHOBIA: 0
CHEST TIGHTNESS: 0
COLOR CHANGE: 0
BLOOD IN STOOL: 0
VOMITING: 0
SHORTNESS OF BREATH: 1
ANAL BLEEDING: 0
ABDOMINAL DISTENTION: 0
RECTAL PAIN: 0
BACK PAIN: 0

## 2023-08-25 ENCOUNTER — HOSPITAL ENCOUNTER (OUTPATIENT)
Dept: CT IMAGING | Age: 68
Discharge: HOME | End: 2023-08-25
Payer: COMMERCIAL

## 2023-08-25 VITALS
BODY MASS INDEX: 35.93 KG/M2 | DIASTOLIC BLOOD PRESSURE: 63 MMHG | OXYGEN SATURATION: 93 % | SYSTOLIC BLOOD PRESSURE: 110 MMHG | HEART RATE: 79 BPM | TEMPERATURE: 97.1 F | WEIGHT: 257.6 LBS | RESPIRATION RATE: 24 BRPM

## 2023-08-25 DIAGNOSIS — D46.9 MDS (MYELODYSPLASTIC SYNDROME) (HCC): ICD-10-CM

## 2023-08-25 LAB
CREAT SERPL-MCNC: 1 MG/DL (ref 0.4–1.2)
DEPRECATED RDW RBC AUTO: 47.3 FL (ref 35–45)
ERYTHROCYTE [DISTWIDTH] IN BLOOD BY AUTOMATED COUNT: 14.4 % (ref 11.5–14.5)
GFR SERPL CREATININE-BSD FRML MDRD: > 60 ML/MIN/1.73M2
HCT VFR BLD AUTO: 22.1 % (ref 42–52)
HGB BLD-MCNC: 7.3 GM/DL (ref 14–18)
MCH RBC QN AUTO: 29.8 PG (ref 26–33)
MCHC RBC AUTO-ENTMCNC: 33 GM/DL (ref 32.2–35.5)
MCV RBC AUTO: 90.2 FL (ref 80–94)
PLATELET # BLD AUTO: 60 THOU/MM3 (ref 130–400)
PMV BLD AUTO: 11.2 FL (ref 9.4–12.4)
RBC # BLD AUTO: 2.45 MILL/MM3 (ref 4.7–6.1)
WBC # BLD AUTO: 2 THOU/MM3 (ref 4.8–10.8)

## 2023-08-25 PROCEDURE — 2580000003 HC RX 258: Performed by: RADIOLOGY

## 2023-08-25 PROCEDURE — 38221 DX BONE MARROW BIOPSIES: CPT

## 2023-08-25 PROCEDURE — 6360000002 HC RX W HCPCS: Performed by: RADIOLOGY

## 2023-08-25 PROCEDURE — 77012 CT SCAN FOR NEEDLE BIOPSY: CPT

## 2023-08-25 PROCEDURE — 6370000000 HC RX 637 (ALT 250 FOR IP): Performed by: RADIOLOGY

## 2023-08-25 RX ORDER — MIDAZOLAM HYDROCHLORIDE 1 MG/ML
1 INJECTION INTRAMUSCULAR; INTRAVENOUS ONCE
Status: DISCONTINUED | OUTPATIENT
Start: 2023-08-25 | End: 2023-08-26 | Stop reason: HOSPADM

## 2023-08-25 RX ORDER — IBUPROFEN 200 MG
TABLET ORAL PRN
Status: COMPLETED | OUTPATIENT
Start: 2023-08-25 | End: 2023-08-25

## 2023-08-25 RX ORDER — FENTANYL CITRATE 50 UG/ML
INJECTION, SOLUTION INTRAMUSCULAR; INTRAVENOUS PRN
Status: COMPLETED | OUTPATIENT
Start: 2023-08-25 | End: 2023-08-25

## 2023-08-25 RX ORDER — SODIUM CHLORIDE 450 MG/100ML
INJECTION, SOLUTION INTRAVENOUS CONTINUOUS
Status: DISCONTINUED | OUTPATIENT
Start: 2023-08-25 | End: 2023-08-26 | Stop reason: HOSPADM

## 2023-08-25 RX ORDER — MIDAZOLAM HYDROCHLORIDE 1 MG/ML
INJECTION INTRAMUSCULAR; INTRAVENOUS PRN
Status: COMPLETED | OUTPATIENT
Start: 2023-08-25 | End: 2023-08-25

## 2023-08-25 RX ORDER — FENTANYL CITRATE 50 UG/ML
50 INJECTION, SOLUTION INTRAMUSCULAR; INTRAVENOUS ONCE
Status: DISCONTINUED | OUTPATIENT
Start: 2023-08-25 | End: 2023-08-26 | Stop reason: HOSPADM

## 2023-08-25 RX ADMIN — MIDAZOLAM 1 MG: 1 INJECTION INTRAMUSCULAR; INTRAVENOUS at 10:44

## 2023-08-25 RX ADMIN — BACITRACIN ZINC, NEOMYCIN SULFATE, AND POLYMYXIN B SULFATE 1 EACH: 400; 3.5; 5 OINTMENT TOPICAL at 10:49

## 2023-08-25 RX ADMIN — SODIUM CHLORIDE: 4.5 INJECTION, SOLUTION INTRAVENOUS at 09:00

## 2023-08-25 RX ADMIN — FENTANYL CITRATE 50 MCG: 50 INJECTION, SOLUTION INTRAMUSCULAR; INTRAVENOUS at 10:27

## 2023-08-25 RX ADMIN — MIDAZOLAM 1 MG: 1 INJECTION INTRAMUSCULAR; INTRAVENOUS at 10:27

## 2023-08-25 RX ADMIN — FENTANYL CITRATE 50 MCG: 50 INJECTION, SOLUTION INTRAMUSCULAR; INTRAVENOUS at 10:44

## 2023-08-25 NOTE — DISCHARGE INSTRUCTIONS
POST BIOPSY DISCHARGE INSTRUCTION SHEET    DIET:  As tolerated    ACTIVITY:  Rest at home on sofa, bed or recliner today. Bathroom privileges only today. Limit any exertion (pushing or pulling) today. No lifting for 3 days. No driving today. Check biopsy site frequently today. Resume any blood thinners in 24 hours. Keep band aid clean & dry - replace as needed, may remove in 48 hours. RETURN TO NEAREST EMERGENCY ROOM IF YOU HAVE ANY OF THE FOLLOWING:  Sign of bleeding, swelling, drainage from biopsy site or severe pain (slight discomfort to be expected) around biopsy site. Repeated nausea/vomiting/abdominal pain. Elevated temperature above 101 degrees. Shortness of breath. Chest pain. Keep scheduled appointment with your physician. If sedation given, follow post sedation instruction sheet.      _       SEDATION/ANALGESIA INFORMATION HOME GOING ADVICE    Review the following information with the patient prior to the procedure. Sedation/agalgesia is used during short medical procedures under controlled supervision. The medication will produce a strong relaxation. You will be able to hear, speak and follow instructions, but you memory and alertness will be decreased. You will be able to swallow and breathe on your own. During sedation/analgesia you blood pressure, hear and breathing will be watched closely. After the procedure, you may not remember what was said or done. Procedure:      Date:  You may have the following effects from the medication. Drowsiness, dizziness, sleepiness or confusion. Difficulty remembering or delayed reaction times. Loss of fine muscle control or difficulty with your balance especially while walking. Difficulty focusing or blurred vision. You may not be aware of slight changes in your behavior and/or your reaction time because of the medication used during the procedure. Therefore you should follow these instructions.   Have someone responsible help you with

## 2023-08-25 NOTE — PROGRESS NOTES
0252 Pt arrived to CT for bone marrow Biopsy. Procedure explained using teach back method. Pt states understanding. 36 Dr Kristel Salmon in to assess patient and explain procedure. Histology called. 1017 This procedure has been fully reviewed with the patient and written informed consent has been obtained. 1019 Patient assisted to table in prone position. Monitor attached to patient. Comfort ensured. 1024 Pre-procedure images obtained. Histology arrived. 1029 Procedure begins. 1046 Procedure complete. Samples obtained and given to histology. 1047 Post-procedure images obtained. 1053 Monitor removed. Patient assisted to cart in semi fowlers position. Comfort ensured. 1054 Patient transported to OPN in stable condition. Report called Lady Valerie CARLISLE in OPN.

## 2023-08-25 NOTE — PROGRESS NOTES
0830 Patient ambulatory to [facility] for Bone Marrow biopsy. Patient denies any blood thinners. Confirms NPO and brother here to drive patient home. PT RIGHTS AND RESPONSIBILITIES OFFERED TO PT.     9957 Patient to Radiology for procedure. 1104 Patient back to room post procedure. Band aid to left buttock dry and intact. No signs of bleeding. 1127 Patient resting in bed. Denies any pain. Band aid to left buttock dry and intact. 1200 Patient resting in bed. Denies any pain. Band aid to left buttock dry and intact. 1230 Patient resting in bed. Denies any pain. Band aid to left buttock dry and intact. 1245 AVS reviewed with patient and brother. Verbalizes understanding. Patient discharged in wheelchair to lobby.          _M___ Safety:       (Environmental)  [place] to environment  Ensure ID band is correct and in place/ allergy band as needed  Assess for fall risk  Initiate fall precautions as applicable (fall band, side rails, etc.)  Call light within reach  Bed in low position/ wheels locked    _M___ Pain:       Assess pain level and characteristics  Administer analgesics as ordered  Assess effectiveness of pain management and report to MD as needed    _M___ Knowledge Deficit:  Assess baseline knowledge  Provide teaching at level of understanding  Provide teaching via preferred learning method  Evaluate teaching effectiveness    _M___ Hemodynamic/Respiratory Status:       (Pre and Post Procedure Monitoring)  Assess/Monitor vital signs and LOC  Assess Baseline SpO2 prior to any sedation  Obtain weight/height  Assess vital signs/ LOC until patient meets discharge criteria  Monitor procedure site and notify MD of any issues

## 2023-08-27 LAB — LEUKEMIA/LYMPHOMA PHENO BONE MARROW: NORMAL

## 2023-08-28 ENCOUNTER — TELEPHONE (OUTPATIENT)
Dept: CARDIOLOGY CLINIC | Age: 68
End: 2023-08-28

## 2023-08-28 ENCOUNTER — HOSPITAL ENCOUNTER (OUTPATIENT)
Dept: INFUSION THERAPY | Age: 68
Discharge: HOME | End: 2023-08-28
Payer: COMMERCIAL

## 2023-08-28 VITALS
SYSTOLIC BLOOD PRESSURE: 125 MMHG | TEMPERATURE: 98.2 F | OXYGEN SATURATION: 94 % | DIASTOLIC BLOOD PRESSURE: 60 MMHG | BODY MASS INDEX: 36.37 KG/M2 | HEART RATE: 78 BPM | WEIGHT: 259.8 LBS | RESPIRATION RATE: 18 BRPM | HEIGHT: 71 IN

## 2023-08-28 DIAGNOSIS — D46.9 MDS (MYELODYSPLASTIC SYNDROME) (HCC): Primary | ICD-10-CM

## 2023-08-28 DIAGNOSIS — D63.8 ANEMIA IN OTHER CHRONIC DISEASES CLASSIFIED ELSEWHERE: ICD-10-CM

## 2023-08-28 DIAGNOSIS — I48.0 PAROXYSMAL ATRIAL FIBRILLATION (HCC): ICD-10-CM

## 2023-08-28 DIAGNOSIS — I31.9 PERICARDITIS, UNSPECIFIED CHRONICITY, UNSPECIFIED TYPE: Primary | ICD-10-CM

## 2023-08-28 LAB
ALBUMIN SERPL BCG-MCNC: 3.8 G/DL (ref 3.5–5.1)
ALP SERPL-CCNC: 74 U/L (ref 38–126)
ALT SERPL W/O P-5'-P-CCNC: 26 U/L (ref 11–66)
AST SERPL-CCNC: 15 U/L (ref 5–40)
BILIRUB CONJ SERPL-MCNC: < 0.2 MG/DL (ref 0–0.3)
BILIRUB SERPL-MCNC: 0.4 MG/DL (ref 0.3–1.2)
BUN BLDP-MCNC: 20 MG/DL (ref 8–26)
CHLORIDE BLD-SCNC: 107 MEQ/L (ref 98–109)
CREAT BLD-MCNC: 1.3 MG/DL (ref 0.5–1.2)
GFR SERPL CREATININE-BSD FRML MDRD: > 60 ML/MIN/1.73M2
GLUCOSE BLD-MCNC: 124 MG/DL (ref 70–108)
IONIZED CALCIUM, WHOLE BLOOD: 1.18 MMOL/L (ref 1.12–1.32)
LDH SERPL L TO P-CCNC: 517 U/L (ref 100–190)
POTASSIUM BLD-SCNC: 4.3 MEQ/L (ref 3.5–4.9)
PROT SERPL-MCNC: 6.8 G/DL (ref 6.1–8)
SODIUM BLD-SCNC: 139 MEQ/L (ref 138–146)
TOTAL CO2, WHOLE BLOOD: 25 MEQ/L (ref 23–33)
URATE SERPL-MCNC: 3.1 MG/DL (ref 3.7–7)

## 2023-08-28 PROCEDURE — 80047 BASIC METABLC PNL IONIZED CA: CPT

## 2023-08-28 PROCEDURE — 80076 HEPATIC FUNCTION PANEL: CPT

## 2023-08-28 PROCEDURE — 96372 THER/PROPH/DIAG INJ SC/IM: CPT

## 2023-08-28 PROCEDURE — 84550 ASSAY OF BLOOD/URIC ACID: CPT

## 2023-08-28 PROCEDURE — 83615 LACTATE (LD) (LDH) ENZYME: CPT

## 2023-08-28 PROCEDURE — 6360000002 HC RX W HCPCS: Performed by: NURSE PRACTITIONER

## 2023-08-28 PROCEDURE — 99211 OFF/OP EST MAY X REQ PHY/QHP: CPT

## 2023-08-28 PROCEDURE — 85025 COMPLETE CBC W/AUTO DIFF WBC: CPT

## 2023-08-28 RX ORDER — ONDANSETRON 2 MG/ML
8 INJECTION INTRAMUSCULAR; INTRAVENOUS
OUTPATIENT
Start: 2023-08-30

## 2023-08-28 RX ORDER — SODIUM CHLORIDE 9 MG/ML
INJECTION, SOLUTION INTRAVENOUS CONTINUOUS
OUTPATIENT
Start: 2023-08-30

## 2023-08-28 RX ORDER — ACETAMINOPHEN 325 MG/1
650 TABLET ORAL
OUTPATIENT
Start: 2023-08-30

## 2023-08-28 RX ORDER — EPINEPHRINE 1 MG/ML
0.3 INJECTION, SOLUTION INTRAMUSCULAR; SUBCUTANEOUS PRN
OUTPATIENT
Start: 2023-08-30

## 2023-08-28 RX ORDER — ALBUTEROL SULFATE 90 UG/1
4 AEROSOL, METERED RESPIRATORY (INHALATION) PRN
OUTPATIENT
Start: 2023-08-30

## 2023-08-28 RX ORDER — DIPHENHYDRAMINE HYDROCHLORIDE 50 MG/ML
50 INJECTION INTRAMUSCULAR; INTRAVENOUS
OUTPATIENT
Start: 2023-08-30

## 2023-08-28 RX ORDER — FOLIC ACID 1 MG/1
1 TABLET ORAL DAILY
Qty: 30 TABLET | Refills: 3 | Status: SHIPPED | OUTPATIENT
Start: 2023-08-28

## 2023-08-28 RX ADMIN — EPOETIN ALFA-EPBX 40000 UNITS: 40000 INJECTION, SOLUTION INTRAVENOUS; SUBCUTANEOUS at 12:14

## 2023-08-28 NOTE — PLAN OF CARE
Problem: Discharge Planning  Goal: Discharge to home or other facility with appropriate resources  Flowsheets (Taken 8/28/2023 1305)  Discharge to home or other facility with appropriate resources:   Identify barriers to discharge with patient and caregiver   Arrange for needed discharge resources and transportation as appropriate   Identify discharge learning needs (meds, wound care, etc)  Note: Verbalize understanding of discharge instructions, follow up appointments, and when to call Physician. Problem: Safety - Adult  Goal: Free from fall injury  Outcome: Adequate for Discharge  Flowsheets (Taken 8/28/2023 1305)  Free From Fall Injury:   Instruct family/caregiver on patient safety   Based on caregiver fall risk screen, instruct family/caregiver to ask for assistance with transferring infant if caregiver noted to have fall risk factors  Note: Free from falls while in O.P. Oncology. Problem: Chronic Conditions and Co-morbidities  Goal: Patient's chronic conditions and co-morbidity symptoms are monitored and maintained or improved  Flowsheets (Taken 8/28/2023 1305)  Care Plan - Patient's Chronic Conditions and Co-Morbidity Symptoms are Monitored and Maintained or Improved:   Monitor and assess patient's chronic conditions and comorbid symptoms for stability, deterioration, or improvement   Collaborate with multidisciplinary team to address chronic and comorbid conditions and prevent exacerbation or deterioration  Note: Patient verbalizes understanding to verbal information given on retacrit,action and possible side effects. Aware to call MD if develop complications. Care plan reviewed with patient. Patient verbalize understanding of the plan of care and contribute to goal setting.

## 2023-08-28 NOTE — DISCHARGE INSTRUCTIONS
Patient tolerated retacrit injection without any complications. Patient verbalized understanding of discharge instructions. Ambulated off unit per self with belongings.

## 2023-08-28 NOTE — PROGRESS NOTES
Dr. Lulu Valencia ordered one unit of PRBC due to hemoglobin of 7.8. Patient short of breath and fatigued.  Order sent to [facility].

## 2023-08-28 NOTE — TELEPHONE ENCOUNTER
Dr Smith Plants should pt continue prednisone until echo completed? ?  Please let PCP office know     Also lm for pt to call office to let him know we need to order an echo

## 2023-08-28 NOTE — TELEPHONE ENCOUNTER
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    Please call PCP office back [phone] (Work)      Also states he is not following with [facility] because it is now out of network for him

## 2023-08-28 NOTE — TELEPHONE ENCOUNTER
Patient received this while he was inpatient at [facility]. He is requesting a refill.

## 2023-08-29 LAB
AUTO DIFF PNL BLD: ABNORMAL
BASOPHILS ABSOLUTE: 0 THOU/MM3 (ref 0–0.1)
BASOPHILS NFR BLD AUTO: 0 %
BLASTS: 1 % (ref 0–1)
DEPRECATED RDW RBC AUTO: 52.4 FL (ref 35–45)
EOSINOPHIL NFR BLD AUTO: 0 %
EOSINOPHILS ABSOLUTE: 0 THOU/MM3 (ref 0–0.4)
ERYTHROCYTE [DISTWIDTH] IN BLOOD BY AUTOMATED COUNT: 15.3 % (ref 11.5–14.5)
HCT VFR BLD AUTO: 24.8 % (ref 42–52)
HGB BLD-MCNC: 7.7 GM/DL (ref 14–18)
LYMPHOCYTES ABSOLUTE: 2.4 THOU/MM3 (ref 1–4.8)
LYMPHOCYTES NFR BLD AUTO: 26 %
MANUAL DIFF BLD: NORMAL
MCH RBC QN AUTO: 29.5 PG (ref 26–33)
MCHC RBC AUTO-ENTMCNC: 31 GM/DL (ref 32.2–35.5)
MCV RBC AUTO: 95 FL (ref 80–94)
METAMYELOCYTES: 2 %
MONOCYTES ABSOLUTE: 4.3 THOU/MM3 (ref 0.4–1.3)
MONOCYTES NFR BLD AUTO: 47 %
MYELOCYTES: 8 %
NEUTROPHILS NFR BLD AUTO: 16 %
NRBC BLD AUTO-RTO: 3 /100 WBC
PATHOLOGIST REVIEW: ABNORMAL
PLATELET # BLD AUTO: 120 THOU/MM3 (ref 130–400)
PLATELET BLD QL SMEAR: ABNORMAL
PMV BLD AUTO: 11.6 FL (ref 9.4–12.4)
RBC # BLD AUTO: 2.61 MILL/MM3 (ref 4.7–6.1)
SEGMENTED NEUTROPHILS ABSOLUTE COUNT: 1.5 THOU/MM3 (ref 1.8–7.7)
VARIANT LYMPHS BLD QL SMEAR: ABNORMAL %
WBC # BLD AUTO: 9.1 THOU/MM3 (ref 4.8–10.8)

## 2023-08-30 LAB — CHROMOSOME, BONE MARROW: NORMAL

## 2023-08-30 NOTE — TELEPHONE ENCOUNTER
Kerwin Bro at PCP office notified. Patient notified and agreed to testing. Patient will speak with PCP office to wean off prednisone.

## 2023-08-31 LAB — MISC. #1 REFERENCE GROUP TEST: NORMAL

## 2023-09-05 ENCOUNTER — HOSPITAL ENCOUNTER (EMERGENCY)
Age: 68
Discharge: ANOTHER HOSPITAL | End: 2023-09-06
Attending: EMERGENCY MEDICINE
Payer: COMMERCIAL

## 2023-09-05 ENCOUNTER — APPOINTMENT (OUTPATIENT)
Dept: GENERAL RADIOLOGY | Age: 68
End: 2023-09-05
Payer: COMMERCIAL

## 2023-09-05 ENCOUNTER — HOSPITAL ENCOUNTER (OUTPATIENT)
Dept: INFUSION THERAPY | Age: 68
End: 2023-09-05

## 2023-09-05 ENCOUNTER — APPOINTMENT (OUTPATIENT)
Dept: CT IMAGING | Age: 68
End: 2023-09-05
Payer: COMMERCIAL

## 2023-09-05 DIAGNOSIS — R06.00 DYSPNEA, UNSPECIFIED TYPE: ICD-10-CM

## 2023-09-05 DIAGNOSIS — J90 PLEURAL EFFUSION: ICD-10-CM

## 2023-09-05 DIAGNOSIS — J44.1 COPD EXACERBATION (HCC): ICD-10-CM

## 2023-09-05 DIAGNOSIS — C92.10 CML (CHRONIC MYELOCYTIC LEUKEMIA) (HCC): ICD-10-CM

## 2023-09-05 DIAGNOSIS — C92.10 BLAST CRISIS PHASE OF CHRONIC MYELOID LEUKEMIA (HCC): Primary | ICD-10-CM

## 2023-09-05 LAB
ALBUMIN SERPL BCG-MCNC: 3.5 G/DL (ref 3.5–5.1)
ALP SERPL-CCNC: 70 U/L (ref 38–126)
ALT SERPL W/O P-5'-P-CCNC: 16 U/L (ref 11–66)
AMORPH SED URNS QL MICRO: ABNORMAL
ANION GAP SERPL CALC-SCNC: 15 MEQ/L (ref 8–16)
ANISOCYTOSIS BLD QL SMEAR: PRESENT
AST SERPL-CCNC: 16 U/L (ref 5–40)
AUTO DIFF PNL BLD: ABNORMAL
BACTERIA: ABNORMAL
BASOPHILS ABSOLUTE: 0 THOU/MM3 (ref 0–0.1)
BASOPHILS NFR BLD AUTO: 0 %
BILIRUB SERPL-MCNC: 0.3 MG/DL (ref 0.3–1.2)
BILIRUB UR QL STRIP: NEGATIVE
BLASTS: 11 % (ref 0–1)
BUN SERPL-MCNC: 18 MG/DL (ref 7–22)
CALCIUM SERPL-MCNC: 8.6 MG/DL (ref 8.5–10.5)
CASTS #/AREA URNS LPF: ABNORMAL /LPF
CASTS #/AREA URNS LPF: ABNORMAL /LPF
CHARACTER UR: CLEAR
CHARCOAL URNS QL MICRO: ABNORMAL
CHLORIDE SERPL-SCNC: 102 MEQ/L (ref 98–111)
CO2 SERPL-SCNC: 20 MEQ/L (ref 23–33)
COLOR UR: YELLOW
CREAT SERPL-MCNC: 1.5 MG/DL (ref 0.4–1.2)
CRYSTALS URNS QL MICRO: ABNORMAL
DEPRECATED RDW RBC AUTO: 51 FL (ref 35–45)
EOSINOPHIL NFR BLD AUTO: 0 %
EOSINOPHILS ABSOLUTE: 0 THOU/MM3 (ref 0–0.4)
EPITHELIAL CELLS, UA: ABNORMAL /HPF
ERYTHROCYTE [DISTWIDTH] IN BLOOD BY AUTOMATED COUNT: 15.4 % (ref 11.5–14.5)
FLUAV RNA RESP QL NAA+PROBE: NOT DETECTED
FLUBV RNA RESP QL NAA+PROBE: NOT DETECTED
GFR SERPL CREATININE-BSD FRML MDRD: 51 ML/MIN/1.73M2
GLUCOSE SERPL-MCNC: 96 MG/DL (ref 70–108)
GLUCOSE UR QL STRIP.AUTO: NEGATIVE MG/DL
HCT VFR BLD AUTO: 25.4 % (ref 42–52)
HGB BLD-MCNC: 8.5 GM/DL (ref 14–18)
HGB UR QL STRIP.AUTO: NEGATIVE
KETONES UR QL STRIP.AUTO: NEGATIVE
LACTIC ACID, SEPSIS: 0.8 MMOL/L (ref 0.5–1.9)
LEUKOCYTE ESTERASE UR QL STRIP.AUTO: NEGATIVE
LYMPHOCYTES ABSOLUTE: 4.5 THOU/MM3 (ref 1–4.8)
LYMPHOCYTES NFR BLD AUTO: 7 %
MANUAL DIFF BLD: NORMAL
MCH RBC QN AUTO: 30.9 PG (ref 26–33)
MCHC RBC AUTO-ENTMCNC: 33.5 GM/DL (ref 32.2–35.5)
MCV RBC AUTO: 92.4 FL (ref 80–94)
METAMYELOCYTES: 8 %
MONOCYTES ABSOLUTE: 32.1 THOU/MM3 (ref 0.4–1.3)
MONOCYTES NFR BLD AUTO: 50 %
MUCOUS THREADS URNS QL MICRO: ABNORMAL
MYELOCYTES: 17 %
NEUTROPHILS NFR BLD AUTO: 6 %
NITRITE UR QL STRIP.AUTO: NEGATIVE
NRBC BLD AUTO-RTO: 0 /100 WBC
NT-PROBNP SERPL IA-MCNC: 644.6 PG/ML (ref 0–124)
OSMOLALITY SERPL CALC.SUM OF ELEC: 275.6 MOSMOL/KG (ref 275–300)
PATHOLOGIST REVIEW: ABNORMAL
PH UR STRIP.AUTO: 5 [PH] (ref 5–9)
PLATELET # BLD AUTO: 121 THOU/MM3 (ref 130–400)
PMV BLD AUTO: 11.3 FL (ref 9.4–12.4)
POIKILOCYTES: ABNORMAL
POTASSIUM SERPL-SCNC: 4.9 MEQ/L (ref 3.5–5.2)
PROMYELOCYTES: 1 %
PROT SERPL-MCNC: 6.5 G/DL (ref 6.1–8)
PROT UR STRIP.AUTO-MCNC: 30 MG/DL
RBC # BLD AUTO: 2.75 MILL/MM3 (ref 4.7–6.1)
RBC #/AREA URNS HPF: ABNORMAL /HPF
RENAL EPI CELLS #/AREA URNS HPF: ABNORMAL /[HPF]
ROULEAUX BLD QL SMEAR: SLIGHT
SARS-COV-2 RNA RESP QL NAA+PROBE: NOT DETECTED
SEGMENTED NEUTROPHILS ABSOLUTE COUNT: 3.9 THOU/MM3 (ref 1.8–7.7)
SMUDGE CELLS BLD QL SMEAR: PRESENT
SODIUM SERPL-SCNC: 137 MEQ/L (ref 135–145)
SP GR UR REFRACT.AUTO: 1.03 (ref 1–1.03)
SPHEROCYTES BLD QL SMEAR: ABNORMAL
TROPONIN, HIGH SENSITIVITY: 18 NG/L (ref 0–12)
UROBILINOGEN UR QL STRIP.AUTO: 0.2 EU/DL (ref 0–1)
WBC # BLD AUTO: 64.2 THOU/MM3 (ref 4.8–10.8)
WBC #/AREA URNS HPF: ABNORMAL /HPF
YEAST LIKE FUNGI URNS QL MICRO: ABNORMAL

## 2023-09-05 PROCEDURE — 71275 CT ANGIOGRAPHY CHEST: CPT

## 2023-09-05 PROCEDURE — 99285 EMERGENCY DEPT VISIT HI MDM: CPT

## 2023-09-05 PROCEDURE — 36415 COLL VENOUS BLD VENIPUNCTURE: CPT

## 2023-09-05 PROCEDURE — 83605 ASSAY OF LACTIC ACID: CPT

## 2023-09-05 PROCEDURE — 73502 X-RAY EXAM HIP UNI 2-3 VIEWS: CPT

## 2023-09-05 PROCEDURE — 87636 SARSCOV2 & INF A&B AMP PRB: CPT

## 2023-09-05 PROCEDURE — 93005 ELECTROCARDIOGRAM TRACING: CPT | Performed by: EMERGENCY MEDICINE

## 2023-09-05 PROCEDURE — 6360000002 HC RX W HCPCS

## 2023-09-05 PROCEDURE — 80053 COMPREHEN METABOLIC PANEL: CPT

## 2023-09-05 PROCEDURE — 71045 X-RAY EXAM CHEST 1 VIEW: CPT

## 2023-09-05 PROCEDURE — 6360000004 HC RX CONTRAST MEDICATION: Performed by: EMERGENCY MEDICINE

## 2023-09-05 PROCEDURE — 6360000002 HC RX W HCPCS: Performed by: EMERGENCY MEDICINE

## 2023-09-05 PROCEDURE — 6370000000 HC RX 637 (ALT 250 FOR IP): Performed by: EMERGENCY MEDICINE

## 2023-09-05 PROCEDURE — 93010 ELECTROCARDIOGRAM REPORT: CPT | Performed by: INTERNAL MEDICINE

## 2023-09-05 PROCEDURE — 96374 THER/PROPH/DIAG INJ IV PUSH: CPT

## 2023-09-05 PROCEDURE — 96376 TX/PRO/DX INJ SAME DRUG ADON: CPT

## 2023-09-05 PROCEDURE — 83880 ASSAY OF NATRIURETIC PEPTIDE: CPT

## 2023-09-05 PROCEDURE — 85025 COMPLETE CBC W/AUTO DIFF WBC: CPT

## 2023-09-05 PROCEDURE — 72100 X-RAY EXAM L-S SPINE 2/3 VWS: CPT

## 2023-09-05 PROCEDURE — 81001 URINALYSIS AUTO W/SCOPE: CPT

## 2023-09-05 PROCEDURE — 2580000003 HC RX 258: Performed by: STUDENT IN AN ORGANIZED HEALTH CARE EDUCATION/TRAINING PROGRAM

## 2023-09-05 PROCEDURE — 84484 ASSAY OF TROPONIN QUANT: CPT

## 2023-09-05 RX ORDER — 0.9 % SODIUM CHLORIDE 0.9 %
500 INTRAVENOUS SOLUTION INTRAVENOUS ONCE
Status: COMPLETED | OUTPATIENT
Start: 2023-09-05 | End: 2023-09-05

## 2023-09-05 RX ORDER — ACETAMINOPHEN 325 MG/1
650 TABLET ORAL ONCE
Status: COMPLETED | OUTPATIENT
Start: 2023-09-05 | End: 2023-09-05

## 2023-09-05 RX ADMIN — ACETAMINOPHEN 650 MG: 325 TABLET ORAL at 17:16

## 2023-09-05 RX ADMIN — IOPAMIDOL 80 ML: 755 INJECTION, SOLUTION INTRAVENOUS at 15:23

## 2023-09-05 RX ADMIN — HYDROMORPHONE HYDROCHLORIDE 1 MG: 1 INJECTION, SOLUTION INTRAMUSCULAR; INTRAVENOUS; SUBCUTANEOUS at 17:55

## 2023-09-05 RX ADMIN — SODIUM CHLORIDE 500 ML: 9 INJECTION, SOLUTION INTRAVENOUS at 15:06

## 2023-09-05 RX ADMIN — HYDROMORPHONE HYDROCHLORIDE 1 MG: 1 INJECTION, SOLUTION INTRAMUSCULAR; INTRAVENOUS; SUBCUTANEOUS at 21:36

## 2023-09-05 ASSESSMENT — ENCOUNTER SYMPTOMS
DIARRHEA: 1
WHEEZING: 0
BACK PAIN: 1
ABDOMINAL PAIN: 0
COUGH: 0
CONSTIPATION: 1
RHINORRHEA: 0
SHORTNESS OF BREATH: 1
NAUSEA: 0
SORE THROAT: 0
VOMITING: 0

## 2023-09-05 ASSESSMENT — PAIN SCALES - GENERAL
PAINLEVEL_OUTOF10: 6
PAINLEVEL_OUTOF10: 5
PAINLEVEL_OUTOF10: 8
PAINLEVEL_OUTOF10: 8
PAINLEVEL_OUTOF10: 4
PAINLEVEL_OUTOF10: 2
PAINLEVEL_OUTOF10: 8
PAINLEVEL_OUTOF10: 4

## 2023-09-05 ASSESSMENT — PAIN - FUNCTIONAL ASSESSMENT
PAIN_FUNCTIONAL_ASSESSMENT: 0-10

## 2023-09-05 ASSESSMENT — PAIN DESCRIPTION - LOCATION
LOCATION: LEG

## 2023-09-05 ASSESSMENT — PAIN DESCRIPTION - ORIENTATION
ORIENTATION: RIGHT
ORIENTATION: LEFT
ORIENTATION: RIGHT

## 2023-09-06 ENCOUNTER — APPOINTMENT (OUTPATIENT)
Dept: ULTRASOUND IMAGING | Age: 68
End: 2023-09-06
Attending: INTERNAL MEDICINE
Payer: COMMERCIAL

## 2023-09-06 ENCOUNTER — HOSPITAL ENCOUNTER (INPATIENT)
Age: 68
LOS: 1 days | Discharge: HOME | End: 2023-09-07
Attending: INTERNAL MEDICINE
Payer: COMMERCIAL

## 2023-09-06 ENCOUNTER — APPOINTMENT (OUTPATIENT)
Dept: INTERVENTIONAL RADIOLOGY/VASCULAR | Age: 68
End: 2023-09-06
Attending: INTERNAL MEDICINE
Payer: COMMERCIAL

## 2023-09-06 VITALS
BODY MASS INDEX: 36.12 KG/M2 | OXYGEN SATURATION: 97 % | HEART RATE: 87 BPM | SYSTOLIC BLOOD PRESSURE: 116 MMHG | WEIGHT: 258 LBS | TEMPERATURE: 97.6 F | RESPIRATION RATE: 16 BRPM | HEIGHT: 71 IN | DIASTOLIC BLOOD PRESSURE: 70 MMHG

## 2023-09-06 DIAGNOSIS — C92.10 CML (CHRONIC MYELOID LEUKEMIA) (HCC): Primary | ICD-10-CM

## 2023-09-06 DIAGNOSIS — N18.31 STAGE 3A CHRONIC KIDNEY DISEASE (CKD) (HCC): ICD-10-CM

## 2023-09-06 PROBLEM — C93.10 CHRONIC MYELOMONOCYTIC LEUKEMIA NOT HAVING ACHIEVED REMISSION (HCC): Status: ACTIVE | Noted: 2023-09-06

## 2023-09-06 LAB
EKG ATRIAL RATE: 82 BPM
EKG P AXIS: 27 DEGREES
EKG P-R INTERVAL: 152 MS
EKG Q-T INTERVAL: 386 MS
EKG QRS DURATION: 90 MS
EKG QTC CALCULATION (BAZETT): 450 MS
EKG R AXIS: 27 DEGREES
EKG T AXIS: 10 DEGREES
EKG VENTRICULAR RATE: 82 BPM
FERRITIN SERPL-MCNC: 4698 NG/ML (ref 30–400)
FOLATE SERPL-MCNC: >20 NG/ML
GLUCOSE FLD-MCNC: 86 MG/DL
IMM RETICS NFR: 10.6 % (ref 2.7–18.3)
INR PPP: 1.2
IRON SATN MFR SERPL: 80 % (ref 20–55)
IRON SERPL-MCNC: 142 UG/DL (ref 59–158)
LDH FLD L TO P-CCNC: 570 U/L
LDH SERPL-CCNC: 696 U/L (ref 135–225)
PH FLUID: 8
PROT FLD-MCNC: 4.1 G/DL
PROT SERPL-MCNC: 6.6 G/DL (ref 6.4–8.3)
PROTHROMBIN TIME: 15.3 SEC (ref 11.7–14.9)
RETIC HEMOGLOBIN: 35.1 PG (ref 28.2–35.7)
RETICS # AUTO: 0.01 M/UL (ref 0.03–0.08)
RETICS/RBC NFR AUTO: 0.3 % (ref 0.5–1.9)
SPECIMEN TYPE: NORMAL
TIBC SERPL-MCNC: 178 UG/DL (ref 250–450)
UNSATURATED IRON BINDING CAPACITY: 36 UG/DL (ref 112–347)
URATE SERPL-MCNC: 5 MG/DL (ref 3.4–7)
VIT B12 SERPL-MCNC: 668 PG/ML (ref 232–1245)

## 2023-09-06 PROCEDURE — 87205 SMEAR GRAM STAIN: CPT

## 2023-09-06 PROCEDURE — 99222 1ST HOSP IP/OBS MODERATE 55: CPT | Performed by: NURSE PRACTITIONER

## 2023-09-06 PROCEDURE — 6360000002 HC RX W HCPCS: Performed by: STUDENT IN AN ORGANIZED HEALTH CARE EDUCATION/TRAINING PROGRAM

## 2023-09-06 PROCEDURE — 2060000000 HC ICU INTERMEDIATE R&B

## 2023-09-06 PROCEDURE — 89051 BODY FLUID CELL COUNT: CPT

## 2023-09-06 PROCEDURE — 2580000003 HC RX 258: Performed by: STUDENT IN AN ORGANIZED HEALTH CARE EDUCATION/TRAINING PROGRAM

## 2023-09-06 PROCEDURE — 87206 SMEAR FLUORESCENT/ACID STAI: CPT

## 2023-09-06 PROCEDURE — 83540 ASSAY OF IRON: CPT

## 2023-09-06 PROCEDURE — 6360000002 HC RX W HCPCS: Performed by: NURSE PRACTITIONER

## 2023-09-06 PROCEDURE — 36415 COLL VENOUS BLD VENIPUNCTURE: CPT

## 2023-09-06 PROCEDURE — 2709999900 US THORACENTESIS

## 2023-09-06 PROCEDURE — 87015 SPECIMEN INFECT AGNT CONCNTJ: CPT

## 2023-09-06 PROCEDURE — 99223 1ST HOSP IP/OBS HIGH 75: CPT | Performed by: INTERNAL MEDICINE

## 2023-09-06 PROCEDURE — 87070 CULTURE OTHR SPECIMN AEROBIC: CPT

## 2023-09-06 PROCEDURE — 87075 CULTR BACTERIA EXCEPT BLOOD: CPT

## 2023-09-06 PROCEDURE — 84157 ASSAY OF PROTEIN OTHER: CPT

## 2023-09-06 PROCEDURE — 2700000000 HC OXYGEN THERAPY PER DAY

## 2023-09-06 PROCEDURE — 85610 PROTHROMBIN TIME: CPT

## 2023-09-06 PROCEDURE — 6360000002 HC RX W HCPCS: Performed by: EMERGENCY MEDICINE

## 2023-09-06 PROCEDURE — 94640 AIRWAY INHALATION TREATMENT: CPT

## 2023-09-06 PROCEDURE — 6370000000 HC RX 637 (ALT 250 FOR IP): Performed by: NURSE PRACTITIONER

## 2023-09-06 PROCEDURE — 82728 ASSAY OF FERRITIN: CPT

## 2023-09-06 PROCEDURE — 85045 AUTOMATED RETICULOCYTE COUNT: CPT

## 2023-09-06 PROCEDURE — 82746 ASSAY OF FOLIC ACID SERUM: CPT

## 2023-09-06 PROCEDURE — 87102 FUNGUS ISOLATION CULTURE: CPT

## 2023-09-06 PROCEDURE — 87116 MYCOBACTERIA CULTURE: CPT

## 2023-09-06 PROCEDURE — 0W9B3ZZ DRAINAGE OF LEFT PLEURAL CAVITY, PERCUTANEOUS APPROACH: ICD-10-PCS | Performed by: INTERNAL MEDICINE

## 2023-09-06 PROCEDURE — 84155 ASSAY OF PROTEIN SERUM: CPT

## 2023-09-06 PROCEDURE — 82945 GLUCOSE OTHER FLUID: CPT

## 2023-09-06 PROCEDURE — 83986 ASSAY PH BODY FLUID NOS: CPT

## 2023-09-06 PROCEDURE — 83615 LACTATE (LD) (LDH) ENZYME: CPT

## 2023-09-06 PROCEDURE — 83550 IRON BINDING TEST: CPT

## 2023-09-06 PROCEDURE — 96376 TX/PRO/DX INJ SAME DRUG ADON: CPT

## 2023-09-06 PROCEDURE — 2580000003 HC RX 258: Performed by: INTERNAL MEDICINE

## 2023-09-06 PROCEDURE — 88185 FLOWCYTOMETRY/TC ADD-ON: CPT

## 2023-09-06 PROCEDURE — 84550 ASSAY OF BLOOD/URIC ACID: CPT

## 2023-09-06 PROCEDURE — 88184 FLOWCYTOMETRY/ TC 1 MARKER: CPT

## 2023-09-06 PROCEDURE — 88305 TISSUE EXAM BY PATHOLOGIST: CPT

## 2023-09-06 PROCEDURE — 88112 CYTOPATH CELL ENHANCE TECH: CPT

## 2023-09-06 PROCEDURE — 82607 VITAMIN B-12: CPT

## 2023-09-06 RX ORDER — LANOLIN ALCOHOL/MO/W.PET/CERES
400 CREAM (GRAM) TOPICAL DAILY
Status: DISCONTINUED | OUTPATIENT
Start: 2023-09-06 | End: 2023-09-07 | Stop reason: HOSPADM

## 2023-09-06 RX ORDER — SODIUM CHLORIDE 9 MG/ML
INJECTION, SOLUTION INTRAVENOUS PRN
Status: DISCONTINUED | OUTPATIENT
Start: 2023-09-06 | End: 2023-09-07 | Stop reason: HOSPADM

## 2023-09-06 RX ORDER — ROSUVASTATIN CALCIUM 10 MG/1
10 TABLET, COATED ORAL DAILY
Status: DISCONTINUED | OUTPATIENT
Start: 2023-09-06 | End: 2023-09-07 | Stop reason: HOSPADM

## 2023-09-06 RX ORDER — TAMSULOSIN HYDROCHLORIDE 0.4 MG/1
0.8 CAPSULE ORAL DAILY
Status: DISCONTINUED | OUTPATIENT
Start: 2023-09-06 | End: 2023-09-07 | Stop reason: HOSPADM

## 2023-09-06 RX ORDER — FOLIC ACID 1 MG/1
1 TABLET ORAL DAILY
Status: DISCONTINUED | OUTPATIENT
Start: 2023-09-06 | End: 2023-09-07 | Stop reason: HOSPADM

## 2023-09-06 RX ORDER — POTASSIUM CHLORIDE 20 MEQ/1
40 TABLET, EXTENDED RELEASE ORAL PRN
Status: DISCONTINUED | OUTPATIENT
Start: 2023-09-06 | End: 2023-09-07 | Stop reason: HOSPADM

## 2023-09-06 RX ORDER — CETIRIZINE HYDROCHLORIDE 10 MG/1
5 TABLET ORAL DAILY
Status: DISCONTINUED | OUTPATIENT
Start: 2023-09-06 | End: 2023-09-07 | Stop reason: HOSPADM

## 2023-09-06 RX ORDER — ACETAMINOPHEN 325 MG/1
650 TABLET ORAL EVERY 6 HOURS PRN
Status: DISCONTINUED | OUTPATIENT
Start: 2023-09-06 | End: 2023-09-07 | Stop reason: HOSPADM

## 2023-09-06 RX ORDER — QUETIAPINE FUMARATE 25 MG/1
25 TABLET, FILM COATED ORAL NIGHTLY
Status: DISCONTINUED | OUTPATIENT
Start: 2023-09-06 | End: 2023-09-07 | Stop reason: HOSPADM

## 2023-09-06 RX ORDER — ACETAMINOPHEN 650 MG/1
650 SUPPOSITORY RECTAL EVERY 6 HOURS PRN
Status: DISCONTINUED | OUTPATIENT
Start: 2023-09-06 | End: 2023-09-07 | Stop reason: HOSPADM

## 2023-09-06 RX ORDER — HEPARIN SODIUM 5000 [USP'U]/ML
5000 INJECTION, SOLUTION INTRAVENOUS; SUBCUTANEOUS EVERY 8 HOURS SCHEDULED
Status: DISCONTINUED | OUTPATIENT
Start: 2023-09-06 | End: 2023-09-07 | Stop reason: HOSPADM

## 2023-09-06 RX ORDER — POTASSIUM CHLORIDE 7.45 MG/ML
10 INJECTION INTRAVENOUS PRN
Status: DISCONTINUED | OUTPATIENT
Start: 2023-09-06 | End: 2023-09-07 | Stop reason: HOSPADM

## 2023-09-06 RX ORDER — ONDANSETRON 2 MG/ML
4 INJECTION INTRAMUSCULAR; INTRAVENOUS EVERY 6 HOURS PRN
Status: DISCONTINUED | OUTPATIENT
Start: 2023-09-06 | End: 2023-09-07 | Stop reason: HOSPADM

## 2023-09-06 RX ORDER — LANOLIN ALCOHOL/MO/W.PET/CERES
3 CREAM (GRAM) TOPICAL NIGHTLY PRN
Status: DISCONTINUED | OUTPATIENT
Start: 2023-09-06 | End: 2023-09-07 | Stop reason: HOSPADM

## 2023-09-06 RX ORDER — ALLOPURINOL 300 MG/1
300 TABLET ORAL DAILY
Status: DISCONTINUED | OUTPATIENT
Start: 2023-09-06 | End: 2023-09-07 | Stop reason: HOSPADM

## 2023-09-06 RX ORDER — MAGNESIUM SULFATE 1 G/100ML
1000 INJECTION INTRAVENOUS PRN
Status: DISCONTINUED | OUTPATIENT
Start: 2023-09-06 | End: 2023-09-07 | Stop reason: HOSPADM

## 2023-09-06 RX ORDER — LEVOFLOXACIN 500 MG/1
500 TABLET, FILM COATED ORAL DAILY
Status: DISCONTINUED | OUTPATIENT
Start: 2023-09-06 | End: 2023-09-07 | Stop reason: HOSPADM

## 2023-09-06 RX ORDER — SODIUM CHLORIDE 0.9 % (FLUSH) 0.9 %
5-40 SYRINGE (ML) INJECTION EVERY 12 HOURS SCHEDULED
Status: DISCONTINUED | OUTPATIENT
Start: 2023-09-06 | End: 2023-09-07 | Stop reason: HOSPADM

## 2023-09-06 RX ORDER — SODIUM CHLORIDE 0.9 % (FLUSH) 0.9 %
5-40 SYRINGE (ML) INJECTION PRN
Status: DISCONTINUED | OUTPATIENT
Start: 2023-09-06 | End: 2023-09-07 | Stop reason: HOSPADM

## 2023-09-06 RX ORDER — ACYCLOVIR 800 MG/1
800 TABLET ORAL DAILY
Status: DISCONTINUED | OUTPATIENT
Start: 2023-09-06 | End: 2023-09-07 | Stop reason: HOSPADM

## 2023-09-06 RX ORDER — ALBUTEROL SULFATE 2.5 MG/3ML
2.5 SOLUTION RESPIRATORY (INHALATION) EVERY 6 HOURS PRN
Status: DISCONTINUED | OUTPATIENT
Start: 2023-09-06 | End: 2023-09-07 | Stop reason: HOSPADM

## 2023-09-06 RX ORDER — BUDESONIDE AND FORMOTEROL FUMARATE DIHYDRATE 160; 4.5 UG/1; UG/1
2 AEROSOL RESPIRATORY (INHALATION)
Status: DISCONTINUED | OUTPATIENT
Start: 2023-09-06 | End: 2023-09-07 | Stop reason: HOSPADM

## 2023-09-06 RX ORDER — SODIUM CHLORIDE 9 MG/ML
INJECTION, SOLUTION INTRAVENOUS CONTINUOUS
Status: DISCONTINUED | OUTPATIENT
Start: 2023-09-06 | End: 2023-09-06 | Stop reason: HOSPADM

## 2023-09-06 RX ORDER — POLYETHYLENE GLYCOL 3350 17 G/17G
17 POWDER, FOR SOLUTION ORAL DAILY PRN
Status: DISCONTINUED | OUTPATIENT
Start: 2023-09-06 | End: 2023-09-07 | Stop reason: HOSPADM

## 2023-09-06 RX ORDER — SODIUM CHLORIDE 0.9 % (FLUSH) 0.9 %
10 SYRINGE (ML) INJECTION PRN
Status: DISCONTINUED | OUTPATIENT
Start: 2023-09-06 | End: 2023-09-07 | Stop reason: HOSPADM

## 2023-09-06 RX ORDER — ONDANSETRON 4 MG/1
4 TABLET, ORALLY DISINTEGRATING ORAL EVERY 8 HOURS PRN
Status: DISCONTINUED | OUTPATIENT
Start: 2023-09-06 | End: 2023-09-07 | Stop reason: HOSPADM

## 2023-09-06 RX ORDER — CYCLOBENZAPRINE HCL 10 MG
10 TABLET ORAL 3 TIMES DAILY PRN
Status: DISCONTINUED | OUTPATIENT
Start: 2023-09-06 | End: 2023-09-07 | Stop reason: HOSPADM

## 2023-09-06 RX ORDER — POSACONAZOLE 100 MG/1
300 TABLET, DELAYED RELEASE ORAL EVERY 24 HOURS
Status: DISCONTINUED | OUTPATIENT
Start: 2023-09-06 | End: 2023-09-07 | Stop reason: HOSPADM

## 2023-09-06 RX ORDER — ENOXAPARIN SODIUM 100 MG/ML
30 INJECTION SUBCUTANEOUS 2 TIMES DAILY
Status: DISCONTINUED | OUTPATIENT
Start: 2023-09-06 | End: 2023-09-06

## 2023-09-06 RX ADMIN — FOLIC ACID 1 MG: 1 TABLET ORAL at 17:26

## 2023-09-06 RX ADMIN — ROSUVASTATIN CALCIUM 10 MG: 10 TABLET, FILM COATED ORAL at 17:26

## 2023-09-06 RX ADMIN — HYDROMORPHONE HYDROCHLORIDE 1 MG: 1 INJECTION, SOLUTION INTRAMUSCULAR; INTRAVENOUS; SUBCUTANEOUS at 07:54

## 2023-09-06 RX ADMIN — SODIUM CHLORIDE, PRESERVATIVE FREE 10 ML: 5 INJECTION INTRAVENOUS at 17:27

## 2023-09-06 RX ADMIN — LEVOFLOXACIN 500 MG: 500 TABLET, FILM COATED ORAL at 17:21

## 2023-09-06 RX ADMIN — Medication 3 MG: at 21:43

## 2023-09-06 RX ADMIN — BUDESONIDE AND FORMOTEROL FUMARATE DIHYDRATE 2 PUFF: 160; 4.5 AEROSOL RESPIRATORY (INHALATION) at 21:12

## 2023-09-06 RX ADMIN — METOPROLOL TARTRATE 12.5 MG: 25 TABLET, FILM COATED ORAL at 21:43

## 2023-09-06 RX ADMIN — TAMSULOSIN HYDROCHLORIDE 0.8 MG: 0.4 CAPSULE ORAL at 17:21

## 2023-09-06 RX ADMIN — QUETIAPINE FUMARATE 25 MG: 25 TABLET ORAL at 22:30

## 2023-09-06 RX ADMIN — SODIUM CHLORIDE: 9 INJECTION, SOLUTION INTRAVENOUS at 00:50

## 2023-09-06 RX ADMIN — POSACONAZOLE 300 MG: 100 TABLET, DELAYED RELEASE ORAL at 17:22

## 2023-09-06 RX ADMIN — MAGNESIUM GLUCONATE 500 MG ORAL TABLET 400 MG: 500 TABLET ORAL at 17:26

## 2023-09-06 RX ADMIN — SODIUM CHLORIDE, PRESERVATIVE FREE 10 ML: 5 INJECTION INTRAVENOUS at 21:44

## 2023-09-06 RX ADMIN — PREDNISONE 30 MG: 20 TABLET ORAL at 17:21

## 2023-09-06 RX ADMIN — ALLOPURINOL 300 MG: 300 TABLET ORAL at 17:21

## 2023-09-06 RX ADMIN — ACYCLOVIR 800 MG: 800 TABLET ORAL at 17:21

## 2023-09-06 RX ADMIN — HEPARIN SODIUM 5000 UNITS: 5000 INJECTION INTRAVENOUS; SUBCUTANEOUS at 21:44

## 2023-09-06 RX ADMIN — POLYETHYLENE GLYCOL 3350 17 G: 17 POWDER, FOR SOLUTION ORAL at 22:30

## 2023-09-06 RX ADMIN — HYDROMORPHONE HYDROCHLORIDE 1 MG: 1 INJECTION, SOLUTION INTRAMUSCULAR; INTRAVENOUS; SUBCUTANEOUS at 00:35

## 2023-09-06 RX ADMIN — CETIRIZINE HYDROCHLORIDE 5 MG: 10 TABLET ORAL at 17:21

## 2023-09-06 RX ADMIN — HYDROMORPHONE HYDROCHLORIDE 1 MG: 1 INJECTION, SOLUTION INTRAMUSCULAR; INTRAVENOUS; SUBCUTANEOUS at 05:41

## 2023-09-06 ASSESSMENT — PAIN SCALES - GENERAL
PAINLEVEL_OUTOF10: 8
PAINLEVEL_OUTOF10: 0
PAINLEVEL_OUTOF10: 8
PAINLEVEL_OUTOF10: 6
PAINLEVEL_OUTOF10: 8
PAINLEVEL_OUTOF10: 3
PAINLEVEL_OUTOF10: 5
PAINLEVEL_OUTOF10: 5
PAINLEVEL_OUTOF10: 0

## 2023-09-06 ASSESSMENT — PAIN - FUNCTIONAL ASSESSMENT
PAIN_FUNCTIONAL_ASSESSMENT: 0-10

## 2023-09-06 NOTE — PROGRESS NOTES
Occupational [address]  Occupational Therapy Not Seen Note    DATE: 2023    NAME: Emeka Peralta  MRN: [id number]   : 1955      Patient not seen this date for Occupational Therapy due to: Both patient and RN confirmed that the patient is independent with ADLs and functional tasks with no acute OT needs. PT declines any concerns with ability to complete mobility and ADLs independently and without skilled assistance. Will defer OT evaluation at this time. Please reorder OT if future needs arise.      Electronically signed by Marni Hawkins OT on 2023 at 2:24 PM

## 2023-09-06 NOTE — ED NOTES
Bedside report received from [place]. Pt provided urinal to obtain urine specimen. Call light in reach.       Leonarda Herzog RN  09/05/23 4360
ED nurse-to-nurse bedside report    Chief Complaint   Patient presents with    Shortness of Breath      LOC: alert and orientated to name, place, date  Vital signs   Vitals:    09/05/23 2331 09/06/23 0049 09/06/23 0209 09/06/23 0311   BP: 114/65 121/69 124/65 105/66   Pulse: 92 98 96 92   Resp: (!) 32 20 25 23   Temp:       TempSrc:       SpO2: 96% 95% 95% 95%   Weight:       Height:          Pain:  6/10  Pain Interventions: IV Dilaudid   Pain Goal:   Oxygen: Yes    Current needs required 2L nc (this is pt's baseline)   Telemetry: Yes  LDAs: 20g Left antecubital   Peripheral IV 09/05/23 Left Antecubital (Active)   Site Assessment Clean, dry & intact 09/05/23 1622   Line Status Infusing 09/05/23 1622   Phlebitis Assessment No symptoms 09/05/23 1622   Infiltration Assessment 0 09/05/23 1622   Dressing Status Clean, dry & intact 09/05/23 1622   Dressing Type Transparent 09/05/23 1622     Continuous Infusions:    sodium chloride 75 mL/hr at 09/06/23 0050     Mobility: Requires assistance * 1  Todd Fall Risk Score: No flowsheet data found.   Fall Interventions: Left side rail up, call light in reach, intentional rounding  Report given to: Rosalba Hargrove RN  09/06/23 8904
LACP at bedside.      Leonel Mendoza RN  09/06/23 8067
Pt in bed watching tv at this time with no s/s of distress noted. Updated on plan of care. Voiced no needs. Call light in reach.      Kory Darden RN  09/06/23 2479
Pt in bed watching tv. Voiced no needs. Updated on ETA of LACP. Call light in reach.      Fariha Tovar RN  09/06/23 0806
Pt medicated per MAR and provided ice water at this time. Call light in reach.       Ann Cohen RN  09/05/23 2666
Pt requesting more pain meds at this time. Provider notified.      Claudell Crawley, RN  09/06/23 8855
Pt resting on cot watching tv. RN provided patient with ice water. Pt respirations are even and unlabored.      Maria T Nelson RN  09/06/23 1939
Pt resting on cot. States that his pain is down from an 8 to a 2 at this time. Pt updated on decision to transfer. LUCY.       Reilly Culp, [place]  09/05/23 4705
Pt states pain is resolved at this time. Voiced no needs. Call light in reach.      Juan Lizarraga RN  09/06/23 3429
Pt to ED via intake with c/o SOB for three days. Pt reports they received a blood transfusion a few days ago with no improvement of symptoms. Pt reports that they normally use 2L of O2. Pt O2 sats remain above 95% on 2L. P respirations are labored at rest. Pt VSS. EKG completed.       aWylon Roberson RN  09/05/23 0438
Pt updated on plan of care. Voiced no needs. Call light in reach.      Ericka Morton RN  09/06/23 6753
Pt updated on transfer process at this time. States his pain is back to an 8/10.  Will notify Dr. Ashwin Hahn, RN  09/05/23 8274
Report given to Leo Talbert RN.      Guzman Mae RN  09/06/23 5789
Transfer consent signed at this time     Rehana Paredes RN  09/06/23 3141
Urine specimen obtained at this time. Pt complaining of chronic right leg pain.  Will notify provider     Madonna Barroso RN  09/05/23 5878
MD  Resident  09/06/23 5842

## 2023-09-06 NOTE — ED PROVIDER NOTES
Signed out to me at shift change 3:53 PM.   This patient has been seen and evaluated by previous shift physician, Dr. Jennifer Darden and his resient. Please refer to his/her note for detailed history of present illness, physical exam and medical decision making. I was signed out to follow up disposition. I have personally seen and evaluated this patient at time of sign-out. ED COURSE / MEDICAL DECISION MAKING:       Patient presents for evaluation of generalized weakness and pain x 4-5 days. 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 Patient felt well since [facility] discharge until 4 and 5 days ago. Physical exam: AVSS. Heart and lungs are unremarkable. Abdomen soft. Neurologically intact. He looks fatigued. ED work-ups today suggest AML conversion again with blast percentage around 20%. Discussed with [facility] transfer center, unfortunately, his insurance [employer] is not accepted by [facility] hematology/oncology groups as outpatient. [facility] transfer center recommends seeking other places in [place] while waiting for [employer] worker who will be available tomorrow morning. We will consider transferring to [address] [place] [address] Signed out to next shift physician Dr. Carlos Haque.     VITALS  Vitals:    09/05/23 1904 09/05/23 2101 09/05/23 2147 09/05/23 2331   BP: 111/63 108/68 (!) 112/58 114/65   Pulse: 88 88 86 92   Resp: 25 27 22 (!) 32   Temp:       TempSrc:       SpO2: 96% 96% 96% 96%   Weight:       Height:             LABS  Results for orders placed or performed during the hospital encounter of 09/05/23   COVID-19 & Influenza Combo    Specimen: Nasopharyngeal Swab   Result Value Ref Range    SARS-CoV-2 RNA, RT PCR NOT DETECTED NOT DETECTED    INFLUENZA A NOT DETECTED NOT DETECTED    INFLUENZA B NOT DETECTED NOT DETECTED   CBC with Auto Differential   Result Value Ref Range    WBC 64.2 (HH) 4.8 - 10.8 thou/mm3    RBC
Transfer of Care Note:   Physician Signing out: Atiya Ying MD  Receiving Physician: Krystal Green MD  Sign out time: 1600      Brief history:  79 y.o. male with PMH significant for CML, COPD, HTN who presents to the ER, brought in by EMS for evaluation of shortness of breath for the past 3 days with associated fatigue and weakness. Patient denies fever, chills, cough, abdominal pain chest pain. Patient utilizes 2L O2 via NC at home. Patient found to have pleural effusions, WBC count 64.2, blasts 11%. Patient needs to be transferred and seen by heme-onc. Items pending that need to be checked:  -Blast count      Tentative Impression of patient:  Pleural effusion  COPD exacerbation  Possible CML relapse    Expected disposition of patient:  Pending results, transferred. Additional Assessment and results:   I have personally performed a face to face diagnostic evaluation on this patient. The patient's initial evaluation and plan have been discussed with the prior physician who initially evaluated the patient. Nursing Notes, Past Medical Hx, Past Surgical Hx, Social Hx, Allergies, vital signs and Family Hx were all reviewed. Vitals:    09/06/23 0049   BP: 121/69   Pulse: 98   Resp: 20   Temp:    SpO2: 95%     Physical Exam  Vitals and nursing note reviewed. Constitutional:       General: He is not in acute distress. Appearance: He is not diaphoretic. HENT:      Head: Normocephalic and atraumatic. Mouth/Throat:      Mouth: Mucous membranes are moist.      Pharynx: Oropharynx is clear. Eyes:      Extraocular Movements: Extraocular movements intact. Pupils: Pupils are equal, round, and reactive to light. Cardiovascular:      Rate and Rhythm: Normal rate and regular rhythm. Pulses: Normal pulses. Heart sounds: No murmur heard. No friction rub. No gallop. Pulmonary:      Effort: Pulmonary effort is normal. No respiratory distress. Breath sounds:  No
Transfer of Care Note:   Physician Signing out: Belinda Ross MD  Receiving Physician: Liset Mariee MD  Sign out time: 65      Brief history:  62-year-old male past medical history significant for CML, COPD presents ED due to chief complaint shortness of breath. It was found that patient does have pleural effusions however also WBC 64.2, blasts 11%. Concerned that this is transitioning into blast crisis and patient would need to be seen by heme-onc. Patient initially was established at [facility] however his insurance does not accept [facility] therefore there were multiple cancellations at [facility]. Patient needs to go to a center where his insurance has coverage. Items pending that need to be checked:  Transfer to [facility]     Tentative Impression of patient:  Stable    Expected disposition of patient:  Pending results, transferred. Additional Assessment and results:   I have personally performed a face to face diagnostic evaluation on this patient. The patient's initial evaluation and plan have been discussed with the prior physician who initially evaluated the patient. Nursing Notes, Past Medical Hx, Past Surgical Hx, Social Hx, Allergies, vital signs and Family Hx were all reviewed.       Vitals:    09/05/23 2331   BP: 114/65   Pulse: 92   Resp: (!) 32   Temp:    SpO2: 96%       Labs Reviewed   CBC WITH AUTO DIFFERENTIAL - Abnormal; Notable for the following components:       Result Value    WBC 64.2 (*)     RBC 2.75 (*)     Hemoglobin 8.5 (*)     Hematocrit 25.4 (*)     RDW-CV 15.4 (*)     RDW-SD 51.0 (*)     Platelets 302 (*)     Blasts 11 (*)     Monocytes Absolute 32.1 (*)     All other components within normal limits   COMPREHENSIVE METABOLIC PANEL W/ REFLEX TO MG FOR LOW K - Abnormal; Notable for the following components:    Creatinine 1.5 (*)     CO2 20 (*)     All other components within normal limits   GLOMERULAR FILTRATION RATE, ESTIMATED - Abnormal; Notable for the following components:    Est, Glom
644.6 (*)     All other components within normal limits   MICROSCOPIC URINALYSIS - Abnormal; Notable for the following components:    Protein, UA 30 (*)     All other components within normal limits   COVID-19 & INFLUENZA COMBO   ANION GAP   OSMOLALITY   LACTATE, SEPSIS   MANUAL DIFFERENTIAL       (Any cultures that may have been sent were not resulted at the time of this patient visit)    [facility] / ED COURSE:     1) Number and Complexity of Problems            Problem List This Visit:         Chief Complaint   Patient presents with    Shortness of Breath   Fatigue. Weakness        Differential Diagnosis includes (but not limited to):  COPD exacerbation  CML relapse/blast crisis. Community-acquired pneumonia. Pleural effusion        Diagnoses Considered but I have low suspicion of:   Acute coronary syndrome. Pulmonary sepsis. Pulmonary embolism. Renal failure             Pertinent Comorbid Conditions:    CML, COPD, hypertension    2)  Data Reviewed (none if left blank)          My Independent interpretations:     EKG:     Sinus rhythm, normal axis heart rate 80, normal T waves, no ST elevation, no ST depression QTc 450    Imaging: CT chestThere is a confluent opacity within the right perihilar region extending along the brachial vasculature of the right mid lung which appears decreased in size when compared to prior CT examination dated 7/28/2023. This measures approximately 2.5 x 1.6   cm. Previously this measured 6.1 x 3.2 cm. 2. There is a moderate left pleural effusion. There is no CT angiographic evidence of pulmonary embolism. 3. There is a small amount of pericardial fluid present which is nonspecific. 4. There is an enlarged lymph node demonstrated within the right paratracheal region measuring approximately 3 x 2.1 cm. Previously this measured 3.7 x 2.6 cm. This is decreased in size.  However, there are numerous additional prominent and enlarged lymph   nodes elsewhere

## 2023-09-06 NOTE — BRIEF OP NOTE
Brief Postoperative Note    Laura Route  YOB: 1955  [id number]    Pre-operative Diagnosis: Left pleural effusion    Post-operative Diagnosis: Same    Procedure: Thoracentesis    Anesthesia: Local    Surgeons/Assistants: Yuan Allen MD     Estimated Blood Loss: minimal    Complications: none immediate    Specimens: were obtained    Findings: U/S guided left thoracentesis yielding 725 ml dark red \"punch-colored\" fluid.       Electronically signed by Yuan Allen MD on 9/6/2023 at 4:20 PM

## 2023-09-06 NOTE — PROGRESS NOTES
Patient to IR for left thoracentesis. Dr. Sabrina Villatoro and Sabine Cobb at bedside. Site prepped and draped, area numbed with lidocaine. Access obtained and 725ml of dark red fluid drained. Specimen collected. Access removed and dry sterile drsg with tegaderm placed to site. Patient tolerated well.

## 2023-09-06 NOTE — H&P
[facility]  Office: [address], DO, Kathrine Garcia, DO, Tobias Essex, DO, Reena Sarabia Blood, DO, Raquel Maria MD, Michelle Goodwin MD, Evgeny Andres MD, Rossy Tracy MD,  Suzanne Angel MD, Nba Dolan MD, Jhonathan Villatoro, DO, Brandie Garcia MD,  Stacey Nguyen MD, Yazmin Arriaga MD, Kerry Bowen DO, Di Parra MD,  Debra Reyes, DO, Angella Tomas MD, Pam Nye MD, Teena Montoya MD, Pelon Jacobson MD,  Tomás Butterfield MD, Kaylan Alcantara MD, Cheyanne Khanna MD, Archie Tong DO, Freeman Banda MD,  Pilar Banda MD, Lianna Tinoco, CNP,  Bryan Cano, CNP,, Kelechi Bravo, CNP,  Abril Andrew, [facility], Rolando Spencer, CNP, Darshana Dow, CNP, Jazmin Bhandari, CNP, Valdemar Nieves, CNP, Kayla Saeed, CNP, Greta Murillo, CNP, Harshal Reyna PA-C, Karen Bro, [facility], Nahun Arriaga, CNP, Gema Pablo, [address]    HISTORY AND PHYSICAL EXAMINATION            Date:   9/6/2023  Patient name:  Terrence Greer  Date of admission:  9/6/2023  9:44 AM  MRN:   [id number]  Account:  [de-identified]  YOB: 1955  PCP:    YESY Khalil CNP  Room:   2019/2019-01  Code Status:    Full Code    Chief Complaint:     fatigue    History Obtained From:     patient, electronic medical record    History of Present Illness:     Terrence Greer is a 79 y.o. Non- / non  male who presents with No chief complaint on file. and is admitted to the hospital for the management of Chronic myelogenous leukemia ([address] [facility]). This is a 53-year-old male with a past medical history significant for COPD, hypertension, arthritis, cancer, currently undergoing chemotherapy with [facility] who presented to [facility] with worsening fatigue and shortness of breath and right hip with LBP.       The patient was initially seen by Dr. Kirti Keller of [facility] and [place] recliner no acute distress. Vital signs are stable. Denies active chest pain, denies shortness of breath at rest but does admit to dyspnea on exertion. No nausea, vomiting, diarrhea, constipation, fever, chills, urinary symptoms states right hip and low back pain without associated numbness or tingling or loss of bowel or bladder sensation    He is a nondrinker for the last 7 months, quit smoking approximately 1 year ago. No known drug allergies    Past Medical History:     Past Medical History:   Diagnosis Date    Arthritis     Cancer ([address] [facility])     COPD (chronic obstructive pulmonary disease) ([address] [facility])     Hypertension         Past Surgical History:     Past Surgical History:   Procedure Laterality Date    CT BONE MARROW BIOPSY  8/25/2023    CT BONE MARROW BIOPSY 8/25/2023 [facility] CT SCAN        Medications Prior to Admission:     Prior to Admission medications    Medication Sig Start Date End Date Taking? Authorizing Provider   folic acid (FOLVITE) 1 MG tablet Take 1 tablet by mouth daily 8/28/23   Sarah Vogt MD   aluminum & magnesium hydroxide-simethicone (MAALOX) 200-200-20 MG/5ML SUSP suspension Take 30 mLs by mouth every 6 hours as needed 8/10/23   Historical Provider, MD   metoprolol tartrate (LOPRESSOR) 25 MG tablet Take 0.5 tablets by mouth in the morning and 0.5 tablets in the evening. 8/10/23   Historical Provider, MD   melatonin 3 MG TABS tablet Take 1 tablet by mouth 8/10/23   Historical Provider, MD   levoFLOXacin (LEVAQUIN) 500 MG tablet Take 1 tablet by mouth daily 8/11/23 11/9/23  Historical Provider, MD   docusate (COLACE, DULCOLAX) 100 MG CAPS Take 100,000 mcg by mouth 2 times daily as needed 8/10/23   Historical Provider, MD   magnesium oxide (MAG-OX) 400 MG tablet Take 1 tablet by mouth daily 8/11/23   Historical Provider, MD   Polyethylene Glycol 3350 POWD Take 17 g by mouth in the morning and 17 g in the evening.  8/10/23   Historical Provider, MD   posaconazole (NOXAFIL) 100 MG TBEC tablet Take

## 2023-09-06 NOTE — CONSULTS
PULMONARY & CRITICAL CARE MEDICINE CONSULT NOTE     Patient:  Chandrika Bee  MRN: [id number]  Admit date: 9/6/2023  Primary Care Physician: YESY Alegre - CNP  Consulting Physician: Delcia Epley, DO  CODE Status: Full Code   LOS: 0    HISTORY     CHIEF COMPLAINT/REASON FOR CONSULT:    Left pleural effusion. HISTORY OF PRESENT ILLNESS:  The patient is a 79 y.o. male with history of COPD on home oxygen at 2 L, paroxysmal atrial fibrillation hypertension. According to available history according to patient he was diagnosed with CML around April and he had 2 cycles of chemotherapy in [facility]. Patient presented to [facility] on 09/05/2023 with weakness fatigue and found to have a WBC count of 64.2 with blast of 11% patient was transferred to [place] for possible blast crisis. Patient does not complain of any worsening shortness of breath he does have chronic dyspnea and mild intermittent cough he does take Trelegy at home. He denies any worsening of shortness of breath or increased cough hemoptysis or chest pain does not complain of fever or night sweats. He recently had a CT-guided bone marrow biopsy done on 08/25/2023. He had a CT scan of the chest done which was negative for pulm embolism but shows small to moderate left-sided pleural effusion on review of the CT scan of the chest on 07/28/2023 at that time no significant pleural effusion was seen. On review of the chest x-ray and CT scan patient does have a right upper lobe area of opacity/atelectasis along with area extending to pleural/pleural thickening. CT scan of the chest also shows calcification of the fluid laterally above the diaphragm and lower posteriorly. CT scan of the chest also shows mediastinal did not within the right hilar soft tissue mass/lymph node    He has history of working as a [employer],  and also in [employer].   He has history of smoking 40 pack years and stopped smoking 1 year ago      PAST still pleural fluid analysis including flow cytometry, cell count differential culture and cytology along with chemistry. Patient has right upper lobe area of opacity which look chronic but hilar soft tissue mass/lymph node could cause obstruction or atelectasis. Follow pleural fluid analysis result. Continue O2 as patient is on home O2. Continue Trelegy or substitute available in the hospital  Would recommend peripheral smear by pathology. Follow-up with hematology oncology and follow-up WBC, platelet count. \  Patient remains hemodynamically stable and is currently saturating well on nasal cannula. Continue supplemental oxygen to keep oxygen saturation greater than 92%  DVT prophylaxis if platelet count remains okay and okay with oncology  Physical/occupational therapy; increase activity as tolerated. I updated the patient regarding the current clinical condition, provisional diagnosis and management plan. He verbalized a clear understanding and I addressed his concerns, and answered all questions to the best of my abilities. It was my pleasure to evaluate Master Lovell today. We will continue to follow. I would like to thank you for allowing me to participate in the care of this patient. Please feel free to call with any further questions or concerns. Reuben Weiss MD, M.D. Pulmonary and Critical Care Medicine           9/6/2023, 12:32 PM    Please note that this chart was generated using voice recognition Dragon dictation software. Although every effort was made to ensure the accuracy of this automated transcription, some errors in transcription may have occurred.

## 2023-09-07 ENCOUNTER — HOSPITAL ENCOUNTER (OUTPATIENT)
Dept: GENERAL RADIOLOGY | Age: 68
Discharge: HOME | End: 2023-09-07

## 2023-09-07 ENCOUNTER — HOSPITAL ENCOUNTER (OUTPATIENT)
Dept: CT IMAGING | Age: 68
Discharge: HOME | End: 2023-09-07
Attending: RADIOLOGY

## 2023-09-07 VITALS
BODY MASS INDEX: 35.51 KG/M2 | RESPIRATION RATE: 16 BRPM | SYSTOLIC BLOOD PRESSURE: 107 MMHG | WEIGHT: 254.63 LBS | OXYGEN SATURATION: 96 % | DIASTOLIC BLOOD PRESSURE: 64 MMHG | HEART RATE: 94 BPM | TEMPERATURE: 97.7 F

## 2023-09-07 DIAGNOSIS — Z00.6 EXAMINATION FOR NORMAL COMPARISON FOR CLINICAL RESEARCH: ICD-10-CM

## 2023-09-07 PROBLEM — J44.9 CHRONIC OBSTRUCTIVE PULMONARY DISEASE (HCC): Status: ACTIVE | Noted: 2023-09-07

## 2023-09-07 PROBLEM — J90 PLEURAL EFFUSION ON LEFT: Status: ACTIVE | Noted: 2023-09-07

## 2023-09-07 PROBLEM — J94.8 CALCIFIED PLEURAL PLAQUE ON CHEST X-RAY: Status: ACTIVE | Noted: 2023-09-07

## 2023-09-07 PROBLEM — J98.11 ATELECTASIS: Status: ACTIVE | Noted: 2023-09-07

## 2023-09-07 LAB
ALBUMIN SERPL-MCNC: 3.2 G/DL (ref 3.5–5.2)
ANION GAP SERPL CALCULATED.3IONS-SCNC: 11 MMOL/L (ref 9–17)
BODY FLD TYPE: NORMAL
BUN SERPL-MCNC: 13 MG/DL (ref 8–23)
CALCIUM SERPL-MCNC: 8.4 MG/DL (ref 8.6–10.4)
CASE NUMBER:: NORMAL
CHLORIDE SERPL-SCNC: 104 MMOL/L (ref 98–107)
CO2 SERPL-SCNC: 22 MMOL/L (ref 20–31)
CREAT SERPL-MCNC: 1.2 MG/DL (ref 0.7–1.2)
ERYTHROCYTE [DISTWIDTH] IN BLOOD BY AUTOMATED COUNT: 15.5 % (ref 11.8–14.4)
GFR SERPL CREATININE-BSD FRML MDRD: >60 ML/MIN/1.73M2
GLUCOSE SERPL-MCNC: 120 MG/DL (ref 70–99)
HCT VFR BLD AUTO: 22.9 % (ref 40.7–50.3)
HGB BLD-MCNC: 7.4 G/DL (ref 13–17)
INR PPP: 1.2
LYMPHOCYTES NFR FLD: 3 %
MAGNESIUM SERPL-MCNC: 2.2 MG/DL (ref 1.6–2.6)
MCH RBC QN AUTO: 29.7 PG (ref 25.2–33.5)
MCHC RBC AUTO-ENTMCNC: 32.3 G/DL (ref 28.4–34.8)
MCV RBC AUTO: 92 FL (ref 82.6–102.9)
MICROORGANISM SPEC CULT: NORMAL
MICROORGANISM SPEC CULT: NORMAL
MICROORGANISM/AGENT SPEC: NORMAL
MICROORGANISM/AGENT SPEC: NORMAL
NEUTROPHILS NFR FLD: 0 %
NRBC BLD-RTO: 0 PER 100 WBC
PHOSPHATE SERPL-MCNC: 5.1 MG/DL (ref 2.5–4.5)
PLATELET # BLD AUTO: ABNORMAL K/UL (ref 138–453)
PLATELET, FLUORESCENCE: 79 K/UL (ref 138–453)
PLATELETS.RETICULATED NFR BLD AUTO: 16.8 % (ref 1.1–10.3)
POTASSIUM SERPL-SCNC: 4.1 MMOL/L (ref 3.7–5.3)
PROTHROMBIN TIME: 15.3 SEC (ref 11.7–14.9)
RBC # BLD AUTO: 2.49 M/UL (ref 4.21–5.77)
RBC # FLD: NORMAL CELLS/UL
SODIUM SERPL-SCNC: 137 MMOL/L (ref 135–144)
SPECIMEN DESCRIPTION: NORMAL
UNIDENT CELLS NFR FLD: NORMAL %
WBC # FLD: 4886 CELLS/UL
WBC OTHER # BLD: 65.7 K/UL (ref 3.5–11.3)

## 2023-09-07 PROCEDURE — 36415 COLL VENOUS BLD VENIPUNCTURE: CPT

## 2023-09-07 PROCEDURE — 6360000002 HC RX W HCPCS: Performed by: NURSE PRACTITIONER

## 2023-09-07 PROCEDURE — 99232 SBSQ HOSP IP/OBS MODERATE 35: CPT | Performed by: INTERNAL MEDICINE

## 2023-09-07 PROCEDURE — 99233 SBSQ HOSP IP/OBS HIGH 50: CPT | Performed by: INTERNAL MEDICINE

## 2023-09-07 PROCEDURE — 85055 RETICULATED PLATELET ASSAY: CPT

## 2023-09-07 PROCEDURE — 85610 PROTHROMBIN TIME: CPT

## 2023-09-07 PROCEDURE — 99232 SBSQ HOSP IP/OBS MODERATE 35: CPT | Performed by: NURSE PRACTITIONER

## 2023-09-07 PROCEDURE — 6370000000 HC RX 637 (ALT 250 FOR IP): Performed by: NURSE PRACTITIONER

## 2023-09-07 PROCEDURE — 2700000000 HC OXYGEN THERAPY PER DAY

## 2023-09-07 PROCEDURE — 83735 ASSAY OF MAGNESIUM: CPT

## 2023-09-07 PROCEDURE — 94640 AIRWAY INHALATION TREATMENT: CPT

## 2023-09-07 PROCEDURE — 85027 COMPLETE CBC AUTOMATED: CPT

## 2023-09-07 PROCEDURE — 80069 RENAL FUNCTION PANEL: CPT

## 2023-09-07 PROCEDURE — 2T015 HOSPITALIST 2ND TOUCH: CPT | Performed by: STUDENT IN AN ORGANIZED HEALTH CARE EDUCATION/TRAINING PROGRAM

## 2023-09-07 PROCEDURE — 94760 N-INVAS EAR/PLS OXIMETRY 1: CPT

## 2023-09-07 RX ORDER — BUDESONIDE AND FORMOTEROL FUMARATE DIHYDRATE 160; 4.5 UG/1; UG/1
2 AEROSOL RESPIRATORY (INHALATION)
Qty: 10.2 G | Refills: 3 | Status: SHIPPED | OUTPATIENT
Start: 2023-09-07 | End: 2023-09-07 | Stop reason: HOSPADM

## 2023-09-07 RX ORDER — SEVELAMER CARBONATE 800 MG/1
800 TABLET, FILM COATED ORAL
Status: DISCONTINUED | OUTPATIENT
Start: 2023-09-07 | End: 2023-09-07 | Stop reason: HOSPADM

## 2023-09-07 RX ORDER — HYDROXYUREA 500 MG/1
500 CAPSULE ORAL DAILY
Qty: 30 CAPSULE | Refills: 0 | Status: SHIPPED | OUTPATIENT
Start: 2023-09-07 | End: 2023-10-07

## 2023-09-07 RX ORDER — HYDROXYUREA 500 MG/1
500 CAPSULE ORAL DAILY
Status: DISCONTINUED | OUTPATIENT
Start: 2023-09-07 | End: 2023-09-07 | Stop reason: HOSPADM

## 2023-09-07 RX ORDER — CYCLOBENZAPRINE HCL 10 MG
10 TABLET ORAL 3 TIMES DAILY PRN
Qty: 30 TABLET | Refills: 0 | Status: SHIPPED | OUTPATIENT
Start: 2023-09-07 | End: 2023-09-17

## 2023-09-07 RX ADMIN — TAMSULOSIN HYDROCHLORIDE 0.8 MG: 0.4 CAPSULE ORAL at 10:01

## 2023-09-07 RX ADMIN — FOLIC ACID 1 MG: 1 TABLET ORAL at 09:55

## 2023-09-07 RX ADMIN — HEPARIN SODIUM 5000 UNITS: 5000 INJECTION INTRAVENOUS; SUBCUTANEOUS at 06:18

## 2023-09-07 RX ADMIN — CETIRIZINE HYDROCHLORIDE 5 MG: 10 TABLET ORAL at 10:01

## 2023-09-07 RX ADMIN — PREDNISONE 30 MG: 20 TABLET ORAL at 10:00

## 2023-09-07 RX ADMIN — METOPROLOL TARTRATE 12.5 MG: 25 TABLET, FILM COATED ORAL at 09:56

## 2023-09-07 RX ADMIN — ACYCLOVIR 800 MG: 800 TABLET ORAL at 10:00

## 2023-09-07 RX ADMIN — BUDESONIDE AND FORMOTEROL FUMARATE DIHYDRATE 2 PUFF: 160; 4.5 AEROSOL RESPIRATORY (INHALATION) at 09:20

## 2023-09-07 RX ADMIN — MAGNESIUM GLUCONATE 500 MG ORAL TABLET 400 MG: 500 TABLET ORAL at 09:55

## 2023-09-07 RX ADMIN — LEVOFLOXACIN 500 MG: 500 TABLET, FILM COATED ORAL at 09:56

## 2023-09-07 RX ADMIN — ROSUVASTATIN CALCIUM 10 MG: 10 TABLET, FILM COATED ORAL at 09:55

## 2023-09-07 RX ADMIN — ALLOPURINOL 300 MG: 300 TABLET ORAL at 10:01

## 2023-09-07 RX ADMIN — TIOTROPIUM BROMIDE INHALATION SPRAY 2 PUFF: 3.12 SPRAY, METERED RESPIRATORY (INHALATION) at 09:20

## 2023-09-07 ASSESSMENT — PAIN SCALES - GENERAL
PAINLEVEL_OUTOF10: 0
PAINLEVEL_OUTOF10: 0

## 2023-09-07 NOTE — PROGRESS NOTES
[facility]  Office: [address], DO, Shabnam Burgoster, DO, Naren Ramsey, DO, [facility] Blood, DO, Deborah Wallace MD, Abdifatah German MD, Ozzy Cary MD, Chelle Kumar MD,  Bill Patrick MD, Jody Vanegas MD, Clarence Lam, DO, Josh Acevedo MD,  Rosi De Santiago MD, Ernestine White MD, Kristal Small, DO, Murray Daniels MD,  Ede Champagne, DO, Maribell Villatoro MD, Leola Chowdary MD, John Lynne MD, Genny Choudhury MD,  Charles Calix MD, Duane Campanile, MD, Sadaf Wong MD, Tj Osorio, DO, Catrachita Chun MD,  Dona Borges MD, Magdiel Camarena, CNP,  Jeffrey Ortiz, CNP,, Saroj Harris, CNP,  David Man, SVEN, Fercho Appiah, CNP, Vincent Leija, CNP, Rush Lemus, CNP, June Wolfe, CNP, Trevin Villaseñor, CNP, Edgar Beck, CNP, Mark Olmedo PA-C, Jumana Floyd, GELY, Dallas Meyers, CNP, Gurwinder Stein, [address]    Progress Note    9/7/2023    9:44 AM    Name:   Junaid Mckeon  MRN:     [id number]     Acct:      [de-identified]   Room:   2019/2019-01  IP Day:  1  Admit Date:  9/6/2023  9:44 AM    PCP:   YESY Nunez CNP  Code Status:  Full Code    Subjective:     C/C: Fatigue  Interval History Status: improved. Patient seen and evaluated in room, sitting up in recliner. No acute events overnight. States he is feeling better in comparison to yesterday. No acute intervention from pulmonary or heme-onc at this time. Stable for discharge if cleared by other services    Brief History:      This is a 24-year-old male with a past medical history significant for COPD, hypertension, arthritis, cancer, currently undergoing chemotherapy with [facility] who presented to [facility] with worsening fatigue and shortness of breath    The patient was initially seen by Dr. Luis Miguel Crump of [facility] for evaluation of an abnormal CBC with

## 2023-09-07 NOTE — PLAN OF CARE
Problem: Respiratory - Adult  Goal: Achieves optimal ventilation and oxygenation  9/7/2023 0925 by Kathrine Sotomayor RCP  Outcome: Progressing

## 2023-09-07 NOTE — DISCHARGE SUMMARY
[facility]  Office: [address], DO, Jason Chaoter, DO, Bren Boyer, DO, Cecille Campuzano Blood, DO, Ish Chavira MD, Glenn Mercado MD, Jazmin Arshad MD, Mitzy Stein MD,  Yolanda Haile MD, Pankaj Catalan MD, Brian Turpin, DO, Margo Casarez MD,  Andrés Dsouza DO, Zach Miguel MD, Karolee Holter, MD, Do Florian, DO, Tuan Perez MD,  Ale Faith, DO, Chapo Jorgensen MD, Kelly Delvalle MD, Quirino Marie MD, Kati Caceres MD,  Rhys Pino MD, Dick Vasquez MD, Christiano Saenz MD, Carlyle Horne, DO, Tamika Cardenas MD,  Daya Carvajal MD, Silvestre Orellana, CNP,  Osvaldo Durant, CNP,, Nisreen Gentile, [facility],  Maria L Obando, [facility], Jeison Limon, CNP, Isela Co, CNP, Marquita Martinez, CNP, Siobhan Shell, CNP, Gucci Rogel, CNP, Todd Casillas, CNP, Lizzeth Garcia PA-C, Kalen Wallace, CNS, Lauren Flores, [facility], Jesse Rodrigez, [address] [facility]    Discharge Summary     Patient ID: Aileen Brantley  :  1955   MRN: [id number]     ACCOUNT:  [de-identified]   Patient's PCP: YESY Hernandez CNP  Admit Date: 2023   Discharge Date: 2023     Length of Stay: 1  Code Status:  Full Code  Admitting Physician: No admitting provider for patient encounter. 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  *      Admission Condition: albuterol (2.5 MG/3ML) 0.083% nebulizer solution  Commonly known as: PROVENTIL     * albuterol sulfate  (90 Base) MCG/ACT inhaler  Commonly known as: PROVENTIL;VENTOLIN;PROAIR     allopurinol 300 MG tablet  Commonly known as: ZYLOPRIM  Take 1 tablet by mouth daily     aluminum & magnesium hydroxide-simethicone 200-200-20 MG/5ML Susp suspension  Commonly known as: MAALOX     docusate 100 MG Caps  Commonly known as: COLACE, DULCOLAX     fluticasone-umeclidin-vilant 100-62.5-25 MCG/ACT Aepb inhaler  Commonly known as: TRELEGY ELLIPTA     folic acid 1 MG tablet  Commonly known as: FOLVITE  Take 1 tablet by mouth daily     Handicap Placard Misc  by Does not apply route EXP. 7/2028  DX:46.9     levoFLOXacin 500 MG tablet  Commonly known as: LEVAQUIN     loratadine 10 MG tablet  Commonly known as: CLARITIN     magnesium oxide 400 MG tablet  Commonly known as: MAG-OX     melatonin 3 MG Tabs tablet     metoprolol tartrate 25 MG tablet  Commonly known as: LOPRESSOR     ondansetron 4 MG tablet  Commonly known as: ZOFRAN  Take 1 tablet by mouth every 8 hours as needed for Nausea or Vomiting     OXYGEN     Polyethylene Glycol 3350 Powd     posaconazole 100 MG Tbec tablet  Commonly known as: NOXAFIL     potassium chloride 20 MEQ extended release tablet  Commonly known as: KLOR-CON M     predniSONE 10 MG tablet  Commonly known as: DELTASONE     QUEtiapine 25 MG tablet  Commonly known as: SEROQUEL     rosuvastatin 10 MG tablet  Commonly known as: CRESTOR     tamsulosin 0.4 MG capsule  Commonly known as: FLOMAX           * This list has 2 medication(s) that are the same as other medications prescribed for you. Read the directions carefully, and ask your doctor or other care provider to review them with you.                 STOP taking these medications      senna 8.6 MG tablet  Commonly known as: SENOKOT               Where to Get Your Medications        These medications were sent to [address] - [facility]

## 2023-09-07 NOTE — PROGRESS NOTES
decitabine for today and aim for next Monday. Transfuse with 1 unit of PRBC given significant dyspnea and tachycardia. Will order repeat ultrasound of left thorax with thoracentesis if needed. Continue with supportive transfusions (irradiated blood) to maintain Hgb>8 and platelets>10k. Continue with prophylactic acyclovir, posaconazole, levaquin and allopurinol. Stop Hydrea for now. Will refer for alloSCT at in [facility], patient not able to return to [facility] due to copay. RTC in 3 weeks. Needs labs weekly. Total of 35 minutes were spent on the encounter with at least 50% of the time spent face to face with the patient.      Iveth Gonzáles MD  Hematology Oncology

## 2023-09-07 NOTE — CONSULTS
Today's Date: 9/6/2023  Patient Name: Deon Snyder  Date of admission: 9/6/2023  9:44 AM  Patient's age: 79 y.o., 1955  Admission Dx: Chronic myelogenous leukemia ([address]) [C92.10]  CML (chronic myelocytic leukemia) ([address]) [C92.10]    Reason for Consult: management recommendations  Requesting Physician: No admitting provider for patient encounter. CHIEF COMPLAINT: Weakness fatigue shortness of breath    History Obtained From:  patient, electronic medical record    HISTORY OF PRESENT ILLNESS:      The patient is a 79 y.o.  male who is transferred from outside facility to [facility] with chief complaint of fatigue. Patient has been diagnosed with history of CMML-2. Patient follows up with hematology oncology in [facility]. Patient s/p bone marrow biopsy which confirmed MDS. Per record patient has been started on treatment using decitabine. Oncologic history is as below. Patient also received Procrit 40,000 units subcutaneous every week. X-ray at outlying facility reviewed which showed moderate left-sided effusion. Decreasing right midlung opacity. Numerous enlarged lymph nodes in the mediastinum as well as a paratracheal lymph node. Patient was transferred to [facility] for higher level of care. Patient underwent thoracentesis with drainage of 700 dark-colored fluid. Patient CBC from 9/5/2023 shows WBC count 64,000 hemoglobin 8.5 and platelet count of 340. Iron studies show ferritin of 4000. Cytology from pleural effusion is pending. Recent CT abdomen pelvis shows bulky mediastinal lymphadenopathy as well as axillary retroperitoneal and iliac chain lymphadenopathy. Past Medical History:   has a past medical history of Arthritis, Cancer ([address]), COPD (chronic obstructive pulmonary disease) ([address]), and Hypertension. Past Surgical History:   has a past surgical history that includes CT BIOPSY BONE MARROW (8/25/2023).      Medications:    Prior to Admission iterative reconstruction, and/or weight-based dosing when appropriate to reduce radiation dose to as low as reasonably achievable. Status post successful CT guided bone marrow biopsy. **This report has been created using voice recognition software. It may contain minor errors which are inherent in voice recognition technology. ** Final report electronically signed by Dr Luis Angel Flores on 8/25/2023 1:08 PM    XR CHEST PORTABLE    Result Date: 9/5/2023  PROCEDURE: XR CHEST PORTABLE CLINICAL INFORMATION: Shortness of breath TECHNIQUE: Mobile AP chest radiograph. COMPARISON: AP and lateral chest radiographs 7/31/2023 FINDINGS: There is mild stable enlargement of the cardiac silhouette. Alveolar and reticular opacities in the right upper and bilateral lower lungs are slightly more extensive. The bilateral costophrenic angles are blunted. Degenerative changes in the thoracic spine are poorly visualized. 1. Right upper and bilateral lower lung atelectasis/infiltrate. 2. Small bilateral pleural effusions. 3. Mild stable cardiomegaly. . **This report has been created using voice recognition software. It may contain minor errors which are inherent in voice recognition technology. ** Final report electronically signed by Dr. Cleo Ceballos on 9/5/2023 2:51 PM    US THORACENTESIS Which side should the procedure be performed? Radiologist Recommendation    Result Date: 9/6/2023  PROCEDURE: David Patten LEFT THORACENTESIS 9/6/2023 HISTORY: ORDERING SYSTEM PROVIDED HISTORY: diagnostic thoracentesis TECHNOLOGIST PROVIDED HISTORY: Diagnostic thoracentesis Which side should the procedure be performed?->Radiologist Recommendation Does fluid need testing? If yes, please place LAB Orders. ->Yes Is the procedure for Diagnostic or Therapeutic reasons? ->Diagnostic TECHNIQUE/PROCEDURE DESCRIPTION: Informed consent was obtained after a detailed explanation of the procedure including risks. Universal protocol was performed.   The

## 2023-09-07 NOTE — PROGRESS NOTES
[facility], APRN - CNP        Or    potassium bicarb-citric acid (EFFER-K) effervescent tablet 40 mEq  40 mEq Oral PRN [place] Closs, APRN - CNP        Or    potassium chloride 10 mEq/100 mL IVPB (Peripheral Line)  10 mEq IntraVENous PRN Cassie Closs, APRN - CNP        magnesium sulfate 1000 mg in dextrose 5% 100 mL IVPB  1,000 mg IntraVENous PRN [place] Closs, APRN - CNP        ondansetron (ZOFRAN-ODT) disintegrating tablet 4 mg  4 mg Oral Q8H PRN [place] Closs, APRN - CNP        Or    [facility] injection 4 mg  4 mg IntraVENous Q6H PRN [place] Closs, APRN - CNP        polyethylene glycol (GLYCOLAX) packet 17 g  17 g Oral Daily PRN Cassie Closs, APRN - CNP   17 g at 09/06/23 2230    acetaminophen (TYLENOL) tablet 650 mg  650 mg Oral Q6H PRN [place] Closs, APRN - CNP        Or    acetaminophen (TYLENOL) suppository 650 mg  650 mg Rectal Q6H PRN Cassie Closs, APRN - CNP        HYDROmorphone (DILAUDID) injection 0.25 mg  0.25 mg IntraVENous Q3H PRN Cassie Closs, APRN - CNP        Or    HYDROmorphone (DILAUDID) injection 0.5 mg  0.5 mg IntraVENous Q3H PRN [place] Closs, APRN - CNP        sodium chloride flush 0.9 % injection 5-40 mL  5-40 mL IntraVENous 2 times per day Michael Mendoza, DO   10 mL at 09/06/23 2144    sodium chloride flush 0.9 % injection 5-40 mL  5-40 mL IntraVENous PRN Michael Mendoza, DO        0.9 % sodium chloride infusion   IntraVENous PRN [place] Mendoza, DO        albuterol (PROVENTIL) (2.5 MG/3ML) 0.083% nebulizer solution 2.5 mg  2.5 mg Nebulization Q6H PRN Guillermo Farias, APRN - NP        allopurinol (ZYLOPRIM) tablet 300 mg  300 mg Oral Daily YESY Tran - NP   300 mg at 78/96/25 8567    folic acid (FOLVITE) tablet 1 mg  1 mg Oral Daily YESY Tran NP   1 mg at 09/06/23 1726    levoFLOXacin (LEVAQUIN) tablet 500 mg  500 mg Oral Daily YESY Tran NP   500 mg at 09/06/23 1721 chest x-ray [J94.8] 09/07/2023    Chronic obstructive pulmonary disease ([address]) [J44.9] 09/07/2023    Chronic myelogenous leukemia ([address]) [C92.10] 09/06/2023    Chronic myelomonocytic leukemia not having achieved remission ([address]) [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 I explained to the patient the nature of this problem. I explained the significance of these abnormalities and possible etiology and management options  Reviewed records from outside facility  Bone marrow biopsy from 8/25/2023 shows CMML 2. Consistent with prior diagnosis. S/p 2 cycles of treatment according the patient first 1 was with Vidaza patient received second cycle of decitabine. Patient is scheduled for third cycle on the 9/11. Feels better since thoracentesis. Follow-up on cytology and flow cytometry  Clinically I suspect patient lymphadenopathy is part of the MDS/MPN syndrome however second malignancy cannot be ruled out  Currently I do not believe there is a emergent need to start patient on cycle #3 of decitabine. No indication to start leukapheresis either. We will monitor patient in-house. Patient to follow-up with primary oncologist in [facility] as previously scheduled  Transfuse to keep platelet count above 10,000 and hemoglobin above 7  Okay to discharge patient from medical oncology standpoint with outpatient follow-up with primary oncologist  We will follow. Thank you for asking us to see this patient.           Chrissy Saravia

## 2023-09-07 NOTE — CONSULTS
PULMONARY & CRITICAL CARE MEDICINE CONSULT NOTE     Patient:  Laura Manzanares  MRN: [id number]  Admit date: 9/6/2023  Primary Care Physician: YESY Gamez CNP  Consulting Physician: Milady Bautista MD  CODE Status: Full Code   LOS: 1    HISTORY     CHIEF COMPLAINT/REASON FOR CONSULT:    Left pleural effusion. HISTORY OF PRESENT ILLNESS:  The patient is a 79 y.o. male with history of COPD on home oxygen at 2 L, paroxysmal atrial fibrillation hypertension. According to available history according to patient he was diagnosed with CML around April and he had 2 cycles of chemotherapy in [facility]. Patient presented to [facility] on 09/05/2023 with weakness fatigue and found to have a WBC count of 64.2 with blast of 11% patient was transferred to [facility] for possible blast crisis. Patient does not complain of any worsening shortness of breath he does have chronic dyspnea and mild intermittent cough he does take Trelegy at home. He denies any worsening of shortness of breath or increased cough hemoptysis or chest pain does not complain of fever or night sweats. He recently had a CT-guided bone marrow biopsy done on 08/25/2023. He had a CT scan of the chest done which was negative for pulm embolism but shows small to moderate left-sided pleural effusion on review of the CT scan of the chest on 07/28/2023 at that time no significant pleural effusion was seen. On review of the chest x-ray and CT scan patient does have a right upper lobe area of opacity/atelectasis along with area extending to pleural/pleural thickening. CT scan of the chest also shows calcification of the fluid laterally above the diaphragm and lower posteriorly. CT scan of the chest also shows mediastinal did not within the right hilar soft tissue mass/lymph node    He has history of working as a [employer],  and also in [employer].   He has history of smoking 40 pack years and stopped smoking 1 year negative  Patient has right upper lobe area of opacity which look chronic but hilar soft tissue mass/lymph node could cause obstruction or atelectasis. Follow pleural fluid analysis result. Continue O2 as patient is on home O2. Continue Trelegy or substitute available in the hospital  Would recommend peripheral smear by pathology. Follow-up with hematology oncology and follow-up WBC, platelet count. \  Patient remains hemodynamically stable and is currently saturating well on nasal cannula. Continue supplemental oxygen to keep oxygen saturation greater than 92%  DVT prophylaxis if platelet count remains okay and okay with oncology  Physical/occupational therapy; increase activity as tolerated. I updated the patient regarding the current clinical condition, provisional diagnosis and management plan. He verbalized a clear understanding and I addressed his concerns, and answered all questions to the best of my abilities. It was my pleasure to evaluate Pankaj Squibb today. We will continue to follow. I would like to thank you for allowing me to participate in the care of this patient. Please feel free to call with any further questions or concerns. Jamison Rick MD, M.D. Pulmonary and Critical Care Medicine           9/7/2023, 9:22 AM    Please note that this chart was generated using voice recognition Dragon dictation software. Although every effort was made to ensure the accuracy of this automated transcription, some errors in transcription may have occurred.

## 2023-09-07 NOTE — PROGRESS NOTES
CLINICAL PHARMACY NOTE: MEDS TO BEDS    Total # of Prescriptions Filled: 2   The following medications were delivered to the patient:  Cyclobenzaprine 10mg  Hydroxyurea 500mg    Additional Documentation:

## 2023-09-07 NOTE — CARE COORDINATION
Case Management Assessment  Initial Evaluation    Date/Time of Evaluation: 9/7/2023 3:48 PM  Assessment Completed by: Fredis Glover RN    If patient is discharged prior to next notation, then this note serves as note for discharge by case management. Patient Name: Wes Sen                   YOB: 1955  Diagnosis: Chronic myelogenous leukemia ([address]) [C92.10]  CML (chronic myelocytic leukemia) ([address]) [C92.10]                   Date / Time: 9/6/2023  9:44 AM    Patient Admission Status: Inpatient   Readmission Risk (Low < 19, Mod (19-27), High > 27): Readmission Risk Score: 27.3    Current PCP: YESY Conteh CNP  PCP verified by CM? Yes    Chart Reviewed: Yes      History Provided by: Patient  Patient Orientation: Alert and Oriented    Patient Cognition: Alert    Hospitalization in the last 30 days (Readmission):  No    If yes, Readmission Assessment in CM Navigator will be completed. Advance Directives:      Code Status: Full Code   Patient's Primary Decision Maker is: Legal Next of Kin    Primary Decision MakerVaishnavi Gomez Spouse - [phone]    Secondary Decision Maker: Magy Mitchell Child - [phone]    Discharge Planning:    Patient lives with: (P) Alone Type of Home: (P) House  Primary Care Giver: Self  Patient Support Systems include: Family Members   Current Financial resources: (P) Medicare, Medicaid  Current community resources:    Current services prior to admission: (P) Oxygen Therapy            Current DME:              Type of Home Care services:  (P) None    ADLS  Prior functional level: Independent in ADLs/IADLs  Current functional level: Independent in ADLs/IADLs    PT AM-PAC:   /24  OT AM-PAC:   /24    Family can provide assistance at DC: (P) Yes  Would you like Case Management to discuss the discharge plan with any other family members/significant others, and if so, who?     Plans to Return to Present Housing: Yes  Other Identified Issues/Barriers to

## 2023-09-07 NOTE — PROGRESS NOTES
Physical Therapy        Physical Therapy Cancel Note      DATE: 2023    NAME: Terrence Greer  MRN: [id number]   : 1955      Patient not seen this date for Physical Therapy due to:    Patient Declined: Pt report independent with all mobility, no DME needs.  PT to defer evaluation at  pt's request.      Electronically signed by Mickie Squires PT on 2023 at 10:49 AM

## 2023-09-08 ENCOUNTER — TELEPHONE (OUTPATIENT)
Dept: PULMONOLOGY | Age: 68
End: 2023-09-08

## 2023-09-08 LAB
MISC. #1 REFERENCE GROUP TEST: NORMAL
SURGICAL PATHOLOGY REPORT: NORMAL

## 2023-09-08 NOTE — TELEPHONE ENCOUNTER
----- Message from Jonna Severance sent at 9/8/2023  7:45 AM EDT -----  Kennedy Claros, please see message from Dr Cristina Lemus and call to schedule. Thank you.   ----- Message -----  From: Jean-Claude Gardiner MD  Sent: 9/7/2023   8:30 PM EDT  To: [facility] Respiratory Spec Clinical Staff     Please have the patient follow-up in our office in 1 to 2 weeks.   Thank you

## 2023-09-10 LAB
MICROORGANISM SPEC CULT: ABNORMAL
MICROORGANISM/AGENT SPEC: ABNORMAL
SPECIMEN DESCRIPTION: ABNORMAL

## 2023-09-11 ENCOUNTER — HOSPITAL ENCOUNTER (OUTPATIENT)
Dept: INFUSION THERAPY | Age: 68
Discharge: HOME | End: 2023-09-11

## 2023-09-11 ENCOUNTER — HOSPITAL ENCOUNTER (OUTPATIENT)
Dept: INFUSION THERAPY | Age: 68
Discharge: HOME | End: 2023-09-11
Payer: COMMERCIAL

## 2023-09-11 ENCOUNTER — OFFICE VISIT (OUTPATIENT)
Dept: ONCOLOGY | Age: 68
End: 2023-09-11
Payer: COMMERCIAL

## 2023-09-11 VITALS
SYSTOLIC BLOOD PRESSURE: 125 MMHG | TEMPERATURE: 97.5 F | HEART RATE: 108 BPM | HEIGHT: 71 IN | DIASTOLIC BLOOD PRESSURE: 65 MMHG | OXYGEN SATURATION: 92 % | WEIGHT: 262.6 LBS | RESPIRATION RATE: 22 BRPM | BODY MASS INDEX: 36.76 KG/M2

## 2023-09-11 VITALS
TEMPERATURE: 97.5 F | WEIGHT: 262.6 LBS | BODY MASS INDEX: 36.76 KG/M2 | HEIGHT: 71 IN | RESPIRATION RATE: 22 BRPM | DIASTOLIC BLOOD PRESSURE: 65 MMHG | SYSTOLIC BLOOD PRESSURE: 125 MMHG | HEART RATE: 108 BPM | OXYGEN SATURATION: 92 %

## 2023-09-11 DIAGNOSIS — D46.9 MDS (MYELODYSPLASTIC SYNDROME) (HCC): ICD-10-CM

## 2023-09-11 DIAGNOSIS — N18.31 STAGE 3A CHRONIC KIDNEY DISEASE (CKD) (HCC): ICD-10-CM

## 2023-09-11 DIAGNOSIS — C92.10 CML (CHRONIC MYELOID LEUKEMIA) (HCC): ICD-10-CM

## 2023-09-11 DIAGNOSIS — D46.9 MDS (MYELODYSPLASTIC SYNDROME) (HCC): Primary | ICD-10-CM

## 2023-09-11 LAB
ALBUMIN SERPL BCG-MCNC: 3.5 G/DL (ref 3.5–5.1)
ALP SERPL-CCNC: 62 U/L (ref 38–126)
ALT SERPL W/O P-5'-P-CCNC: 15 U/L (ref 11–66)
AST SERPL-CCNC: 13 U/L (ref 5–40)
BILIRUB CONJ SERPL-MCNC: < 0.2 MG/DL (ref 0–0.3)
BILIRUB SERPL-MCNC: 0.2 MG/DL (ref 0.3–1.2)
BUN BLDP-MCNC: 20 MG/DL (ref 8–26)
CHLORIDE BLD-SCNC: 107 MEQ/L (ref 98–109)
CREAT BLD-MCNC: 1.4 MG/DL (ref 0.5–1.2)
FLOW CYTOMETRY SOURCE: NORMAL
FLOW CYTOMETRY, NODE/FLUID: NORMAL
GFR SERPL CREATININE-BSD FRML MDRD: 55 ML/MIN/1.73M2
GLUCOSE BLD-MCNC: 124 MG/DL (ref 70–108)
IONIZED CALCIUM, WHOLE BLOOD: 1.09 MMOL/L (ref 1.12–1.32)
LDH SERPL L TO P-CCNC: 592 U/L (ref 100–190)
MANUAL DIFF BLD: NORMAL
MICROORGANISM SPEC CULT: NORMAL
MICROORGANISM SPEC CULT: NORMAL
MICROORGANISM/AGENT SPEC: NORMAL
MICROORGANISM/AGENT SPEC: NORMAL
POTASSIUM BLD-SCNC: 4.1 MEQ/L (ref 3.5–4.9)
PROT SERPL-MCNC: 5.8 G/DL (ref 6.1–8)
SODIUM BLD-SCNC: 140 MEQ/L (ref 138–146)
SPECIMEN DESCRIPTION: NORMAL
SPECIMEN DESCRIPTION: NORMAL
TOTAL CO2, WHOLE BLOOD: 24 MEQ/L (ref 23–33)
URATE SERPL-MCNC: 6.1 MG/DL (ref 3.7–7)

## 2023-09-11 PROCEDURE — 1111F DSCHRG MED/CURRENT MED MERGE: CPT | Performed by: INTERNAL MEDICINE

## 2023-09-11 PROCEDURE — 84550 ASSAY OF BLOOD/URIC ACID: CPT

## 2023-09-11 PROCEDURE — 85025 COMPLETE CBC W/AUTO DIFF WBC: CPT

## 2023-09-11 PROCEDURE — 3017F COLORECTAL CA SCREEN DOC REV: CPT | Performed by: INTERNAL MEDICINE

## 2023-09-11 PROCEDURE — 99214 OFFICE O/P EST MOD 30 MIN: CPT | Performed by: INTERNAL MEDICINE

## 2023-09-11 PROCEDURE — 99211 OFF/OP EST MAY X REQ PHY/QHP: CPT

## 2023-09-11 PROCEDURE — 1036F TOBACCO NON-USER: CPT | Performed by: INTERNAL MEDICINE

## 2023-09-11 PROCEDURE — 1123F ACP DISCUSS/DSCN MKR DOCD: CPT | Performed by: INTERNAL MEDICINE

## 2023-09-11 PROCEDURE — 80076 HEPATIC FUNCTION PANEL: CPT

## 2023-09-11 PROCEDURE — G8417 CALC BMI ABV UP PARAM F/U: HCPCS | Performed by: INTERNAL MEDICINE

## 2023-09-11 PROCEDURE — 83615 LACTATE (LD) (LDH) ENZYME: CPT

## 2023-09-11 PROCEDURE — 80047 BASIC METABLC PNL IONIZED CA: CPT

## 2023-09-11 PROCEDURE — G8427 DOCREV CUR MEDS BY ELIG CLIN: HCPCS | Performed by: INTERNAL MEDICINE

## 2023-09-11 NOTE — PROGRESS NOTES
New chemotherapy validation note:    Diagnosis for chemotherapy: MDS with concern for progression to AML    Regimen ordered: Decitabine      Reference or literature used for validation: NCCN       Date literature or guideline last updated 5/2/23     Deviation from literature or guideline used: None    Summary of any verbal or telephone information obtained: None.  Noted that patient received 1st cycle of decitabine at [facility] 8/4/23 to 8/8/23 (decitabine 47mg daily days 1 to 5)    Jo HuD, BCPS 9/11/2023 8:03 AM

## 2023-09-12 LAB
ANISOCYTOSIS BLD QL SMEAR: PRESENT
BASOPHILS ABSOLUTE: 0 THOU/MM3 (ref 0–0.1)
BASOPHILS NFR BLD AUTO: 0 %
BLASTS: 5 % (ref 0–1)
DEPRECATED RDW RBC AUTO: 53.5 FL (ref 35–45)
EOSINOPHIL NFR BLD AUTO: 1 %
EOSINOPHILS ABSOLUTE: 0.5 THOU/MM3 (ref 0–0.4)
ERYTHROCYTE [DISTWIDTH] IN BLOOD BY AUTOMATED COUNT: 15.8 % (ref 11.5–14.5)
HCT VFR BLD AUTO: 23.2 % (ref 42–52)
HGB BLD-MCNC: 7.3 GM/DL (ref 14–18)
LYMPHOCYTES ABSOLUTE: 5.3 THOU/MM3 (ref 1–4.8)
LYMPHOCYTES NFR BLD AUTO: 10 %
MCH RBC QN AUTO: 29.7 PG (ref 26–33)
MCHC RBC AUTO-ENTMCNC: 31.5 GM/DL (ref 32.2–35.5)
MCV RBC AUTO: 94.3 FL (ref 80–94)
METAMYELOCYTES: 6 %
MONOCYTES ABSOLUTE: 22.2 THOU/MM3 (ref 0.4–1.3)
MONOCYTES NFR BLD AUTO: 42 %
MYELOCYTES: 5 %
NEUTROPHILS NFR BLD AUTO: 29 %
NRBC BLD AUTO-RTO: 0 /100 WBC
PATHOLOGIST REVIEW: ABNORMAL
PLATELET # BLD AUTO: 30 THOU/MM3 (ref 130–400)
PLATELET BLD QL SMEAR: ABNORMAL
PMV BLD AUTO: 12.8 FL (ref 9.4–12.4)
PROMYELOCYTES: 2 %
RBC # BLD AUTO: 2.46 MILL/MM3 (ref 4.7–6.1)
ROULEAUX BLD QL SMEAR: SLIGHT
SEGMENTED NEUTROPHILS ABSOLUTE COUNT: 15.3 THOU/MM3 (ref 1.8–7.7)
SMUDGE CELLS BLD QL SMEAR: PRESENT
WBC # BLD AUTO: 52.9 THOU/MM3 (ref 4.8–10.8)

## 2023-09-13 ENCOUNTER — HOSPITAL ENCOUNTER (OUTPATIENT)
Dept: GENERAL RADIOLOGY | Age: 68
Discharge: HOME | End: 2023-09-13
Payer: COMMERCIAL

## 2023-09-13 ENCOUNTER — HOSPITAL ENCOUNTER (OUTPATIENT)
Dept: ULTRASOUND IMAGING | Age: 68
Discharge: HOME | End: 2023-09-13
Payer: COMMERCIAL

## 2023-09-13 DIAGNOSIS — D46.9 MDS (MYELODYSPLASTIC SYNDROME) (HCC): ICD-10-CM

## 2023-09-13 DIAGNOSIS — Z04.9: ICD-10-CM

## 2023-09-13 DIAGNOSIS — J90 RECURRENT LEFT PLEURAL EFFUSION: Primary | ICD-10-CM

## 2023-09-13 LAB
CHARACTER, BODY FLUID: ABNORMAL
COLOR FLD: YELLOW
GRANULOCYTES NFR FLD AUTO: 18.9 %
LDH FLD L TO P-CCNC: 520 U/L
MESOTHELIAL CELLS BODY FLUID: ABNORMAL
MICROORGANISM SPEC CULT: ABNORMAL
MICROORGANISM/AGENT SPEC: ABNORMAL
MONONUC CELLS NFR FLD AUTO: 81.1 %
NUC CELL # FLD AUTO: 4238 /CUMM (ref 0–500)
PATHOLOGIST REVIEW: ABNORMAL
PROT FLD-MCNC: 4 GM/DL
RBC # FLD AUTO: 6000 /CUMM
SPECIMEN DESCRIPTION: ABNORMAL
SPECIMEN: ABNORMAL
TOTAL VOLUME RECEIVED BODY FLUID: 73 ML

## 2023-09-13 PROCEDURE — 71045 X-RAY EXAM CHEST 1 VIEW: CPT

## 2023-09-13 PROCEDURE — 87075 CULTR BACTERIA EXCEPT BLOOD: CPT

## 2023-09-13 PROCEDURE — 83615 LACTATE (LD) (LDH) ENZYME: CPT

## 2023-09-13 PROCEDURE — 89050 BODY FLUID CELL COUNT: CPT

## 2023-09-13 PROCEDURE — 84157 ASSAY OF PROTEIN OTHER: CPT

## 2023-09-13 PROCEDURE — 88108 CYTOPATH CONCENTRATE TECH: CPT

## 2023-09-13 PROCEDURE — 87205 SMEAR GRAM STAIN: CPT

## 2023-09-13 PROCEDURE — 32555 ASPIRATE PLEURA W/ IMAGING: CPT

## 2023-09-13 PROCEDURE — 87070 CULTURE OTHR SPECIMN AEROBIC: CPT

## 2023-09-14 DIAGNOSIS — I31.9 PERICARDITIS, UNSPECIFIED CHRONICITY, UNSPECIFIED TYPE: ICD-10-CM

## 2023-09-14 DIAGNOSIS — I48.0 PAROXYSMAL ATRIAL FIBRILLATION (HCC): ICD-10-CM

## 2023-09-15 LAB
BACTERIA SPEC ANAEROBE CULT: NORMAL
BACTERIA SPEC BFLD CULT: NORMAL
GRAM STN SPEC: NORMAL

## 2023-09-16 ENCOUNTER — TELEPHONE (OUTPATIENT)
Dept: ONCOLOGY | Age: 68
End: 2023-09-16

## 2023-09-18 ENCOUNTER — HOSPITAL ENCOUNTER (INPATIENT)
Age: 68
LOS: 8 days | Discharge: HOME | DRG: 841 | End: 2023-09-26
Attending: HOSPITALIST | Admitting: HOSPITALIST
Payer: COMMERCIAL

## 2023-09-18 ENCOUNTER — APPOINTMENT (OUTPATIENT)
Dept: CT IMAGING | Age: 68
End: 2023-09-18
Payer: COMMERCIAL

## 2023-09-18 ENCOUNTER — SOCIAL WORK (OUTPATIENT)
Dept: INFUSION THERAPY | Age: 68
End: 2023-09-18

## 2023-09-18 ENCOUNTER — HOSPITAL ENCOUNTER (EMERGENCY)
Age: 68
Discharge: ANOTHER HOSPITAL | End: 2023-09-18
Attending: EMERGENCY MEDICINE
Payer: COMMERCIAL

## 2023-09-18 ENCOUNTER — APPOINTMENT (OUTPATIENT)
Dept: GENERAL RADIOLOGY | Age: 68
End: 2023-09-18
Payer: COMMERCIAL

## 2023-09-18 VITALS
DIASTOLIC BLOOD PRESSURE: 64 MMHG | HEIGHT: 71 IN | OXYGEN SATURATION: 97 % | TEMPERATURE: 97.5 F | HEART RATE: 93 BPM | RESPIRATION RATE: 21 BRPM | WEIGHT: 250 LBS | BODY MASS INDEX: 35 KG/M2 | SYSTOLIC BLOOD PRESSURE: 122 MMHG

## 2023-09-18 DIAGNOSIS — C92.10 BLAST CRISIS PHASE OF CHRONIC MYELOID LEUKEMIA (HCC): ICD-10-CM

## 2023-09-18 DIAGNOSIS — D46.9 MDS (MYELODYSPLASTIC SYNDROME) (HCC): ICD-10-CM

## 2023-09-18 DIAGNOSIS — J90 PLEURAL EFFUSION, BILATERAL: ICD-10-CM

## 2023-09-18 DIAGNOSIS — R59.0 CERVICAL LYMPHADENOPATHY: ICD-10-CM

## 2023-09-18 DIAGNOSIS — R06.89 DYSPNEA AND RESPIRATORY ABNORMALITIES: Primary | ICD-10-CM

## 2023-09-18 DIAGNOSIS — R79.89 ELEVATED TROPONIN: ICD-10-CM

## 2023-09-18 DIAGNOSIS — C92.10 CML (CHRONIC MYELOID LEUKEMIA) (HCC): ICD-10-CM

## 2023-09-18 DIAGNOSIS — R06.00 DYSPNEA AND RESPIRATORY ABNORMALITIES: Primary | ICD-10-CM

## 2023-09-18 DIAGNOSIS — R79.89 ELEVATED BRAIN NATRIURETIC PEPTIDE (BNP) LEVEL: Primary | ICD-10-CM

## 2023-09-18 DIAGNOSIS — J18.9 PNEUMONIA OF BOTH LUNGS DUE TO INFECTIOUS ORGANISM, UNSPECIFIED PART OF LUNG: ICD-10-CM

## 2023-09-18 PROBLEM — J96.20 ACUTE ON CHRONIC RESPIRATORY FAILURE (HCC): Status: ACTIVE | Noted: 2023-09-18

## 2023-09-18 LAB
ALBUMIN SERPL BCG-MCNC: 3.5 G/DL (ref 3.5–5.1)
ALP SERPL-CCNC: 72 U/L (ref 38–126)
ALT SERPL W/O P-5'-P-CCNC: 21 U/L (ref 11–66)
ANION GAP SERPL CALC-SCNC: 12 MEQ/L (ref 8–16)
APTT PPP: 28.1 SECONDS (ref 22–38)
AST SERPL-CCNC: 12 U/L (ref 5–40)
BACTERIA SPEC ANAEROBE CULT: NORMAL
BACTERIA SPEC BFLD CULT: NORMAL
BASOPHILS ABSOLUTE: 0 THOU/MM3 (ref 0–0.1)
BASOPHILS NFR BLD AUTO: 0 %
BILIRUB CONJ SERPL-MCNC: < 0.2 MG/DL (ref 0–0.3)
BILIRUB SERPL-MCNC: 0.2 MG/DL (ref 0.3–1.2)
BLASTS: 35 % (ref 0–1)
BUN SERPL-MCNC: 27 MG/DL (ref 7–22)
CALCIUM SERPL-MCNC: 9.2 MG/DL (ref 8.5–10.5)
CHLORIDE SERPL-SCNC: 103 MEQ/L (ref 98–111)
CO2 SERPL-SCNC: 24 MEQ/L (ref 23–33)
CREAT SERPL-MCNC: 1.4 MG/DL (ref 0.4–1.2)
DEPRECATED RDW RBC AUTO: 52.6 FL (ref 35–45)
EOSINOPHIL NFR BLD AUTO: 0 %
EOSINOPHILS ABSOLUTE: 0 THOU/MM3 (ref 0–0.4)
ERYTHROCYTE [DISTWIDTH] IN BLOOD BY AUTOMATED COUNT: 15.6 % (ref 11.5–14.5)
FIBRINOGEN PPP-MCNC: 471 MG/100ML (ref 155–475)
GFR SERPL CREATININE-BSD FRML MDRD: 55 ML/MIN/1.73M2
GLUCOSE SERPL-MCNC: 123 MG/DL (ref 70–108)
GRAM STN SPEC: NORMAL
HCT VFR BLD AUTO: 25.6 % (ref 42–52)
HGB BLD-MCNC: 8.2 GM/DL (ref 14–18)
INR PPP: 1.03 (ref 0.85–1.13)
LACTATE SERPL-SCNC: 0.6 MMOL/L (ref 0.5–2)
LDH SERPL L TO P-CCNC: 450 U/L (ref 100–190)
LYMPHOCYTES ABSOLUTE: 8.5 THOU/MM3 (ref 1–4.8)
LYMPHOCYTES NFR BLD AUTO: 14 %
MAGNESIUM SERPL-MCNC: 1.8 MG/DL (ref 1.6–2.4)
MANUAL DIFF BLD: NORMAL
MCH RBC QN AUTO: 29.6 PG (ref 26–33)
MCHC RBC AUTO-ENTMCNC: 32 GM/DL (ref 32.2–35.5)
MCV RBC AUTO: 92.4 FL (ref 80–94)
METAMYELOCYTES: 8 %
MICROORGANISM SPEC CULT: NORMAL
MICROORGANISM SPEC CULT: NORMAL
MICROORGANISM/AGENT SPEC: NORMAL
MICROORGANISM/AGENT SPEC: NORMAL
MONOCYTES ABSOLUTE: 3 THOU/MM3 (ref 0.4–1.3)
MONOCYTES NFR BLD AUTO: 5 %
MYELOCYTES: 5 %
NEUTROPHILS NFR BLD AUTO: 33 %
NRBC BLD AUTO-RTO: 0 /100 WBC
NT-PROBNP SERPL IA-MCNC: 635.4 PG/ML (ref 0–124)
OSMOLALITY SERPL CALC.SUM OF ELEC: 284 MOSMOL/KG (ref 275–300)
PATHOLOGIST REVIEW: ABNORMAL
PHOSPHATE SERPL-MCNC: 4.8 MG/DL (ref 2.4–4.7)
PLATELET # BLD AUTO: 24 THOU/MM3 (ref 130–400)
PMV BLD AUTO: 11 FL (ref 9.4–12.4)
POTASSIUM SERPL-SCNC: 3.7 MEQ/L (ref 3.5–5.2)
PROCALCITONIN SERPL IA-MCNC: 0.18 NG/ML (ref 0.01–0.09)
PROMYELOCYTES: 0 %
PROT SERPL-MCNC: 6.3 G/DL (ref 6.1–8)
RBC # BLD AUTO: 2.77 MILL/MM3 (ref 4.7–6.1)
REASON FOR REJECTION: NORMAL
REJECTED TEST: NORMAL
SCAN OF BLOOD SMEAR: NORMAL
SEGMENTED NEUTROPHILS ABSOLUTE COUNT: 20 THOU/MM3 (ref 1.8–7.7)
SODIUM SERPL-SCNC: 139 MEQ/L (ref 135–145)
SPECIMEN DESCRIPTION: NORMAL
SPECIMEN DESCRIPTION: NORMAL
TROPONIN, HIGH SENSITIVITY: 19 NG/L (ref 0–12)
WBC # BLD AUTO: 60.7 THOU/MM3 (ref 4.8–10.8)

## 2023-09-18 PROCEDURE — 83605 ASSAY OF LACTIC ACID: CPT

## 2023-09-18 PROCEDURE — 80053 COMPREHEN METABOLIC PANEL: CPT

## 2023-09-18 PROCEDURE — 83615 LACTATE (LD) (LDH) ENZYME: CPT

## 2023-09-18 PROCEDURE — 82248 BILIRUBIN DIRECT: CPT

## 2023-09-18 PROCEDURE — 6360000004 HC RX CONTRAST MEDICATION: Performed by: PHYSICIAN ASSISTANT

## 2023-09-18 PROCEDURE — 96376 TX/PRO/DX INJ SAME DRUG ADON: CPT

## 2023-09-18 PROCEDURE — 85610 PROTHROMBIN TIME: CPT

## 2023-09-18 PROCEDURE — 1200000000 HC SEMI PRIVATE

## 2023-09-18 PROCEDURE — 93005 ELECTROCARDIOGRAM TRACING: CPT | Performed by: PHYSICIAN ASSISTANT

## 2023-09-18 PROCEDURE — 84484 ASSAY OF TROPONIN QUANT: CPT

## 2023-09-18 PROCEDURE — 6360000002 HC RX W HCPCS: Performed by: PHYSICIAN ASSISTANT

## 2023-09-18 PROCEDURE — 85730 THROMBOPLASTIN TIME PARTIAL: CPT

## 2023-09-18 PROCEDURE — 83735 ASSAY OF MAGNESIUM: CPT

## 2023-09-18 PROCEDURE — 85384 FIBRINOGEN ACTIVITY: CPT

## 2023-09-18 PROCEDURE — 96365 THER/PROPH/DIAG IV INF INIT: CPT

## 2023-09-18 PROCEDURE — 99285 EMERGENCY DEPT VISIT HI MDM: CPT

## 2023-09-18 PROCEDURE — 84100 ASSAY OF PHOSPHORUS: CPT

## 2023-09-18 PROCEDURE — 71275 CT ANGIOGRAPHY CHEST: CPT

## 2023-09-18 PROCEDURE — 2580000003 HC RX 258: Performed by: PHYSICIAN ASSISTANT

## 2023-09-18 PROCEDURE — 36415 COLL VENOUS BLD VENIPUNCTURE: CPT

## 2023-09-18 PROCEDURE — 85025 COMPLETE CBC W/AUTO DIFF WBC: CPT

## 2023-09-18 PROCEDURE — 6360000002 HC RX W HCPCS: Performed by: NURSE PRACTITIONER

## 2023-09-18 PROCEDURE — 84145 PROCALCITONIN (PCT): CPT

## 2023-09-18 PROCEDURE — 83880 ASSAY OF NATRIURETIC PEPTIDE: CPT

## 2023-09-18 PROCEDURE — 93010 ELECTROCARDIOGRAM REPORT: CPT | Performed by: NUCLEAR MEDICINE

## 2023-09-18 PROCEDURE — 96375 TX/PRO/DX INJ NEW DRUG ADDON: CPT

## 2023-09-18 PROCEDURE — 6370000000 HC RX 637 (ALT 250 FOR IP): Performed by: PHYSICIAN ASSISTANT

## 2023-09-18 PROCEDURE — 71045 X-RAY EXAM CHEST 1 VIEW: CPT

## 2023-09-18 RX ORDER — 0.9 % SODIUM CHLORIDE 0.9 %
1000 INTRAVENOUS SOLUTION INTRAVENOUS ONCE
Status: COMPLETED | OUTPATIENT
Start: 2023-09-18 | End: 2023-09-18

## 2023-09-18 RX ORDER — SODIUM CHLORIDE 9 MG/ML
INJECTION, SOLUTION INTRAVENOUS PRN
Status: DISCONTINUED | OUTPATIENT
Start: 2023-09-18 | End: 2023-09-26 | Stop reason: HOSPADM

## 2023-09-18 RX ORDER — ACETAMINOPHEN 650 MG/1
650 SUPPOSITORY RECTAL EVERY 6 HOURS PRN
Status: DISCONTINUED | OUTPATIENT
Start: 2023-09-18 | End: 2023-09-18 | Stop reason: HOSPADM

## 2023-09-18 RX ORDER — SODIUM CHLORIDE 0.9 % (FLUSH) 0.9 %
5-40 SYRINGE (ML) INJECTION EVERY 12 HOURS SCHEDULED
Status: DISCONTINUED | OUTPATIENT
Start: 2023-09-18 | End: 2023-09-18 | Stop reason: HOSPADM

## 2023-09-18 RX ORDER — POTASSIUM CHLORIDE 20 MEQ/1
40 TABLET, EXTENDED RELEASE ORAL PRN
Status: DISCONTINUED | OUTPATIENT
Start: 2023-09-18 | End: 2023-09-26 | Stop reason: HOSPADM

## 2023-09-18 RX ORDER — SODIUM CHLORIDE 0.9 % (FLUSH) 0.9 %
5-40 SYRINGE (ML) INJECTION EVERY 12 HOURS SCHEDULED
Status: DISCONTINUED | OUTPATIENT
Start: 2023-09-18 | End: 2023-09-26 | Stop reason: HOSPADM

## 2023-09-18 RX ORDER — SODIUM CHLORIDE 0.9 % (FLUSH) 0.9 %
10 SYRINGE (ML) INJECTION PRN
Status: DISCONTINUED | OUTPATIENT
Start: 2023-09-18 | End: 2023-09-26 | Stop reason: HOSPADM

## 2023-09-18 RX ORDER — ACETAMINOPHEN 325 MG/1
650 TABLET ORAL EVERY 6 HOURS PRN
Status: DISCONTINUED | OUTPATIENT
Start: 2023-09-18 | End: 2023-09-26 | Stop reason: HOSPADM

## 2023-09-18 RX ORDER — HYDROCODONE BITARTRATE AND ACETAMINOPHEN 5; 325 MG/1; MG/1
1 TABLET ORAL ONCE
Status: COMPLETED | OUTPATIENT
Start: 2023-09-18 | End: 2023-09-18

## 2023-09-18 RX ORDER — SODIUM CHLORIDE 9 MG/ML
INJECTION, SOLUTION INTRAVENOUS PRN
Status: DISCONTINUED | OUTPATIENT
Start: 2023-09-18 | End: 2023-09-18 | Stop reason: HOSPADM

## 2023-09-18 RX ORDER — ONDANSETRON 2 MG/ML
4 INJECTION INTRAMUSCULAR; INTRAVENOUS EVERY 6 HOURS PRN
Status: DISCONTINUED | OUTPATIENT
Start: 2023-09-18 | End: 2023-09-18 | Stop reason: HOSPADM

## 2023-09-18 RX ORDER — SODIUM CHLORIDE 0.9 % (FLUSH) 0.9 %
5-40 SYRINGE (ML) INJECTION PRN
Status: DISCONTINUED | OUTPATIENT
Start: 2023-09-18 | End: 2023-09-18 | Stop reason: HOSPADM

## 2023-09-18 RX ORDER — POLYETHYLENE GLYCOL 3350 17 G/17G
17 POWDER, FOR SOLUTION ORAL DAILY PRN
Status: DISCONTINUED | OUTPATIENT
Start: 2023-09-18 | End: 2023-09-18 | Stop reason: HOSPADM

## 2023-09-18 RX ORDER — ACETAMINOPHEN 650 MG/1
650 SUPPOSITORY RECTAL EVERY 6 HOURS PRN
Status: DISCONTINUED | OUTPATIENT
Start: 2023-09-18 | End: 2023-09-26 | Stop reason: HOSPADM

## 2023-09-18 RX ORDER — MAGNESIUM SULFATE 1 G/100ML
1000 INJECTION INTRAVENOUS PRN
Status: DISCONTINUED | OUTPATIENT
Start: 2023-09-18 | End: 2023-09-26 | Stop reason: HOSPADM

## 2023-09-18 RX ORDER — ONDANSETRON 4 MG/1
4 TABLET, ORALLY DISINTEGRATING ORAL EVERY 8 HOURS PRN
Status: DISCONTINUED | OUTPATIENT
Start: 2023-09-18 | End: 2023-09-26 | Stop reason: HOSPADM

## 2023-09-18 RX ORDER — ONDANSETRON 4 MG/1
4 TABLET, ORALLY DISINTEGRATING ORAL EVERY 8 HOURS PRN
Status: DISCONTINUED | OUTPATIENT
Start: 2023-09-18 | End: 2023-09-18 | Stop reason: HOSPADM

## 2023-09-18 RX ORDER — ACETAMINOPHEN 325 MG/1
650 TABLET ORAL EVERY 6 HOURS PRN
Status: DISCONTINUED | OUTPATIENT
Start: 2023-09-18 | End: 2023-09-18 | Stop reason: HOSPADM

## 2023-09-18 RX ORDER — ONDANSETRON 2 MG/ML
4 INJECTION INTRAMUSCULAR; INTRAVENOUS EVERY 6 HOURS PRN
Status: DISCONTINUED | OUTPATIENT
Start: 2023-09-18 | End: 2023-09-26 | Stop reason: HOSPADM

## 2023-09-18 RX ORDER — POLYETHYLENE GLYCOL 3350 17 G/17G
17 POWDER, FOR SOLUTION ORAL DAILY PRN
Status: DISCONTINUED | OUTPATIENT
Start: 2023-09-18 | End: 2023-09-26 | Stop reason: HOSPADM

## 2023-09-18 RX ORDER — POTASSIUM CHLORIDE 7.45 MG/ML
10 INJECTION INTRAVENOUS PRN
Status: DISCONTINUED | OUTPATIENT
Start: 2023-09-18 | End: 2023-09-26 | Stop reason: HOSPADM

## 2023-09-18 RX ADMIN — HYDROMORPHONE HYDROCHLORIDE 0.5 MG: 1 INJECTION, SOLUTION INTRAMUSCULAR; INTRAVENOUS; SUBCUTANEOUS at 21:21

## 2023-09-18 RX ADMIN — HYDROMORPHONE HYDROCHLORIDE 0.5 MG: 1 INJECTION, SOLUTION INTRAMUSCULAR; INTRAVENOUS; SUBCUTANEOUS at 19:15

## 2023-09-18 RX ADMIN — SODIUM CHLORIDE 1000 ML: 9 INJECTION, SOLUTION INTRAVENOUS at 09:56

## 2023-09-18 RX ADMIN — CEFTRIAXONE SODIUM 1000 MG: 1 INJECTION, POWDER, FOR SOLUTION INTRAMUSCULAR; INTRAVENOUS at 11:53

## 2023-09-18 RX ADMIN — IOPAMIDOL 80 ML: 755 INJECTION, SOLUTION INTRAVENOUS at 10:05

## 2023-09-18 RX ADMIN — HYDROCODONE BITARTRATE AND ACETAMINOPHEN 1 TABLET: 5; 325 TABLET ORAL at 16:55

## 2023-09-18 ASSESSMENT — ENCOUNTER SYMPTOMS
VOMITING: 0
COUGH: 0
NAUSEA: 0
SORE THROAT: 0
ABDOMINAL PAIN: 0
SHORTNESS OF BREATH: 1
DIARRHEA: 0
RHINORRHEA: 0

## 2023-09-18 ASSESSMENT — PAIN SCALES - GENERAL
PAINLEVEL_OUTOF10: 8
PAINLEVEL_OUTOF10: 6
PAINLEVEL_OUTOF10: 7
PAINLEVEL_OUTOF10: 4

## 2023-09-18 ASSESSMENT — PAIN - FUNCTIONAL ASSESSMENT
PAIN_FUNCTIONAL_ASSESSMENT: 0-10
PAIN_FUNCTIONAL_ASSESSMENT: 0-10
PAIN_FUNCTIONAL_ASSESSMENT: NONE - DENIES PAIN
PAIN_FUNCTIONAL_ASSESSMENT: 0-10
PAIN_FUNCTIONAL_ASSESSMENT: 0-10
PAIN_FUNCTIONAL_ASSESSMENT: NONE - DENIES PAIN

## 2023-09-18 ASSESSMENT — PAIN DESCRIPTION - ORIENTATION
ORIENTATION: LEFT
ORIENTATION: RIGHT;LEFT

## 2023-09-18 ASSESSMENT — PAIN DESCRIPTION - LOCATION
LOCATION: LEG
LOCATION: LEG

## 2023-09-18 NOTE — ED NOTES
Pt resting on recliner. Pt respirations are even and unlabored.      Jessica Beckford, MAYLIN  09/18/23 2869

## 2023-09-18 NOTE — ED NOTES
Pt resting on cot with eyes closed. Pt respirations are even and unlabored.      Amina Villarreal RN  09/18/23 0504

## 2023-09-18 NOTE — ED TRIAGE NOTES
Pt presents to the ED with complaints of increased SOB. Pt states he has been feeling more SOB than normal. Pt baseline is 2 L NC. Pt pulse ox is 96% with respirations 24. Pt has hx of  MDS. Pt received RBC on 09/15/23 for amenia and states since he has had a rash on his body that does not itch. Pt went to [facility] on 9/16 and wanted transferred to [facility], due to thrombocytopenia and anemia, but was not transferred. Pt respirations are even but labored.

## 2023-09-18 NOTE — PLAN OF CARE
Hospitalist --hospitalist service contacted for admission Mr. Adelina Alvarado for increasing hypoxia, shortness of breath, recurrent left pleural effusion. However chart review patient with CMML, mixed MPN/MDS overlap and current CBC showing 35% blasts. Discussed case with oncology service KYLER Novoa with Dr. Blas Mora - recommended transfer to tertiary center with hem/onc coverage for possible conversion to AML. Pt recently at [facility]. V's 9/6 - 9/7 for similar issue but now the blasts have went from 5% on 9/11/23 to 35% thus why recommending transfer. Discussed with ER attending Marcelljj CHÁVEZ who will work on further transfer to tertiary center.     Electronically signed by Lona Quintero MD on 9/18/2023 at 12:08 PM

## 2023-09-18 NOTE — PROGRESS NOTES
Oncology Social Work    Date: 9/18/2023  Time: 11:20 AM  Name: Aileen Brantley  MRN: [id number]     Contact Type: Follow-up    Note:   Situation: This staff was referred to Aileen Brantley (goes by Nazario)from the [facility] to assist in finding a treatment center covered by his insurance. Background: This staff received the following email from the staff in [facility])  -- This patient needs help finding out what [facility] accepts his insurance. He states [facility](where he is established) does not. Assessment: This staff called and spoke with Cesar Quintero while he was in the Emergency Dept. This morning. He shared this is the 3rd week in a row that he has had to have fluid removed from his lungs. He expressed a great deal of concern as to why this continues to happen. He also shared, \"No one is explaining to me, why the fluid keeps coming back and I wish they would. \"  - This staff offered education regarding ways to ask the question so he is able to get his necessary answers. Cesar Quintero was in the ED with his wife and together they felt they could get the answers today. - This staff inquired about Nazario's initial concern expressed to the [facility] staff. Cesar Quintero has never contacted his insurance provider about finding in network providers. He was encouraged to do so. He was also informed he needs a referral to Dr Javy Delacruz from our ED provider - at which time, he explained he has been seeing Dr Javy Delacruz in [facility] so is confused as well. Again, this staff encouraged him to contact his insurance provider and clarify if any further action is needed at this time. He was instructed to contact me if he needs further help.,    Recommendation: Follow-up will be initiated by Cesar Quintero based on need.  provided him with my contact information and will remain available for support.         Tena Sood, MSW, LSW, ACHP-  Oncology Social Worker      Electronically signed by Quan Erazo

## 2023-09-18 NOTE — ED NOTES
Bedside report received from Erika Webb. Pt up in chair, states pain is an 8. Medication administered. No distress noted.      Álvaro Neves RN  09/18/23 1920

## 2023-09-19 ENCOUNTER — APPOINTMENT (OUTPATIENT)
Dept: GENERAL RADIOLOGY | Age: 68
DRG: 841 | End: 2023-09-19
Attending: HOSPITALIST
Payer: COMMERCIAL

## 2023-09-19 ENCOUNTER — SOCIAL WORK (OUTPATIENT)
Dept: INFUSION THERAPY | Age: 68
End: 2023-09-19

## 2023-09-19 PROBLEM — R79.89 ELEVATED TROPONIN: Status: ACTIVE | Noted: 2023-09-19

## 2023-09-19 PROBLEM — R79.89 ELEVATED BRAIN NATRIURETIC PEPTIDE (BNP) LEVEL: Status: ACTIVE | Noted: 2023-09-19

## 2023-09-19 PROBLEM — R77.8 ELEVATED TROPONIN: Status: ACTIVE | Noted: 2023-09-19

## 2023-09-19 LAB
ALBUMIN SERPL-MCNC: 3.2 G/DL (ref 3.5–5.2)
ALBUMIN/GLOB SERPL: 1.1 {RATIO} (ref 1–2.5)
ALP SERPL-CCNC: 63 U/L (ref 40–129)
ALT SERPL-CCNC: 21 U/L (ref 5–41)
ANION GAP SERPL CALCULATED.3IONS-SCNC: 13 MMOL/L (ref 9–17)
AST SERPL-CCNC: 13 U/L
BILIRUB SERPL-MCNC: 0.2 MG/DL (ref 0.3–1.2)
BUN SERPL-MCNC: 21 MG/DL (ref 8–23)
CALCIUM SERPL-MCNC: 8.3 MG/DL (ref 8.6–10.4)
CHLORIDE SERPL-SCNC: 101 MMOL/L (ref 98–107)
CO2 SERPL-SCNC: 20 MMOL/L (ref 20–31)
CREAT SERPL-MCNC: 1.5 MG/DL (ref 0.7–1.2)
ERYTHROCYTE [DISTWIDTH] IN BLOOD BY AUTOMATED COUNT: 16.3 % (ref 11.8–14.4)
GFR SERPL CREATININE-BSD FRML MDRD: 51 ML/MIN/1.73M2
GLUCOSE SERPL-MCNC: 108 MG/DL (ref 70–99)
HCT VFR BLD AUTO: 25.1 % (ref 40.7–50.3)
HGB BLD-MCNC: 7.3 G/DL (ref 13–17)
INR PPP: 1.1
MCH RBC QN AUTO: 29.2 PG (ref 25.2–33.5)
MCHC RBC AUTO-ENTMCNC: 29.1 G/DL (ref 28.4–34.8)
MCV RBC AUTO: 100.4 FL (ref 82.6–102.9)
NRBC BLD-RTO: 0 PER 100 WBC
PLATELET # BLD AUTO: ABNORMAL K/UL (ref 138–453)
PLATELET, FLUORESCENCE: 23 K/UL (ref 138–453)
PLATELETS.RETICULATED NFR BLD AUTO: 8.8 % (ref 1.1–10.3)
POTASSIUM SERPL-SCNC: 3.7 MMOL/L (ref 3.7–5.3)
PROT SERPL-MCNC: 6.1 G/DL (ref 6.4–8.3)
PROTHROMBIN TIME: 13.7 SEC (ref 11.7–14.9)
RBC # BLD AUTO: 2.5 M/UL (ref 4.21–5.77)
SODIUM SERPL-SCNC: 134 MMOL/L (ref 135–144)
TROPONIN I SERPL HS-MCNC: 20 NG/L (ref 0–22)
WBC OTHER # BLD: 87.9 K/UL (ref 3.5–11.3)

## 2023-09-19 PROCEDURE — 36415 COLL VENOUS BLD VENIPUNCTURE: CPT

## 2023-09-19 PROCEDURE — 99223 1ST HOSP IP/OBS HIGH 75: CPT | Performed by: INTERNAL MEDICINE

## 2023-09-19 PROCEDURE — 80053 COMPREHEN METABOLIC PANEL: CPT

## 2023-09-19 PROCEDURE — 94640 AIRWAY INHALATION TREATMENT: CPT

## 2023-09-19 PROCEDURE — 73502 X-RAY EXAM HIP UNI 2-3 VIEWS: CPT

## 2023-09-19 PROCEDURE — 6370000000 HC RX 637 (ALT 250 FOR IP): Performed by: FAMILY MEDICINE

## 2023-09-19 PROCEDURE — 94761 N-INVAS EAR/PLS OXIMETRY MLT: CPT

## 2023-09-19 PROCEDURE — 6370000000 HC RX 637 (ALT 250 FOR IP): Performed by: INTERNAL MEDICINE

## 2023-09-19 PROCEDURE — 2580000003 HC RX 258: Performed by: NURSE PRACTITIONER

## 2023-09-19 PROCEDURE — 85055 RETICULATED PLATELET ASSAY: CPT

## 2023-09-19 PROCEDURE — 6360000002 HC RX W HCPCS: Performed by: NURSE PRACTITIONER

## 2023-09-19 PROCEDURE — 1200000000 HC SEMI PRIVATE

## 2023-09-19 PROCEDURE — 6370000000 HC RX 637 (ALT 250 FOR IP): Performed by: NURSE PRACTITIONER

## 2023-09-19 PROCEDURE — 85610 PROTHROMBIN TIME: CPT

## 2023-09-19 PROCEDURE — 99223 1ST HOSP IP/OBS HIGH 75: CPT | Performed by: FAMILY MEDICINE

## 2023-09-19 PROCEDURE — 85027 COMPLETE CBC AUTOMATED: CPT

## 2023-09-19 PROCEDURE — 2700000000 HC OXYGEN THERAPY PER DAY

## 2023-09-19 PROCEDURE — 84484 ASSAY OF TROPONIN QUANT: CPT

## 2023-09-19 PROCEDURE — 2580000003 HC RX 258: Performed by: FAMILY MEDICINE

## 2023-09-19 PROCEDURE — 71045 X-RAY EXAM CHEST 1 VIEW: CPT

## 2023-09-19 RX ORDER — BUDESONIDE AND FORMOTEROL FUMARATE DIHYDRATE 160; 4.5 UG/1; UG/1
2 AEROSOL RESPIRATORY (INHALATION)
Status: DISCONTINUED | OUTPATIENT
Start: 2023-09-19 | End: 2023-09-26 | Stop reason: HOSPADM

## 2023-09-19 RX ORDER — ALLOPURINOL 300 MG/1
300 TABLET ORAL DAILY
Status: DISCONTINUED | OUTPATIENT
Start: 2023-09-19 | End: 2023-09-26 | Stop reason: HOSPADM

## 2023-09-19 RX ORDER — POTASSIUM CHLORIDE 20 MEQ/1
20 TABLET, EXTENDED RELEASE ORAL
Status: DISCONTINUED | OUTPATIENT
Start: 2023-09-20 | End: 2023-09-26 | Stop reason: HOSPADM

## 2023-09-19 RX ORDER — TAMSULOSIN HYDROCHLORIDE 0.4 MG/1
0.8 CAPSULE ORAL DAILY
Status: DISCONTINUED | OUTPATIENT
Start: 2023-09-19 | End: 2023-09-26 | Stop reason: HOSPADM

## 2023-09-19 RX ORDER — HYDROXYUREA 500 MG/1
500 CAPSULE ORAL DAILY
Status: DISCONTINUED | OUTPATIENT
Start: 2023-09-19 | End: 2023-09-26 | Stop reason: HOSPADM

## 2023-09-19 RX ORDER — SODIUM CHLORIDE 9 MG/ML
INJECTION, SOLUTION INTRAVENOUS CONTINUOUS
Status: ACTIVE | OUTPATIENT
Start: 2023-09-19 | End: 2023-09-20

## 2023-09-19 RX ORDER — QUETIAPINE FUMARATE 25 MG/1
25 TABLET, FILM COATED ORAL NIGHTLY
Status: DISCONTINUED | OUTPATIENT
Start: 2023-09-19 | End: 2023-09-26 | Stop reason: HOSPADM

## 2023-09-19 RX ADMIN — BUDESONIDE AND FORMOTEROL FUMARATE DIHYDRATE 2 PUFF: 160; 4.5 AEROSOL RESPIRATORY (INHALATION) at 20:33

## 2023-09-19 RX ADMIN — HYDROMORPHONE HYDROCHLORIDE 0.5 MG: 1 INJECTION, SOLUTION INTRAMUSCULAR; INTRAVENOUS; SUBCUTANEOUS at 14:08

## 2023-09-19 RX ADMIN — SODIUM CHLORIDE: 9 INJECTION, SOLUTION INTRAVENOUS at 15:05

## 2023-09-19 RX ADMIN — SODIUM CHLORIDE, PRESERVATIVE FREE 10 ML: 5 INJECTION INTRAVENOUS at 09:45

## 2023-09-19 RX ADMIN — POLYETHYLENE GLYCOL 3350 17 G: 17 POWDER, FOR SOLUTION ORAL at 09:48

## 2023-09-19 RX ADMIN — HYDROMORPHONE HYDROCHLORIDE 0.5 MG: 1 INJECTION, SOLUTION INTRAMUSCULAR; INTRAVENOUS; SUBCUTANEOUS at 00:12

## 2023-09-19 RX ADMIN — HYDROXYUREA 500 MG: 500 CAPSULE ORAL at 16:44

## 2023-09-19 RX ADMIN — SODIUM CHLORIDE, PRESERVATIVE FREE 10 ML: 5 INJECTION INTRAVENOUS at 00:14

## 2023-09-19 RX ADMIN — HYDROMORPHONE HYDROCHLORIDE 0.5 MG: 1 INJECTION, SOLUTION INTRAMUSCULAR; INTRAVENOUS; SUBCUTANEOUS at 05:33

## 2023-09-19 RX ADMIN — HYDROMORPHONE HYDROCHLORIDE 0.5 MG: 1 INJECTION, SOLUTION INTRAMUSCULAR; INTRAVENOUS; SUBCUTANEOUS at 19:25

## 2023-09-19 RX ADMIN — METOPROLOL TARTRATE 12.5 MG: 25 TABLET ORAL at 15:02

## 2023-09-19 RX ADMIN — QUETIAPINE FUMARATE 25 MG: 25 TABLET ORAL at 20:43

## 2023-09-19 RX ADMIN — HYDROMORPHONE HYDROCHLORIDE 0.5 MG: 1 INJECTION, SOLUTION INTRAMUSCULAR; INTRAVENOUS; SUBCUTANEOUS at 09:48

## 2023-09-19 RX ADMIN — ALLOPURINOL 300 MG: 300 TABLET ORAL at 21:35

## 2023-09-19 RX ADMIN — TAMSULOSIN HYDROCHLORIDE 0.8 MG: 0.4 CAPSULE ORAL at 15:02

## 2023-09-19 ASSESSMENT — PAIN DESCRIPTION - ORIENTATION
ORIENTATION: LEFT
ORIENTATION: RIGHT;LEFT
ORIENTATION: LEFT

## 2023-09-19 ASSESSMENT — PAIN DESCRIPTION - LOCATION
LOCATION: HIP;LEG
LOCATION: HIP
LOCATION: LEG
LOCATION: HIP
LOCATION: LEG

## 2023-09-19 ASSESSMENT — ENCOUNTER SYMPTOMS
BLOOD IN STOOL: 0
STRIDOR: 0
SHORTNESS OF BREATH: 1
CONSTIPATION: 0
DIARRHEA: 0
COUGH: 0
ABDOMINAL PAIN: 0
WHEEZING: 0
VOMITING: 0
NAUSEA: 0

## 2023-09-19 ASSESSMENT — PAIN DESCRIPTION - DESCRIPTORS
DESCRIPTORS: SHARP
DESCRIPTORS: SHARP;SHOOTING
DESCRIPTORS: ACHING;SHARP

## 2023-09-19 ASSESSMENT — PAIN SCALES - GENERAL
PAINLEVEL_OUTOF10: 8
PAINLEVEL_OUTOF10: 8
PAINLEVEL_OUTOF10: 9
PAINLEVEL_OUTOF10: 8
PAINLEVEL_OUTOF10: 6

## 2023-09-19 NOTE — PROGRESS NOTES
- This staff was contacted by the [facility] staff to assist in helping Nazario determine the tertiary centers who would accept his insurance. - After speaking with Jean Claude Murcia, he explained that he was denied care at a hospital in [facility] and wanted to make sure he was able to go to other hospitals based on his Medicare part B coverage.   - This staff reviewed his account information through the Medicare. gov web site. Printed the list of centers available to him in his area and in the [facility] area. - Mailed ([employer]) the list of centers to Jean Claude Murcia. I enclosed my contact information and provided him with the instructions if he wanted to do further checking on his own. - This staff will remain available for support to Jean Claude Murcia. I provided my contact information in the letter sent today.

## 2023-09-19 NOTE — ED NOTES
Patient resting in bed. Respirations easy and unlabored. No distress noted. Call light within reach.       Teresita Carlson RN  09/18/23 0675

## 2023-09-19 NOTE — CONSENT
Informed Consent for Blood Component Transfusion Note    I have discussed with the patient the rationale for blood component transfusion; its benefits in treating or preventing fatigue, organ damage, or death; and its risk which includes mild transfusion reactions, rare risk of blood borne infection, or more serious but rare reactions. I have discussed the alternatives to transfusion, including the risk and consequences of not receiving transfusion. The patient had an opportunity to ask questions and had agreed to proceed with transfusion of blood components.     Electronically signed by Kevon Harper MD on 9/19/23 at 12:16 PM EDT

## 2023-09-19 NOTE — H&P
worsening dyspnea regarding transfusion will ask IR consult for thoracentesis  Patient recently finished course of Levaquin      Mediastinal lymphadenopathy    Acute hip pain: Left hip x-ray    Anemia: Continue transfuse as needed      Dyslipidemia : on statin           Stage 3a chronic kidney disease : Continue to avoid nephrotoxic agents, patient received contrast yesterday we will provide some hydration, will continue to monitor      Elevated troponin / Elevated bnp : Seen active chest pain, EKG unremarkable will get echocardiogram, will monitor closely, Continue on telemetry,    No pharmacologic DVT prophylaxis    Consultations:   IP CONSULT TO HEM/ONC     Patient is admitted as inpatient status because of co-morbidities listed above, severity of signs and symptoms as outlined, requirement for current medical therapies and most importantly because of direct risk to patient if care not provided in a hospital setting. Expected length of stay > 48 hours.     Bello Chavez MD  9/19/2023  2:34 PM    Copy sent to Dr. Estephanie Teixeira, APRN - CNP

## 2023-09-20 ENCOUNTER — APPOINTMENT (OUTPATIENT)
Age: 68
DRG: 841 | End: 2023-09-20
Attending: FAMILY MEDICINE
Payer: COMMERCIAL

## 2023-09-20 ENCOUNTER — APPOINTMENT (OUTPATIENT)
Dept: GENERAL RADIOLOGY | Age: 68
DRG: 841 | End: 2023-09-20
Attending: HOSPITALIST
Payer: COMMERCIAL

## 2023-09-20 ENCOUNTER — APPOINTMENT (OUTPATIENT)
Dept: ULTRASOUND IMAGING | Age: 68
DRG: 841 | End: 2023-09-20
Attending: HOSPITALIST
Payer: COMMERCIAL

## 2023-09-20 PROBLEM — D69.6 THROMBOCYTOPENIA (HCC): Status: ACTIVE | Noted: 2023-09-20

## 2023-09-20 LAB
ANION GAP SERPL CALCULATED.3IONS-SCNC: 13 MMOL/L (ref 9–17)
BASOPHILS # BLD: 0 K/UL (ref 0–0.2)
BASOPHILS NFR BLD: 0 % (ref 0–2)
BLASTS ABSOLUTE COUNT: 1.97 K/UL
BLASTS: 3 %
BUN SERPL-MCNC: 17 MG/DL (ref 8–23)
CALCIUM SERPL-MCNC: 8.4 MG/DL (ref 8.6–10.4)
CHLORIDE SERPL-SCNC: 100 MMOL/L (ref 98–107)
CO2 SERPL-SCNC: 24 MMOL/L (ref 20–31)
CREAT SERPL-MCNC: 1.2 MG/DL (ref 0.7–1.2)
ECHO AO ROOT DIAM: 3.7 CM
ECHO AO ROOT INDEX: 1.55 CM/M2
ECHO BSA: 2.45 M2
ECHO EST RA PRESSURE: 3 MMHG
ECHO LA AREA 2C: 23 CM2
ECHO LA AREA 4C: 25.4 CM2
ECHO LA DIAMETER INDEX: 1.89 CM/M2
ECHO LA DIAMETER: 4.5 CM
ECHO LA MAJOR AXIS: 7.3 CM
ECHO LA MINOR AXIS: 6 CM
ECHO LA TO AORTIC ROOT RATIO: 1.22
ECHO LA VOL 2C: 72 ML (ref 18–58)
ECHO LA VOL 4C: 70 ML (ref 18–58)
ECHO LA VOL BP: 79 ML (ref 18–58)
ECHO LA VOL/BSA BIPLANE: 33 ML/M2 (ref 16–34)
ECHO LA VOLUME INDEX A2C: 30 ML/M2 (ref 16–34)
ECHO LA VOLUME INDEX A4C: 29 ML/M2 (ref 16–34)
ECHO LV FRACTIONAL SHORTENING: 34 % (ref 28–44)
ECHO LV INTERNAL DIMENSION DIASTOLE INDEX: 2.23 CM/M2
ECHO LV INTERNAL DIMENSION DIASTOLIC: 5.3 CM (ref 4.2–5.9)
ECHO LV INTERNAL DIMENSION SYSTOLIC INDEX: 1.47 CM/M2
ECHO LV INTERNAL DIMENSION SYSTOLIC: 3.5 CM
ECHO LV IVSD: 0.9 CM (ref 0.6–1)
ECHO LV MASS 2D: 187.3 G (ref 88–224)
ECHO LV MASS INDEX 2D: 78.7 G/M2 (ref 49–115)
ECHO LV POSTERIOR WALL DIASTOLIC: 1 CM (ref 0.6–1)
ECHO LV RELATIVE WALL THICKNESS RATIO: 0.38
ECHO LVOT AREA: 4.9 CM2
ECHO LVOT DIAM: 2.5 CM
ECHO RIGHT VENTRICULAR SYSTOLIC PRESSURE (RVSP): 20 MMHG
ECHO RV INTERNAL DIMENSION: 4 CM
ECHO RV TAPSE: 2 CM (ref 1.7–?)
ECHO TV REGURGITANT MAX VELOCITY: 2.08 M/S
ECHO TV REGURGITANT PEAK GRADIENT: 17 MMHG
EKG ATRIAL RATE: 81 BPM
EKG P AXIS: 47 DEGREES
EKG P-R INTERVAL: 152 MS
EKG Q-T INTERVAL: 374 MS
EKG QRS DURATION: 88 MS
EKG QTC CALCULATION (BAZETT): 434 MS
EKG R AXIS: 28 DEGREES
EKG T AXIS: 32 DEGREES
EKG VENTRICULAR RATE: 81 BPM
EOSINOPHIL # BLD: 0.66 K/UL (ref 0–0.4)
EOSINOPHILS RELATIVE PERCENT: 1 % (ref 1–4)
ERYTHROCYTE [DISTWIDTH] IN BLOOD BY AUTOMATED COUNT: 16.1 % (ref 11.8–14.4)
GFR SERPL CREATININE-BSD FRML MDRD: >60 ML/MIN/1.73M2
GLUCOSE SERPL-MCNC: 159 MG/DL (ref 70–99)
HCT VFR BLD AUTO: 21.4 % (ref 40.7–50.3)
HCT VFR BLD AUTO: 24.1 % (ref 40.7–50.3)
HGB BLD-MCNC: 6.8 G/DL (ref 13–17)
HGB BLD-MCNC: 7.9 G/DL (ref 13–17)
IMM GRANULOCYTES # BLD AUTO: 4.59 K/UL (ref 0–0.3)
IMM GRANULOCYTES NFR BLD: 7 %
LYMPHOCYTES NFR BLD: 7.86 K/UL (ref 1–4.8)
LYMPHOCYTES RELATIVE PERCENT: 12 % (ref 24–44)
MCH RBC QN AUTO: 30.1 PG (ref 25.2–33.5)
MCHC RBC AUTO-ENTMCNC: 31.8 G/DL (ref 28.4–34.8)
MCV RBC AUTO: 94.7 FL (ref 82.6–102.9)
MONOCYTES NFR BLD: 35.35 K/UL (ref 0.1–0.8)
MONOCYTES NFR BLD: 54 % (ref 1–7)
MORPHOLOGY: ABNORMAL
NEUTROPHILS NFR BLD: 23 % (ref 36–66)
NEUTS SEG NFR BLD: 15.07 K/UL (ref 1.8–7.7)
NRBC BLD-RTO: 0 PER 100 WBC
PLATELET # BLD AUTO: ABNORMAL K/UL (ref 138–453)
PLATELET, FLUORESCENCE: 13 K/UL (ref 138–453)
PLATELETS.RETICULATED NFR BLD AUTO: 11.7 % (ref 1.1–10.3)
POTASSIUM SERPL-SCNC: 3.2 MMOL/L (ref 3.7–5.3)
RBC # BLD AUTO: 2.26 M/UL (ref 4.21–5.77)
SODIUM SERPL-SCNC: 137 MMOL/L (ref 135–144)
URATE SERPL-MCNC: 7 MG/DL (ref 3.4–7)
WBC OTHER # BLD: 65.5 K/UL (ref 3.5–11.3)

## 2023-09-20 PROCEDURE — 2700000000 HC OXYGEN THERAPY PER DAY

## 2023-09-20 PROCEDURE — 85014 HEMATOCRIT: CPT

## 2023-09-20 PROCEDURE — P9016 RBC LEUKOCYTES REDUCED: HCPCS

## 2023-09-20 PROCEDURE — 86850 RBC ANTIBODY SCREEN: CPT

## 2023-09-20 PROCEDURE — 6370000000 HC RX 637 (ALT 250 FOR IP): Performed by: FAMILY MEDICINE

## 2023-09-20 PROCEDURE — 36430 TRANSFUSION BLD/BLD COMPNT: CPT

## 2023-09-20 PROCEDURE — 6370000000 HC RX 637 (ALT 250 FOR IP): Performed by: INTERNAL MEDICINE

## 2023-09-20 PROCEDURE — 99233 SBSQ HOSP IP/OBS HIGH 50: CPT | Performed by: FAMILY MEDICINE

## 2023-09-20 PROCEDURE — 99233 SBSQ HOSP IP/OBS HIGH 50: CPT | Performed by: INTERNAL MEDICINE

## 2023-09-20 PROCEDURE — 85025 COMPLETE CBC W/AUTO DIFF WBC: CPT

## 2023-09-20 PROCEDURE — P9073 PLATELETS PHERESIS PATH REDU: HCPCS

## 2023-09-20 PROCEDURE — 6360000002 HC RX W HCPCS: Performed by: INTERNAL MEDICINE

## 2023-09-20 PROCEDURE — 86900 BLOOD TYPING SEROLOGIC ABO: CPT

## 2023-09-20 PROCEDURE — 2580000003 HC RX 258: Performed by: FAMILY MEDICINE

## 2023-09-20 PROCEDURE — 1200000000 HC SEMI PRIVATE

## 2023-09-20 PROCEDURE — 2709999900 US THORACENTESIS

## 2023-09-20 PROCEDURE — 86920 COMPATIBILITY TEST SPIN: CPT

## 2023-09-20 PROCEDURE — 2580000003 HC RX 258: Performed by: INTERNAL MEDICINE

## 2023-09-20 PROCEDURE — 93321 DOPPLER ECHO F-UP/LMTD STD: CPT | Performed by: INTERNAL MEDICINE

## 2023-09-20 PROCEDURE — 93308 TTE F-UP OR LMTD: CPT | Performed by: INTERNAL MEDICINE

## 2023-09-20 PROCEDURE — 6360000002 HC RX W HCPCS: Performed by: NURSE PRACTITIONER

## 2023-09-20 PROCEDURE — 85055 RETICULATED PLATELET ASSAY: CPT

## 2023-09-20 PROCEDURE — 0W9B3ZZ DRAINAGE OF LEFT PLEURAL CAVITY, PERCUTANEOUS APPROACH: ICD-10-PCS | Performed by: RADIOLOGY

## 2023-09-20 PROCEDURE — 6360000002 HC RX W HCPCS: Performed by: FAMILY MEDICINE

## 2023-09-20 PROCEDURE — 85018 HEMOGLOBIN: CPT

## 2023-09-20 PROCEDURE — 93325 DOPPLER ECHO COLOR FLOW MAPG: CPT | Performed by: INTERNAL MEDICINE

## 2023-09-20 PROCEDURE — 80048 BASIC METABOLIC PNL TOTAL CA: CPT

## 2023-09-20 PROCEDURE — 86901 BLOOD TYPING SEROLOGIC RH(D): CPT

## 2023-09-20 PROCEDURE — 36415 COLL VENOUS BLD VENIPUNCTURE: CPT

## 2023-09-20 PROCEDURE — 84550 ASSAY OF BLOOD/URIC ACID: CPT

## 2023-09-20 PROCEDURE — 71045 X-RAY EXAM CHEST 1 VIEW: CPT

## 2023-09-20 PROCEDURE — 94761 N-INVAS EAR/PLS OXIMETRY MLT: CPT

## 2023-09-20 PROCEDURE — 93308 TTE F-UP OR LMTD: CPT

## 2023-09-20 PROCEDURE — 2580000003 HC RX 258: Performed by: NURSE PRACTITIONER

## 2023-09-20 PROCEDURE — 94640 AIRWAY INHALATION TREATMENT: CPT

## 2023-09-20 RX ORDER — LORAZEPAM 2 MG/ML
0.5 INJECTION INTRAMUSCULAR
Status: ACTIVE | OUTPATIENT
Start: 2023-09-20 | End: 2023-09-21

## 2023-09-20 RX ORDER — SODIUM CHLORIDE 9 MG/ML
INJECTION, SOLUTION INTRAVENOUS PRN
Status: DISCONTINUED | OUTPATIENT
Start: 2023-09-20 | End: 2023-09-26 | Stop reason: HOSPADM

## 2023-09-20 RX ORDER — PROCHLORPERAZINE EDISYLATE 5 MG/ML
10 INJECTION INTRAMUSCULAR; INTRAVENOUS
Status: ACTIVE | OUTPATIENT
Start: 2023-09-20 | End: 2023-09-21

## 2023-09-20 RX ORDER — FUROSEMIDE 10 MG/ML
20 INJECTION INTRAMUSCULAR; INTRAVENOUS ONCE
Status: COMPLETED | OUTPATIENT
Start: 2023-09-20 | End: 2023-09-20

## 2023-09-20 RX ORDER — SODIUM CHLORIDE 9 MG/ML
5-250 INJECTION, SOLUTION INTRAVENOUS PRN
Status: ACTIVE | OUTPATIENT
Start: 2023-09-20 | End: 2023-09-21

## 2023-09-20 RX ORDER — ONDANSETRON 2 MG/ML
8 INJECTION INTRAMUSCULAR; INTRAVENOUS ONCE
Status: COMPLETED | OUTPATIENT
Start: 2023-09-20 | End: 2023-09-20

## 2023-09-20 RX ORDER — LACTULOSE 10 G/15ML
20 SOLUTION ORAL 3 TIMES DAILY
Status: DISCONTINUED | OUTPATIENT
Start: 2023-09-20 | End: 2023-09-26 | Stop reason: HOSPADM

## 2023-09-20 RX ADMIN — LACTULOSE 20 G: 20 SOLUTION ORAL at 15:56

## 2023-09-20 RX ADMIN — DECITABINE 50 MG: 50 INJECTION, POWDER, LYOPHILIZED, FOR SOLUTION INTRAVENOUS at 16:28

## 2023-09-20 RX ADMIN — HYDROMORPHONE HYDROCHLORIDE 0.5 MG: 1 INJECTION, SOLUTION INTRAMUSCULAR; INTRAVENOUS; SUBCUTANEOUS at 02:24

## 2023-09-20 RX ADMIN — SODIUM CHLORIDE: 9 INJECTION, SOLUTION INTRAVENOUS at 02:29

## 2023-09-20 RX ADMIN — HYDROXYUREA 500 MG: 500 CAPSULE ORAL at 09:21

## 2023-09-20 RX ADMIN — ALLOPURINOL 300 MG: 300 TABLET ORAL at 09:20

## 2023-09-20 RX ADMIN — DICLOFENAC SODIUM 4 G: 10 GEL TOPICAL at 15:56

## 2023-09-20 RX ADMIN — POTASSIUM CHLORIDE 20 MEQ: 1500 TABLET, EXTENDED RELEASE ORAL at 09:20

## 2023-09-20 RX ADMIN — FUROSEMIDE 20 MG: 10 INJECTION, SOLUTION INTRAMUSCULAR; INTRAVENOUS at 15:57

## 2023-09-20 RX ADMIN — QUETIAPINE FUMARATE 25 MG: 25 TABLET ORAL at 20:40

## 2023-09-20 RX ADMIN — HYDROMORPHONE HYDROCHLORIDE 0.5 MG: 1 INJECTION, SOLUTION INTRAMUSCULAR; INTRAVENOUS; SUBCUTANEOUS at 22:46

## 2023-09-20 RX ADMIN — METOPROLOL TARTRATE 12.5 MG: 25 TABLET ORAL at 15:56

## 2023-09-20 RX ADMIN — ONDANSETRON 8 MG: 2 INJECTION INTRAMUSCULAR; INTRAVENOUS at 15:57

## 2023-09-20 RX ADMIN — TIOTROPIUM BROMIDE INHALATION SPRAY 2 PUFF: 3.12 SPRAY, METERED RESPIRATORY (INHALATION) at 09:25

## 2023-09-20 RX ADMIN — HYDROMORPHONE HYDROCHLORIDE 0.5 MG: 1 INJECTION, SOLUTION INTRAMUSCULAR; INTRAVENOUS; SUBCUTANEOUS at 18:17

## 2023-09-20 RX ADMIN — METOPROLOL TARTRATE 12.5 MG: 25 TABLET ORAL at 02:23

## 2023-09-20 RX ADMIN — SODIUM CHLORIDE, PRESERVATIVE FREE 10 ML: 5 INJECTION INTRAVENOUS at 20:39

## 2023-09-20 RX ADMIN — BUDESONIDE AND FORMOTEROL FUMARATE DIHYDRATE 2 PUFF: 160; 4.5 AEROSOL RESPIRATORY (INHALATION) at 09:25

## 2023-09-20 RX ADMIN — TAMSULOSIN HYDROCHLORIDE 0.8 MG: 0.4 CAPSULE ORAL at 09:20

## 2023-09-20 RX ADMIN — SODIUM CHLORIDE, PRESERVATIVE FREE 10 ML: 5 INJECTION INTRAVENOUS at 09:20

## 2023-09-20 RX ADMIN — BUDESONIDE AND FORMOTEROL FUMARATE DIHYDRATE 2 PUFF: 160; 4.5 AEROSOL RESPIRATORY (INHALATION) at 20:51

## 2023-09-20 ASSESSMENT — PAIN DESCRIPTION - DESCRIPTORS
DESCRIPTORS: ACHING;DISCOMFORT
DESCRIPTORS: ACHING;DISCOMFORT
DESCRIPTORS: ACHING

## 2023-09-20 ASSESSMENT — PAIN DESCRIPTION - ORIENTATION
ORIENTATION: LEFT

## 2023-09-20 ASSESSMENT — PAIN DESCRIPTION - LOCATION
LOCATION: HIP

## 2023-09-20 ASSESSMENT — PAIN SCALES - GENERAL
PAINLEVEL_OUTOF10: 8

## 2023-09-20 NOTE — ACP (ADVANCE CARE PLANNING)
.Advance Care Planning     Advance Care Planning Activator (Inpatient)  Conversation Note      Date of ACP Conversation: 9/20/2023     Conversation Conducted with: Patient with Decision Making Capacity    ACP Activator: Nathan Lombardo RN        Health Care Decision Maker:     Current Designated Health Care Decision Maker:     Primary Decision Maker: Nicole Williamson - [phone]    Secondary Decision Maker: Violet Salvador - [phone]  Click here to complete Healthcare Decision Makers including section of the Healthcare Decision Maker Relationship (ie \"Primary\")  Today we documented Decision Maker(s) consistent with Legal Next of Kin hierarchy. Care Preferences    Ventilation: \"If you were in your present state of health and suddenly became very ill and were unable to breathe on your own, what would your preference be about the use of a ventilator (breathing machine) if it were available to you? \"      Would the patient desire the use of ventilator (breathing machine)?: yes    \"If your health worsens and it becomes clear that your chance of recovery is unlikely, what would your preference be about the use of a ventilator (breathing machine) if it were available to you? \"     Would the patient desire the use of ventilator (breathing machine)?: Yes      Resuscitation  \"CPR works best to restart the heart when there is a sudden event, like a heart attack, in someone who is otherwise healthy. Unfortunately, CPR does not typically restart the heart for people who have serious health conditions or who are very sick. \"    \"In the event your heart stopped as a result of an underlying serious health condition, would you want attempts to be made to restart your heart (answer \"yes\" for attempt to resuscitate) or would you prefer a natural death (answer \"no\" for do not attempt to resuscitate)? \" yes       [] Yes   [] No   Educated Patient / Dora Chavez regarding differences between Advance Directives and portable

## 2023-09-20 NOTE — CONSULTS
HIP    9/19/2023 6:36 pm    COMPARISON:  None. HISTORY:  ORDERING SYSTEM PROVIDED HISTORY: acute hip pain  TECHNOLOGIST PROVIDED HISTORY:  acute hip pain    FINDINGS:  There is no acute fracture. The hip joint is maintained. Impression: No fracture or dislocation. 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 Blast crisis    RECOMMENDATIONS:  Records and labs and images were reviewed and discussed with the patient and her companions. Records and outside facilities were reviewed as well. Explained to the patient in layman language. Patient had diagnosis of MDS/CMML. He had 1 cycle of Vidaza in July and 1 cycle of decitabine daily for 5 days at [facility]. Last treatment was in August 2023. Patient is due for follow-up treatment but his insurance was not accepted in my state inversely. Patient was given hydroxyurea during his last hospitalization. Presenting now with very high white blood cells with 35% blasts. We will proceed with treatment using decitabine given daily for 5 days. Explained the benefits and side effects. He agreed. We will monitor labs including uric acid. We will use allopurinol to avoid tumor lysis. We will transfuse to maintain hemoglobin of 7 and platelets above 10. We will follow with you.   Patient's

## 2023-09-20 NOTE — PLAN OF CARE
Problem: Respiratory - Adult  Goal: Achieves optimal ventilation and oxygenation  9/20/2023 0940 by Darryn Patel RCP  Outcome: Progressing   BRONCHOSPASM/BRONCHOCONSTRICTION     [x]         IMPROVE AERATION/BREATH SOUNDS  [x]   ADMINISTER BRONCHODILATOR THERAPY AS APPROPRIATE  [x]   ASSESS BREATH SOUNDS  []   IMPLEMENT AEROSOL/MDI PROTOCOL  [x]   PATIENT EDUCATION AS NEEDED

## 2023-09-20 NOTE — BRIEF OP NOTE
Brief Postoperative Note for Thoracentesis    Wes Sen  YOB: 1955  [id number]    Pre-operative Diagnosis: left Pleural effusion      Post-operative Diagnosis: Same    Procedure: Ultrasound guided Thoracentesis     Anesthesia: 1% Lidocaine     Surgeons/Assistants: Mayo López MD    Complications: none    EBL: Minimal    Specimens: were obtained    Ultrasound guided left thoracentesis performed. 800 ml clear isaac fluid obtained. Dressing applied.       Electronically signed by KYLER Mccracken on 9/20/2023 at 3:37 PM

## 2023-09-20 NOTE — PLAN OF CARE
Problem: Safety - Adult  Goal: Free from fall injury  Outcome: Progressing     Problem: ABCDS Injury Assessment  Goal: Absence of physical injury  Outcome: Progressing  Flowsheets (Taken 9/19/2023 1930)  Absence of Physical Injury: Implement safety measures based on patient assessment     Problem: Respiratory - Adult  Goal: Achieves optimal ventilation and oxygenation  9/20/2023 0455 by Joanna Moraes RN  Outcome: Progressing  9/19/2023 2036 by Kristie Covarrubias RCP  Outcome: Progressing

## 2023-09-20 NOTE — PLAN OF CARE
Problem: Safety - Adult  Goal: Free from fall injury  Outcome: Progressing     Problem: ABCDS Injury Assessment  Goal: Absence of physical injury  Outcome: Progressing     Problem: Pain  Goal: Verbalizes/displays adequate comfort level or baseline comfort level  Outcome: Progressing     Problem: Respiratory - Adult  Goal: Achieves optimal ventilation and oxygenation  9/20/2023 1940 by Jack López RN  Outcome: Progressing  9/20/2023 0940 by Yuko Amaya RCP  Outcome: Progressing

## 2023-09-20 NOTE — FLOWSHEET NOTE
Thoracentesis: via left chest    Dr. Oleg Galloway    Patient to [address] [facility] room 8, identified, allergies confirmed. Sitting up, monitors on. Stable. BP  116/68  HR  98  SATS  100    Time out complete. Prep to left back per Dr. aDni Talley. 800 mLs  of clear isaac fluid drawn off. No orders for sample at this time. 2x2 with Tegaderm to  back puncture. No problems noted. Patient tolerated well. Report called to RN. Post CXR ordered and completed.  Patient stable, off monitor, and awaiting transport in IR holding area

## 2023-09-21 LAB
ANION GAP SERPL CALCULATED.3IONS-SCNC: 13 MMOL/L (ref 9–17)
BASOPHILS # BLD: 0 K/UL (ref 0–0.2)
BASOPHILS NFR BLD: 0 % (ref 0–2)
BLASTS ABSOLUTE COUNT: 0.53 K/UL
BLASTS: 1 %
BLOOD BANK BLOOD PRODUCT EXPIRATION DATE: NORMAL
BLOOD BANK DISPENSE STATUS: NORMAL
BLOOD BANK DISPENSE STATUS: NORMAL
BLOOD BANK ISBT PRODUCT BLOOD TYPE: 7300
BLOOD BANK PRODUCT CODE: NORMAL
BLOOD BANK UNIT TYPE AND RH: NORMAL
BPU ID: NORMAL
BPU ID: NORMAL
BUN SERPL-MCNC: 18 MG/DL (ref 8–23)
CALCIUM SERPL-MCNC: 8.4 MG/DL (ref 8.6–10.4)
CHLORIDE SERPL-SCNC: 101 MMOL/L (ref 98–107)
CO2 SERPL-SCNC: 24 MMOL/L (ref 20–31)
COMPONENT: NORMAL
COMPONENT: NORMAL
CREAT SERPL-MCNC: 1.4 MG/DL (ref 0.7–1.2)
EOSINOPHIL # BLD: 0 K/UL (ref 0–0.4)
EOSINOPHILS RELATIVE PERCENT: 0 % (ref 1–4)
ERYTHROCYTE [DISTWIDTH] IN BLOOD BY AUTOMATED COUNT: 15.9 % (ref 11.8–14.4)
GFR SERPL CREATININE-BSD FRML MDRD: 55 ML/MIN/1.73M2
GLUCOSE SERPL-MCNC: 77 MG/DL (ref 70–99)
HCT VFR BLD AUTO: 24.7 % (ref 40.7–50.3)
HGB BLD-MCNC: 7.7 G/DL (ref 13–17)
IMM GRANULOCYTES # BLD AUTO: 9.47 K/UL (ref 0–0.3)
IMM GRANULOCYTES NFR BLD: 18 %
LYMPHOCYTES NFR BLD: 4.73 K/UL (ref 1–4.8)
LYMPHOCYTES RELATIVE PERCENT: 9 % (ref 24–44)
MCH RBC QN AUTO: 29.3 PG (ref 25.2–33.5)
MCHC RBC AUTO-ENTMCNC: 31.2 G/DL (ref 28.4–34.8)
MCV RBC AUTO: 93.9 FL (ref 82.6–102.9)
MONOCYTES NFR BLD: 20.51 K/UL (ref 0.1–0.8)
MONOCYTES NFR BLD: 39 % (ref 1–7)
MORPHOLOGY: ABNORMAL
NEUTROPHILS NFR BLD: 33 % (ref 36–66)
NEUTS SEG NFR BLD: 17.36 K/UL (ref 1.8–7.7)
NRBC BLD-RTO: 0 PER 100 WBC
NUCLEATED RED BLOOD CELLS: 1 PER 100 WBC
PLATELET # BLD AUTO: ABNORMAL K/UL (ref 138–453)
PLATELET, FLUORESCENCE: 16 K/UL (ref 138–453)
PLATELETS.RETICULATED NFR BLD AUTO: 8.3 % (ref 1.1–10.3)
POTASSIUM SERPL-SCNC: 4 MMOL/L (ref 3.7–5.3)
RBC # BLD AUTO: 2.63 M/UL (ref 4.21–5.77)
SODIUM SERPL-SCNC: 138 MMOL/L (ref 135–144)
TRANSFUSION STATUS: NORMAL
TRANSFUSION STATUS: NORMAL
UNIT DIVISION: 0
UNIT DIVISION: 0
UNIT ISSUE DATE/TIME: NORMAL
WBC OTHER # BLD: 52.6 K/UL (ref 3.5–11.3)

## 2023-09-21 PROCEDURE — 6360000002 HC RX W HCPCS: Performed by: NURSE PRACTITIONER

## 2023-09-21 PROCEDURE — 1200000000 HC SEMI PRIVATE

## 2023-09-21 PROCEDURE — 6370000000 HC RX 637 (ALT 250 FOR IP): Performed by: FAMILY MEDICINE

## 2023-09-21 PROCEDURE — 6360000002 HC RX W HCPCS: Performed by: INTERNAL MEDICINE

## 2023-09-21 PROCEDURE — 2700000000 HC OXYGEN THERAPY PER DAY

## 2023-09-21 PROCEDURE — 99232 SBSQ HOSP IP/OBS MODERATE 35: CPT | Performed by: INTERNAL MEDICINE

## 2023-09-21 PROCEDURE — 36415 COLL VENOUS BLD VENIPUNCTURE: CPT

## 2023-09-21 PROCEDURE — 2580000003 HC RX 258: Performed by: INTERNAL MEDICINE

## 2023-09-21 PROCEDURE — 85055 RETICULATED PLATELET ASSAY: CPT

## 2023-09-21 PROCEDURE — 99232 SBSQ HOSP IP/OBS MODERATE 35: CPT | Performed by: FAMILY MEDICINE

## 2023-09-21 PROCEDURE — 85025 COMPLETE CBC W/AUTO DIFF WBC: CPT

## 2023-09-21 PROCEDURE — 80048 BASIC METABOLIC PNL TOTAL CA: CPT

## 2023-09-21 PROCEDURE — 97161 PT EVAL LOW COMPLEX 20 MIN: CPT

## 2023-09-21 PROCEDURE — 94761 N-INVAS EAR/PLS OXIMETRY MLT: CPT

## 2023-09-21 PROCEDURE — 97530 THERAPEUTIC ACTIVITIES: CPT

## 2023-09-21 PROCEDURE — 94640 AIRWAY INHALATION TREATMENT: CPT

## 2023-09-21 PROCEDURE — 6370000000 HC RX 637 (ALT 250 FOR IP): Performed by: INTERNAL MEDICINE

## 2023-09-21 PROCEDURE — 2580000003 HC RX 258: Performed by: NURSE PRACTITIONER

## 2023-09-21 RX ORDER — ONDANSETRON 2 MG/ML
8 INJECTION INTRAMUSCULAR; INTRAVENOUS ONCE
Status: COMPLETED | OUTPATIENT
Start: 2023-09-21 | End: 2023-09-21

## 2023-09-21 RX ORDER — LORAZEPAM 2 MG/ML
0.5 INJECTION INTRAMUSCULAR
Status: ACTIVE | OUTPATIENT
Start: 2023-09-21 | End: 2023-09-22

## 2023-09-21 RX ORDER — PROCHLORPERAZINE EDISYLATE 5 MG/ML
10 INJECTION INTRAMUSCULAR; INTRAVENOUS
Status: ACTIVE | OUTPATIENT
Start: 2023-09-21 | End: 2023-09-22

## 2023-09-21 RX ORDER — SODIUM CHLORIDE 9 MG/ML
5-250 INJECTION, SOLUTION INTRAVENOUS PRN
Status: ACTIVE | OUTPATIENT
Start: 2023-09-21 | End: 2023-09-22

## 2023-09-21 RX ADMIN — LACTULOSE 20 G: 20 SOLUTION ORAL at 09:11

## 2023-09-21 RX ADMIN — HYDROXYUREA 500 MG: 500 CAPSULE ORAL at 09:12

## 2023-09-21 RX ADMIN — HYDROMORPHONE HYDROCHLORIDE 0.5 MG: 1 INJECTION, SOLUTION INTRAMUSCULAR; INTRAVENOUS; SUBCUTANEOUS at 13:19

## 2023-09-21 RX ADMIN — METOPROLOL TARTRATE 12.5 MG: 25 TABLET ORAL at 04:08

## 2023-09-21 RX ADMIN — POTASSIUM CHLORIDE 20 MEQ: 1500 TABLET, EXTENDED RELEASE ORAL at 09:11

## 2023-09-21 RX ADMIN — SODIUM CHLORIDE, PRESERVATIVE FREE 10 ML: 5 INJECTION INTRAVENOUS at 22:03

## 2023-09-21 RX ADMIN — ALLOPURINOL 300 MG: 300 TABLET ORAL at 09:12

## 2023-09-21 RX ADMIN — BUDESONIDE AND FORMOTEROL FUMARATE DIHYDRATE 2 PUFF: 160; 4.5 AEROSOL RESPIRATORY (INHALATION) at 20:30

## 2023-09-21 RX ADMIN — ONDANSETRON 8 MG: 2 INJECTION INTRAMUSCULAR; INTRAVENOUS at 15:19

## 2023-09-21 RX ADMIN — HYDROMORPHONE HYDROCHLORIDE 0.5 MG: 1 INJECTION, SOLUTION INTRAMUSCULAR; INTRAVENOUS; SUBCUTANEOUS at 22:03

## 2023-09-21 RX ADMIN — TAMSULOSIN HYDROCHLORIDE 0.8 MG: 0.4 CAPSULE ORAL at 09:12

## 2023-09-21 RX ADMIN — HYDROMORPHONE HYDROCHLORIDE 0.5 MG: 1 INJECTION, SOLUTION INTRAMUSCULAR; INTRAVENOUS; SUBCUTANEOUS at 18:08

## 2023-09-21 RX ADMIN — TIOTROPIUM BROMIDE INHALATION SPRAY 2 PUFF: 3.12 SPRAY, METERED RESPIRATORY (INHALATION) at 09:11

## 2023-09-21 RX ADMIN — BUDESONIDE AND FORMOTEROL FUMARATE DIHYDRATE 2 PUFF: 160; 4.5 AEROSOL RESPIRATORY (INHALATION) at 09:11

## 2023-09-21 RX ADMIN — DECITABINE 50 MG: 50 INJECTION, POWDER, LYOPHILIZED, FOR SOLUTION INTRAVENOUS at 15:27

## 2023-09-21 RX ADMIN — QUETIAPINE FUMARATE 25 MG: 25 TABLET ORAL at 22:03

## 2023-09-21 RX ADMIN — SODIUM CHLORIDE, PRESERVATIVE FREE 10 ML: 5 INJECTION INTRAVENOUS at 09:17

## 2023-09-21 RX ADMIN — METOPROLOL TARTRATE 12.5 MG: 25 TABLET ORAL at 15:23

## 2023-09-21 RX ADMIN — HYDROMORPHONE HYDROCHLORIDE 0.5 MG: 1 INJECTION, SOLUTION INTRAMUSCULAR; INTRAVENOUS; SUBCUTANEOUS at 04:06

## 2023-09-21 ASSESSMENT — PAIN DESCRIPTION - DESCRIPTORS
DESCRIPTORS: DISCOMFORT;ACHING;SHARP
DESCRIPTORS: ACHING
DESCRIPTORS: ACHING;DISCOMFORT
DESCRIPTORS: ACHING
DESCRIPTORS: ACHING;DISCOMFORT

## 2023-09-21 ASSESSMENT — PAIN DESCRIPTION - ORIENTATION
ORIENTATION: LEFT

## 2023-09-21 ASSESSMENT — PAIN DESCRIPTION - LOCATION
LOCATION: HIP

## 2023-09-21 ASSESSMENT — PAIN SCALES - GENERAL
PAINLEVEL_OUTOF10: 7
PAINLEVEL_OUTOF10: 7
PAINLEVEL_OUTOF10: 4
PAINLEVEL_OUTOF10: 8
PAINLEVEL_OUTOF10: 7

## 2023-09-21 NOTE — PLAN OF CARE
Problem: Safety - Adult  Goal: Free from fall injury  9/21/2023 1828 by Joyce Barnes RN  Outcome: Progressing  9/21/2023 0506 by David Remy RN  Outcome: Progressing     Problem: ABCDS Injury Assessment  Goal: Absence of physical injury  9/21/2023 1828 by Joyce Barnes RN  Outcome: Progressing  9/21/2023 0506 by David Remy RN  Outcome: Progressing  Flowsheets (Taken 9/20/2023 2000)  Absence of Physical Injury: Implement safety measures based on patient assessment     Problem: Pain  Goal: Verbalizes/displays adequate comfort level or baseline comfort level  Outcome: Progressing     Problem: Respiratory - Adult  Goal: Achieves optimal ventilation and oxygenation  9/21/2023 1828 by Joyce Barnes RN  Outcome: Progressing  9/21/2023 1210 by Janis Pyle RCP  Outcome: Progressing  9/21/2023 0506 by David Remy RN  Outcome: Progressing

## 2023-09-21 NOTE — PLAN OF CARE
Problem: Respiratory - Adult  Goal: Achieves optimal ventilation and oxygenation  9/20/2023 2107 by Lindsey Saul RCP  Outcome: Progressing   BRONCHOSPASM/BRONCHOCONSTRICTION     [x]         IMPROVE AERATION/BREATH SOUNDS  [x]   ADMINISTER BRONCHODILATOR THERAPY AS APPROPRIATE  [x]   ASSESS BREATH SOUNDS  []   IMPLEMENT AEROSOL/MDI PROTOCOL  [x]   PATIENT EDUCATION AS NEEDED   PROVIDE ADEQUATE OXYGENATION WITH ACCEPTABLE SP02/ABG'S    [x]  IDENTIFY APPROPRIATE OXYGEN THERAPY  [x]   MONITOR SP02/ABG'S AS NEEDED   [x]   PATIENT EDUCATION AS NEEDED

## 2023-09-21 NOTE — PLAN OF CARE
Problem: Respiratory - Adult  Goal: Achieves optimal ventilation and oxygenation  9/21/2023 1210 by Darryn Patel RCP  Outcome: Progressing   BRONCHOSPASM/BRONCHOCONSTRICTION     [x]         IMPROVE AERATION/BREATH SOUNDS  [x]   ADMINISTER BRONCHODILATOR THERAPY AS APPROPRIATE  [x]   ASSESS BREATH SOUNDS  []   IMPLEMENT AEROSOL/MDI PROTOCOL  [x]   PATIENT EDUCATION AS NEEDED   MOBILIZE SECRETIONS    [x]   ASSESS BREATH SOUNDS  [x]   ASSESS SPUTUM PRODUCTION  [x]   COUGH AND DEEP BREATHING  []  IMPLEMENT SECRETION MANAGEMENT PROTOCOL  [x]   PATIENT EDUCATION AS NEEDED

## 2023-09-21 NOTE — PLAN OF CARE
Problem: Safety - Adult  Goal: Free from fall injury  9/21/2023 0506 by Bard Adalid RN  Outcome: Progressing  9/20/2023 1940 by Teri Chance RN  Outcome: Progressing     Problem: ABCDS Injury Assessment  Goal: Absence of physical injury  9/21/2023 0506 by Bard Adalid RN  Outcome: Progressing  Flowsheets (Taken 9/20/2023 2000)  Absence of Physical Injury: Implement safety measures based on patient assessment  9/20/2023 1940 by Teri Chance RN  Outcome: Progressing     Problem: Respiratory - Adult  Goal: Achieves optimal ventilation and oxygenation  9/21/2023 0506 by Bard Adalid RN  Outcome: Progressing  9/20/2023 2107 by Beau Huynh RCP  Outcome: Progressing  9/20/2023 1940 by Teri Chance RN  Outcome: Progressing

## 2023-09-22 LAB
BASOPHILS # BLD: 0 K/UL (ref 0–0.2)
BASOPHILS NFR BLD: 0 % (ref 0–2)
BLASTS ABSOLUTE COUNT: 0.54 K/UL
BLASTS: 1 %
EOSINOPHIL # BLD: 0 K/UL (ref 0–0.4)
EOSINOPHILS RELATIVE PERCENT: 0 % (ref 1–4)
ERYTHROCYTE [DISTWIDTH] IN BLOOD BY AUTOMATED COUNT: 15.9 % (ref 11.8–14.4)
HCT VFR BLD AUTO: 21.9 % (ref 40.7–50.3)
HCT VFR BLD AUTO: 26.3 % (ref 40.7–50.3)
HGB BLD-MCNC: 6.9 G/DL (ref 13–17)
HGB BLD-MCNC: 8.1 G/DL (ref 13–17)
IMM GRANULOCYTES # BLD AUTO: 2.72 K/UL (ref 0–0.3)
IMM GRANULOCYTES NFR BLD: 5 %
LYMPHOCYTES NFR BLD: 5.44 K/UL (ref 1–4.8)
LYMPHOCYTES RELATIVE PERCENT: 10 % (ref 24–44)
MCH RBC QN AUTO: 29.2 PG (ref 25.2–33.5)
MCHC RBC AUTO-ENTMCNC: 31.5 G/DL (ref 28.4–34.8)
MCV RBC AUTO: 92.8 FL (ref 82.6–102.9)
MONOCYTES NFR BLD: 35.36 K/UL (ref 0.1–0.8)
MONOCYTES NFR BLD: 65 % (ref 1–7)
MORPHOLOGY: ABNORMAL
NEUTROPHILS NFR BLD: 19 % (ref 36–66)
NEUTS SEG NFR BLD: 10.34 K/UL (ref 1.8–7.7)
NRBC BLD-RTO: 0 PER 100 WBC
PLATELET # BLD AUTO: ABNORMAL K/UL (ref 138–453)
PLATELET, FLUORESCENCE: 10 K/UL (ref 138–453)
PLATELETS.RETICULATED NFR BLD AUTO: 10.6 % (ref 1.1–10.3)
RBC # BLD AUTO: 2.36 M/UL (ref 4.21–5.77)
WBC OTHER # BLD: 54.4 K/UL (ref 3.5–11.3)

## 2023-09-22 PROCEDURE — 99233 SBSQ HOSP IP/OBS HIGH 50: CPT | Performed by: FAMILY MEDICINE

## 2023-09-22 PROCEDURE — 6370000000 HC RX 637 (ALT 250 FOR IP): Performed by: FAMILY MEDICINE

## 2023-09-22 PROCEDURE — 6370000000 HC RX 637 (ALT 250 FOR IP): Performed by: INTERNAL MEDICINE

## 2023-09-22 PROCEDURE — 85018 HEMOGLOBIN: CPT

## 2023-09-22 PROCEDURE — 85055 RETICULATED PLATELET ASSAY: CPT

## 2023-09-22 PROCEDURE — 2580000003 HC RX 258: Performed by: INTERNAL MEDICINE

## 2023-09-22 PROCEDURE — 36430 TRANSFUSION BLD/BLD COMPNT: CPT

## 2023-09-22 PROCEDURE — 6360000002 HC RX W HCPCS: Performed by: NURSE PRACTITIONER

## 2023-09-22 PROCEDURE — 85025 COMPLETE CBC W/AUTO DIFF WBC: CPT

## 2023-09-22 PROCEDURE — 85014 HEMATOCRIT: CPT

## 2023-09-22 PROCEDURE — 2700000000 HC OXYGEN THERAPY PER DAY

## 2023-09-22 PROCEDURE — 2580000003 HC RX 258: Performed by: NURSE PRACTITIONER

## 2023-09-22 PROCEDURE — 6360000002 HC RX W HCPCS: Performed by: INTERNAL MEDICINE

## 2023-09-22 PROCEDURE — P9016 RBC LEUKOCYTES REDUCED: HCPCS

## 2023-09-22 PROCEDURE — 99232 SBSQ HOSP IP/OBS MODERATE 35: CPT | Performed by: INTERNAL MEDICINE

## 2023-09-22 PROCEDURE — 36415 COLL VENOUS BLD VENIPUNCTURE: CPT

## 2023-09-22 PROCEDURE — 94640 AIRWAY INHALATION TREATMENT: CPT

## 2023-09-22 PROCEDURE — 1200000000 HC SEMI PRIVATE

## 2023-09-22 RX ORDER — SODIUM CHLORIDE 9 MG/ML
INJECTION, SOLUTION INTRAVENOUS PRN
Status: DISCONTINUED | OUTPATIENT
Start: 2023-09-22 | End: 2023-09-26 | Stop reason: HOSPADM

## 2023-09-22 RX ORDER — SODIUM CHLORIDE 9 MG/ML
5-250 INJECTION, SOLUTION INTRAVENOUS PRN
Status: ACTIVE | OUTPATIENT
Start: 2023-09-22 | End: 2023-09-23

## 2023-09-22 RX ORDER — ONDANSETRON 2 MG/ML
8 INJECTION INTRAMUSCULAR; INTRAVENOUS ONCE
Status: COMPLETED | OUTPATIENT
Start: 2023-09-22 | End: 2023-09-22

## 2023-09-22 RX ORDER — LORAZEPAM 2 MG/ML
0.5 INJECTION INTRAMUSCULAR
Status: ACTIVE | OUTPATIENT
Start: 2023-09-22 | End: 2023-09-23

## 2023-09-22 RX ORDER — PROCHLORPERAZINE EDISYLATE 5 MG/ML
10 INJECTION INTRAMUSCULAR; INTRAVENOUS
Status: ACTIVE | OUTPATIENT
Start: 2023-09-22 | End: 2023-09-23

## 2023-09-22 RX ADMIN — BUDESONIDE AND FORMOTEROL FUMARATE DIHYDRATE 2 PUFF: 160; 4.5 AEROSOL RESPIRATORY (INHALATION) at 21:37

## 2023-09-22 RX ADMIN — ALLOPURINOL 300 MG: 300 TABLET ORAL at 09:22

## 2023-09-22 RX ADMIN — SODIUM CHLORIDE, PRESERVATIVE FREE 10 ML: 5 INJECTION INTRAVENOUS at 09:22

## 2023-09-22 RX ADMIN — POTASSIUM CHLORIDE 20 MEQ: 1500 TABLET, EXTENDED RELEASE ORAL at 09:22

## 2023-09-22 RX ADMIN — SODIUM CHLORIDE, PRESERVATIVE FREE 10 ML: 5 INJECTION INTRAVENOUS at 20:26

## 2023-09-22 RX ADMIN — HYDROXYUREA 500 MG: 500 CAPSULE ORAL at 09:23

## 2023-09-22 RX ADMIN — TAMSULOSIN HYDROCHLORIDE 0.8 MG: 0.4 CAPSULE ORAL at 09:22

## 2023-09-22 RX ADMIN — TIOTROPIUM BROMIDE INHALATION SPRAY 2 PUFF: 3.12 SPRAY, METERED RESPIRATORY (INHALATION) at 08:24

## 2023-09-22 RX ADMIN — ONDANSETRON 8 MG: 2 INJECTION INTRAMUSCULAR; INTRAVENOUS at 15:01

## 2023-09-22 RX ADMIN — DECITABINE 50 MG: 50 INJECTION, POWDER, LYOPHILIZED, FOR SOLUTION INTRAVENOUS at 14:59

## 2023-09-22 RX ADMIN — HYDROMORPHONE HYDROCHLORIDE 0.5 MG: 1 INJECTION, SOLUTION INTRAMUSCULAR; INTRAVENOUS; SUBCUTANEOUS at 02:13

## 2023-09-22 RX ADMIN — METOPROLOL TARTRATE 12.5 MG: 25 TABLET ORAL at 02:05

## 2023-09-22 RX ADMIN — HYDROMORPHONE HYDROCHLORIDE 0.5 MG: 1 INJECTION, SOLUTION INTRAMUSCULAR; INTRAVENOUS; SUBCUTANEOUS at 19:14

## 2023-09-22 RX ADMIN — QUETIAPINE FUMARATE 25 MG: 25 TABLET ORAL at 20:30

## 2023-09-22 RX ADMIN — BUDESONIDE AND FORMOTEROL FUMARATE DIHYDRATE 2 PUFF: 160; 4.5 AEROSOL RESPIRATORY (INHALATION) at 08:24

## 2023-09-22 ASSESSMENT — PAIN DESCRIPTION - ORIENTATION
ORIENTATION: RIGHT
ORIENTATION: LEFT

## 2023-09-22 ASSESSMENT — PAIN DESCRIPTION - LOCATION
LOCATION: LEG
LOCATION: HIP

## 2023-09-22 ASSESSMENT — PAIN SCALES - GENERAL
PAINLEVEL_OUTOF10: 8
PAINLEVEL_OUTOF10: 8

## 2023-09-22 ASSESSMENT — PAIN DESCRIPTION - DESCRIPTORS
DESCRIPTORS: ACHING
DESCRIPTORS: ACHING

## 2023-09-22 NOTE — PLAN OF CARE
Problem: Respiratory - Adult  Goal: Achieves optimal ventilation and oxygenation  9/22/2023 1015 by Wendie Villarreal RCP  Outcome: Progressing

## 2023-09-22 NOTE — PLAN OF CARE
Problem: Respiratory - Adult  Goal: Achieves optimal ventilation and oxygenation  9/21/2023 2032 by Jess German RCP  Outcome: Progressing   BRONCHOSPASM/BRONCHOCONSTRICTION     [x]         IMPROVE AERATION/BREATH SOUNDS  [x]   ADMINISTER BRONCHODILATOR THERAPY AS APPROPRIATE  [x]   ASSESS BREATH SOUNDS  []   IMPLEMENT AEROSOL/MDI PROTOCOL  [x]   PATIENT EDUCATION AS NEEDED   PROVIDE ADEQUATE OXYGENATION WITH ACCEPTABLE SP02/ABG'S    [x]  IDENTIFY APPROPRIATE OXYGEN THERAPY  [x]   MONITOR SP02/ABG'S AS NEEDED   [x]   PATIENT EDUCATION AS NEEDED

## 2023-09-22 NOTE — PLAN OF CARE
Problem: Safety - Adult  Goal: Free from fall injury  Outcome: Progressing     Problem: ABCDS Injury Assessment  Goal: Absence of physical injury  Outcome: Progressing  Flowsheets (Taken 9/22/2023 0736)  Absence of Physical Injury: Implement safety measures based on patient assessment     Problem: Pain  Goal: Verbalizes/displays adequate comfort level or baseline comfort level  Outcome: Progressing     Problem: Respiratory - Adult  Goal: Achieves optimal ventilation and oxygenation  9/22/2023 1838 by Dayan Schumacher RN  Outcome: Progressing  9/22/2023 1015 by Artie Hay RCP  Outcome: Progressing  Flowsheets (Taken 9/22/2023 0930 by Dayan Schumacher RN)  Achieves optimal ventilation and oxygenation: Assess for changes in respiratory status

## 2023-09-22 NOTE — PLAN OF CARE
Problem: Safety - Adult  Goal: Free from fall injury  9/22/2023 0405 by Jose Luis Bonds RN  Outcome: Progressing  9/21/2023 1828 by Aguila Olguin RN  Outcome: Progressing     Problem: ABCDS Injury Assessment  Goal: Absence of physical injury  9/22/2023 0405 by Jose Luis Bonds RN  Outcome: Progressing  Flowsheets (Taken 9/21/2023 1937)  Absence of Physical Injury: Implement safety measures based on patient assessment  9/21/2023 1828 by Aguila Olguin RN  Outcome: Progressing     Problem: Pain  Goal: Verbalizes/displays adequate comfort level or baseline comfort level  9/22/2023 0405 by Jose Luis Bonds RN  Outcome: Progressing  9/21/2023 1828 by Aguila Olguin RN  Outcome: Progressing     Problem: Respiratory - Adult  Goal: Achieves optimal ventilation and oxygenation  9/22/2023 0405 by Jose Luis Bonds RN  Outcome: Progressing  9/21/2023 2032 by Leyla Szymanski RCP  Outcome: Progressing  9/21/2023 1828 by Aguila Olguin RN  Outcome: Progressing

## 2023-09-23 LAB
ABO/RH: NORMAL
ANTIBODY SCREEN: NEGATIVE
ARM BAND NUMBER: NORMAL
BASOPHILS # BLD: 0 K/UL (ref 0–0.2)
BASOPHILS NFR BLD: 0 % (ref 0–2)
BLOOD BANK BLOOD PRODUCT EXPIRATION DATE: NORMAL
BLOOD BANK BLOOD PRODUCT EXPIRATION DATE: NORMAL
BLOOD BANK DISPENSE STATUS: NORMAL
BLOOD BANK DISPENSE STATUS: NORMAL
BLOOD BANK ISBT PRODUCT BLOOD TYPE: 6200
BLOOD BANK ISBT PRODUCT BLOOD TYPE: 8400
BLOOD BANK PRODUCT CODE: NORMAL
BLOOD BANK PRODUCT CODE: NORMAL
BLOOD BANK SAMPLE EXPIRATION: NORMAL
BLOOD BANK UNIT TYPE AND RH: NORMAL
BLOOD BANK UNIT TYPE AND RH: NORMAL
BPU ID: NORMAL
BPU ID: NORMAL
COMPONENT: NORMAL
COMPONENT: NORMAL
CROSSMATCH RESULT: NORMAL
CROSSMATCH RESULT: NORMAL
EOSINOPHIL # BLD: 0 K/UL (ref 0–0.4)
EOSINOPHILS RELATIVE PERCENT: 0 % (ref 1–4)
ERYTHROCYTE [DISTWIDTH] IN BLOOD BY AUTOMATED COUNT: 16.3 % (ref 11.8–14.4)
HCT VFR BLD AUTO: 25.1 % (ref 40.7–50.3)
HGB BLD-MCNC: 7.2 G/DL (ref 13–17)
IMM GRANULOCYTES # BLD AUTO: 3.25 K/UL (ref 0–0.3)
IMM GRANULOCYTES NFR BLD: 9 %
LYMPHOCYTES NFR BLD: 3.25 K/UL (ref 1–4.8)
LYMPHOCYTES RELATIVE PERCENT: 9 % (ref 24–44)
MCH RBC QN AUTO: 29.1 PG (ref 25.2–33.5)
MCHC RBC AUTO-ENTMCNC: 28.7 G/DL (ref 28.4–34.8)
MCV RBC AUTO: 101.6 FL (ref 82.6–102.9)
MONOCYTES NFR BLD: 20.94 K/UL (ref 0.1–0.8)
MONOCYTES NFR BLD: 58 % (ref 1–7)
MORPHOLOGY: ABNORMAL
NEUTROPHILS NFR BLD: 24 % (ref 36–66)
NEUTS SEG NFR BLD: 8.66 K/UL (ref 1.8–7.7)
NRBC BLD-RTO: 0 PER 100 WBC
PLATELET # BLD AUTO: ABNORMAL K/UL (ref 138–453)
PLATELET, FLUORESCENCE: 13 K/UL (ref 138–453)
PLATELETS.RETICULATED NFR BLD AUTO: 3.1 % (ref 1.1–10.3)
RBC # BLD AUTO: 2.47 M/UL (ref 4.21–5.77)
TRANSFUSION STATUS: NORMAL
TRANSFUSION STATUS: NORMAL
UNIT DIVISION: 0
UNIT DIVISION: 0
UNIT ISSUE DATE/TIME: NORMAL
UNIT ISSUE DATE/TIME: NORMAL
WBC OTHER # BLD: 36.1 K/UL (ref 3.5–11.3)

## 2023-09-23 PROCEDURE — 85025 COMPLETE CBC W/AUTO DIFF WBC: CPT

## 2023-09-23 PROCEDURE — 94640 AIRWAY INHALATION TREATMENT: CPT

## 2023-09-23 PROCEDURE — 94760 N-INVAS EAR/PLS OXIMETRY 1: CPT

## 2023-09-23 PROCEDURE — 6370000000 HC RX 637 (ALT 250 FOR IP): Performed by: FAMILY MEDICINE

## 2023-09-23 PROCEDURE — 6370000000 HC RX 637 (ALT 250 FOR IP): Performed by: INTERNAL MEDICINE

## 2023-09-23 PROCEDURE — 6360000002 HC RX W HCPCS: Performed by: INTERNAL MEDICINE

## 2023-09-23 PROCEDURE — 99232 SBSQ HOSP IP/OBS MODERATE 35: CPT | Performed by: INTERNAL MEDICINE

## 2023-09-23 PROCEDURE — 2580000003 HC RX 258: Performed by: NURSE PRACTITIONER

## 2023-09-23 PROCEDURE — 2700000000 HC OXYGEN THERAPY PER DAY

## 2023-09-23 PROCEDURE — 85055 RETICULATED PLATELET ASSAY: CPT

## 2023-09-23 PROCEDURE — 2580000003 HC RX 258: Performed by: INTERNAL MEDICINE

## 2023-09-23 PROCEDURE — 36415 COLL VENOUS BLD VENIPUNCTURE: CPT

## 2023-09-23 PROCEDURE — 1200000000 HC SEMI PRIVATE

## 2023-09-23 PROCEDURE — 6360000002 HC RX W HCPCS: Performed by: NURSE PRACTITIONER

## 2023-09-23 RX ORDER — DEXTROSE AND SODIUM CHLORIDE 5; .45 G/100ML; G/100ML
INJECTION, SOLUTION INTRAVENOUS ONCE
Status: COMPLETED | OUTPATIENT
Start: 2023-09-23 | End: 2023-09-23

## 2023-09-23 RX ORDER — ONDANSETRON 2 MG/ML
8 INJECTION INTRAMUSCULAR; INTRAVENOUS ONCE
Status: COMPLETED | OUTPATIENT
Start: 2023-09-23 | End: 2023-09-23

## 2023-09-23 RX ADMIN — METOPROLOL TARTRATE 12.5 MG: 25 TABLET ORAL at 00:48

## 2023-09-23 RX ADMIN — BUDESONIDE AND FORMOTEROL FUMARATE DIHYDRATE 2 PUFF: 160; 4.5 AEROSOL RESPIRATORY (INHALATION) at 22:05

## 2023-09-23 RX ADMIN — BUDESONIDE AND FORMOTEROL FUMARATE DIHYDRATE 2 PUFF: 160; 4.5 AEROSOL RESPIRATORY (INHALATION) at 09:16

## 2023-09-23 RX ADMIN — DEXTROSE AND SODIUM CHLORIDE: 5; 450 INJECTION, SOLUTION INTRAVENOUS at 20:35

## 2023-09-23 RX ADMIN — POTASSIUM CHLORIDE 20 MEQ: 1500 TABLET, EXTENDED RELEASE ORAL at 09:09

## 2023-09-23 RX ADMIN — HYDROMORPHONE HYDROCHLORIDE 0.5 MG: 1 INJECTION, SOLUTION INTRAMUSCULAR; INTRAVENOUS; SUBCUTANEOUS at 00:47

## 2023-09-23 RX ADMIN — ALLOPURINOL 300 MG: 300 TABLET ORAL at 09:10

## 2023-09-23 RX ADMIN — SODIUM CHLORIDE, PRESERVATIVE FREE 10 ML: 5 INJECTION INTRAVENOUS at 20:32

## 2023-09-23 RX ADMIN — SODIUM CHLORIDE, PRESERVATIVE FREE 10 ML: 5 INJECTION INTRAVENOUS at 09:10

## 2023-09-23 RX ADMIN — METOPROLOL TARTRATE 12.5 MG: 25 TABLET ORAL at 14:30

## 2023-09-23 RX ADMIN — ONDANSETRON 8 MG: 2 INJECTION INTRAMUSCULAR; INTRAVENOUS at 14:29

## 2023-09-23 RX ADMIN — TAMSULOSIN HYDROCHLORIDE 0.8 MG: 0.4 CAPSULE ORAL at 09:10

## 2023-09-23 RX ADMIN — QUETIAPINE FUMARATE 25 MG: 25 TABLET ORAL at 22:05

## 2023-09-23 RX ADMIN — LACTULOSE 20 G: 20 SOLUTION ORAL at 09:10

## 2023-09-23 RX ADMIN — HYDROMORPHONE HYDROCHLORIDE 0.5 MG: 1 INJECTION, SOLUTION INTRAMUSCULAR; INTRAVENOUS; SUBCUTANEOUS at 05:22

## 2023-09-23 RX ADMIN — DECITABINE 50 MG: 50 INJECTION, POWDER, LYOPHILIZED, FOR SOLUTION INTRAVENOUS at 15:08

## 2023-09-23 RX ADMIN — HYDROXYUREA 500 MG: 500 CAPSULE ORAL at 09:10

## 2023-09-23 RX ADMIN — TIOTROPIUM BROMIDE INHALATION SPRAY 2 PUFF: 3.12 SPRAY, METERED RESPIRATORY (INHALATION) at 09:16

## 2023-09-23 ASSESSMENT — PAIN DESCRIPTION - LOCATION
LOCATION: HIP
LOCATION: HIP

## 2023-09-23 ASSESSMENT — PAIN SCALES - GENERAL
PAINLEVEL_OUTOF10: 8
PAINLEVEL_OUTOF10: 8

## 2023-09-23 ASSESSMENT — PAIN DESCRIPTION - DESCRIPTORS
DESCRIPTORS: ACHING;DISCOMFORT;STABBING
DESCRIPTORS: ACHING;DISCOMFORT

## 2023-09-23 ASSESSMENT — PAIN DESCRIPTION - ORIENTATION
ORIENTATION: LEFT
ORIENTATION: LEFT

## 2023-09-23 NOTE — PLAN OF CARE
BRONCHOSPASM/BRONCHOCONSTRICTION     [x]         IMPROVE AERATION/BREATH SOUNDS  [x]   ADMINISTER BRONCHODILATOR THERAPY AS APPROPRIATE  [x]   ASSESS BREATH SOUNDS  []   IMPLEMENT AEROSOL/MDI PROTOCOL  [x]   PATIENT EDUCATION AS NEEDED   PROVIDE ADEQUATE OXYGENATION WITH ACCEPTABLE SP02/ABG'S    [x]  IDENTIFY APPROPRIATE OXYGEN THERAPY  [x]   MONITOR SP02/ABG'S AS NEEDED   [x]   PATIENT EDUCATION AS NEEDED      Problem: Respiratory - Adult  Goal: Achieves optimal ventilation and oxygenation  9/22/2023 2140 by Yuko Hanna MICHELLE  Outcome: Progressing

## 2023-09-23 NOTE — PLAN OF CARE
Problem: Respiratory - Adult  Goal: Achieves optimal ventilation and oxygenation  9/23/2023 0920 by Celestina Reese RCP  Outcome: Progressing   BRONCHOSPASM/BRONCHOCONSTRICTION     [x]         IMPROVE AERATION/BREATH SOUNDS  [x]   ADMINISTER BRONCHODILATOR THERAPY AS APPROPRIATE  [x]   ASSESS BREATH SOUNDS  []   IMPLEMENT AEROSOL/MDI PROTOCOL  [x]   PATIENT EDUCATION AS NEEDED

## 2023-09-23 NOTE — PLAN OF CARE
Problem: Safety - Adult  Goal: Free from fall injury  9/23/2023 1800 by Juan Carlos England RN  Outcome: Progressing  9/23/2023 0437 by Jocelyn Davis RN  Outcome: Progressing

## 2023-09-23 NOTE — PLAN OF CARE
Problem: Safety - Adult  Goal: Free from fall injury  9/23/2023 0437 by Nagi Mullins RN  Outcome: Progressing  9/22/2023 1838 by Ayden Calvert RN  Outcome: Progressing     Problem: ABCDS Injury Assessment  Goal: Absence of physical injury  9/23/2023 0437 by Nagi Mullins RN  Outcome: Progressing  Flowsheets (Taken 9/22/2023 2015)  Absence of Physical Injury: Implement safety measures based on patient assessment  9/22/2023 1838 by Ayden Calvert RN  Outcome: Progressing  Flowsheets (Taken 9/22/2023 0736)  Absence of Physical Injury: Implement safety measures based on patient assessment     Problem: Respiratory - Adult  Goal: Achieves optimal ventilation and oxygenation  9/23/2023 0437 by Nagi Mullins RN  Outcome: Progressing  9/22/2023 2140 by Warren Arciniega RCP  Outcome: Progressing  9/22/2023 1838 by Ayden Calvert RN  Outcome: Progressing

## 2023-09-24 LAB
BASOPHILS # BLD: 0 K/UL (ref 0–0.2)
BASOPHILS NFR BLD: 0 % (ref 0–2)
BILIRUB UR QL STRIP: NEGATIVE
BLOOD BANK DISPENSE STATUS: NORMAL
BPU ID: NORMAL
CLARITY UR: CLEAR
COLOR UR: YELLOW
COMPONENT: NORMAL
CRP SERPL HS-MCNC: 39.2 MG/L (ref 0–5)
EOSINOPHIL # BLD: 0 K/UL (ref 0–0.4)
EOSINOPHILS RELATIVE PERCENT: 0 % (ref 1–4)
EPI CELLS #/AREA URNS HPF: NORMAL /HPF (ref 0–5)
ERYTHROCYTE [DISTWIDTH] IN BLOOD BY AUTOMATED COUNT: 15.8 % (ref 11.8–14.4)
ERYTHROCYTE [DISTWIDTH] IN BLOOD BY AUTOMATED COUNT: 15.8 % (ref 11.8–14.4)
ERYTHROCYTE [SEDIMENTATION RATE] IN BLOOD BY PHOTOMETRIC METHOD: 17 MM/HR (ref 0–20)
GLUCOSE UR STRIP-MCNC: NEGATIVE MG/DL
HCT VFR BLD AUTO: 23.4 % (ref 40.7–50.3)
HCT VFR BLD AUTO: 24.7 % (ref 40.7–50.3)
HGB BLD-MCNC: 7.4 G/DL (ref 13–17)
HGB BLD-MCNC: 7.9 G/DL (ref 13–17)
HGB UR QL STRIP.AUTO: NEGATIVE
IMM GRANULOCYTES # BLD AUTO: 0 K/UL (ref 0–0.3)
IMM GRANULOCYTES NFR BLD: 0 %
IMM RETICS NFR: 2.2 % (ref 2.7–18.3)
KETONES UR STRIP-MCNC: NEGATIVE MG/DL
LDH SERPL-CCNC: 325 U/L (ref 135–225)
LEUKOCYTE ESTERASE UR QL STRIP: NEGATIVE
LYMPHOCYTES NFR BLD: 5.05 K/UL (ref 1–4.8)
LYMPHOCYTES RELATIVE PERCENT: 11 % (ref 24–44)
MCH RBC QN AUTO: 29.5 PG (ref 25.2–33.5)
MCH RBC QN AUTO: 29.7 PG (ref 25.2–33.5)
MCHC RBC AUTO-ENTMCNC: 31.6 G/DL (ref 28.4–34.8)
MCHC RBC AUTO-ENTMCNC: 32 G/DL (ref 28.4–34.8)
MCV RBC AUTO: 92.9 FL (ref 82.6–102.9)
MCV RBC AUTO: 93.2 FL (ref 82.6–102.9)
MONOCYTES NFR BLD: 30.29 K/UL (ref 0.1–0.8)
MONOCYTES NFR BLD: 66 % (ref 1–7)
MORPHOLOGY: ABNORMAL
NEUTROPHILS NFR BLD: 23 % (ref 36–66)
NEUTS SEG NFR BLD: 10.56 K/UL (ref 1.8–7.7)
NITRITE UR QL STRIP: NEGATIVE
NRBC BLD-RTO: 0 PER 100 WBC
NRBC BLD-RTO: 0 PER 100 WBC
PH UR STRIP: 6 [PH] (ref 5–8)
PLATELET # BLD AUTO: ABNORMAL K/UL (ref 138–453)
PLATELET # BLD AUTO: ABNORMAL K/UL (ref 138–453)
PLATELET, FLUORESCENCE: 15 K/UL (ref 138–453)
PLATELET, FLUORESCENCE: 6 K/UL (ref 138–453)
PLATELETS.RETICULATED NFR BLD AUTO: 10.1 % (ref 1.1–10.3)
PLATELETS.RETICULATED NFR BLD AUTO: 5.5 % (ref 1.1–10.3)
PROT UR STRIP-MCNC: ABNORMAL MG/DL
RBC # BLD AUTO: 2.51 M/UL (ref 4.21–5.77)
RBC # BLD AUTO: 2.66 M/UL (ref 4.21–5.77)
RBC #/AREA URNS HPF: NORMAL /HPF (ref 0–2)
RETIC HEMOGLOBIN: 36.5 PG (ref 28.2–35.7)
RETICS # AUTO: <0.01 M/UL (ref 0.03–0.08)
RETICS/RBC NFR AUTO: 0.1 % (ref 0.5–1.9)
SP GR UR STRIP: 1.01 (ref 1–1.03)
TRANSFUSION STATUS: NORMAL
UNIT DIVISION: 0
UROBILINOGEN UR STRIP-ACNC: NORMAL EU/DL (ref 0–1)
WBC #/AREA URNS HPF: NORMAL /HPF (ref 0–5)
WBC OTHER # BLD: 30.4 K/UL (ref 3.5–11.3)
WBC OTHER # BLD: 45.9 K/UL (ref 3.5–11.3)

## 2023-09-24 PROCEDURE — 6370000000 HC RX 637 (ALT 250 FOR IP): Performed by: FAMILY MEDICINE

## 2023-09-24 PROCEDURE — 94640 AIRWAY INHALATION TREATMENT: CPT

## 2023-09-24 PROCEDURE — 85055 RETICULATED PLATELET ASSAY: CPT

## 2023-09-24 PROCEDURE — 83615 LACTATE (LD) (LDH) ENZYME: CPT

## 2023-09-24 PROCEDURE — P9073 PLATELETS PHERESIS PATH REDU: HCPCS

## 2023-09-24 PROCEDURE — 86140 C-REACTIVE PROTEIN: CPT

## 2023-09-24 PROCEDURE — 2580000003 HC RX 258: Performed by: NURSE PRACTITIONER

## 2023-09-24 PROCEDURE — 86901 BLOOD TYPING SEROLOGIC RH(D): CPT

## 2023-09-24 PROCEDURE — 99232 SBSQ HOSP IP/OBS MODERATE 35: CPT | Performed by: INTERNAL MEDICINE

## 2023-09-24 PROCEDURE — 86900 BLOOD TYPING SEROLOGIC ABO: CPT

## 2023-09-24 PROCEDURE — 6370000000 HC RX 637 (ALT 250 FOR IP): Performed by: INTERNAL MEDICINE

## 2023-09-24 PROCEDURE — 6360000002 HC RX W HCPCS: Performed by: INTERNAL MEDICINE

## 2023-09-24 PROCEDURE — 81001 URINALYSIS AUTO W/SCOPE: CPT

## 2023-09-24 PROCEDURE — 2700000000 HC OXYGEN THERAPY PER DAY

## 2023-09-24 PROCEDURE — 2580000003 HC RX 258: Performed by: INTERNAL MEDICINE

## 2023-09-24 PROCEDURE — 36415 COLL VENOUS BLD VENIPUNCTURE: CPT

## 2023-09-24 PROCEDURE — 36430 TRANSFUSION BLD/BLD COMPNT: CPT

## 2023-09-24 PROCEDURE — 6360000002 HC RX W HCPCS: Performed by: NURSE PRACTITIONER

## 2023-09-24 PROCEDURE — 85027 COMPLETE CBC AUTOMATED: CPT

## 2023-09-24 PROCEDURE — 94761 N-INVAS EAR/PLS OXIMETRY MLT: CPT

## 2023-09-24 PROCEDURE — 85025 COMPLETE CBC W/AUTO DIFF WBC: CPT

## 2023-09-24 PROCEDURE — 85045 AUTOMATED RETICULOCYTE COUNT: CPT

## 2023-09-24 PROCEDURE — 86850 RBC ANTIBODY SCREEN: CPT

## 2023-09-24 PROCEDURE — 1200000000 HC SEMI PRIVATE

## 2023-09-24 PROCEDURE — 86920 COMPATIBILITY TEST SPIN: CPT

## 2023-09-24 PROCEDURE — 85652 RBC SED RATE AUTOMATED: CPT

## 2023-09-24 RX ORDER — SODIUM CHLORIDE 9 MG/ML
INJECTION, SOLUTION INTRAVENOUS PRN
Status: DISCONTINUED | OUTPATIENT
Start: 2023-09-24 | End: 2023-09-26 | Stop reason: HOSPADM

## 2023-09-24 RX ORDER — ONDANSETRON 2 MG/ML
8 INJECTION INTRAMUSCULAR; INTRAVENOUS ONCE
Status: COMPLETED | OUTPATIENT
Start: 2023-09-24 | End: 2023-09-24

## 2023-09-24 RX ADMIN — POTASSIUM CHLORIDE 20 MEQ: 1500 TABLET, EXTENDED RELEASE ORAL at 08:45

## 2023-09-24 RX ADMIN — ONDANSETRON 8 MG: 2 INJECTION INTRAMUSCULAR; INTRAVENOUS at 15:17

## 2023-09-24 RX ADMIN — HYDROXYUREA 500 MG: 500 CAPSULE ORAL at 08:45

## 2023-09-24 RX ADMIN — BUDESONIDE AND FORMOTEROL FUMARATE DIHYDRATE 2 PUFF: 160; 4.5 AEROSOL RESPIRATORY (INHALATION) at 21:06

## 2023-09-24 RX ADMIN — BUDESONIDE AND FORMOTEROL FUMARATE DIHYDRATE 2 PUFF: 160; 4.5 AEROSOL RESPIRATORY (INHALATION) at 08:34

## 2023-09-24 RX ADMIN — SODIUM CHLORIDE, PRESERVATIVE FREE 10 ML: 5 INJECTION INTRAVENOUS at 19:31

## 2023-09-24 RX ADMIN — HYDROMORPHONE HYDROCHLORIDE 0.5 MG: 1 INJECTION, SOLUTION INTRAMUSCULAR; INTRAVENOUS; SUBCUTANEOUS at 04:12

## 2023-09-24 RX ADMIN — LACTULOSE 20 G: 20 SOLUTION ORAL at 08:45

## 2023-09-24 RX ADMIN — TAMSULOSIN HYDROCHLORIDE 0.8 MG: 0.4 CAPSULE ORAL at 08:45

## 2023-09-24 RX ADMIN — QUETIAPINE FUMARATE 25 MG: 25 TABLET ORAL at 22:23

## 2023-09-24 RX ADMIN — ALLOPURINOL 300 MG: 300 TABLET ORAL at 08:45

## 2023-09-24 RX ADMIN — HYDROMORPHONE HYDROCHLORIDE 0.5 MG: 1 INJECTION, SOLUTION INTRAMUSCULAR; INTRAVENOUS; SUBCUTANEOUS at 19:29

## 2023-09-24 RX ADMIN — HYDROMORPHONE HYDROCHLORIDE 0.5 MG: 1 INJECTION, SOLUTION INTRAMUSCULAR; INTRAVENOUS; SUBCUTANEOUS at 15:18

## 2023-09-24 RX ADMIN — SODIUM CHLORIDE, PRESERVATIVE FREE 10 ML: 5 INJECTION INTRAVENOUS at 08:45

## 2023-09-24 RX ADMIN — TIOTROPIUM BROMIDE INHALATION SPRAY 2 PUFF: 3.12 SPRAY, METERED RESPIRATORY (INHALATION) at 08:34

## 2023-09-24 RX ADMIN — METOPROLOL TARTRATE 12.5 MG: 25 TABLET ORAL at 15:17

## 2023-09-24 RX ADMIN — DECITABINE 50 MG: 50 INJECTION, POWDER, LYOPHILIZED, FOR SOLUTION INTRAVENOUS at 15:53

## 2023-09-24 ASSESSMENT — PAIN SCALES - GENERAL
PAINLEVEL_OUTOF10: 7
PAINLEVEL_OUTOF10: 8
PAINLEVEL_OUTOF10: 8
PAINLEVEL_OUTOF10: 7
PAINLEVEL_OUTOF10: 8

## 2023-09-24 ASSESSMENT — PAIN DESCRIPTION - ORIENTATION
ORIENTATION: RIGHT
ORIENTATION: RIGHT
ORIENTATION: LEFT

## 2023-09-24 ASSESSMENT — PAIN DESCRIPTION - LOCATION
LOCATION: HIP
LOCATION: HIP
LOCATION: GROIN;HIP

## 2023-09-24 ASSESSMENT — PAIN DESCRIPTION - DESCRIPTORS
DESCRIPTORS: ACHING
DESCRIPTORS: ACHING
DESCRIPTORS: ACHING;DISCOMFORT

## 2023-09-24 NOTE — PLAN OF CARE
Problem: Respiratory - Adult  Goal: Achieves optimal ventilation and oxygenation  9/23/2023 2210 by Kortney Crawford RCP  Outcome: Progressing   BRONCHOSPASM/BRONCHOCONSTRICTION     [x]         IMPROVE AERATION/BREATH SOUNDS  [x]   ADMINISTER BRONCHODILATOR THERAPY AS APPROPRIATE  [x]   ASSESS BREATH SOUNDS  []   IMPLEMENT AEROSOL/MDI PROTOCOL  [x]   PATIENT EDUCATION AS NEEDED

## 2023-09-24 NOTE — PLAN OF CARE
Problem: Safety - Adult  Goal: Free from fall injury  9/24/2023 0403 by Cedric Lucio RN  Outcome: Progressing  Flowsheets (Taken 9/23/2023 1926)  Free From Fall Injury: Instruct family/caregiver on patient safety  9/23/2023 1800 by Temo Rasmussen RN  Outcome: Progressing     Problem: ABCDS Injury Assessment  Goal: Absence of physical injury  9/24/2023 0403 by Cedric Lucio RN  Outcome: Progressing  Flowsheets (Taken 9/23/2023 1926)  Absence of Physical Injury: Implement safety measures based on patient assessment  9/23/2023 1800 by Temo Rasmussen RN  Outcome: Progressing     Problem: Pain  Goal: Verbalizes/displays adequate comfort level or baseline comfort level  9/24/2023 0403 by Cedric Lucio RN  Outcome: Progressing  9/23/2023 1800 by Temo Rasmussen RN  Outcome: Progressing     Problem: Respiratory - Adult  Goal: Achieves optimal ventilation and oxygenation  9/24/2023 0403 by Cedric Lucio RN  Outcome: Progressing  9/23/2023 2210 by Elen Castañeda RCP  Outcome: Progressing  9/23/2023 1800 by Temo Rasmussen RN  Outcome: Progressing

## 2023-09-24 NOTE — DISCHARGE INSTR - COC
Continuity of Care Form    Patient Name: Tello Cummings   :  1955  MRN:  [id number]    Admit date:  2023  Discharge date:  ***    Code Status Order: Full Code   Advance Directives:     Admitting Physician:  Susan Renner MD  PCP: Radha Duke, YESY - CNP    Discharging Nurse: [facility] Unit/Room#: 1618/1658-46  Discharging Unit Phone Number: ***    Emergency Contact:   Extended Emergency Contact Information  Primary Emergency Contact: Janet Vaz  Mobile Phone: [phone]  Relation: Spouse   needed? No  Secondary Emergency Contact: Saul Vaz  Mobile Phone: [phone]  Relation: Child   needed?  No    Past Surgical History:  Past Surgical History:   Procedure Laterality Date    CT BONE MARROW BIOPSY  2023    CT BONE MARROW BIOPSY 2023 [facility] CT SCAN       Immunization History:   Immunization History   Administered Date(s) Administered    COVID-19, MODERNA BLUE border, Primary or Immunocompromised, (age 12y+), IM, 100 mcg/0.5mL 2022, 2022       Active Problems:  Patient Active Problem List   Diagnosis Code    Symptomatic anemia D64.9    Chronic respiratory failure with hypoxia ([facility]) J96.11    CML (chronic myeloid leukemia) ([facility]) C92.10    Dyslipidemia E78.5    MDS (myelodysplastic syndrome) ([facility]) D46.9    Anemia in other chronic diseases classified elsewhere D63.8    Hypoxia R09.02    COPD exacerbation ([facility]) J44.1    Mediastinal mass J98.59    Moderate malnutrition ([facility]) E44.0    Acute respiratory failure with hypoxia ([facility]) J96.01    Chronic myelogenous leukemia ([facility]) C92.10    Chronic myelomonocytic leukemia not having achieved remission ([facility]) C93.10    Stage 3a chronic kidney disease (CKD) ([facility]) N18.31    Pleural effusion on left J90    Atelectasis J98.11    Calcified pleural plaque on chest x-ray J94.8    Chronic obstructive pulmonary disease (HCC) J44.9    Acute on chronic respiratory failure ([facility]) J96.20    Blast

## 2023-09-25 ENCOUNTER — APPOINTMENT (OUTPATIENT)
Dept: GENERAL RADIOLOGY | Age: 68
DRG: 841 | End: 2023-09-25
Attending: HOSPITALIST
Payer: COMMERCIAL

## 2023-09-25 LAB
ANION GAP SERPL CALCULATED.3IONS-SCNC: 10 MMOL/L (ref 9–17)
BASOPHILS # BLD: 0 K/UL (ref 0–0.2)
BASOPHILS NFR BLD: 0 % (ref 0–2)
BLASTS ABSOLUTE COUNT: 0.78 K/UL
BLASTS: 4 %
BLOOD BANK BLOOD PRODUCT EXPIRATION DATE: NORMAL
BLOOD BANK DISPENSE STATUS: NORMAL
BLOOD BANK ISBT PRODUCT BLOOD TYPE: 5100
BLOOD BANK PRODUCT CODE: NORMAL
BLOOD BANK UNIT TYPE AND RH: NORMAL
BPU ID: NORMAL
BUN SERPL-MCNC: 21 MG/DL (ref 8–23)
CALCIUM SERPL-MCNC: 8.4 MG/DL (ref 8.6–10.4)
CHLORIDE SERPL-SCNC: 99 MMOL/L (ref 98–107)
CO2 SERPL-SCNC: 28 MMOL/L (ref 20–31)
COMPONENT: NORMAL
CREAT SERPL-MCNC: 1.2 MG/DL (ref 0.7–1.2)
EOSINOPHIL # BLD: 0 K/UL (ref 0–0.4)
EOSINOPHILS RELATIVE PERCENT: 0 % (ref 1–4)
ERYTHROCYTE [DISTWIDTH] IN BLOOD BY AUTOMATED COUNT: 15.7 % (ref 11.8–14.4)
GFR SERPL CREATININE-BSD FRML MDRD: >60 ML/MIN/1.73M2
GLUCOSE SERPL-MCNC: 89 MG/DL (ref 70–99)
HCT VFR BLD AUTO: 23.6 % (ref 40.7–50.3)
HGB BLD-MCNC: 7.8 G/DL (ref 13–17)
IMM GRANULOCYTES # BLD AUTO: 1.18 K/UL (ref 0–0.3)
IMM GRANULOCYTES NFR BLD: 6 %
LYMPHOCYTES NFR BLD: 3.72 K/UL (ref 1–4.8)
LYMPHOCYTES RELATIVE PERCENT: 19 % (ref 24–44)
MCH RBC QN AUTO: 29.4 PG (ref 25.2–33.5)
MCHC RBC AUTO-ENTMCNC: 33.1 G/DL (ref 28.4–34.8)
MCV RBC AUTO: 89.1 FL (ref 82.6–102.9)
MICROORGANISM SPEC CULT: NORMAL
MICROORGANISM SPEC CULT: NORMAL
MICROORGANISM/AGENT SPEC: NORMAL
MICROORGANISM/AGENT SPEC: NORMAL
MONOCYTES NFR BLD: 31 % (ref 1–7)
MONOCYTES NFR BLD: 6.08 K/UL (ref 0.1–0.8)
MORPHOLOGY: ABNORMAL
NEUTROPHILS NFR BLD: 40 % (ref 36–66)
NEUTS SEG NFR BLD: 7.84 K/UL (ref 1.8–7.7)
NRBC BLD-RTO: 0 PER 100 WBC
PATH REV BLD -IMP: NORMAL
PLATELET # BLD AUTO: ABNORMAL K/UL (ref 138–453)
PLATELET, FLUORESCENCE: 8 K/UL (ref 138–453)
PLATELETS.RETICULATED NFR BLD AUTO: 8.8 % (ref 1.1–10.3)
POTASSIUM SERPL-SCNC: 3.7 MMOL/L (ref 3.7–5.3)
RBC # BLD AUTO: 2.65 M/UL (ref 4.21–5.77)
SODIUM SERPL-SCNC: 137 MMOL/L (ref 135–144)
SPECIMEN DESCRIPTION: NORMAL
SPECIMEN DESCRIPTION: NORMAL
SURGICAL PATHOLOGY REPORT: NORMAL
TRANSFUSION STATUS: NORMAL
UNIT DIVISION: 0
UNIT ISSUE DATE/TIME: NORMAL
WBC OTHER # BLD: 19.6 K/UL (ref 3.5–11.3)

## 2023-09-25 PROCEDURE — 36430 TRANSFUSION BLD/BLD COMPNT: CPT

## 2023-09-25 PROCEDURE — 85025 COMPLETE CBC W/AUTO DIFF WBC: CPT

## 2023-09-25 PROCEDURE — P9073 PLATELETS PHERESIS PATH REDU: HCPCS

## 2023-09-25 PROCEDURE — 1200000000 HC SEMI PRIVATE

## 2023-09-25 PROCEDURE — 2700000000 HC OXYGEN THERAPY PER DAY

## 2023-09-25 PROCEDURE — 6370000000 HC RX 637 (ALT 250 FOR IP): Performed by: INTERNAL MEDICINE

## 2023-09-25 PROCEDURE — 73100 X-RAY EXAM OF WRIST: CPT

## 2023-09-25 PROCEDURE — 6360000002 HC RX W HCPCS: Performed by: NURSE PRACTITIONER

## 2023-09-25 PROCEDURE — 36415 COLL VENOUS BLD VENIPUNCTURE: CPT

## 2023-09-25 PROCEDURE — 2580000003 HC RX 258: Performed by: NURSE PRACTITIONER

## 2023-09-25 PROCEDURE — 71045 X-RAY EXAM CHEST 1 VIEW: CPT

## 2023-09-25 PROCEDURE — 2580000003 HC RX 258: Performed by: INTERNAL MEDICINE

## 2023-09-25 PROCEDURE — 94640 AIRWAY INHALATION TREATMENT: CPT

## 2023-09-25 PROCEDURE — 6370000000 HC RX 637 (ALT 250 FOR IP): Performed by: FAMILY MEDICINE

## 2023-09-25 PROCEDURE — 94761 N-INVAS EAR/PLS OXIMETRY MLT: CPT

## 2023-09-25 PROCEDURE — 85055 RETICULATED PLATELET ASSAY: CPT

## 2023-09-25 PROCEDURE — 99232 SBSQ HOSP IP/OBS MODERATE 35: CPT | Performed by: INTERNAL MEDICINE

## 2023-09-25 PROCEDURE — 80048 BASIC METABOLIC PNL TOTAL CA: CPT

## 2023-09-25 PROCEDURE — 6360000002 HC RX W HCPCS: Performed by: INTERNAL MEDICINE

## 2023-09-25 RX ORDER — LIDOCAINE 40 MG/G
CREAM TOPICAL EVERY 30 MIN PRN
Status: DISCONTINUED | OUTPATIENT
Start: 2023-09-25 | End: 2023-09-26 | Stop reason: HOSPADM

## 2023-09-25 RX ORDER — MORPHINE SULFATE 2 MG/ML
1 INJECTION, SOLUTION INTRAMUSCULAR; INTRAVENOUS
Status: DISCONTINUED | OUTPATIENT
Start: 2023-09-25 | End: 2023-09-26

## 2023-09-25 RX ORDER — SODIUM CHLORIDE 9 MG/ML
5 INJECTION, SOLUTION INTRAVENOUS CONTINUOUS
Status: DISCONTINUED | OUTPATIENT
Start: 2023-09-25 | End: 2023-09-26 | Stop reason: HOSPADM

## 2023-09-25 RX ADMIN — SODIUM CHLORIDE, PRESERVATIVE FREE 10 ML: 5 INJECTION INTRAVENOUS at 08:30

## 2023-09-25 RX ADMIN — TAMSULOSIN HYDROCHLORIDE 0.8 MG: 0.4 CAPSULE ORAL at 08:30

## 2023-09-25 RX ADMIN — DICLOFENAC SODIUM 4 G: 10 GEL TOPICAL at 13:57

## 2023-09-25 RX ADMIN — SODIUM CHLORIDE 5 ML/HR: 9 INJECTION, SOLUTION INTRAVENOUS at 12:42

## 2023-09-25 RX ADMIN — METOPROLOL TARTRATE 12.5 MG: 25 TABLET ORAL at 13:58

## 2023-09-25 RX ADMIN — MORPHINE SULFATE 1 MG: 2 INJECTION, SOLUTION INTRAMUSCULAR; INTRAVENOUS at 18:27

## 2023-09-25 RX ADMIN — LIDOCAINE: 40 CREAM TOPICAL at 12:41

## 2023-09-25 RX ADMIN — ALLOPURINOL 300 MG: 300 TABLET ORAL at 13:58

## 2023-09-25 RX ADMIN — HYDROMORPHONE HYDROCHLORIDE 0.5 MG: 1 INJECTION, SOLUTION INTRAMUSCULAR; INTRAVENOUS; SUBCUTANEOUS at 00:45

## 2023-09-25 RX ADMIN — BUDESONIDE AND FORMOTEROL FUMARATE DIHYDRATE 2 PUFF: 160; 4.5 AEROSOL RESPIRATORY (INHALATION) at 10:30

## 2023-09-25 RX ADMIN — SODIUM CHLORIDE, PRESERVATIVE FREE 10 ML: 5 INJECTION INTRAVENOUS at 20:50

## 2023-09-25 RX ADMIN — BUDESONIDE AND FORMOTEROL FUMARATE DIHYDRATE 2 PUFF: 160; 4.5 AEROSOL RESPIRATORY (INHALATION) at 21:08

## 2023-09-25 RX ADMIN — QUETIAPINE FUMARATE 25 MG: 25 TABLET ORAL at 22:01

## 2023-09-25 RX ADMIN — POTASSIUM CHLORIDE 20 MEQ: 1500 TABLET, EXTENDED RELEASE ORAL at 08:29

## 2023-09-25 RX ADMIN — HYDROMORPHONE HYDROCHLORIDE 0.5 MG: 1 INJECTION, SOLUTION INTRAMUSCULAR; INTRAVENOUS; SUBCUTANEOUS at 13:58

## 2023-09-25 RX ADMIN — MORPHINE SULFATE 1 MG: 2 INJECTION, SOLUTION INTRAMUSCULAR; INTRAVENOUS at 20:48

## 2023-09-25 RX ADMIN — HYDROXYUREA 500 MG: 500 CAPSULE ORAL at 08:30

## 2023-09-25 RX ADMIN — TIOTROPIUM BROMIDE INHALATION SPRAY 2 PUFF: 3.12 SPRAY, METERED RESPIRATORY (INHALATION) at 10:30

## 2023-09-25 RX ADMIN — METOPROLOL TARTRATE 12.5 MG: 25 TABLET ORAL at 02:45

## 2023-09-25 RX ADMIN — LACTULOSE 20 G: 20 SOLUTION ORAL at 08:30

## 2023-09-25 RX ADMIN — HYDROMORPHONE HYDROCHLORIDE 0.5 MG: 1 INJECTION, SOLUTION INTRAMUSCULAR; INTRAVENOUS; SUBCUTANEOUS at 05:11

## 2023-09-25 ASSESSMENT — PAIN - FUNCTIONAL ASSESSMENT
PAIN_FUNCTIONAL_ASSESSMENT: ACTIVITIES ARE NOT PREVENTED

## 2023-09-25 ASSESSMENT — PAIN DESCRIPTION - LOCATION
LOCATION: HIP
LOCATION: HIP
LOCATION: WRIST

## 2023-09-25 ASSESSMENT — PAIN DESCRIPTION - DESCRIPTORS
DESCRIPTORS: ACHING

## 2023-09-25 ASSESSMENT — PAIN SCALES - GENERAL
PAINLEVEL_OUTOF10: 8
PAINLEVEL_OUTOF10: 9
PAINLEVEL_OUTOF10: 8

## 2023-09-25 ASSESSMENT — PAIN DESCRIPTION - ORIENTATION
ORIENTATION: RIGHT

## 2023-09-25 NOTE — PLAN OF CARE
Problem: Safety - Adult  Goal: Free from fall injury  Outcome: Progressing  Flowsheets (Taken 9/24/2023 2152)  Free From Fall Injury: Instruct family/caregiver on patient safety     Problem: ABCDS Injury Assessment  Goal: Absence of physical injury  Outcome: Progressing  Flowsheets (Taken 9/24/2023 2152)  Absence of Physical Injury: Implement safety measures based on patient assessment     Problem: Pain  Goal: Verbalizes/displays adequate comfort level or baseline comfort level  Outcome: Progressing     Problem: Respiratory - Adult  Goal: Achieves optimal ventilation and oxygenation  9/25/2023 0407 by Reba Rodgers RN  Outcome: Progressing  9/24/2023 2108 by Jese Wolff RCP  Outcome: Progressing

## 2023-09-25 NOTE — CARE COORDINATION
Transition Plan  Patient plans on returning home and going to [facility] for follow up treatment. Brother in room and agrees. Patient states his hip pain is tolerable and questioning when he will be discharged. Discussed with Jon Zamora, /RN patient wanting to be discharged to home.
Transitional planning     Writer to room to discuss d/c plan, plan is home , will have transportation , no voiced needs, has home O2, tank with him.
AidenFabiankirt Pillai - [phone]    Secondary Decision Maker: Magy Mitchell Child - [phone]    Discharge Planning:    Patient lives with: Alone Type of Home: House  Primary Care Giver:    Patient Support Systems include: Family Members   Current Financial resources:    Current community resources:    Current services prior to admission: Oxygen Therapy            Current DME:              Type of Home Care services:  None    ADLS  Prior functional level:    Current functional level:      PT AM-PAC:   /24  OT AM-PAC:   /24    Family can provide assistance at DC: Would you like Case Management to discuss the discharge plan with any other family members/significant others, and if so, who? Plans to Return to Present Housing:    Other Identified Issues/Barriers to RETURNING to current housing: chemo tx  Potential Assistance needed at discharge: N/A            Potential DME:    Patient expects to discharge to: [address] [place] [address] for transportation at discharge:      Financial    Payor: Andrea Curtis / Plan: Maryanne Wong / Product Type: *No Product type* /     Does insurance require precert for SNF: Yes    Potential assistance Purchasing Medications:    Meds-to-Beds request: Yes      [address] [facility] [phone] [place] [address]  [address] [place] [address], [facility] [address]  Phone: [phone] Fax: [phone]      Notes:    Factors facilitating achievement of predicted outcomes: Pleasant    Barriers to discharge: Pain, chemo    Additional Case Management Notes: Plan is to return home independent. Lives alone. Brother to transport. Blast crisis. Awaiting hem/onc consult. Anticipate chemo tx.     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

## 2023-09-25 NOTE — PLAN OF CARE
Problem: Respiratory - Adult  Goal: Achieves optimal ventilation and oxygenation  9/25/2023 1039 by Rina Beard RCP  Flowsheets (Taken 9/25/2023 1039)  Achieves optimal ventilation and oxygenation: Respiratory therapy support as indicated

## 2023-09-26 VITALS
RESPIRATION RATE: 17 BRPM | SYSTOLIC BLOOD PRESSURE: 123 MMHG | WEIGHT: 264.99 LBS | HEIGHT: 71 IN | HEART RATE: 85 BPM | TEMPERATURE: 97.9 F | OXYGEN SATURATION: 91 % | DIASTOLIC BLOOD PRESSURE: 65 MMHG | BODY MASS INDEX: 37.1 KG/M2

## 2023-09-26 LAB
ANION GAP SERPL CALCULATED.3IONS-SCNC: 12 MMOL/L (ref 9–16)
BASOPHILS # BLD: 0 K/UL (ref 0–0.2)
BASOPHILS NFR BLD: 0 % (ref 0–2)
BLOOD BANK BLOOD PRODUCT EXPIRATION DATE: NORMAL
BLOOD BANK DISPENSE STATUS: NORMAL
BLOOD BANK ISBT PRODUCT BLOOD TYPE: 6200
BLOOD BANK PRODUCT CODE: NORMAL
BLOOD BANK UNIT TYPE AND RH: NORMAL
BPU ID: NORMAL
BUN SERPL-MCNC: 26 MG/DL (ref 8–23)
CALCIUM SERPL-MCNC: 8.1 MG/DL (ref 8.6–10.4)
CELLS COUNTED: 200
CHLORIDE SERPL-SCNC: 99 MMOL/L (ref 98–107)
CO2 SERPL-SCNC: 27 MMOL/L (ref 20–31)
COMPONENT: NORMAL
CREAT SERPL-MCNC: 1.2 MG/DL (ref 0.7–1.2)
EOSINOPHIL # BLD: 0 K/UL (ref 0–0.4)
EOSINOPHILS RELATIVE PERCENT: 0 % (ref 1–4)
ERYTHROCYTE [DISTWIDTH] IN BLOOD BY AUTOMATED COUNT: 15.8 % (ref 11.8–14.4)
GFR SERPL CREATININE-BSD FRML MDRD: >60 ML/MIN/1.73M2
GLUCOSE SERPL-MCNC: 99 MG/DL (ref 74–99)
HCT VFR BLD AUTO: 22 % (ref 40.7–50.3)
HGB BLD-MCNC: 7.1 G/DL (ref 13–17)
IMM GRANULOCYTES # BLD AUTO: 1.34 K/UL (ref 0–0.3)
IMM GRANULOCYTES NFR BLD: 4 %
LYMPHOCYTES NFR BLD: 5.04 K/UL (ref 1–4.8)
LYMPHOCYTES RELATIVE PERCENT: 15 % (ref 24–44)
MAGNESIUM SERPL-MCNC: 1.9 MG/DL (ref 1.6–2.6)
MCH RBC QN AUTO: 30.2 PG (ref 25.2–33.5)
MCHC RBC AUTO-ENTMCNC: 32.3 G/DL (ref 28.4–34.8)
MCV RBC AUTO: 93.6 FL (ref 82.6–102.9)
MONOCYTES NFR BLD: 20.84 K/UL (ref 0.1–0.8)
MONOCYTES NFR BLD: 62 % (ref 1–7)
MORPHOLOGY: NORMAL
NEUTROPHILS NFR BLD: 19 % (ref 36–66)
NEUTS SEG NFR BLD: 6.38 K/UL (ref 1.8–7.7)
NRBC BLD-RTO: 0 PER 100 WBC
PLATELET # BLD AUTO: ABNORMAL K/UL (ref 138–453)
PLATELET, FLUORESCENCE: 14 K/UL (ref 138–453)
PLATELETS.RETICULATED NFR BLD AUTO: 6.8 % (ref 1.1–10.3)
POTASSIUM SERPL-SCNC: 3.4 MMOL/L (ref 3.7–5.3)
RBC # BLD AUTO: 2.35 M/UL (ref 4.21–5.77)
SODIUM SERPL-SCNC: 138 MMOL/L (ref 136–145)
TRANSFUSION STATUS: NORMAL
UNIT DIVISION: 0
UNIT ISSUE DATE/TIME: NORMAL
WBC OTHER # BLD: 33.6 K/UL (ref 3.5–11.3)

## 2023-09-26 PROCEDURE — P9016 RBC LEUKOCYTES REDUCED: HCPCS

## 2023-09-26 PROCEDURE — 2580000003 HC RX 258: Performed by: NURSE PRACTITIONER

## 2023-09-26 PROCEDURE — 6360000002 HC RX W HCPCS: Performed by: INTERNAL MEDICINE

## 2023-09-26 PROCEDURE — 83735 ASSAY OF MAGNESIUM: CPT

## 2023-09-26 PROCEDURE — 85055 RETICULATED PLATELET ASSAY: CPT

## 2023-09-26 PROCEDURE — 94761 N-INVAS EAR/PLS OXIMETRY MLT: CPT

## 2023-09-26 PROCEDURE — 80048 BASIC METABOLIC PNL TOTAL CA: CPT

## 2023-09-26 PROCEDURE — 6370000000 HC RX 637 (ALT 250 FOR IP): Performed by: INTERNAL MEDICINE

## 2023-09-26 PROCEDURE — 6370000000 HC RX 637 (ALT 250 FOR IP): Performed by: FAMILY MEDICINE

## 2023-09-26 PROCEDURE — 85025 COMPLETE CBC W/AUTO DIFF WBC: CPT

## 2023-09-26 PROCEDURE — 36430 TRANSFUSION BLD/BLD COMPNT: CPT

## 2023-09-26 PROCEDURE — 94640 AIRWAY INHALATION TREATMENT: CPT

## 2023-09-26 PROCEDURE — 2700000000 HC OXYGEN THERAPY PER DAY

## 2023-09-26 PROCEDURE — 36415 COLL VENOUS BLD VENIPUNCTURE: CPT

## 2023-09-26 PROCEDURE — 99232 SBSQ HOSP IP/OBS MODERATE 35: CPT | Performed by: INTERNAL MEDICINE

## 2023-09-26 RX ORDER — POLYETHYLENE GLYCOL 3350 17 G/17G
17 POWDER, FOR SOLUTION ORAL DAILY PRN
Qty: 325 G | Refills: 0 | COMMUNITY
Start: 2023-09-26 | End: 2023-10-26

## 2023-09-26 RX ORDER — HYDROXYUREA 500 MG/1
500 CAPSULE ORAL DAILY
Qty: 30 CAPSULE | Refills: 0 | Status: SHIPPED | OUTPATIENT
Start: 2023-09-26 | End: 2023-10-26

## 2023-09-26 RX ORDER — OXYCODONE HYDROCHLORIDE AND ACETAMINOPHEN 5; 325 MG/1; MG/1
1 TABLET ORAL EVERY 6 HOURS PRN
Qty: 12 TABLET | Refills: 0 | Status: SHIPPED | OUTPATIENT
Start: 2023-09-26 | End: 2023-09-29

## 2023-09-26 RX ORDER — SODIUM CHLORIDE 9 MG/ML
INJECTION, SOLUTION INTRAVENOUS PRN
Status: DISCONTINUED | OUTPATIENT
Start: 2023-09-26 | End: 2023-09-26 | Stop reason: HOSPADM

## 2023-09-26 RX ADMIN — MORPHINE SULFATE 1 MG: 2 INJECTION, SOLUTION INTRAMUSCULAR; INTRAVENOUS at 07:51

## 2023-09-26 RX ADMIN — TAMSULOSIN HYDROCHLORIDE 0.8 MG: 0.4 CAPSULE ORAL at 09:22

## 2023-09-26 RX ADMIN — HYDROMORPHONE HYDROCHLORIDE 1 MG: 1 INJECTION, SOLUTION INTRAMUSCULAR; INTRAVENOUS; SUBCUTANEOUS at 01:13

## 2023-09-26 RX ADMIN — TIOTROPIUM BROMIDE INHALATION SPRAY 2 PUFF: 3.12 SPRAY, METERED RESPIRATORY (INHALATION) at 10:02

## 2023-09-26 RX ADMIN — ALLOPURINOL 300 MG: 300 TABLET ORAL at 09:22

## 2023-09-26 RX ADMIN — MORPHINE SULFATE 1 MG: 2 INJECTION, SOLUTION INTRAMUSCULAR; INTRAVENOUS at 04:44

## 2023-09-26 RX ADMIN — METOPROLOL TARTRATE 12.5 MG: 25 TABLET ORAL at 14:33

## 2023-09-26 RX ADMIN — SODIUM CHLORIDE, PRESERVATIVE FREE 10 ML: 5 INJECTION INTRAVENOUS at 09:32

## 2023-09-26 RX ADMIN — POTASSIUM CHLORIDE 20 MEQ: 1500 TABLET, EXTENDED RELEASE ORAL at 09:22

## 2023-09-26 RX ADMIN — HYDROXYUREA 500 MG: 500 CAPSULE ORAL at 09:20

## 2023-09-26 RX ADMIN — BUDESONIDE AND FORMOTEROL FUMARATE DIHYDRATE 2 PUFF: 160; 4.5 AEROSOL RESPIRATORY (INHALATION) at 10:03

## 2023-09-26 RX ADMIN — DICLOFENAC SODIUM 4 G: 10 GEL TOPICAL at 09:22

## 2023-09-26 RX ADMIN — METOPROLOL TARTRATE 12.5 MG: 25 TABLET ORAL at 01:13

## 2023-09-26 ASSESSMENT — PAIN DESCRIPTION - ORIENTATION
ORIENTATION: RIGHT

## 2023-09-26 ASSESSMENT — PAIN DESCRIPTION - DESCRIPTORS
DESCRIPTORS: DISCOMFORT
DESCRIPTORS: ACHING;DISCOMFORT
DESCRIPTORS: ACHING
DESCRIPTORS: ACHING

## 2023-09-26 ASSESSMENT — PAIN SCALES - GENERAL
PAINLEVEL_OUTOF10: 8
PAINLEVEL_OUTOF10: 8
PAINLEVEL_OUTOF10: 4
PAINLEVEL_OUTOF10: 8

## 2023-09-26 ASSESSMENT — PAIN DESCRIPTION - LOCATION
LOCATION: WRIST

## 2023-09-26 NOTE — PLAN OF CARE
Problem: Safety - Adult  Goal: Free from fall injury  Outcome: Progressing     Problem: ABCDS Injury Assessment  Goal: Absence of physical injury  Outcome: Progressing  Flowsheets (Taken 9/26/2023 0715)  Absence of Physical Injury: Implement safety measures based on patient assessment     Problem: Pain  Goal: Verbalizes/displays adequate comfort level or baseline comfort level  Outcome: Progressing     Problem: Respiratory - Adult  Goal: Achieves optimal ventilation and oxygenation  9/26/2023 1851 by Nabil Sherwood RN  Outcome: Progressing  9/26/2023 1006 by Curly Michael RCP  Flowsheets  Taken 9/26/2023 1006 by Curly Michael RCP  Achieves optimal ventilation and oxygenation: Respiratory therapy support as indicated  Taken 9/26/2023 0800 by Nabil Sherwood RN  Achieves optimal ventilation and oxygenation:   Assess for changes in respiratory status   Position to facilitate oxygenation and minimize respiratory effort     Problem: Nutrition Deficit:  Goal: Optimize nutritional status  Outcome: Progressing

## 2023-09-26 NOTE — DISCHARGE SUMMARY
Discussed with the patient and all questioned fully answered. He will call me if any problems arise. Order for CBC and medications handed to patient.

## 2023-09-26 NOTE — PLAN OF CARE
Problem: Respiratory - Adult  Goal: Achieves optimal ventilation and oxygenation  9/26/2023 1006 by Quirino Cueva RCP  Flowsheets  Taken 9/26/2023 1006 by Quirino Cueva RCP  Achieves optimal ventilation and oxygenation: Respiratory therapy support as indicated

## 2023-09-26 NOTE — PLAN OF CARE
Problem: Safety - Adult  Goal: Free from fall injury  9/26/2023 1859 by Adriana Frost RN  Outcome: Completed  9/26/2023 1851 by Adriana Frost RN  Outcome: Progressing     Problem: ABCDS Injury Assessment  Goal: Absence of physical injury  9/26/2023 1859 by Adriana Frost RN  Outcome: Completed  9/26/2023 1851 by Adriana Frost RN  Outcome: Progressing  Flowsheets (Taken 9/26/2023 0715)  Absence of Physical Injury: Implement safety measures based on patient assessment     Problem: Pain  Goal: Verbalizes/displays adequate comfort level or baseline comfort level  9/26/2023 1859 by Adriana Frost RN  Outcome: Completed  9/26/2023 1851 by Adriana Frost RN  Outcome: Progressing     Problem: Respiratory - Adult  Goal: Achieves optimal ventilation and oxygenation  9/26/2023 1859 by Adriana Frost RN  Outcome: Completed  9/26/2023 1851 by Adriana Frost RN  Outcome: Progressing  9/26/2023 1006 by Kati Pascual RCP  Flowsheets  Taken 9/26/2023 1006 by Kati Pascual RCP  Achieves optimal ventilation and oxygenation: Respiratory therapy support as indicated  Taken 9/26/2023 0800 by Adriana Frost RN  Achieves optimal ventilation and oxygenation:   Assess for changes in respiratory status   Position to facilitate oxygenation and minimize respiratory effort     Problem: Nutrition Deficit:  Goal: Optimize nutritional status  9/26/2023 1859 by Adriana Frost RN  Outcome: Completed  9/26/2023 1851 by Adriana Frost RN  Outcome: Progressing

## 2023-09-26 NOTE — PROGRESS NOTES
Called lab regarding 11am lab draw for post transfusion. Informed they don't see a order. CBC reordered stat.
Comprehensive Nutrition Assessment    Type and Reason for Visit:  RD Nutrition Re-Screen/LOS    Nutrition Recommendations/Plan:   Continue current diet, Regular  Will send Diabetic ONS BID  Monitor/encourage PO intake     Malnutrition Assessment:  Malnutrition Status: At risk for malnutrition (Comment) (09/25/23 6793)    Context:  Chronic Illness     Findings of the 6 clinical characteristics of malnutrition:  Energy Intake:  Mild decrease in energy intake (Comment) (predicted)  Weight Loss:  Mild weight loss (specify amount and time period)     Body Fat Loss:  No significant body fat loss     Muscle Mass Loss:  No significant muscle mass loss    Fluid Accumulation:  Mild     Strength:  Not Performed    Nutrition Assessment:    78 yo m adm blast crisis phase of chronic myeloid leukemia. PMH significant for HTN, COPD, MDS, cx. Per chart, pt with 7.2% wt loss x 3 mos (not significant), steadily losing wt since November 2022. Pt reportst no wt loss pta and no change in PO intake pta. He states he drinks 1-2 ONS daily at home, agreeable to ONS here with no flavor preference. LBM 9/24. +1 pitting BLE edema noted. Nutrition Related Findings:    labs/meds reviewed Wound Type: None       Current Nutrition Intake & Therapies:    Average Meal Intake: %  Average Supplements Intake: None Ordered  ADULT DIET; Regular    Anthropometric Measures:  Height: 5' 11\" (180.3 cm)  Ideal Body Weight (IBW): 172 lbs (78 kg)       Current Body Weight: 264 lb 8.8 oz (120 kg) (9/23), 153.8 % IBW.     Current BMI (kg/m2): 36.9  Usual Body Weight: 285 lb (129.3 kg) (6/8/23)  % Weight Change (Calculated): -7.2  Weight Adjustment For: No Adjustment                 BMI Categories: Obese Class 2 (BMI 35.0 -39.9)    Estimated Daily Nutrient Needs:  Energy Requirements Based On: Formula  Weight Used for Energy Requirements: Current  Energy (kcal/day): 2300 to 2500 kcal/day  Weight Used for Protein Requirements: Ideal  Protein (g/day): 90
Dr. Bee Frias notified WBC 33.6, PLT 14, HGB 7.1. Dr. Bee Frias wants to give one unit PRBC before discharge tonight, no need for redraw.
I have discussed with the patient the rationale for blood component transfusion; its benefits in treating or preventing fatigue, organ damage, or death; and its risk which includes mild transfusion reactions, rare risk of blood borne infection, or more serious but rare reactions. I have discussed the alternatives to transfusion, including the risk and consequences of not receiving transfusion. The patient had an opportunity to ask questions and had agreed to proceed with transfusion of blood components.
Notified MD that IR would like his pltx 50 or above for thoracentesis procedure in am.
Occupational [address]  Occupational Therapy Not Seen Note    DATE: 2023    NAME: Luis Carrington  MRN: [id number]   : 1955      Patient not seen this date for Occupational Therapy due to:    Patient independent with ADLs and functional tasks with no acute OT needs. Will defer OT evaluation at this time. Please reorder OT if future needs arise.          Electronically signed by Elvin Benitez OT/S on 2023 at 2:57 PM
Physical Therapy  Facility/Department: [facility] ONC/MED SURG  Physical Therapy Initial Assessment    Name: Kelly Michel  : 1955  MRN: [id number]  Date of Service: 2023  No chief complaint on file. Discharge Recommendations:   No further therapy required at discharge. PT Equipment Recommendations  Equipment Needed: No      Patient Diagnosis(es): The primary encounter diagnosis was Elevated brain natriuretic peptide (BNP) level. Diagnoses of Elevated troponin and MDS (myelodysplastic syndrome) (HCC) were also pertinent to this visit. Past Medical History:  has a past medical history of Arthritis, Cancer ([address]), COPD (chronic obstructive pulmonary disease) ([address]), and Hypertension. Past Surgical History:  has a past surgical history that includes CT BIOPSY BONE MARROW (2023). Assessment   Assessment: Pt amb 250' IND no AD. Pt demonstrates functional mobility and safety throughout eval. Pt denies barriers to safe return home. PT to Sign off. Therapy Prognosis: Excellent  Decision Making: Low Complexity  Requires PT Follow-Up: No  Activity Tolerance  Activity Tolerance: Patient tolerated treatment well     Plan   Physcial Therapy Plan  General Plan: Discharge with evaluation only  Safety Devices  Type of Devices: Call light within reach, Left in bed, Gait belt, Nurse notified  Restraints  Restraints Initially in Place: No     Restrictions  Restrictions/Precautions  Restrictions/Precautions: General Precautions  Required Braces or Orthoses?: No  Position Activity Restriction  Other position/activity restrictions:  - Thoracentesis.  Up w/ assist.     Subjective   General  Chart Reviewed: Yes  Patient assessed for rehabilitation services?: Yes  Response To Previous Treatment: Not applicable  Family / Caregiver Present: No  Follows Commands: Within Functional Limits  General Comment  Comments: RN and pt agreeable to PT. pt alert in bed upon arrival. Pt on 2L O2 NC
[facility]  Office: [phone]  Erich Farmer, DO, Stepan Chou, DO, Caitlyn Hi, DO, Samantha Werner Blood, DO, Chet Saenz MD, Leia Meneses MD, Diallo Garcia MD, Cayla Grayson MD,  Lois Tafoya MD, Nelson Dong MD, Ila Benitez, DO, Winnie Shrestha MD,  Racheal Horne MD, Dave Deutsch MD, Vilma Beckford, DO, John Ash MD,  Carmela Diaz DO, Gary Bond MD, Kwaku Yoon MD, Reymundo Pulido MD, Jami Dunn MD,  Larry Savage MD, Jacobo Abbott MD, Jackelin Bah MD, Rossi Starks MD, Zhao Unger DO, Kendrick Gomes MD,  Alysia Bains MD, Tomas Urena MD, Sadia Arevalo, CNP,  Malcom Heimlich, CNP,, Laurie Shields, CNP,  Hannah Orr, DNP, Jose Kirk, CNP, Filiberto Meza, CNP, Prudence Cuello, CNP, Jon Tafoya, CNP, Korina Laura, CNP, Jose Sinclair, CNP, Conner Cox, CNS, Leeann Lr, CNP, Chuyita Rankin, [address]    Progress Note    9/20/2023    7:51 AM    Name:   Chyrel Saint  MRN:     [id number]     [address]:      [de-identified]   Room:   [address] [place], [place] [address] Day:  2  Admit Date:  9/18/2023 11:25 PM    PCP:   YESY Velez CNP  Code Status:  Full Code    Subjective:     C/C:     Interval History Status: not changed. Patient seen and examined at bedside, no acute events overnight. Hemoglobin dropped further today, platelets also at 13 and patient is feeling more short of breath this morning. Consent already in chart and ordered transfusions, patient will need platelet transfusion for thoracentesis purposes  Brother was at bedside   VSS overnight  Chemo orders were placed, to be started  Patient vitals, labs and all providers notes were reviewed,from overnight shift and morning updates were noted and discussed with the nurse    Brief History:     Chyrel Saint is a 79 y.o.  Non- / non  male who presents with No chief complaint
[facility]  Office: [phone]  Erika Ibarra DO, Beti Huertas DO, Judie Mike DO, Shadi Peralta, DO, Zhou Greene MD, Duncan Contreras MD, Matthew Mcleod MD, Sagar Aragon MD,  Jeff Leahy MD, Estelita Rosen MD, Angel Luis Billingsley DO, Rober Robb MD,  Calos Atkinson MD, Melissa Acevedo MD, Philly Robles DO, Jenna Lopez MD,  Kaela Magaña DO, Arlen Howard MD, Tawanda Wynn MD, Love Quigley MD, Teddy Hubbard MD,  Pauline Huang MD, Felix Wright MD, Sameer Mccray MD, Yonas Murphy MD, Emilie Acevedo DO, Sandra Veras MD,  Vero Martínez MD, Chantell Gambino MD, Carmen Levy, CNP,  Cesar Lyon, CNP,, Denice Cushing, CNP,  Lawrence Escobar, [facility], Nancy Bustamante, CNP, Chavez Palacios, CNP, Vee Monique, CNP, Kenia Maurice, CNP, Indio Quesada, CNP, Alexis Zabala, CNP, Justice Song, CNS, Raven Astorga, CNP, Amy Owens, [address] [place] [address]    Progress Note    9/24/2023    9:39 AM    Name:   Brina Hampton  MRN:     [id number]     [address] [facility] [address]:      [de-identified]   Room:   [address] [place] [address]  IP Day:  6  Admit Date:  9/18/2023 11:25 PM    PCP:   YESY Garcia CNP  Code Status:  Full Code    Subjective:     C/C:  Shortness of breath. Left-sided pleural effusion. CMML. Blast crisis. Interval History Status: not changed. Afebrile, hemodynamicly stable since last seen remains on 2L NC  PLTs this am downtrending, now 9. Will tranfuse platelets  Denies any new or worseing symptoms from baseline. Brief History:     The patient is a 79 y.o.  male who is admitted to the hospital for further management of increasing shortness of breath and pleural effusion. Patient had multiple hospitalizations with similar condition. For the last few days he was having increasing shortness of breath and he was noted to have anemia and received blood transfusion.   No improvement after
Today's Date: 9/25/2023  Patient Name: Yane Newell  Date of admission: 9/18/2023 11:25 PM  Patient's age: 79 y.o., 1955  Admission Dx: Blast crisis phase of chronic myeloid leukemia ([address]) [C92.10]      Requesting Physician: No admitting provider for patient encounter. CHIEF COMPLAINT: Shortness of breath. Left-sided pleural effusion. CMML. Blast crisis. SUBJECTIVE:  Patient seen and examined. He completed 5 days of chemotherapy with decitabine yesterday  Complaining of pain in his right wrist and denies any trauma  Mild erythema noted on right wrist  No fever chills  No nausea vomiting  Received platelet transfusion. BRIEF CASE HISTORY:    The patient is a 79 y.o.  male who is admitted to the hospital for further management of increasing shortness of breath and pleural effusion. Patient had multiple hospitalizations with similar condition. For the last few days he was having increasing shortness of breath and he was noted to have anemia and received blood transfusion. No improvement after transfusion. He had pleural effusion and he had thoracentesis on his last hospitalization. Patient's repeated labs in outside facility showed significant increase in the white blood cells with 35% blasts. Patient was recently diagnosed with MDS. He had CMML. Patient received 1 cycle of Vidaza and subsequently evaluated at [facility] and he received 1 cycle of IV decitabine in August 2023. No complications or side effects. Patient states that his insurance is no longer accepted at [facility]. He was transferred to [facility] for further care of the recent changes in his labs. He has no fever or chills. No active bleeding. No headaches or dizziness. Past Medical History:   has a past medical history of Arthritis, Cancer ([address]), COPD (chronic obstructive pulmonary disease) ([address]), and Hypertension.     Past Surgical
Today's Date: 9/26/2023  Patient Name: Chilo Clark  Date of admission: 9/18/2023 11:25 PM  Patient's age: 79 y.o., 1955  Admission Dx: Blast crisis phase of chronic myeloid leukemia ([address]) [C92.10]      Requesting Physician: No admitting provider for patient encounter. CHIEF COMPLAINT: Shortness of breath. Left-sided pleural effusion. CMML. Blast crisis. SUBJECTIVE:  Patient seen and examined. No labs drawn today  His wrist pain has improved  Denies any nausea vomiting  No bleeding symptoms  BRIEF CASE HISTORY:    The patient is a 79 y.o.  male who is admitted to the hospital for further management of increasing shortness of breath and pleural effusion. Patient had multiple hospitalizations with similar condition. For the last few days he was having increasing shortness of breath and he was noted to have anemia and received blood transfusion. No improvement after transfusion. He had pleural effusion and he had thoracentesis on his last hospitalization. Patient's repeated labs in outside facility showed significant increase in the white blood cells with 35% blasts. Patient was recently diagnosed with MDS. He had CMML. Patient received 1 cycle of Vidaza and subsequently evaluated at [facility] and he received 1 cycle of IV decitabine in August 2023. No complications or side effects. Patient states that his insurance is no longer accepted at [facility]. He was transferred to [facility] for further care of the recent changes in his labs. He has no fever or chills. No active bleeding. No headaches or dizziness. Past Medical History:   has a past medical history of Arthritis, Cancer ([facility]), COPD (chronic obstructive pulmonary disease) ([facility]), and Hypertension. Past Surgical History:   has a past surgical history that includes CT BIOPSY BONE MARROW (8/25/2023).    Family History: family history includes
General appearance: ao3, chroniclly ill appearing   Mental Status:  oriented to person, place and time and normal affect  Lungs:  reduced breath sounds, R > L. No wheezing. Mild basilar crackles on right. Symmetrical lung exapnsion. Heart:  regular rate and rhythm, no murmur  Abdomen:  soft, nontender, nondistended, normal bowel sounds,   Extremities:  no edema, redness, tenderness in the calves  Skin:  Rash present. No erythema or lesion. Rash is purpuric.      Assessment:        Hospital Problems             Last Modified POA    * (Principal) Blast crisis phase of chronic myeloid leukemia ([address]) 9/18/2023 Yes    Symptomatic anemia 9/19/2023 Yes    Chronic respiratory failure with hypoxia ([address]) 9/19/2023 Yes    Dyslipidemia 9/19/2023 Yes    MDS (myelodysplastic syndrome) ([address]) 9/19/2023 Yes    Hypoxia 9/19/2023 Yes    Mediastinal mass 9/19/2023 Yes    Chronic myelogenous leukemia ([address]) 9/19/2023 Yes    Stage 3a chronic kidney disease (CKD) ([address]) 9/19/2023 Yes    Elevated troponin 9/19/2023 Yes    Elevated brain natriuretic peptide (BNP) level 9/19/2023 Yes    Thrombocytopenia ([address]) 9/20/2023 Yes        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  -Appreciate oncology recommendation, IV decitabine cycle initiated ( today day 3)   -Continue to monitor counts and transfuse as needed  Hemoglobin if less than 7 and platelets if less than 10, -consent placed in chart in case needed  - So far received 2 U PRBCs  - Also received  1 unit of platelets for thoracentesis purposes     Chronic respiratory failure /pleural effusion: CT chest reviewed, S/P thoracentesis 9/20  Patient recently finished course of Levaquin       #PETRA  -secondary to contrast and abx  -gently hyudration
No pneumothorax is observed. The patchy bilateral lower lobe groundglass opacities, left greater than right and the confluent right perihilar airspace opacity (series 604, image 155), possibly infectious/inflammatory, are not significantly changed). **This report has been created using voice recognition software. It may contain minor errors which are inherent in voice recognition technology. ** Final report electronically signed by Dr Mima López on 9/18/2023 10:27 AM    XR CHEST PORTABLE    Result Date: 9/18/2023  1. Small bilateral pleural effusions. 2. Bibasilar opacities are noted that can relate to atelectasis and/or infiltrates. 3. Platelike atelectasis seen in the right upper lobe. **This report has been created using voice recognition software. It may contain minor errors which are inherent in voice recognition technology. ** Final report electronically signed by Dr Chloé Dozier on 9/18/2023 9:06 AM      Physical Examination:        General appearance:  alert, cooperative and no distress  Mental Status:  oriented to person, place and time and normal affect  ENT:bilateral submandibular lymphadenopathy  Lungs:  clear to auscultation bilaterally, normal effort  Heart:  regular rate and rhythm, no murmur  Abdomen:  soft, nontender, nondistended, normal bowel sounds, no masses, hepatomegaly, splenomegaly  Extremities:  no edema, redness, tenderness in the calves  Skin:  no gross lesions, rashes, induration    Assessment:        Hospital Problems             Last Modified POA    * (Principal) Blast crisis phase of chronic myeloid leukemia ([address]) 9/18/2023 Yes    Symptomatic anemia 9/19/2023 Yes    Chronic respiratory failure with hypoxia ([address]) 9/19/2023 Yes    Dyslipidemia 9/19/2023 Yes    MDS (myelodysplastic syndrome) ([address]) 9/19/2023 Yes    Hypoxia 9/19/2023 Yes    Mediastinal mass 9/19/2023 Yes    Chronic myelogenous leukemia ([address]) 9/19/2023 Yes    Stage 3a chronic kidney disease (CKD) ([address]) 9/19/2023 Yes
Percocet prn  PT/OT    Okay to discharge home with close f/u with Oncology next week. DW pt and brother. Pt refuses home care, he has family nearby.   [facility]  9/26/2023  8:09 AM
GLUCOSE 123* 108*     PT/INR:   Recent Labs     09/18/23  1230 09/19/23  0144   PROTIME  --  13.7   INR 1.03 1.1     APTT:  Recent Labs     09/18/23  1230   APTT 28.1     LIVER PROFILE:  Recent Labs     09/18/23  0835 09/19/23  0144   AST 12 13   ALT 21 21   LABALBU 3.5 3.2*     Echo (TTE) limited (with contrast/ bubble/ strain/ 3D PRN)    Left Ventricle: Normal left ventricular systolic function with a   visually estimated EF of 55 - 60%. Left ventricle size is normal. Septal   motion is abnormal.    Tricuspid Valve: The estimated RVSP is 20 mmHg. Left Atrium: Left atrium is mildly dilated. IMPRESSION:    Primary Problem      Active Hospital Problems    Diagnosis Date Noted    Elevated troponin [R77.8] 09/19/2023    Elevated brain natriuretic peptide (BNP) level [R79.89] 09/19/2023    Blast crisis phase of chronic myeloid leukemia (HCC) [C92.10] 09/18/2023    Chronic myelogenous leukemia (HCC) [C92.10] 09/06/2023    Stage 3a chronic kidney disease (CKD) ([address]) [N18.31] 09/06/2023    Mediastinal mass [J98.59] 07/29/2023    Hypoxia [R09.02] 07/28/2023    MDS (myelodysplastic syndrome) ([address]) [D46.9] 06/21/2023    Dyslipidemia [E78.5] 06/09/2023    Symptomatic anemia [D64.9] 06/09/2023    Chronic respiratory failure with hypoxia [facility]) [J96.11] 06/09/2023   Chronic myelomonocytic leukemia CMML. Blast crisis    RECOMMENDATIONS:  Records and labs and images were reviewed and discussed with the patient and her companions. Records and outside facilities were reviewed as well. Explained to the patient in layman language. Patient had diagnosis of MDS/CMML. He had 1 cycle of Vidaza in July and 1 cycle of decitabine daily for 5 days at [facility]. Last treatment was in August 2023. Patient is due for follow-up treatment but his insurance [place]. Patient was given hydroxyurea during his last hospitalization.   Presenting now with very high white blood cells with
rhythm. Pulses: Normal pulses. Heart sounds: Normal heart sounds. No murmur heard. Pulmonary:      Effort: Pulmonary effort is normal. No respiratory distress. Breath sounds: No stridor. Examination of the right-lower field reveals decreased breath sounds. Examination of the left-lower field reveals decreased breath sounds. Decreased breath sounds present. Abdominal:      General: Bowel sounds are normal. There is no distension. Palpations: Abdomen is soft. There is no mass. Tenderness: There is no abdominal tenderness. There is no guarding. Musculoskeletal:         General: No tenderness. Cervical back: Neck supple. Skin:     General: Skin is warm and dry. Findings: Rash present. No erythema or lesion. Rash is purpuric. Neurological:      Mental Status: He is alert and oriented to person, place, and time. He is not disoriented. Cranial Nerves: No cranial nerve deficit. Psychiatric:         Speech: Speech normal.         Behavior: Behavior normal. Behavior is cooperative.      Assessment:        Hospital Problems             Last Modified POA    * (Principal) Blast crisis phase of chronic myeloid leukemia ([address]) 9/18/2023 Yes    Symptomatic anemia 9/19/2023 Yes    Chronic respiratory failure with hypoxia ([address]) 9/19/2023 Yes    Dyslipidemia 9/19/2023 Yes    MDS (myelodysplastic syndrome) ([address]) 9/19/2023 Yes    Hypoxia 9/19/2023 Yes    Mediastinal mass 9/19/2023 Yes    Chronic myelogenous leukemia ([address]) 9/19/2023 Yes    Stage 3a chronic kidney disease (CKD) ([address]) 9/19/2023 Yes    Elevated troponin 9/19/2023 Yes    Elevated brain natriuretic peptide (BNP) level 9/19/2023 Yes    Thrombocytopenia ([address]) 9/20/2023 Yes     Plan:          Blast crisis phase of chronic myeloid leukemia / hx MDS :   -Patient received 1 cycle of Vidaza and subsequently evaluated at [facility] and he received 1 cycle of IV decitabine in August 2023   -blasts up to 35 % , was discharged
CHEST    9/20/2023 3:12 pm    COMPARISON:  09/19/2023    HISTORY:  ORDERING SYSTEM PROVIDED HISTORY: left sided thora  TECHNOLOGIST PROVIDED HISTORY:  Please come to [address] room 8  left sided thora  Reason for Exam: Upright port, left sided thora    FINDINGS:  Decreased left pleural effusion. Heart size stable. No new airspace  disease. No pneumothorax. Impression: Decreased left pleural effusion, no pneumothorax  Echo (TTE) limited (with contrast/ bubble/ strain/ 3D PRN)    Left Ventricle: Normal left ventricular systolic function with a   visually estimated EF of 55 - 60%. Left ventricle size is normal. Septal   motion is abnormal.    Tricuspid Valve: The estimated RVSP is 20 mmHg. Left Atrium: Left atrium is mildly dilated. IMPRESSION:    Primary Problem      Active Hospital Problems    Diagnosis Date Noted    Thrombocytopenia ([address]) [D69.6] 09/20/2023    Elevated troponin [R77.8] 09/19/2023    Elevated brain natriuretic peptide (BNP) level [R79.89] 09/19/2023    Blast crisis phase of chronic myeloid leukemia (HCC) [C92.10] 09/18/2023    Chronic myelogenous leukemia (HCC) [C92.10] 09/06/2023    Stage 3a chronic kidney disease (CKD) ([address]) [N18.31] 09/06/2023    Mediastinal mass [J98.59] 07/29/2023    Hypoxia [R09.02] 07/28/2023    MDS (myelodysplastic syndrome) ([address]) [D46.9] 06/21/2023    Dyslipidemia [E78.5] 06/09/2023    Symptomatic anemia [D64.9] 06/09/2023    Chronic respiratory failure with hypoxia [facility]) [J96.11] 06/09/2023   Chronic myelomonocytic leukemia CMML.   Blast crisis    RECOMMENDATIONS:  I reviewed the laboratory data, imaging studies, discussed diagnosis, prognosis and treatment recommendations   So far his tolerating chemotherapy well   Today is day 4 of his chemotherapy   Today will complete day 5 and he will receive chemotherapy every 4 weeks   Patient will follow with oncology as outpatient after discharge   We will transfuse to maintain hemoglobin of 7   Transfuse platelets for
HISTORY: left sided thora  TECHNOLOGIST PROVIDED HISTORY:  Please come to [address] room 8  left sided thora  Reason for Exam: Upright port, left sided thora    FINDINGS:  Decreased left pleural effusion. Heart size stable. No new airspace  disease. No pneumothorax. Impression: Decreased left pleural effusion, no pneumothorax  Echo (TTE) limited (with contrast/ bubble/ strain/ 3D PRN)    Left Ventricle: Normal left ventricular systolic function with a   visually estimated EF of 55 - 60%. Left ventricle size is normal. Septal   motion is abnormal.    Tricuspid Valve: The estimated RVSP is 20 mmHg. Left Atrium: Left atrium is mildly dilated. IMPRESSION:    Primary Problem      Active Hospital Problems    Diagnosis Date Noted    Thrombocytopenia ([address]) [D69.6] 09/20/2023    Elevated troponin [R77.8] 09/19/2023    Elevated brain natriuretic peptide (BNP) level [R79.89] 09/19/2023    Blast crisis phase of chronic myeloid leukemia (HCC) [C92.10] 09/18/2023    Chronic myelogenous leukemia (HCC) [C92.10] 09/06/2023    Stage 3a chronic kidney disease (CKD) ([address]) [N18.31] 09/06/2023    Mediastinal mass [J98.59] 07/29/2023    Hypoxia [R09.02] 07/28/2023    MDS (myelodysplastic syndrome) ([address]) [D46.9] 06/21/2023    Dyslipidemia [E78.5] 06/09/2023    Symptomatic anemia [D64.9] 06/09/2023    Chronic respiratory failure with hypoxia [facility]) [J96.11] 06/09/2023   Chronic myelomonocytic leukemia CMML. Blast crisis    RECOMMENDATIONS:  I reviewed the laboratory data, imaging studies, discussed diagnosis, prognosis and treatment recommendations   So far his tolerating chemotherapy well   Today is day 4 of his chemotherapy   Tomorrow he will complete day 5 and he will receive chemotherapy every 4 weeks   Patient will follow with oncology as outpatient after discharge   We will transfuse to maintain hemoglobin of 7   Transfuse platelets for platelets below 10 or before procedures.   Proceed with decitabine as
None    FINDINGS:  A total of 800 mL of light isaac colored fluid was removed. Impression: Successful ultrasound guided left thoracentesis. XR CHEST PORTABLE  Narrative: EXAMINATION:  ONE XRAY VIEW OF THE CHEST    9/20/2023 3:12 pm    COMPARISON:  09/19/2023    HISTORY:  ORDERING SYSTEM PROVIDED HISTORY: left sided thora  TECHNOLOGIST PROVIDED HISTORY:  Please come to [address] [facility] room 8  left sided thora  Reason for Exam: Upright port, left sided thora    FINDINGS:  Decreased left pleural effusion. Heart size stable. No new airspace  disease. No pneumothorax. Impression: Decreased left pleural effusion, no pneumothorax  Echo (TTE) limited (with contrast/ bubble/ strain/ 3D PRN)    Left Ventricle: Normal left ventricular systolic function with a   visually estimated EF of 55 - 60%. Left ventricle size is normal. Septal   motion is abnormal.    Tricuspid Valve: The estimated RVSP is 20 mmHg. Left Atrium: Left atrium is mildly dilated. 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 Blast crisis    RECOMMENDATIONS:  Records and labs and images were reviewed and discussed with the patient and her companions. Records and outside facilities were reviewed as well.   Explained to the patient in [address] [place] [address]
unremarkable      Anemia: Continue transfuse as needed       Dyslipidemia : on statin        Stage 3a chronic kidney disease : Continue to avoid nephrotoxic agents, patient received contrast yesterday we will provide some hydration, will continue to monitor       Elevated troponin / Elevated bnp : Seen active chest pain, EKG unremarkable, ordered echocardiogram, which came back    No pharmacologic DVT prophylaxis      Magdiel Lucero MD  9/22/2023  3:00 PM
companions. Records and outside facilities were reviewed as well. Explained to the patient in layman language. Patient had diagnosis of MDS/CMML. He had 1 cycle of Vidaza in July and 1 cycle of decitabine daily for 5 days at [facility]. Last treatment was in August 2023. Patient is due for follow-up treatment but his insurance was not accepted in my state [place]. Patient was given hydroxyurea during his last hospitalization. Presenting now with very high white blood cells with 35% blasts. We will proceed with treatment using decitabine given daily for 5 days. Explained the benefits and side effects. He agreed. We will monitor labs including uric acid. We will use allopurinol to avoid tumor lysis. We will transfuse to maintain hemoglobin of 7   Transfuse platelets for platelets below 10 or before procedures. Proceed with decitabine. We will follow with you. Patient's questions were answered to the best of his satisfaction and he verbalized full understanding and agreement. Nguyen Senior MD, MD                            [address] [facility]                            This note is created with the assistance of a speech recognition program.  While intending to generate a document that actually reflects the content of the visit, the document can still have some errors including those of syntax and sound a like substitutions which may escape proof reading. It such instances, actual meaning can be extrapolated by contextual diversion.

## 2023-09-26 NOTE — PLAN OF CARE
Problem: Safety - Adult  Goal: Free from fall injury  Outcome: Progressing  Flowsheets (Taken 9/25/2023 1935 by Rei Benito, RN)  Free From Fall Injury: Instruct family/caregiver on patient safety     Problem: ABCDS Injury Assessment  Goal: Absence of physical injury  Outcome: Progressing  Flowsheets (Taken 9/25/2023 1935 by Rei Benito, RN)  Absence of Physical Injury: Implement safety measures based on patient assessment     Problem: Pain  Goal: Verbalizes/displays adequate comfort level or baseline comfort level  Outcome: Progressing     Problem: Respiratory - Adult  Goal: Achieves optimal ventilation and oxygenation  9/26/2023 0403 by Benjamin Trivedi RN  Outcome: Progressing  9/25/2023 2115 by Mamta Fabian RCP  Outcome: Progressing     Problem: Nutrition Deficit:  Goal: Optimize nutritional status  Outcome: Progressing

## 2023-09-26 NOTE — PLAN OF CARE
Problem: Respiratory - Adult  Goal: Achieves optimal ventilation and oxygenation  9/25/2023 2115 by Elicia Beverly RCP  Outcome: Progressing   BRONCHOSPASM/BRONCHOCONSTRICTION     [x]         IMPROVE AERATION/BREATH SOUNDS  [x]   ADMINISTER BRONCHODILATOR THERAPY AS APPROPRIATE  [x]   ASSESS BREATH SOUNDS  []   IMPLEMENT AEROSOL/MDI PROTOCOL  [x]   PATIENT EDUCATION AS NEEDED   PROVIDE ADEQUATE OXYGENATION WITH ACCEPTABLE SP02/ABG'S    [x]  IDENTIFY APPROPRIATE OXYGEN THERAPY  [x]   MONITOR SP02/ABG'S AS NEEDED   [x]   PATIENT EDUCATION AS NEEDED

## 2023-09-27 LAB
ABO/RH: NORMAL
ANTIBODY SCREEN: NEGATIVE
ARM BAND NUMBER: NORMAL
BLOOD BANK BLOOD PRODUCT EXPIRATION DATE: NORMAL
BLOOD BANK DISPENSE STATUS: NORMAL
BLOOD BANK ISBT PRODUCT BLOOD TYPE: 6200
BLOOD BANK PRODUCT CODE: NORMAL
BLOOD BANK SAMPLE EXPIRATION: NORMAL
BLOOD BANK UNIT TYPE AND RH: NORMAL
BPU ID: NORMAL
COMPONENT: NORMAL
CROSSMATCH RESULT: NORMAL
TRANSFUSION STATUS: NORMAL
UNIT DIVISION: 0
UNIT ISSUE DATE/TIME: NORMAL

## 2023-09-29 ENCOUNTER — HOSPITAL ENCOUNTER (OUTPATIENT)
Dept: INFUSION THERAPY | Age: 68
Discharge: HOME | End: 2023-09-29
Payer: COMMERCIAL

## 2023-09-29 ENCOUNTER — OFFICE VISIT (OUTPATIENT)
Dept: ONCOLOGY | Age: 68
End: 2023-09-29
Payer: COMMERCIAL

## 2023-09-29 ENCOUNTER — TELEPHONE (OUTPATIENT)
Dept: ONCOLOGY | Age: 68
End: 2023-09-29

## 2023-09-29 ENCOUNTER — HOSPITAL ENCOUNTER (OUTPATIENT)
Age: 68
Discharge: HOME | End: 2023-09-29
Payer: COMMERCIAL

## 2023-09-29 VITALS
TEMPERATURE: 97.6 F | OXYGEN SATURATION: 91 % | BODY MASS INDEX: 36.83 KG/M2 | HEART RATE: 81 BPM | WEIGHT: 264.1 LBS | RESPIRATION RATE: 18 BRPM | SYSTOLIC BLOOD PRESSURE: 132 MMHG | DIASTOLIC BLOOD PRESSURE: 73 MMHG

## 2023-09-29 VITALS
HEART RATE: 90 BPM | DIASTOLIC BLOOD PRESSURE: 71 MMHG | RESPIRATION RATE: 16 BRPM | TEMPERATURE: 97.9 F | SYSTOLIC BLOOD PRESSURE: 117 MMHG

## 2023-09-29 DIAGNOSIS — C93.10 CHRONIC MYELOMONOCYTIC LEUKEMIA NOT HAVING ACHIEVED REMISSION (HCC): Primary | ICD-10-CM

## 2023-09-29 DIAGNOSIS — C92.10 CML (CHRONIC MYELOID LEUKEMIA) (HCC): Primary | ICD-10-CM

## 2023-09-29 DIAGNOSIS — C92.10 CML (CHRONIC MYELOID LEUKEMIA) (HCC): ICD-10-CM

## 2023-09-29 DIAGNOSIS — J90 PLEURAL EFFUSION: ICD-10-CM

## 2023-09-29 LAB
BASOPHILS # BLD: 0 K/UL (ref 0–0.2)
BASOPHILS NFR BLD: 0 % (ref 0–2)
EOSINOPHIL # BLD: 0.15 K/UL (ref 0–0.4)
EOSINOPHILS RELATIVE PERCENT: 1 % (ref 1–4)
ERYTHROCYTE [DISTWIDTH] IN BLOOD BY AUTOMATED COUNT: 15.4 % (ref 12.5–15.4)
HCT VFR BLD AUTO: 21.5 % (ref 41–53)
HGB BLD-MCNC: 7.2 G/DL (ref 13.5–17.5)
LYMPHOCYTES NFR BLD: 2.11 K/UL (ref 1–4.8)
LYMPHOCYTES RELATIVE PERCENT: 14 % (ref 24–44)
MCH RBC QN AUTO: 29.3 PG (ref 26–34)
MCHC RBC AUTO-ENTMCNC: 33.4 G/DL (ref 31–37)
MCV RBC AUTO: 87.8 FL (ref 80–100)
METAMYELOCYTES ABSOLUTE COUNT: 0.45 K/UL
METAMYELOCYTES: 3 %
MONOCYTES NFR BLD: 34 % (ref 1–7)
MONOCYTES NFR BLD: 5.13 K/UL (ref 0.1–0.8)
MORPHOLOGY: NORMAL
MYELOCYTES ABSOLUTE COUNT: 0.3 K/UL
MYELOCYTES: 2 %
NEUTROPHILS NFR BLD: 44 % (ref 36–66)
NEUTS SEG NFR BLD: 6.66 K/UL (ref 1.8–7.7)
PLATELET # BLD AUTO: 11 K/UL (ref 140–450)
PMV BLD AUTO: 8.9 FL (ref 6–12)
PROMYELOCYTES ABSOLUTE COUNT: 0.3 K/UL
PROMYELOCYTES: 2 %
RBC # BLD AUTO: 2.45 M/UL (ref 4.5–5.9)
WBC OTHER # BLD: 15.1 K/UL (ref 3.5–11)

## 2023-09-29 PROCEDURE — 36415 COLL VENOUS BLD VENIPUNCTURE: CPT

## 2023-09-29 PROCEDURE — 36430 TRANSFUSION BLD/BLD COMPNT: CPT

## 2023-09-29 PROCEDURE — 3017F COLORECTAL CA SCREEN DOC REV: CPT | Performed by: INTERNAL MEDICINE

## 2023-09-29 PROCEDURE — 86850 RBC ANTIBODY SCREEN: CPT

## 2023-09-29 PROCEDURE — 1111F DSCHRG MED/CURRENT MED MERGE: CPT | Performed by: INTERNAL MEDICINE

## 2023-09-29 PROCEDURE — 1124F ACP DISCUSS-NO DSCNMKR DOCD: CPT | Performed by: INTERNAL MEDICINE

## 2023-09-29 PROCEDURE — 86901 BLOOD TYPING SEROLOGIC RH(D): CPT

## 2023-09-29 PROCEDURE — G8417 CALC BMI ABV UP PARAM F/U: HCPCS | Performed by: INTERNAL MEDICINE

## 2023-09-29 PROCEDURE — 2580000003 HC RX 258: Performed by: INTERNAL MEDICINE

## 2023-09-29 PROCEDURE — 99214 OFFICE O/P EST MOD 30 MIN: CPT | Performed by: INTERNAL MEDICINE

## 2023-09-29 PROCEDURE — 85025 COMPLETE CBC W/AUTO DIFF WBC: CPT

## 2023-09-29 PROCEDURE — G8427 DOCREV CUR MEDS BY ELIG CLIN: HCPCS | Performed by: INTERNAL MEDICINE

## 2023-09-29 PROCEDURE — P9073 PLATELETS PHERESIS PATH REDU: HCPCS

## 2023-09-29 PROCEDURE — 86920 COMPATIBILITY TEST SPIN: CPT

## 2023-09-29 PROCEDURE — P9016 RBC LEUKOCYTES REDUCED: HCPCS

## 2023-09-29 PROCEDURE — 1036F TOBACCO NON-USER: CPT | Performed by: INTERNAL MEDICINE

## 2023-09-29 PROCEDURE — 86900 BLOOD TYPING SEROLOGIC ABO: CPT

## 2023-09-29 RX ORDER — SODIUM CHLORIDE 9 MG/ML
INJECTION, SOLUTION INTRAVENOUS PRN
Status: COMPLETED | OUTPATIENT
Start: 2023-09-29 | End: 2023-09-29

## 2023-09-29 RX ADMIN — SODIUM CHLORIDE: 9 INJECTION, SOLUTION INTRAVENOUS at 11:12

## 2023-09-29 NOTE — TELEPHONE ENCOUNTER
AVS from 9/29/23    Platelets transfusion and one unit PRBCs transfusion today  IR for thoracentesis weekly  Weekly cbc with blood transfusion for Hb below 7 and platelets below 10  RV 2 weeks          *rv is sched [email or web address]  *transfusion sched everyweek    PT was given AVS and appt schedule

## 2023-09-29 NOTE — PROGRESS NOTES
Patient arrived for 1 unit PRBC 1 unit PLT  Seen by Dr Asuncion Richardson prior   Blood consent obtained   Tolerated w/o incident   Return 10/3 weekly lab draw possible transfusion   Garrett Rubio RN

## 2023-09-29 NOTE — PATIENT INSTRUCTIONS
Platelets transfusion and one unit PRBCs transfusion today  IR for thoracentesis weekly  Weekly cbc with blood transfusion for Hb below 7 and platelets below 10  RV 2 weeks

## 2023-10-01 LAB
ABO/RH: NORMAL
ANTIBODY SCREEN: NEGATIVE
ARM BAND NUMBER: NORMAL
BLOOD BANK BLOOD PRODUCT EXPIRATION DATE: NORMAL
BLOOD BANK BLOOD PRODUCT EXPIRATION DATE: NORMAL
BLOOD BANK DISPENSE STATUS: NORMAL
BLOOD BANK DISPENSE STATUS: NORMAL
BLOOD BANK ISBT PRODUCT BLOOD TYPE: 6200
BLOOD BANK ISBT PRODUCT BLOOD TYPE: 6200
BLOOD BANK PRODUCT CODE: NORMAL
BLOOD BANK PRODUCT CODE: NORMAL
BLOOD BANK SAMPLE EXPIRATION: NORMAL
BLOOD BANK UNIT TYPE AND RH: NORMAL
BLOOD BANK UNIT TYPE AND RH: NORMAL
BPU ID: NORMAL
BPU ID: NORMAL
COMPONENT: NORMAL
COMPONENT: NORMAL
CROSSMATCH RESULT: NORMAL
TRANSFUSION STATUS: NORMAL
TRANSFUSION STATUS: NORMAL
UNIT DIVISION: 0
UNIT DIVISION: 0
UNIT ISSUE DATE/TIME: NORMAL
UNIT ISSUE DATE/TIME: NORMAL

## 2023-10-02 LAB
MICROORGANISM SPEC CULT: NORMAL
MICROORGANISM SPEC CULT: NORMAL
MICROORGANISM/AGENT SPEC: NORMAL
MICROORGANISM/AGENT SPEC: NORMAL
SPECIMEN DESCRIPTION: NORMAL
SPECIMEN DESCRIPTION: NORMAL

## 2023-10-03 ENCOUNTER — HOSPITAL ENCOUNTER (OUTPATIENT)
Dept: ULTRASOUND IMAGING | Age: 68
Discharge: HOME | End: 2023-10-05
Payer: COMMERCIAL

## 2023-10-03 ENCOUNTER — HOSPITAL ENCOUNTER (EMERGENCY)
Age: 68
Discharge: HOME | End: 2023-10-03

## 2023-10-03 ENCOUNTER — HOSPITAL ENCOUNTER (OUTPATIENT)
Dept: INFUSION THERAPY | Age: 68
Discharge: HOME | End: 2023-10-03
Payer: COMMERCIAL

## 2023-10-03 VITALS
DIASTOLIC BLOOD PRESSURE: 78 MMHG | SYSTOLIC BLOOD PRESSURE: 131 MMHG | HEART RATE: 81 BPM | RESPIRATION RATE: 18 BRPM | TEMPERATURE: 97.9 F

## 2023-10-03 VITALS
OXYGEN SATURATION: 93 % | DIASTOLIC BLOOD PRESSURE: 70 MMHG | HEART RATE: 92 BPM | SYSTOLIC BLOOD PRESSURE: 130 MMHG | RESPIRATION RATE: 18 BRPM | TEMPERATURE: 97.9 F

## 2023-10-03 VITALS — OXYGEN SATURATION: 96 % | SYSTOLIC BLOOD PRESSURE: 110 MMHG | HEART RATE: 88 BPM | DIASTOLIC BLOOD PRESSURE: 65 MMHG

## 2023-10-03 DIAGNOSIS — J90 PLEURAL EFFUSION: ICD-10-CM

## 2023-10-03 DIAGNOSIS — C93.10 CHRONIC MYELOMONOCYTIC LEUKEMIA NOT HAVING ACHIEVED REMISSION (HCC): ICD-10-CM

## 2023-10-03 DIAGNOSIS — C92.10 CML (CHRONIC MYELOID LEUKEMIA) (HCC): ICD-10-CM

## 2023-10-03 LAB
BASOPHILS # BLD: 0.16 K/UL (ref 0–0.2)
BASOPHILS NFR BLD: 1 % (ref 0–2)
EOSINOPHIL # BLD: 0 K/UL (ref 0–0.4)
EOSINOPHILS RELATIVE PERCENT: 0 % (ref 1–4)
ERYTHROCYTE [DISTWIDTH] IN BLOOD BY AUTOMATED COUNT: 15 % (ref 12.5–15.4)
HCT VFR BLD AUTO: 22 % (ref 41–53)
HGB BLD-MCNC: 7.5 G/DL (ref 13.5–17.5)
LYMPHOCYTES NFR BLD: 2.24 K/UL (ref 1–4.8)
LYMPHOCYTES RELATIVE PERCENT: 14 % (ref 24–44)
MCH RBC QN AUTO: 29.9 PG (ref 26–34)
MCHC RBC AUTO-ENTMCNC: 34.2 G/DL (ref 31–37)
MCV RBC AUTO: 87.6 FL (ref 80–100)
MONOCYTES NFR BLD: 45 % (ref 1–7)
MONOCYTES NFR BLD: 7.2 K/UL (ref 0.1–0.8)
MORPHOLOGY: NORMAL
NEUTROPHILS NFR BLD: 40 % (ref 36–66)
NEUTS SEG NFR BLD: 6.4 K/UL (ref 1.8–7.7)
PLATELET # BLD AUTO: 8 K/UL (ref 140–450)
PMV BLD AUTO: 8.3 FL (ref 6–12)
RBC # BLD AUTO: 2.51 M/UL (ref 4.5–5.9)
WBC OTHER # BLD: 16 K/UL (ref 3.5–11)

## 2023-10-03 PROCEDURE — P9073 PLATELETS PHERESIS PATH REDU: HCPCS

## 2023-10-03 PROCEDURE — 2580000003 HC RX 258: Performed by: INTERNAL MEDICINE

## 2023-10-03 PROCEDURE — 36430 TRANSFUSION BLD/BLD COMPNT: CPT

## 2023-10-03 PROCEDURE — 85025 COMPLETE CBC W/AUTO DIFF WBC: CPT

## 2023-10-03 PROCEDURE — 2709999900 US THORACENTESIS

## 2023-10-03 PROCEDURE — 36415 COLL VENOUS BLD VENIPUNCTURE: CPT

## 2023-10-03 RX ORDER — SODIUM CHLORIDE 9 MG/ML
INJECTION, SOLUTION INTRAVENOUS PRN
Status: COMPLETED | OUTPATIENT
Start: 2023-10-03 | End: 2023-10-03

## 2023-10-03 RX ADMIN — SODIUM CHLORIDE: 9 INJECTION, SOLUTION INTRAVENOUS at 10:04

## 2023-10-03 ASSESSMENT — PAIN - FUNCTIONAL ASSESSMENT: PAIN_FUNCTIONAL_ASSESSMENT: 0-10

## 2023-10-03 ASSESSMENT — PAIN SCALES - GENERAL: PAINLEVEL_OUTOF10: 8

## 2023-10-03 NOTE — PROGRESS NOTES
Patient arrived for labs and possible transfusion   HGB 7.5 PLT 8   1 unit PLT transfused   Upon assessment pt states that he started having a watery eye and blurred vision in left eye after the infusion on Friday, did not get evalied denies falling or bumping head  He states now its double vision, writer told pt he needs to be evaluated to access possible bleed with low PLT count, pt has a  and is planning on going to [facility] for [facility]esis, writer told pt to go to ER at [facility] to get evaled   Patient and  agreeable, IV left in for access at [facility]   Tolerated PLT w/o incident   Return 10/10 possible transfusion   Garrett Rubio RN

## 2023-10-03 NOTE — PROGRESS NOTES
Pt arrives to room for left jennyfer CHÁVEZ and YANG RDMS to bedside  Site prepped and draped  Access obtained and draining clear isaac fluid  Tolerated well  Access removed and site covered with dsd  900ml removed  Pt taken to ED per his request due to experiencing double vision since Friday.

## 2023-10-03 NOTE — BRIEF OP NOTE
Brief Postoperative Note for Thoracentesis    Emeka Blood  YOB: 1955  [id number]    Pre-operative Diagnosis: left Pleural effusion      Post-operative Diagnosis: Same    Procedure: Ultrasound guided Thoracentesis     Anesthesia: 1% Lidocaine     Surgeons/Assistants: Saida Viramontes PA-C    Complications: none    EBL: Minimal    Specimens: were obtained    Ultrasound guided left thoracentesis performed. 900 ml clear isaac fluid obtained. Dressing applied.       Electronically signed by KYLER Gonzalez on 10/3/2023 at 1:58 PM

## 2023-10-03 NOTE — ED TRIAGE NOTES
Pt reports to ed with complaint of double vision in left eye since Saturday. Also complaints of joint pain.  On active chemo treatments

## 2023-10-04 ENCOUNTER — APPOINTMENT (OUTPATIENT)
Dept: GENERAL RADIOLOGY | Age: 68
End: 2023-10-04
Payer: COMMERCIAL

## 2023-10-04 ENCOUNTER — APPOINTMENT (OUTPATIENT)
Dept: MRI IMAGING | Age: 68
End: 2023-10-04
Payer: COMMERCIAL

## 2023-10-04 ENCOUNTER — HOSPITAL ENCOUNTER (OUTPATIENT)
Age: 68
Setting detail: OBSERVATION
Discharge: HOME | End: 2023-10-05
Attending: EMERGENCY MEDICINE | Admitting: INTERNAL MEDICINE
Payer: COMMERCIAL

## 2023-10-04 ENCOUNTER — APPOINTMENT (OUTPATIENT)
Dept: CT IMAGING | Age: 68
End: 2023-10-04
Payer: COMMERCIAL

## 2023-10-04 DIAGNOSIS — D69.6 THROMBOCYTOPENIA (HCC): ICD-10-CM

## 2023-10-04 DIAGNOSIS — C92.10 CML (CHRONIC MYELOCYTIC LEUKEMIA) (HCC): ICD-10-CM

## 2023-10-04 DIAGNOSIS — H53.2 DIPLOPIA: Primary | ICD-10-CM

## 2023-10-04 DIAGNOSIS — H50.9 STRABISMUS: ICD-10-CM

## 2023-10-04 LAB
ABO: NORMAL
ALBUMIN SERPL BCG-MCNC: 3.6 G/DL (ref 3.5–5.1)
ALP SERPL-CCNC: 66 U/L (ref 38–126)
ALT SERPL W/O P-5'-P-CCNC: 16 U/L (ref 11–66)
ANION GAP SERPL CALC-SCNC: 13 MEQ/L (ref 8–16)
ANTIBODY SCREEN: NORMAL
AST SERPL-CCNC: 9 U/L (ref 5–40)
BILIRUB SERPL-MCNC: 0.5 MG/DL (ref 0.3–1.2)
BLOOD BANK BLOOD PRODUCT EXPIRATION DATE: NORMAL
BLOOD BANK DISPENSE STATUS: NORMAL
BLOOD BANK ISBT PRODUCT BLOOD TYPE: 8400
BLOOD BANK PRODUCT CODE: NORMAL
BLOOD BANK UNIT TYPE AND RH: NORMAL
BPU ID: NORMAL
BUN SERPL-MCNC: 20 MG/DL (ref 7–22)
CALCIUM SERPL-MCNC: 8.7 MG/DL (ref 8.5–10.5)
CHLORIDE SERPL-SCNC: 102 MEQ/L (ref 98–111)
CO2 SERPL-SCNC: 25 MEQ/L (ref 23–33)
COMPONENT: NORMAL
CREAT SERPL-MCNC: 1.3 MG/DL (ref 0.4–1.2)
DEPRECATED RDW RBC AUTO: 50.3 FL (ref 35–45)
ERYTHROCYTE [DISTWIDTH] IN BLOOD BY AUTOMATED COUNT: 15 % (ref 11.5–14.5)
GFR SERPL CREATININE-BSD FRML MDRD: 60 ML/MIN/1.73M2
GLUCOSE BLD STRIP.AUTO-MCNC: 113 MG/DL (ref 70–108)
GLUCOSE SERPL-MCNC: 128 MG/DL (ref 70–108)
HCT VFR BLD AUTO: 22.6 % (ref 42–52)
HGB BLD-MCNC: 7.3 GM/DL (ref 14–18)
MAGNESIUM SERPL-MCNC: 1.7 MG/DL (ref 1.6–2.4)
MCH RBC QN AUTO: 30 PG (ref 26–33)
MCHC RBC AUTO-ENTMCNC: 32.3 GM/DL (ref 32.2–35.5)
MCV RBC AUTO: 93 FL (ref 80–94)
NT-PROBNP SERPL IA-MCNC: 242.7 PG/ML (ref 0–124)
OSMOLALITY SERPL CALC.SUM OF ELEC: 283.7 MOSMOL/KG (ref 275–300)
PLATELET # BLD AUTO: 17 THOU/MM3 (ref 130–400)
PMV BLD AUTO: 10.7 FL (ref 9.4–12.4)
POTASSIUM SERPL-SCNC: 3.8 MEQ/L (ref 3.5–5.2)
PROT SERPL-MCNC: 6.2 G/DL (ref 6.1–8)
RBC # BLD AUTO: 2.43 MILL/MM3 (ref 4.7–6.1)
RH FACTOR: NORMAL
SODIUM SERPL-SCNC: 140 MEQ/L (ref 135–145)
TRANSFUSION STATUS: NORMAL
TROPONIN, HIGH SENSITIVITY: 18 NG/L (ref 0–12)
TROPONIN, HIGH SENSITIVITY: 18 NG/L (ref 0–12)
TROPONIN, HIGH SENSITIVITY: 20 NG/L (ref 0–12)
UNIT DIVISION: 0
UNIT ISSUE DATE/TIME: NORMAL
WBC # BLD AUTO: 13.3 THOU/MM3 (ref 4.8–10.8)

## 2023-10-04 PROCEDURE — 70553 MRI BRAIN STEM W/O & W/DYE: CPT

## 2023-10-04 PROCEDURE — G0378 HOSPITAL OBSERVATION PER HR: HCPCS

## 2023-10-04 PROCEDURE — 70498 CT ANGIOGRAPHY NECK: CPT

## 2023-10-04 PROCEDURE — 93010 ELECTROCARDIOGRAM REPORT: CPT | Performed by: INTERNAL MEDICINE

## 2023-10-04 PROCEDURE — 99285 EMERGENCY DEPT VISIT HI MDM: CPT

## 2023-10-04 PROCEDURE — 70450 CT HEAD/BRAIN W/O DYE: CPT

## 2023-10-04 PROCEDURE — 70496 CT ANGIOGRAPHY HEAD: CPT

## 2023-10-04 PROCEDURE — 86900 BLOOD TYPING SEROLOGIC ABO: CPT

## 2023-10-04 PROCEDURE — 82948 REAGENT STRIP/BLOOD GLUCOSE: CPT

## 2023-10-04 PROCEDURE — 86901 BLOOD TYPING SEROLOGIC RH(D): CPT

## 2023-10-04 PROCEDURE — 6360000004 HC RX CONTRAST MEDICATION

## 2023-10-04 PROCEDURE — 80053 COMPREHEN METABOLIC PANEL: CPT

## 2023-10-04 PROCEDURE — 85027 COMPLETE CBC AUTOMATED: CPT

## 2023-10-04 PROCEDURE — 87040 BLOOD CULTURE FOR BACTERIA: CPT

## 2023-10-04 PROCEDURE — 84484 ASSAY OF TROPONIN QUANT: CPT

## 2023-10-04 PROCEDURE — 2580000003 HC RX 258: Performed by: STUDENT IN AN ORGANIZED HEALTH CARE EDUCATION/TRAINING PROGRAM

## 2023-10-04 PROCEDURE — 6370000000 HC RX 637 (ALT 250 FOR IP): Performed by: STUDENT IN AN ORGANIZED HEALTH CARE EDUCATION/TRAINING PROGRAM

## 2023-10-04 PROCEDURE — 86850 RBC ANTIBODY SCREEN: CPT

## 2023-10-04 PROCEDURE — 72170 X-RAY EXAM OF PELVIS: CPT

## 2023-10-04 PROCEDURE — A9579 GAD-BASE MR CONTRAST NOS,1ML: HCPCS

## 2023-10-04 PROCEDURE — 93005 ELECTROCARDIOGRAM TRACING: CPT

## 2023-10-04 PROCEDURE — 83880 ASSAY OF NATRIURETIC PEPTIDE: CPT

## 2023-10-04 PROCEDURE — 83735 ASSAY OF MAGNESIUM: CPT

## 2023-10-04 PROCEDURE — 36415 COLL VENOUS BLD VENIPUNCTURE: CPT

## 2023-10-04 PROCEDURE — 2060000000 HC ICU INTERMEDIATE R&B

## 2023-10-04 RX ORDER — ALBUTEROL SULFATE 2.5 MG/3ML
2.5 SOLUTION RESPIRATORY (INHALATION) EVERY 6 HOURS PRN
Status: DISCONTINUED | OUTPATIENT
Start: 2023-10-04 | End: 2023-10-05 | Stop reason: HOSPADM

## 2023-10-04 RX ORDER — ONDANSETRON 2 MG/ML
4 INJECTION INTRAMUSCULAR; INTRAVENOUS EVERY 6 HOURS PRN
Status: DISCONTINUED | OUTPATIENT
Start: 2023-10-04 | End: 2023-10-05 | Stop reason: HOSPADM

## 2023-10-04 RX ORDER — SODIUM CHLORIDE 9 MG/ML
INJECTION, SOLUTION INTRAVENOUS PRN
Status: DISCONTINUED | OUTPATIENT
Start: 2023-10-04 | End: 2023-10-05 | Stop reason: HOSPADM

## 2023-10-04 RX ORDER — SODIUM CHLORIDE 0.9 % (FLUSH) 0.9 %
10 SYRINGE (ML) INJECTION PRN
Status: DISCONTINUED | OUTPATIENT
Start: 2023-10-04 | End: 2023-10-05 | Stop reason: HOSPADM

## 2023-10-04 RX ORDER — POLYETHYLENE GLYCOL 3350 17 G/17G
17 POWDER, FOR SOLUTION ORAL DAILY PRN
Status: DISCONTINUED | OUTPATIENT
Start: 2023-10-04 | End: 2023-10-04 | Stop reason: SDUPTHER

## 2023-10-04 RX ORDER — POLYETHYLENE GLYCOL 3350 17 G/17G
17 POWDER, FOR SOLUTION ORAL DAILY PRN
Status: DISCONTINUED | OUTPATIENT
Start: 2023-10-04 | End: 2023-10-05 | Stop reason: HOSPADM

## 2023-10-04 RX ORDER — HYDROXYUREA 500 MG/1
500 CAPSULE ORAL DAILY
Status: DISCONTINUED | OUTPATIENT
Start: 2023-10-05 | End: 2023-10-05 | Stop reason: HOSPADM

## 2023-10-04 RX ORDER — PSEUDOEPHEDRINE HCL 30 MG
1 TABLET ORAL NIGHTLY
Status: DISCONTINUED | OUTPATIENT
Start: 2023-10-04 | End: 2023-10-04

## 2023-10-04 RX ORDER — OXYCODONE HYDROCHLORIDE 5 MG/1
5 TABLET ORAL EVERY 6 HOURS PRN
Status: DISCONTINUED | OUTPATIENT
Start: 2023-10-04 | End: 2023-10-05 | Stop reason: HOSPADM

## 2023-10-04 RX ORDER — QUETIAPINE FUMARATE 25 MG/1
25 TABLET, FILM COATED ORAL NIGHTLY
Status: DISCONTINUED | OUTPATIENT
Start: 2023-10-04 | End: 2023-10-05 | Stop reason: HOSPADM

## 2023-10-04 RX ORDER — SODIUM CHLORIDE 0.9 % (FLUSH) 0.9 %
10 SYRINGE (ML) INJECTION EVERY 12 HOURS SCHEDULED
Status: DISCONTINUED | OUTPATIENT
Start: 2023-10-04 | End: 2023-10-05 | Stop reason: HOSPADM

## 2023-10-04 RX ORDER — TAMSULOSIN HYDROCHLORIDE 0.4 MG/1
0.8 CAPSULE ORAL DAILY
Status: DISCONTINUED | OUTPATIENT
Start: 2023-10-05 | End: 2023-10-05 | Stop reason: HOSPADM

## 2023-10-04 RX ORDER — IBUPROFEN 600 MG/1
1 TABLET ORAL PRN
Status: DISCONTINUED | OUTPATIENT
Start: 2023-10-04 | End: 2023-10-05 | Stop reason: HOSPADM

## 2023-10-04 RX ORDER — ACETAMINOPHEN 325 MG/1
650 TABLET ORAL EVERY 6 HOURS PRN
Status: DISCONTINUED | OUTPATIENT
Start: 2023-10-04 | End: 2023-10-05 | Stop reason: HOSPADM

## 2023-10-04 RX ORDER — ACETAMINOPHEN 650 MG/1
650 SUPPOSITORY RECTAL EVERY 6 HOURS PRN
Status: DISCONTINUED | OUTPATIENT
Start: 2023-10-04 | End: 2023-10-05 | Stop reason: HOSPADM

## 2023-10-04 RX ORDER — ALLOPURINOL 300 MG/1
300 TABLET ORAL DAILY
Status: DISCONTINUED | OUTPATIENT
Start: 2023-10-05 | End: 2023-10-05 | Stop reason: HOSPADM

## 2023-10-04 RX ORDER — DEXTROSE MONOHYDRATE 100 MG/ML
INJECTION, SOLUTION INTRAVENOUS CONTINUOUS PRN
Status: DISCONTINUED | OUTPATIENT
Start: 2023-10-04 | End: 2023-10-05 | Stop reason: HOSPADM

## 2023-10-04 RX ORDER — FOLIC ACID 1 MG/1
1 TABLET ORAL DAILY
Status: DISCONTINUED | OUTPATIENT
Start: 2023-10-05 | End: 2023-10-05 | Stop reason: HOSPADM

## 2023-10-04 RX ORDER — INSULIN LISPRO 100 [IU]/ML
0-4 INJECTION, SOLUTION INTRAVENOUS; SUBCUTANEOUS
Status: DISCONTINUED | OUTPATIENT
Start: 2023-10-05 | End: 2023-10-05 | Stop reason: HOSPADM

## 2023-10-04 RX ORDER — ACETAMINOPHEN 325 MG/1
325 TABLET ORAL EVERY 4 HOURS PRN
Status: DISCONTINUED | OUTPATIENT
Start: 2023-10-04 | End: 2023-10-04 | Stop reason: SDUPTHER

## 2023-10-04 RX ORDER — CETIRIZINE HYDROCHLORIDE 10 MG/1
10 TABLET ORAL DAILY
Status: DISCONTINUED | OUTPATIENT
Start: 2023-10-05 | End: 2023-10-05 | Stop reason: HOSPADM

## 2023-10-04 RX ORDER — ONDANSETRON 4 MG/1
4 TABLET, ORALLY DISINTEGRATING ORAL EVERY 8 HOURS PRN
Status: DISCONTINUED | OUTPATIENT
Start: 2023-10-04 | End: 2023-10-05 | Stop reason: HOSPADM

## 2023-10-04 RX ORDER — INSULIN LISPRO 100 [IU]/ML
0-4 INJECTION, SOLUTION INTRAVENOUS; SUBCUTANEOUS NIGHTLY
Status: DISCONTINUED | OUTPATIENT
Start: 2023-10-04 | End: 2023-10-05 | Stop reason: HOSPADM

## 2023-10-04 RX ADMIN — SODIUM CHLORIDE, PRESERVATIVE FREE 10 ML: 5 INJECTION INTRAVENOUS at 22:25

## 2023-10-04 RX ADMIN — PREDNISONE 60 MG: 50 TABLET ORAL at 23:16

## 2023-10-04 RX ADMIN — METOPROLOL TARTRATE 12.5 MG: 25 TABLET, FILM COATED ORAL at 22:28

## 2023-10-04 RX ADMIN — GADOTERIDOL 20 ML: 279.3 INJECTION, SOLUTION INTRAVENOUS at 14:48

## 2023-10-04 RX ADMIN — QUETIAPINE FUMARATE 25 MG: 25 TABLET ORAL at 22:26

## 2023-10-04 RX ADMIN — IOPAMIDOL 80 ML: 755 INJECTION, SOLUTION INTRAVENOUS at 11:08

## 2023-10-04 RX ADMIN — OXYCODONE HYDROCHLORIDE 5 MG: 5 TABLET ORAL at 21:15

## 2023-10-04 ASSESSMENT — PAIN - FUNCTIONAL ASSESSMENT
PAIN_FUNCTIONAL_ASSESSMENT: NONE - DENIES PAIN
PAIN_FUNCTIONAL_ASSESSMENT: 0-10
PAIN_FUNCTIONAL_ASSESSMENT: ACTIVITIES ARE NOT PREVENTED
PAIN_FUNCTIONAL_ASSESSMENT: NONE - DENIES PAIN
PAIN_FUNCTIONAL_ASSESSMENT: NONE - DENIES PAIN

## 2023-10-04 ASSESSMENT — ENCOUNTER SYMPTOMS
NAUSEA: 0
COLOR CHANGE: 0
ABDOMINAL DISTENTION: 1
ANAL BLEEDING: 0
WHEEZING: 1
WHEEZING: 0
VOMITING: 0
CONSTIPATION: 0
COUGH: 0
DIARRHEA: 0
ABDOMINAL PAIN: 0
PHOTOPHOBIA: 0
ABDOMINAL DISTENTION: 0
APNEA: 0
BACK PAIN: 1
EYE PAIN: 0
SINUS PAIN: 0
CHEST TIGHTNESS: 0
EYE DISCHARGE: 1
SHORTNESS OF BREATH: 1
FACIAL SWELLING: 0

## 2023-10-04 ASSESSMENT — PAIN DESCRIPTION - LOCATION: LOCATION: HIP

## 2023-10-04 ASSESSMENT — PAIN DESCRIPTION - ORIENTATION: ORIENTATION: LEFT

## 2023-10-04 ASSESSMENT — PAIN SCALES - GENERAL
PAINLEVEL_OUTOF10: 6
PAINLEVEL_OUTOF10: 9
PAINLEVEL_OUTOF10: 8
PAINLEVEL_OUTOF10: 8

## 2023-10-04 ASSESSMENT — PAIN DESCRIPTION - DESCRIPTORS: DESCRIPTORS: SPASM;THROBBING

## 2023-10-04 NOTE — ED NOTES
Patient resting in bed. Respirations easy and unlabored. No distress noted. Call light within reach.        Kaitlyn Justice RN  10/04/23 8088

## 2023-10-04 NOTE — ED NOTES
Patient resting in bed. Respirations easy and unlabored. No distress noted. Call light within reach.        Nallely Farmer RN  10/04/23 0529

## 2023-10-04 NOTE — ED PROVIDER NOTES
are open conjugate gaze appears to be intact. Patient's individual vision he states is within normal limits. He has been wearing a patch on the left eye. EKG reveals: Normal sinus rhythm, normal axis, ventricular rate of 80 MS interval 148 QRS duration 86 QT interval 406 QTc of 468. XR PELVIS (1-2 VIEWS)   Final Result   No acute fracture or dislocation. This document has been electronically signed by: Seth Childers DO on    10/04/2023 09:13 PM      MRI BRAIN W WO CONTRAST   Final Result       1. No acute findings. 2. Mild severity chronic small vessel ischemic changes. **This report has been created using voice recognition software. It may contain minor errors which are inherent in voice recognition technology. **         Final report electronically signed by Dr. Aashish Youngblood on 10/4/2023 2:57 PM      CTA NECK W WO CONTRAST   Final Result       1. Minimal atherosclerosis of the carotid bulbs. There is no stenosis. 2. No intracranial stenoses or occlusions. 3. Cervical and mediastinal adenopathy. **This report has been created using voice recognition software. It may contain minor errors which are inherent in voice recognition technology. **      Final report electronically signed by Dr. Aashish Youngblood on 10/4/2023 11:41 AM      CTA HEAD W WO CONTRAST   Final Result       1. Minimal atherosclerosis of the carotid bulbs. There is no stenosis. 2. No intracranial stenoses or occlusions. 3. Cervical and mediastinal adenopathy. **This report has been created using voice recognition software. It may contain minor errors which are inherent in voice recognition technology. **      Final report electronically signed by Dr. Aashish Youngblood on 10/4/2023 11:41 AM      CT HEAD WO CONTRAST   Final Result    No evidence of an acute process. **This report has been created using voice recognition software.  It may contain minor errors which are inherent

## 2023-10-04 NOTE — ED NOTES
Patient resting in bed. Respirations easy and unlabored. No distress noted. Call light within reach.        Melissa Costa RN  10/04/23 5788

## 2023-10-04 NOTE — ED NOTES
Patient resting in bed. Respirations easy and unlabored. No distress noted. Call light within reach.        Robson Tsang RN  10/04/23 5499

## 2023-10-04 NOTE — ED NOTES
Patient resting in bed. Respirations easy and unlabored. No distress noted. Call light within reach.        Savana Mendez RN  10/04/23 5875

## 2023-10-04 NOTE — ED NOTES
Patient resting in bed. Respirations easy and unlabored. No distress noted. Call light within reach.        Nallely Farmer RN  10/04/23 7944

## 2023-10-04 NOTE — ED PROVIDER NOTES
on 10/4/2023 11:41 AM      CTA HEAD W WO CONTRAST   Final Result       1. Minimal atherosclerosis of the carotid bulbs. There is no stenosis. 2. No intracranial stenoses or occlusions. 3. Cervical and mediastinal adenopathy. **This report has been created using voice recognition software. It may contain minor errors which are inherent in voice recognition technology. **      Final report electronically signed by Dr. Charissa Quintana on 10/4/2023 11:41 AM      CT HEAD WO CONTRAST   Final Result    No evidence of an acute process. **This report has been created using voice recognition software. It may contain minor errors which are inherent in voice recognition technology. **      Final report electronically signed by Dr. Charissa Quintana on 10/4/2023 10:19 AM          ED Medications administered this visit:   Medications   iopamidol (ISOVUE-370) 76 % injection 80 mL (80 mLs IntraVENous Given 10/4/23 1108)   gadoteridol (PROHANCE) injection 20 mL (20 mLs IntraVENous Given 10/4/23 1448)         MEDICATION CHANGES     New Prescriptions    No medications on file         FINAL DISPOSITION   MDM  Diplopia of unclear etiology. 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 Suspect secondary to CML. Bilateral esotropia, left ptosis. He has no seizure history. No other focal weakness. CT head without contrast shows no evidence of acute process. CTA neck and head with and without contrast shows minimal atherosclerosis of the carotid bulbs. No stenosis. No intracranial stenosis or occlusion. Cervical and mediastinal lymphadenopathy. MRI brain with and without contrast shows no acute findings and mild severity chronic small vessel changes. He has a significant past medical history. Work-up shows anemia, hemoglobin 7.3, thrombocytopenia, platelets 17. Troponin 20, it is chronically elevated. . Creatinine 1.3, baseline 1.2. Magnesium 1.7.          Final diagnoses:   Diplopia   Strabismus Condition: condition: stable  Dispo: Admit to med/surg floor      This transcription was electronically signed. Parts of this transcriptions may have been dictated by use of voice recognition software and electronically transcribed, and parts may have been transcribed with the assistance of an ED scribe. The transcription may contain errors not detected in proofreading. Please refer to my supervising physician's documentation if my documentation differs.     Electronically Signed: Traci Cazares DO, 10/04/23, 4:05 PM        Traci Cazares DO  Resident  10/04/23 2520 [address] DO Stephanie  Resident  10/04/23 1207

## 2023-10-04 NOTE — ED NOTES
Patient resting in bed. Respirations easy and unlabored. No distress noted. Call light within reach.        Elizabeth Kendrick RN  10/04/23 5950

## 2023-10-04 NOTE — PLAN OF CARE
Brief plan of care    Patient was evaluated by Dr. Ramírez Lawson and TENZIN for potential admission. He has a history of CML, received 5 days of Decitabine, on hydrea presenting for diplopia and left sided ptosis. Symptoms first began 9/29, but has become more prominent. Patient actually presented to [facility] 10/3/23 but LWBS d/t long ED wait. Had imaging here without any overt intracranial lesions that would explain patient's ophthalmologic symptoms. Patient was discussed with neurology service, both medicine and neuro feel that patient may be better served at [facility], where his oncologists are. Additionally, would be able to be evaluated by ophthalmology if need be. Etiology of patient's presentation may be LUIS ENRIQUE, though cannot definitively rule this in at this time.      Spoke with ED team, plan to transfer ED to ED to [facility]

## 2023-10-04 NOTE — ED NOTES
Patient resting in bed. Respirations easy and unlabored. No distress noted. Call light within reach.        Charissa Gómez RN  10/04/23 0289

## 2023-10-04 NOTE — ED TRIAGE NOTES
Presents to ED with c/o double vision in left eye that started Friday and worse since Saturday. Alert and oriented. Respirations easy and unlabored.

## 2023-10-05 ENCOUNTER — APPOINTMENT (OUTPATIENT)
Dept: GENERAL RADIOLOGY | Age: 68
End: 2023-10-05
Payer: COMMERCIAL

## 2023-10-05 VITALS
WEIGHT: 257.9 LBS | OXYGEN SATURATION: 96 % | SYSTOLIC BLOOD PRESSURE: 117 MMHG | HEART RATE: 97 BPM | TEMPERATURE: 98.7 F | BODY MASS INDEX: 36.1 KG/M2 | HEIGHT: 71 IN | DIASTOLIC BLOOD PRESSURE: 65 MMHG | RESPIRATION RATE: 16 BRPM

## 2023-10-05 LAB
ANION GAP SERPL CALC-SCNC: 14 MEQ/L (ref 8–16)
BUN SERPL-MCNC: 21 MG/DL (ref 7–22)
CALCIUM SERPL-MCNC: 8.3 MG/DL (ref 8.5–10.5)
CHLORIDE SERPL-SCNC: 99 MEQ/L (ref 98–111)
CO2 SERPL-SCNC: 25 MEQ/L (ref 23–33)
CREAT SERPL-MCNC: 1.4 MG/DL (ref 0.4–1.2)
DEPRECATED RDW RBC AUTO: 49.9 FL (ref 35–45)
ERYTHROCYTE [DISTWIDTH] IN BLOOD BY AUTOMATED COUNT: 14.7 % (ref 11.5–14.5)
GFR SERPL CREATININE-BSD FRML MDRD: 55 ML/MIN/1.73M2
GLUCOSE BLD STRIP.AUTO-MCNC: 165 MG/DL (ref 70–108)
GLUCOSE BLD STRIP.AUTO-MCNC: 248 MG/DL (ref 70–108)
GLUCOSE BLD STRIP.AUTO-MCNC: 293 MG/DL (ref 70–108)
GLUCOSE SERPL-MCNC: 148 MG/DL (ref 70–108)
HCT VFR BLD AUTO: 22.8 % (ref 42–52)
HGB BLD-MCNC: 7.2 GM/DL (ref 14–18)
MCH RBC QN AUTO: 29.5 PG (ref 26–33)
MCHC RBC AUTO-ENTMCNC: 31.6 GM/DL (ref 32.2–35.5)
MCV RBC AUTO: 93.4 FL (ref 80–94)
OSMOLALITY SERPL CALC.SUM OF ELEC: 281.4 MOSMOL/KG (ref 275–300)
PLATELET # BLD AUTO: 15 THOU/MM3 (ref 130–400)
PMV BLD AUTO: 12 FL (ref 9.4–12.4)
POTASSIUM SERPL-SCNC: 3.7 MEQ/L (ref 3.5–5.2)
RBC # BLD AUTO: 2.44 MILL/MM3 (ref 4.7–6.1)
SODIUM SERPL-SCNC: 138 MEQ/L (ref 135–145)
TSH SERPL DL<=0.005 MIU/L-ACNC: 1.54 UIU/ML (ref 0.4–4.2)
WBC # BLD AUTO: 10.6 THOU/MM3 (ref 4.8–10.8)

## 2023-10-05 PROCEDURE — 2580000003 HC RX 258: Performed by: STUDENT IN AN ORGANIZED HEALTH CARE EDUCATION/TRAINING PROGRAM

## 2023-10-05 PROCEDURE — 82948 REAGENT STRIP/BLOOD GLUCOSE: CPT

## 2023-10-05 PROCEDURE — 97535 SELF CARE MNGMENT TRAINING: CPT

## 2023-10-05 PROCEDURE — G0378 HOSPITAL OBSERVATION PER HR: HCPCS

## 2023-10-05 PROCEDURE — 71045 X-RAY EXAM CHEST 1 VIEW: CPT

## 2023-10-05 PROCEDURE — 97162 PT EVAL MOD COMPLEX 30 MIN: CPT

## 2023-10-05 PROCEDURE — 97116 GAIT TRAINING THERAPY: CPT

## 2023-10-05 PROCEDURE — 2700000000 HC OXYGEN THERAPY PER DAY

## 2023-10-05 PROCEDURE — 84443 ASSAY THYROID STIM HORMONE: CPT

## 2023-10-05 PROCEDURE — 94761 N-INVAS EAR/PLS OXIMETRY MLT: CPT

## 2023-10-05 PROCEDURE — 97166 OT EVAL MOD COMPLEX 45 MIN: CPT

## 2023-10-05 PROCEDURE — 85027 COMPLETE CBC AUTOMATED: CPT

## 2023-10-05 PROCEDURE — 99239 HOSP IP/OBS DSCHRG MGMT >30: CPT | Performed by: HOSPITALIST

## 2023-10-05 PROCEDURE — 36415 COLL VENOUS BLD VENIPUNCTURE: CPT

## 2023-10-05 PROCEDURE — 94640 AIRWAY INHALATION TREATMENT: CPT

## 2023-10-05 PROCEDURE — 80048 BASIC METABOLIC PNL TOTAL CA: CPT

## 2023-10-05 PROCEDURE — 6370000000 HC RX 637 (ALT 250 FOR IP): Performed by: STUDENT IN AN ORGANIZED HEALTH CARE EDUCATION/TRAINING PROGRAM

## 2023-10-05 RX ADMIN — HYDROXYUREA 500 MG: 500 CAPSULE ORAL at 08:39

## 2023-10-05 RX ADMIN — TAMSULOSIN HYDROCHLORIDE 0.8 MG: 0.4 CAPSULE ORAL at 08:38

## 2023-10-05 RX ADMIN — TIOTROPIUM BROMIDE INHALATION SPRAY 2 PUFF: 3.12 SPRAY, METERED RESPIRATORY (INHALATION) at 08:44

## 2023-10-05 RX ADMIN — INSULIN LISPRO 1 UNITS: 100 INJECTION, SOLUTION INTRAVENOUS; SUBCUTANEOUS at 16:57

## 2023-10-05 RX ADMIN — FOLIC ACID 1 MG: 1 TABLET ORAL at 08:38

## 2023-10-05 RX ADMIN — INSULIN LISPRO 2 UNITS: 100 INJECTION, SOLUTION INTRAVENOUS; SUBCUTANEOUS at 11:44

## 2023-10-05 RX ADMIN — OXYCODONE HYDROCHLORIDE 5 MG: 5 TABLET ORAL at 04:14

## 2023-10-05 RX ADMIN — ALLOPURINOL 300 MG: 300 TABLET ORAL at 08:39

## 2023-10-05 RX ADMIN — METOPROLOL TARTRATE 12.5 MG: 25 TABLET, FILM COATED ORAL at 08:46

## 2023-10-05 RX ADMIN — CETIRIZINE HYDROCHLORIDE 10 MG: 10 TABLET ORAL at 08:38

## 2023-10-05 RX ADMIN — SODIUM CHLORIDE, PRESERVATIVE FREE 10 ML: 5 INJECTION INTRAVENOUS at 08:57

## 2023-10-05 ASSESSMENT — PAIN DESCRIPTION - DESCRIPTORS: DESCRIPTORS: DISCOMFORT;THROBBING

## 2023-10-05 ASSESSMENT — PAIN DESCRIPTION - ONSET: ONSET: ON-GOING

## 2023-10-05 ASSESSMENT — PAIN DESCRIPTION - LOCATION: LOCATION: HIP

## 2023-10-05 ASSESSMENT — PAIN DESCRIPTION - ORIENTATION: ORIENTATION: LEFT

## 2023-10-05 ASSESSMENT — PAIN SCALES - GENERAL
PAINLEVEL_OUTOF10: 0
PAINLEVEL_OUTOF10: 0
PAINLEVEL_OUTOF10: 4

## 2023-10-05 ASSESSMENT — ENCOUNTER SYMPTOMS: DIPLOPIA: LEFT

## 2023-10-05 ASSESSMENT — PAIN DESCRIPTION - PAIN TYPE: TYPE: CHRONIC PAIN

## 2023-10-05 ASSESSMENT — PAIN - FUNCTIONAL ASSESSMENT: PAIN_FUNCTIONAL_ASSESSMENT: ACTIVITIES ARE NOT PREVENTED

## 2023-10-05 ASSESSMENT — PAIN DESCRIPTION - FREQUENCY: FREQUENCY: CONTINUOUS

## 2023-10-05 NOTE — ED NOTES
Pt states pain in his left hip at this time. Pt rating pain 8/10. Provider notified.      Melissa Angel RN  10/04/23 2022

## 2023-10-05 NOTE — ED NOTES
ED to inpatient nurses report    Chief Complaint   Patient presents with    Diplopia      Present to ED from home  LOC: alert and orientated to name, place, date  Vital signs   Vitals:    10/04/23 1624 10/04/23 1811 10/04/23 1921 10/04/23 2020   BP: 108/65 (!) 102/54 (!) 95/48 116/68   Pulse: 80 84 89 91   Resp: 20 20 23 22   Temp:       TempSrc:       SpO2: 95% 97% 97% 95%      Oxygen Baseline 2L NC    Current needs required 2L NC Bipap/Cpap No  LDAs:   Peripheral IV 10/03/23 Proximal;Right Forearm (Active)   Site Assessment Clean, dry & intact 10/04/23 2021   Line Status Normal saline locked 10/04/23 2021   Phlebitis Assessment No symptoms 10/04/23 2021   Infiltration Assessment 0 10/04/23 2021   Dressing Status Clean, dry & intact 10/04/23 2021     Mobility: Requires assistance * 1  Pending ED orders: NA  Present condition: stable      Preferred Language: Ward     Electronically signed by Marifer Yanez RN on 10/4/2023 at 8:39 PM       Marifer Yanez RN  10/04/23 2039

## 2023-10-05 NOTE — H&P
to as low as reasonably achievable. FINDINGS: CTA NECK: Aortic arch and branches: There is some mild atheromatosis of the aortic arch. There is no stenosis at the origin innominate artery, left common carotid artery or either subclavian artery. Right common carotid artery/ICA: The right common carotid artery is normal. There is minimal atherosclerosis of the right carotid bulb. There is no stenosis. There is no stenosis of the right internal carotid artery. Left common carotid artery/ICA: The left common carotid artery is normal. There is mild atherosclerosis of the left carotid bulb. There is no stenosis. There is no stenosis of the left internal carotid artery. Vertebral arteries: The vertebral arteries are codominant and normal. CTA HEAD: Internal carotid arteries: There is calcified atheromatosis of the cavernous segments of the internal carotid arteries bilaterally. There is no stenosis. The ophthalmic artery origins are normal. Middle cerebral arteries: Normal. The proximal branches are also normal. Anterior cerebral arteries: Normal. The proximal branches are normal. There is a normal small anterior communicating artery. Vertebral arteries: The vertebral arteries are normal. Basilar artery: There is a proximal basilar fenestration. This is a normal variant. The basilar artery is normal. Superior cerebellar arteries: Normal. Posterior cerebral arteries: Normal. The proximal branches are also normal. No aneurysms, stenoses or occlusions are noted. The superior sagittal sinus, vein of Ra, internal cerebral veins, straight sinus, transverse sinuses and sigmoid sinuses are patent. Axial source data: There are no gross abnormalities in the brain. There is some mucosal thickening in the paranasal sinuses. There are bilateral scattered enlarged cervical lymph nodes. There is bulky mediastinal adenopathy. There is a small layering left-sided pleural effusion. There is a spiculated density in the right lung apex.

## 2023-10-05 NOTE — PROGRESS NOTES
Assistance: Independent  Homemaking Assistance: Independent  Ambulation Assistance: Independent  Transfer Assistance: Independent    Active : Yes     Additional Comments: Pt reports indep PLO. Pt on 2L O2 PLOF. Ex wife lives nearby & can A. VISION: reports more blurred vision then double this am, able to read menu & clock across room. Therapist educated Pt on importance of switching eye patch R/L when he is wearing it (at end of session Pt was going to try to eat breakfast without)   **min eyelid droop noted L eye this am, no watering noted. B eye ROM WFL without nystagmus, peripheral vision intact    HEARING:  WFL    COGNITION: WFL    RANGE OF MOTION:  Bilateral Upper Extremity:  WFL    STRENGTH:  Bilateral Upper Extremity:  WFL    SENSATION:   WFL    ADL:   Feeding: Independent. Footwear Management: Supervision. Able to cross BLE up to adjust slipper socks . BALANCE:  Sitting Balance:  Independent. Standing Balance: Supervision. BED MOBILITY:  Supine to Sit: Modified Independent      TRANSFERS:  Sit to Stand:  Supervision. Stand to Sit: Supervision. FUNCTIONAL MOBILITY:  Assistive Device: None  Assist Level:  Supervision. Distance:  10ft in room          AM-[facility] Inpatient Daily Activity Raw Score: 23  AM-[facility] Inpatient ADL T-Scale Score : 51.12  ADL Inpatient CMS 0-100% Score: 15.86    Activity Tolerance:  Patient tolerance of  treatment: Good treatment tolerance       Assessment:  Assessment: Pt reports blurred vision with double vision, Pt demo increased fall risk & IADL safety risk with these deficits. Continued OT recommended to educate Pt on safety & adaptive strategies for returning to ADLs & IADLs at home. Performance deficits / Impairments: Decreased high-level IADLs, Decreased vision/visual deficit  Prognosis: Fair  REQUIRES OT FOLLOW-UP: Yes  Decision Making: Medium Complexity    Treatment Initiated: Treatment and education initiated within context of evaluation. Evaluation time included review of current medical information, gathering information related to past medical, social and functional history, completion of standardized testing, formal and informal observation of tasks, assessment of data and development of plan of care and goals. Treatment time included skilled education and facilitation of tasks to increase safety and independence with ADL's for improved functional independence and quality of life. Discharge Recommendations:  Home with assist PRN, Continue to assess pending progress    Patient Education:     Patient Education  Education Given To: Patient  Education Provided: Role of Therapy, Plan of Care, ADL Adaptive Strategies  Education Method: Demonstration, Verbal  Barriers to Learning: None  Education Outcome: Continued education needed    Equipment Recommendations: Other: Monitor pending progress    Plan:  Times Per Week: 5x  Current Treatment Recommendations: Safety education & training, Self-Care / ADL. See long-term goal time frame for expected duration of plan of care. If no long-term goals established, a short length of stay is anticipated. Goals:  Patient goals : go home  Short Term Goals  Time Frame for Short Term Goals: until discharge  Short Term Goal 1: Pt will demo good awareness of safety & adaptive strategies with returning to IADLs at home with visual deficits as evidenced by verbalize 2 without vcs  Short Term Goal 2: Pt will complete BADL routine with mod I & 0-2 vcs for safety  Short Term Goal 3: Pt will complete 5-7 min dynamic standing task with mod I for increased ease of returning to meal prep tasks at home. Long Term Goals  Time Frame for Long Term Goals : No LTG set d/t short ELOS    AM-[facility] Inpatient Daily Activity Raw Score: 23  AM-[facility] Inpatient ADL T-Scale Score : 51.12    Following session, patient left in safe position with all fall risk precautions in place.

## 2023-10-05 NOTE — CARE COORDINATION
Case Management Assessment  Initial Evaluation    Date/Time of Evaluation: 10/5/2023 1:38 PM  Assessment Completed by: Kinjal Rai RN    If patient is discharged prior to next notation, then this note serves as note for discharge by case management. Patient Name: Renzo Vazquez                   YOB: 1955  Diagnosis: Diplopia [H53.2]  CML (chronic myelocytic leukemia) ([address]) [C92.10]  Strabismus [H50.9]  Thrombocytopenia ([address]) [D69.6]                   Date / Time: 10/4/2023  9:10 AM  Location: [facility]     Patient Admission Status: Inpatient   Readmission Risk Low 0-14, Mod 15-19), High > 20: Readmission Risk Score: 31.2    Current PCP: YESY Wong CNP  PCP verified by CM? Yes    Chart Reviewed: Yes      History Provided by: Patient  Patient Orientation: Alert and Oriented    Patient Cognition: Alert    Hospitalization in the last 30 days (Readmission):  No    If yes, Readmission Assessment in CM Navigator will be completed. Advance Directives:      Code Status: Full Code   Patient's Primary Decision Maker is: Patient Declined (Legal Next of Kin Remains as Decision Maker)    Primary Decision MakerFlorgladis Clayton Spouse - [phone]    Secondary Decision Maker: Dina Paris Child - [phone]    Discharge Planning:    Patient lives with: Alone Type of Home: House  Primary Care Giver: Self  Patient Support Systems include: Children   Current Financial resources: Medicaid, Medicare  Current community resources: None  Current services prior to admission: Oxygen Therapy (2 liters From [facility])            Current DME:              Type of Home Care services:  None    ADLS  Prior functional level: Independent in ADLs/IADLs  Current functional level: Independent in ADLs/IADLs    Family can provide assistance at DC: Yes  Would you like Case Management to discuss the discharge plan with any other family members/significant others, and if so, who?  No  Plans to Return to Michelle Tobar RN  Case Management Department

## 2023-10-05 NOTE — PROGRESS NOTES
Patient admitted to 4A Room 11 from ED. Complaint upon arrival to the room: none. Vital signs obtained. Assessment and data collection initiated. Oriented to room. Policies and procedures for 4a explained. All questions answered with no further questions at this time. Fall prevention and safety brochure discussed with patient. 2 person skin check completed with Madonna CARLISLE. Was swallow screen completed on admission? [x] YES or [] NO  If patient failed swallow test, obtain order for speech therapy consult and keep NPO.

## 2023-10-05 NOTE — PLAN OF CARE
Problem: Discharge Planning  Goal: Discharge to home or other facility with appropriate resources  10/5/2023 0953 by Orquidea Saul RN  Outcome: Progressing  Flowsheets (Taken 10/5/2023 0549)  Discharge to home or other facility with appropriate resources:   Identify barriers to discharge with patient and caregiver   Arrange for needed discharge resources and transportation as appropriate   Identify discharge learning needs (meds, wound care, etc)   Refer to discharge planning if patient needs post-hospital services based on physician order or complex needs related to functional status, cognitive ability or social support system     Problem: Pain  Goal: Verbalizes/displays adequate comfort level or baseline comfort level  10/5/2023 0953 by Orquidea Saul RN  Outcome: Progressing  Flowsheets (Taken 10/5/2023 8477)  Verbalizes/displays adequate comfort level or baseline comfort level:   Encourage patient to monitor pain and request assistance   Assess pain using appropriate pain scale   Administer analgesics based on type and severity of pain and evaluate response   Implement non-pharmacological measures as appropriate and evaluate response   Consider cultural and social influences on pain and pain management   Notify Licensed Independent Practitioner if interventions unsuccessful or patient reports new pain     Problem: ABCDS Injury Assessment  Goal: Absence of physical injury  10/5/2023 0953 by Orquidea Saul RN  Outcome: Progressing  [address] (Taken 10/5/2023 6078)  Absence of Physical Injury: Implement safety measures based on patient assessment     Problem: Safety - Adult  Goal: Free from fall injury  10/5/2023 0953 by Orquidea Saul RN  Outcome: Progressing  Flowsheets (Taken 10/5/2023 7644)  Free From Fall Injury: Instruct family/caregiver on patient safety     Problem: Chronic Conditions and Co-morbidities  Goal: Patient's chronic conditions and co-morbidity symptoms are monitored and

## 2023-10-05 NOTE — CARE COORDINATION
10/05/23 1340   Readmission Assessment   Number of Days since last admission? 8-30 days   Previous Disposition Home Alone   Who is being Interviewed Patient   What was the patient's/caregiver's perception as to why they think they needed to return back to the hospital? Other (Comment)  (\"I had double vision\")   Did you visit your Primary Care Physician after you left the hospital, before you returned this time? Yes   Did you see a specialist, such as Cardiac, Pulmonary, Orthopedic Physician, etc. after you left the hospital? No   Who advised the patient to return to the hospital? Self-referral   Does the patient report anything that got in the way of taking their medications? No   In our efforts to provide the best possible care to you and others like you, can you think of anything that we could have done to help you after you left the hospital the first time, so that you might not have needed to return so soon?  Other (Comment)  (\" I was ready\")

## 2023-10-05 NOTE — PROGRESS NOTES
[facility]  INPATIENT PHYSICAL THERAPY  EVALUATION  [employer] 4A - 4A-11/011-A    Time In: 0755  Time Out: 0900  Timed Code Treatment Minutes: 8 Minutes  Minutes: 13          Date: 10/5/2023  Patient Name: Shiv Garrett,  Gender:  male        MRN: [id number]  : 1955  (79 y.o.)      Referring Practitioner: Tristin Whelan DO  Diagnosis: Diplopia  Additional Pertinent Hx: 79 y.o., , male who  has a past medical history of Arthritis, Cancer ([address]), COPD (chronic obstructive pulmonary disease) ([address]), and Hypertension. that is hospitalized for diplopia. Patient reported double vision x 5 days. . Double vision was improved with closing 1 eye. CTA head and neck wnl. Brain MRI no acute findings, mild severity chronic small vessel ischemic changes. BMP showed hyperglycemia, CBC showed leukocytosis with thrombocytopenia and anemia. Suspected [employer] Palsy     Restrictions/Precautions:  Restrictions/Precautions: Fall Risk, General Precautions    Subjective:  Chart Reviewed: Yes  Patient assessed for rehabilitation services?: Yes  Subjective: RN approved session.  Pt pleasant and agreeable to therapy    General:  Overall Orientation Status: Within Functional Limits  Vision: Within Functional Limits  Hearing: Within functional limits       Pain: 0/10: denies pain     Vitals: Vitals not assessed per clinical judgement, see nursing flowsheet  2L NC     Social/Functional History:    Lives With: Alone  Type of Home: House  Home Layout: Two level, Performs ADL's on one level, Able to Live on Main level with bedroom/bathroom  Home Access: Stairs to enter without rails  Entrance Stairs - Number of Steps: 1  Home Equipment: Oxygen, Walker, rolling     Bathroom Shower/Tub: Tub/Shower unit  Bathroom Toilet: Handicap height  Bathroom Equipment: Grab bars in shower       ADL Assistance: [place]: Independent  Transfer Assistance: Independent    Active : Yes     Additional Comments: Pt reports indep PLO. Pt on 2L O2 PLOF. Ex wife lives nearby & can A.    OBJECTIVE:  Range of Motion:  Bilateral Lower Extremity: WFL    Strength:  Bilateral Lower Extremity: MMT 4+/5 grossly     Balance:  Static Sitting Balance:  Modified Independent  Static Standing Balance: Supervision    Bed Mobility:  Not Tested    Transfers:  Sit to Stand: Stand By Assistance  Stand to Sit:Stand By Assistance    Ambulation:  Stand By Assistance  Distance: 100'   Surface: Level Tile  Device:No Device  Gait Deviations:  Slow Dora, Decreased Step Length Bilaterally, Decreased Gait Speed, Decreased Heel Strike Bilaterally, and [employer] of Support    Stairs:  Stand By Assistance  Number of Steps: 3  Height: 6\" step with Bilateral Handrails  Descending steps backward     Functional Outcome Measures: Completed  AM-PAC Inpatient Mobility Raw Score : 18  AM-PAC Inpatient T-Scale Score : 43.63    ASSESSMENT:  Activity Tolerance:  Patient tolerance of  treatment: good. Treatment Initiated: Treatment and education initiated within context of evaluation. Evaluation time included review of current medical information, gathering information related to past medical, social and functional history, completion of standardized testing, formal and informal observation of tasks, assessment of data and development of plan of care and goals. Treatment time included skilled education and facilitation of tasks to increase safety and independence with functional mobility for improved independence and quality of life. Assessment: Body Structures, Functions, Activity Limitations Requiring Skilled Therapeutic Intervention: Decreased functional mobility , Decreased endurance, Decreased balance, Decreased strength, Decreased posture  Assessment: Otis Arredondo is a 79 y.o. male that presents with diplopia.  Pt demonstrates a decrease in baseline by way of bed mobility, transfers and

## 2023-10-05 NOTE — PLAN OF CARE
Problem: Discharge Planning  Goal: Discharge to home or other facility with appropriate resources  10/5/2023 0136 by Kristopher Vance RN  Outcome: Progressing     Problem: Pain  Goal: Verbalizes/displays adequate comfort level or baseline comfort level  10/5/2023 0136 by Kristopher Vance RN  Outcome: Progressing  Flowsheets (Taken 10/5/2023 0136)  Verbalizes/displays adequate comfort level or baseline comfort level:   Encourage patient to monitor pain and request assistance   Administer analgesics based on type and severity of pain and evaluate response   Consider cultural and social influences on pain and pain management   Assess pain using appropriate pain scale   Implement non-pharmacological measures as appropriate and evaluate response     Problem: ABCDS Injury Assessment  Goal: Absence of physical injury  10/5/2023 0136 by Kristopher Vance RN  Outcome: Progressing  Flowsheets (Taken 10/5/2023 0136)  Absence of Physical Injury: Implement safety measures based on patient assessment     Problem: Safety - Adult  Goal: Free from fall injury  10/5/2023 0136 by Kristopher Vance RN  Outcome: Progressing  Flowsheets (Taken 10/5/2023 0136)  Free From Fall Injury: Instruct family/caregiver on patient safety     Problem: Chronic Conditions and Co-morbidities  Goal: Patient's chronic conditions and co-morbidity symptoms are monitored and maintained or improved  10/5/2023 0136 by Kristopher Vance RN  Outcome: Progressing  Flowsheets (Taken 10/5/2023 0136)  Care Plan - Patient's Chronic Conditions and Co-Morbidity Symptoms are Monitored and Maintained or Improved: Monitor and assess patient's chronic conditions and comorbid symptoms for stability, deterioration, or improvement     Problem: Metabolic/Fluid and Electrolytes - Adult  Goal: Electrolytes maintained within normal limits  10/5/2023 0136 by Kristopher Vance RN  Outcome: Progressing  Flowsheets (Taken 10/5/2023 0136)  Electrolytes maintained within normal limits: Monitor Initiate appropriate interventions as ordered  10/5/2023 0135 by Margarita Parnell RN  Outcome: Progressing     Problem: Neurosensory - Adult  Goal: Achieves maximal functionality and self care  10/5/2023 0136 by Margarita Parnell RN  Outcome: Progressing  Flowsheets (Taken 10/5/2023 0136)  Achieves maximal functionality and self care:   Monitor swallowing and airway patency with patient fatigue and changes in neurological status   Encourage and assist patient to increase activity and self care with guidance from physical therapy/occupational therapy   Encourage visually impaired, hearing impaired and aphasic patients to use assistive/communication devices   Care plan reviewed with patient. Patient verbalize understanding of the plan of care and contribute to goal setting.

## 2023-10-05 NOTE — CONSULTS
spine. There are no suspicious osseous lesions. 1. Minimal atherosclerosis of the carotid bulbs. There is no stenosis. 2. No intracranial stenoses or occlusions. 3. Cervical and mediastinal adenopathy. **This report has been created using voice recognition software. It may contain minor errors which are inherent in voice recognition technology. ** Final report electronically signed by Dr. Mariaelena Eden on 10/4/2023 11:41 AM    CTA HEAD W WO CONTRAST    Result Date: 10/4/2023  PROCEDURE: CTA HEAD W WO CONTRAST, CTA NECK W WO CONTRAST CLINICAL INFORMATION: ptosis, diplopia. Known mediastinal adenopathy. COMPARISON: Head CT 10/4/2023. Chest CT 9/13/2013. TECHNIQUE: 1 mm axial images were obtained through the head and neck after the fast bolus administration of contrast. A noncontrast localizer was obtained. 3-D reconstructions were performed on a dedicated 3-D workstation. These include multiplanar MPR images and multiplanar MIP images. Centerline reconstructions were obtained of the carotid systems. Isovue intravenous contrast was given. All CT scans at this facility use dose modulation, iterative reconstruction, and/or weight-based dosing when appropriate to reduce radiation dose to as low as reasonably achievable. FINDINGS: CTA NECK: Aortic arch and branches: There is some mild atheromatosis of the aortic arch. There is no stenosis at the origin innominate artery, left common carotid artery or either subclavian artery. Right common carotid artery/ICA: The right common carotid artery is normal. There is minimal atherosclerosis of the right carotid bulb. There is no stenosis. There is no stenosis of the right internal carotid artery. Left common carotid artery/ICA: The left common carotid artery is normal. There is mild atherosclerosis of the left carotid bulb. There is no stenosis. There is no stenosis of the left internal carotid artery. Vertebral arteries:  The vertebral arteries are codominant and normal. CTA HEAD: Internal carotid arteries: There is calcified atheromatosis of the cavernous segments of the internal carotid arteries bilaterally. There is no stenosis. The ophthalmic artery origins are normal. Middle cerebral arteries: Normal. The proximal branches are also normal. Anterior cerebral arteries: Normal. The proximal branches are normal. There is a normal small anterior communicating artery. Vertebral arteries: The vertebral arteries are normal. Basilar artery: There is a proximal basilar fenestration. This is a normal variant. The basilar artery is normal. Superior cerebellar arteries: Normal. Posterior cerebral arteries: Normal. The proximal branches are also normal. No aneurysms, stenoses or occlusions are noted. The superior sagittal sinus, vein of Ra, internal cerebral veins, straight sinus, transverse sinuses and sigmoid sinuses are patent. Axial source data: There are no gross abnormalities in the brain. There is some mucosal thickening in the paranasal sinuses. There are bilateral scattered enlarged cervical lymph nodes. There is bulky mediastinal adenopathy. There is a small layering left-sided pleural effusion. There is a spiculated density in the right lung apex. These findings were also present previously. There are degenerative changes the cervical spine. There are no suspicious osseous lesions. 1. Minimal atherosclerosis of the carotid bulbs. There is no stenosis. 2. No intracranial stenoses or occlusions. 3. Cervical and mediastinal adenopathy. **This report has been created using voice recognition software. It may contain minor errors which are inherent in voice recognition technology. ** Final report electronically signed by Dr. Shakeel Wayne on 10/4/2023 11:41 AM    CT HEAD WO CONTRAST    Result Date: 10/4/2023  PROCEDURE: CT HEAD WO CONTRAST CLINICAL INFORMATION: double vision. COMPARISON: No prior study. TECHNIQUE: Noncontrast 5 mm axial images were obtained through the brain.

## 2023-10-06 NOTE — PROGRESS NOTES
swelling  Extremities - peripheral pulses normal, no pedal edema, no clubbing or cyanosis  Skin - normal coloration and turgor, no rashes, no suspicious skin lesions noted           DATA:      Labs:       CBC:   Recent Labs     10/04/23  0944 10/05/23  0434   WBC 13.3* 10.6   HGB 7.3* 7.2*   HCT 22.6* 22.8*   PLT 17* 15*     BMP:   Recent Labs     10/04/23  0944 10/05/23  0434    138   K 3.8 3.7   CO2 25 25   BUN 20 21   CREATININE 1.3* 1.4*   LABGLOM 60 55*   GLUCOSE 128* 148*       XR CHEST PORTABLE  Narrative: PROCEDURE: XR CHEST PORTABLE    CLINICAL INFORMATION: Hx cancer    COMPARISON: 9/18/2023    TECHNIQUE: A single mobile view of the chest was obtained. Impression: 1. Normal heart size. Moderate fibrotic stranding in the right upper lobe, radiating from the right hilum. Suspicion of mild superimposed interstitial pneumonitis in the right upper lobe. 2. Moderate left basilar atelectasis/pneumonia. Small effusion left side. Fibrocalcific plaque along right hemidiaphragm. 3. Bulky soft tissue masses right paratracheal Region extending into the right hilar region, consistent with hilar and mediastinal adenopathy, better seen on recent CT thorax. 4. Overall appearance of chest has worsened since prior study. **This report has been created using voice recognition software. It may contain minor errors which are inherent in voice recognition technology. **    Final report electronically signed by Dr. Jose Santamaria on 10/5/2023 1:52 PM            IMPRESSION:    Chronic myelomonocytic leukemia CMML. Blast crisis    RECOMMENDATIONS:  Records and labs and images were reviewed and discussed with the patient and her companions. Records and outside facilities were reviewed as well. Explained to the patient in layman language. Patient had diagnosis of MDS/CMML. He had 1 cycle of Vidaza in July and 1 cycle of decitabine daily for 5 days at [facility]. Last treatment was in August 2023.   Patient

## 2023-10-07 LAB
BACTERIA BLD AEROBE CULT: NORMAL
BACTERIA BLD AEROBE CULT: NORMAL
EKG ATRIAL RATE: 80 BPM
EKG P AXIS: 16 DEGREES
EKG P-R INTERVAL: 148 MS
EKG Q-T INTERVAL: 406 MS
EKG QRS DURATION: 86 MS
EKG QTC CALCULATION (BAZETT): 468 MS
EKG R AXIS: 20 DEGREES
EKG T AXIS: 39 DEGREES
EKG VENTRICULAR RATE: 80 BPM

## 2023-10-08 ENCOUNTER — APPOINTMENT (OUTPATIENT)
Dept: GENERAL RADIOLOGY | Age: 68
DRG: 177 | End: 2023-10-08
Payer: COMMERCIAL

## 2023-10-08 ENCOUNTER — APPOINTMENT (OUTPATIENT)
Dept: CT IMAGING | Age: 68
DRG: 177 | End: 2023-10-08
Payer: COMMERCIAL

## 2023-10-08 ENCOUNTER — HOSPITAL ENCOUNTER (INPATIENT)
Age: 68
LOS: 3 days | Discharge: HOME | DRG: 177 | End: 2023-10-11
Attending: EMERGENCY MEDICINE | Admitting: HOSPITALIST
Payer: COMMERCIAL

## 2023-10-08 DIAGNOSIS — R07.9 CHEST PAIN, UNSPECIFIED TYPE: ICD-10-CM

## 2023-10-08 DIAGNOSIS — J96.21 ACUTE AND CHRONIC RESPIRATORY FAILURE WITH HYPOXIA (HCC): Primary | ICD-10-CM

## 2023-10-08 DIAGNOSIS — D64.9 ANEMIA, UNSPECIFIED TYPE: ICD-10-CM

## 2023-10-08 DIAGNOSIS — N18.31 STAGE 3A CHRONIC KIDNEY DISEASE (CKD) (HCC): ICD-10-CM

## 2023-10-08 LAB
ABO: NORMAL
ALBUMIN SERPL BCG-MCNC: 3.7 G/DL (ref 3.5–5.1)
ALP SERPL-CCNC: 80 U/L (ref 38–126)
ALT SERPL W/O P-5'-P-CCNC: 25 U/L (ref 11–66)
ANION GAP SERPL CALC-SCNC: 12 MEQ/L (ref 8–16)
ANTIBODY SCREEN: NORMAL
AST SERPL-CCNC: 14 U/L (ref 5–40)
BACTERIA: ABNORMAL
BILIRUB SERPL-MCNC: 0.4 MG/DL (ref 0.3–1.2)
BILIRUB UR QL STRIP: NEGATIVE
BUN SERPL-MCNC: 20 MG/DL (ref 7–22)
CALCIUM SERPL-MCNC: 8.9 MG/DL (ref 8.5–10.5)
CASTS #/AREA URNS LPF: ABNORMAL /LPF
CASTS #/AREA URNS LPF: ABNORMAL /LPF
CHARACTER UR: ABNORMAL
CHARCOAL URNS QL MICRO: ABNORMAL
CHLORIDE SERPL-SCNC: 100 MEQ/L (ref 98–111)
CO2 SERPL-SCNC: 27 MEQ/L (ref 23–33)
COLOR UR: YELLOW
CREAT SERPL-MCNC: 1.3 MG/DL (ref 0.4–1.2)
CRYSTALS URNS QL MICRO: ABNORMAL
EPITHELIAL CELLS, UA: ABNORMAL /HPF
FLUAV RNA RESP QL NAA+PROBE: NOT DETECTED
FLUBV RNA RESP QL NAA+PROBE: NOT DETECTED
GFR SERPL CREATININE-BSD FRML MDRD: 60 ML/MIN/1.73M2
GLUCOSE SERPL-MCNC: 112 MG/DL (ref 70–108)
GLUCOSE UR QL STRIP.AUTO: NEGATIVE MG/DL
HCT VFR BLD AUTO: 21.4 % (ref 42–52)
HGB BLD-MCNC: 6.9 GM/DL (ref 14–18)
HGB UR QL STRIP.AUTO: NEGATIVE
KETONES UR QL STRIP.AUTO: NEGATIVE
LEUKOCYTE ESTERASE UR QL STRIP.AUTO: NEGATIVE
LIPASE SERPL-CCNC: 15.7 U/L (ref 5.6–51.3)
MAGNESIUM SERPL-MCNC: 1.7 MG/DL (ref 1.6–2.4)
MANUAL DIFF BLD: NORMAL
NITRITE UR QL STRIP.AUTO: NEGATIVE
NT-PROBNP SERPL IA-MCNC: 676.8 PG/ML (ref 0–124)
OSMOLALITY SERPL CALC.SUM OF ELEC: 280.9 MOSMOL/KG (ref 275–300)
PH UR STRIP.AUTO: 5 [PH] (ref 5–9)
POTASSIUM SERPL-SCNC: 3.3 MEQ/L (ref 3.5–5.2)
PROCALCITONIN SERPL IA-MCNC: 0.27 NG/ML (ref 0.01–0.09)
PROT SERPL-MCNC: 6.6 G/DL (ref 6.1–8)
PROT UR STRIP.AUTO-MCNC: 30 MG/DL
RBC #/AREA URNS HPF: ABNORMAL /HPF
RENAL EPI CELLS #/AREA URNS HPF: ABNORMAL /[HPF]
RH FACTOR: NORMAL
SARS-COV-2 RNA RESP QL NAA+PROBE: NOT DETECTED
SODIUM SERPL-SCNC: 139 MEQ/L (ref 135–145)
SPECIFIC GRAVITY UA: > 1.03 (ref 1–1.03)
TROPONIN, HIGH SENSITIVITY: 17 NG/L (ref 0–12)
TROPONIN, HIGH SENSITIVITY: 18 NG/L (ref 0–12)
TROPONIN, HIGH SENSITIVITY: 21 NG/L (ref 0–12)
UROBILINOGEN, URINE: 0.2 EU/DL (ref 0–1)
WBC #/AREA URNS HPF: ABNORMAL /HPF
YEAST LIKE FUNGI URNS QL MICRO: ABNORMAL

## 2023-10-08 PROCEDURE — 6360000002 HC RX W HCPCS

## 2023-10-08 PROCEDURE — 36415 COLL VENOUS BLD VENIPUNCTURE: CPT

## 2023-10-08 PROCEDURE — 93005 ELECTROCARDIOGRAM TRACING: CPT | Performed by: HOSPITALIST

## 2023-10-08 PROCEDURE — 83880 ASSAY OF NATRIURETIC PEPTIDE: CPT

## 2023-10-08 PROCEDURE — 85025 COMPLETE CBC W/AUTO DIFF WBC: CPT

## 2023-10-08 PROCEDURE — 2580000003 HC RX 258

## 2023-10-08 PROCEDURE — 83735 ASSAY OF MAGNESIUM: CPT

## 2023-10-08 PROCEDURE — 6370000000 HC RX 637 (ALT 250 FOR IP): Performed by: HOSPITALIST

## 2023-10-08 PROCEDURE — 87040 BLOOD CULTURE FOR BACTERIA: CPT

## 2023-10-08 PROCEDURE — 84145 PROCALCITONIN (PCT): CPT

## 2023-10-08 PROCEDURE — 85014 HEMATOCRIT: CPT

## 2023-10-08 PROCEDURE — 6370000000 HC RX 637 (ALT 250 FOR IP)

## 2023-10-08 PROCEDURE — 94640 AIRWAY INHALATION TREATMENT: CPT

## 2023-10-08 PROCEDURE — 85018 HEMOGLOBIN: CPT

## 2023-10-08 PROCEDURE — 30233R1 TRANSFUSION OF NONAUTOLOGOUS PLATELETS INTO PERIPHERAL VEIN, PERCUTANEOUS APPROACH: ICD-10-PCS

## 2023-10-08 PROCEDURE — 71275 CT ANGIOGRAPHY CHEST: CPT

## 2023-10-08 PROCEDURE — 86901 BLOOD TYPING SEROLOGIC RH(D): CPT

## 2023-10-08 PROCEDURE — 93005 ELECTROCARDIOGRAM TRACING: CPT

## 2023-10-08 PROCEDURE — 84484 ASSAY OF TROPONIN QUANT: CPT

## 2023-10-08 PROCEDURE — 36430 TRANSFUSION BLD/BLD COMPNT: CPT

## 2023-10-08 PROCEDURE — 71045 X-RAY EXAM CHEST 1 VIEW: CPT

## 2023-10-08 PROCEDURE — 2140000000 HC CCU INTERMEDIATE R&B

## 2023-10-08 PROCEDURE — 30233N1 TRANSFUSION OF NONAUTOLOGOUS RED BLOOD CELLS INTO PERIPHERAL VEIN, PERCUTANEOUS APPROACH: ICD-10-PCS

## 2023-10-08 PROCEDURE — 80053 COMPREHEN METABOLIC PANEL: CPT

## 2023-10-08 PROCEDURE — 81001 URINALYSIS AUTO W/SCOPE: CPT

## 2023-10-08 PROCEDURE — P9040 RBC LEUKOREDUCED IRRADIATED: HCPCS

## 2023-10-08 PROCEDURE — 86900 BLOOD TYPING SEROLOGIC ABO: CPT

## 2023-10-08 PROCEDURE — 99285 EMERGENCY DEPT VISIT HI MDM: CPT

## 2023-10-08 PROCEDURE — 87636 SARSCOV2 & INF A&B AMP PRB: CPT

## 2023-10-08 PROCEDURE — 86850 RBC ANTIBODY SCREEN: CPT

## 2023-10-08 PROCEDURE — 6360000004 HC RX CONTRAST MEDICATION

## 2023-10-08 PROCEDURE — 6360000002 HC RX W HCPCS: Performed by: EMERGENCY MEDICINE

## 2023-10-08 PROCEDURE — 83690 ASSAY OF LIPASE: CPT

## 2023-10-08 PROCEDURE — 86923 COMPATIBILITY TEST ELECTRIC: CPT

## 2023-10-08 RX ORDER — ALBUTEROL SULFATE 90 UG/1
2 AEROSOL, METERED RESPIRATORY (INHALATION) EVERY 4 HOURS PRN
Status: DISCONTINUED | OUTPATIENT
Start: 2023-10-08 | End: 2023-10-11 | Stop reason: HOSPADM

## 2023-10-08 RX ORDER — SODIUM CHLORIDE 9 MG/ML
INJECTION, SOLUTION INTRAVENOUS PRN
Status: DISCONTINUED | OUTPATIENT
Start: 2023-10-08 | End: 2023-10-11 | Stop reason: HOSPADM

## 2023-10-08 RX ORDER — FENTANYL CITRATE 50 UG/ML
25 INJECTION, SOLUTION INTRAMUSCULAR; INTRAVENOUS ONCE
Status: COMPLETED | OUTPATIENT
Start: 2023-10-08 | End: 2023-10-08

## 2023-10-08 RX ORDER — IPRATROPIUM BROMIDE AND ALBUTEROL SULFATE 2.5; .5 MG/3ML; MG/3ML
1 SOLUTION RESPIRATORY (INHALATION) ONCE
Status: COMPLETED | OUTPATIENT
Start: 2023-10-08 | End: 2023-10-08

## 2023-10-08 RX ORDER — SODIUM CHLORIDE 0.9 % (FLUSH) 0.9 %
5-40 SYRINGE (ML) INJECTION EVERY 12 HOURS SCHEDULED
Status: DISCONTINUED | OUTPATIENT
Start: 2023-10-08 | End: 2023-10-11 | Stop reason: HOSPADM

## 2023-10-08 RX ORDER — ASPIRIN 325 MG
325 TABLET, DELAYED RELEASE (ENTERIC COATED) ORAL ONCE
Status: COMPLETED | OUTPATIENT
Start: 2023-10-08 | End: 2023-10-08

## 2023-10-08 RX ORDER — SODIUM CHLORIDE 0.9 % (FLUSH) 0.9 %
5-40 SYRINGE (ML) INJECTION PRN
Status: DISCONTINUED | OUTPATIENT
Start: 2023-10-08 | End: 2023-10-10

## 2023-10-08 RX ORDER — HYDROXYUREA 500 MG/1
500 CAPSULE ORAL DAILY
Status: DISCONTINUED | OUTPATIENT
Start: 2023-10-08 | End: 2023-10-11 | Stop reason: HOSPADM

## 2023-10-08 RX ORDER — ONDANSETRON 4 MG/1
4 TABLET, FILM COATED ORAL EVERY 8 HOURS PRN
Status: DISCONTINUED | OUTPATIENT
Start: 2023-10-08 | End: 2023-10-11 | Stop reason: HOSPADM

## 2023-10-08 RX ORDER — FOLIC ACID 1 MG/1
1 TABLET ORAL DAILY
Status: DISCONTINUED | OUTPATIENT
Start: 2023-10-09 | End: 2023-10-11 | Stop reason: HOSPADM

## 2023-10-08 RX ORDER — POLYETHYLENE GLYCOL 3350 17 G/17G
17 POWDER, FOR SOLUTION ORAL DAILY PRN
Status: DISCONTINUED | OUTPATIENT
Start: 2023-10-08 | End: 2023-10-11 | Stop reason: HOSPADM

## 2023-10-08 RX ORDER — SODIUM CHLORIDE 9 MG/ML
INJECTION, SOLUTION INTRAVENOUS PRN
Status: DISCONTINUED | OUTPATIENT
Start: 2023-10-08 | End: 2023-10-10

## 2023-10-08 RX ORDER — TAMSULOSIN HYDROCHLORIDE 0.4 MG/1
0.8 CAPSULE ORAL DAILY
Status: DISCONTINUED | OUTPATIENT
Start: 2023-10-08 | End: 2023-10-11 | Stop reason: HOSPADM

## 2023-10-08 RX ORDER — MAGNESIUM SULFATE IN WATER 40 MG/ML
2000 INJECTION, SOLUTION INTRAVENOUS ONCE
Status: COMPLETED | OUTPATIENT
Start: 2023-10-08 | End: 2023-10-08

## 2023-10-08 RX ORDER — ALLOPURINOL 300 MG/1
300 TABLET ORAL DAILY
Status: DISCONTINUED | OUTPATIENT
Start: 2023-10-09 | End: 2023-10-11 | Stop reason: HOSPADM

## 2023-10-08 RX ORDER — NITROGLYCERIN 0.4 MG/1
TABLET SUBLINGUAL
Status: DISPENSED
Start: 2023-10-08 | End: 2023-10-09

## 2023-10-08 RX ORDER — ACETAMINOPHEN 325 MG/1
650 TABLET ORAL EVERY 6 HOURS PRN
Status: DISCONTINUED | OUTPATIENT
Start: 2023-10-08 | End: 2023-10-11 | Stop reason: HOSPADM

## 2023-10-08 RX ORDER — QUETIAPINE FUMARATE 25 MG/1
25 TABLET, FILM COATED ORAL NIGHTLY
Status: DISCONTINUED | OUTPATIENT
Start: 2023-10-08 | End: 2023-10-11 | Stop reason: HOSPADM

## 2023-10-08 RX ORDER — KETOROLAC TROMETHAMINE 30 MG/ML
30 INJECTION, SOLUTION INTRAMUSCULAR; INTRAVENOUS EVERY 6 HOURS PRN
Status: DISCONTINUED | OUTPATIENT
Start: 2023-10-08 | End: 2023-10-09

## 2023-10-08 RX ORDER — POTASSIUM CHLORIDE 20 MEQ/1
40 TABLET, EXTENDED RELEASE ORAL ONCE
Status: COMPLETED | OUTPATIENT
Start: 2023-10-08 | End: 2023-10-08

## 2023-10-08 RX ORDER — ACETAMINOPHEN 650 MG/1
650 SUPPOSITORY RECTAL EVERY 6 HOURS PRN
Status: DISCONTINUED | OUTPATIENT
Start: 2023-10-08 | End: 2023-10-11 | Stop reason: HOSPADM

## 2023-10-08 RX ORDER — NITROGLYCERIN 0.4 MG/1
0.4 TABLET SUBLINGUAL EVERY 5 MIN PRN
Status: DISCONTINUED | OUTPATIENT
Start: 2023-10-08 | End: 2023-10-11 | Stop reason: HOSPADM

## 2023-10-08 RX ADMIN — NITROGLYCERIN 0.4 MG: 0.4 TABLET, ORALLY DISINTEGRATING SUBLINGUAL at 20:36

## 2023-10-08 RX ADMIN — ACETAMINOPHEN 650 MG: 325 TABLET ORAL at 23:43

## 2023-10-08 RX ADMIN — POTASSIUM CHLORIDE 40 MEQ: 1500 TABLET, EXTENDED RELEASE ORAL at 20:56

## 2023-10-08 RX ADMIN — PIPERACILLIN AND TAZOBACTAM 4500 MG: 4; .5 INJECTION, POWDER, LYOPHILIZED, FOR SOLUTION INTRAVENOUS at 21:20

## 2023-10-08 RX ADMIN — DICLOFENAC SODIUM 2 G: 10 GEL TOPICAL at 21:20

## 2023-10-08 RX ADMIN — SODIUM CHLORIDE, PRESERVATIVE FREE 10 ML: 5 INJECTION INTRAVENOUS at 21:17

## 2023-10-08 RX ADMIN — FENTANYL CITRATE 25 MCG: 50 INJECTION, SOLUTION INTRAMUSCULAR; INTRAVENOUS at 18:16

## 2023-10-08 RX ADMIN — METOPROLOL TARTRATE 12.5 MG: 25 TABLET, FILM COATED ORAL at 20:52

## 2023-10-08 RX ADMIN — IOPAMIDOL 80 ML: 755 INJECTION, SOLUTION INTRAVENOUS at 16:05

## 2023-10-08 RX ADMIN — NITROGLYCERIN 0.4 MG: 0.4 TABLET, ORALLY DISINTEGRATING SUBLINGUAL at 18:54

## 2023-10-08 RX ADMIN — TAMSULOSIN HYDROCHLORIDE 0.8 MG: 0.4 CAPSULE ORAL at 20:53

## 2023-10-08 RX ADMIN — MOMETASONE FUROATE AND FORMOTEROL FUMARATE DIHYDRATE 2 PUFF: 200; 5 AEROSOL RESPIRATORY (INHALATION) at 21:26

## 2023-10-08 RX ADMIN — IPRATROPIUM BROMIDE AND ALBUTEROL SULFATE 1 DOSE: .5; 3 SOLUTION RESPIRATORY (INHALATION) at 15:39

## 2023-10-08 RX ADMIN — ASPIRIN 325 MG: 325 TABLET, COATED ORAL at 19:28

## 2023-10-08 RX ADMIN — QUETIAPINE FUMARATE 25 MG: 25 TABLET ORAL at 20:53

## 2023-10-08 RX ADMIN — MAGNESIUM SULFATE HEPTAHYDRATE 2000 MG: 40 INJECTION, SOLUTION INTRAVENOUS at 21:12

## 2023-10-08 ASSESSMENT — PAIN DESCRIPTION - DESCRIPTORS
DESCRIPTORS: PRESSURE
DESCRIPTORS: ACHING
DESCRIPTORS: ACHING
DESCRIPTORS: JABBING
DESCRIPTORS: STABBING;PRESSURE

## 2023-10-08 ASSESSMENT — PAIN - FUNCTIONAL ASSESSMENT
PAIN_FUNCTIONAL_ASSESSMENT: PREVENTS OR INTERFERES SOME ACTIVE ACTIVITIES AND ADLS
PAIN_FUNCTIONAL_ASSESSMENT: 0-10
PAIN_FUNCTIONAL_ASSESSMENT: ACTIVITIES ARE NOT PREVENTED
PAIN_FUNCTIONAL_ASSESSMENT: PREVENTS OR INTERFERES SOME ACTIVE ACTIVITIES AND ADLS

## 2023-10-08 ASSESSMENT — PAIN DESCRIPTION - ONSET
ONSET: ON-GOING
ONSET: SUDDEN
ONSET: ON-GOING
ONSET: ON-GOING

## 2023-10-08 ASSESSMENT — PAIN DESCRIPTION - ORIENTATION
ORIENTATION: LEFT
ORIENTATION: LEFT
ORIENTATION: MID
ORIENTATION: LEFT
ORIENTATION: LEFT

## 2023-10-08 ASSESSMENT — PAIN DESCRIPTION - LOCATION
LOCATION: CHEST

## 2023-10-08 ASSESSMENT — PAIN SCALES - GENERAL
PAINLEVEL_OUTOF10: 7
PAINLEVEL_OUTOF10: 5
PAINLEVEL_OUTOF10: 2
PAINLEVEL_OUTOF10: 10
PAINLEVEL_OUTOF10: 3
PAINLEVEL_OUTOF10: 10
PAINLEVEL_OUTOF10: 8

## 2023-10-08 ASSESSMENT — PAIN DESCRIPTION - FREQUENCY
FREQUENCY: CONTINUOUS

## 2023-10-08 ASSESSMENT — PAIN DESCRIPTION - PAIN TYPE
TYPE: ACUTE PAIN

## 2023-10-08 NOTE — ED NOTES
Patient medicated, see MAR. Patient without questions or concerns. Tech at bedside for transfer to the floor.      Juana Cloud RN  10/08/23 4347

## 2023-10-08 NOTE — ED PROVIDER NOTES
Patient signed out to me by my evening colleague this is a 68-year-old male that I know from prior admission for having diplopia. He does have leukemia. He is getting chemotherapy at [facility]. Coming in today with shortness of breath and some chest pain. Patient had severe right-sided nonradiating chest pain. He was also hypoxic at 88%. Patient had been admitted to the hospitalist service. Signed out to me pending moving upstairs. Labs Reviewed   CBC WITH AUTO DIFFERENTIAL - Abnormal; Notable for the following components:       Result Value    WBC 19.2 (*)     RBC 2.27 (*)     Hemoglobin 6.7 (*)     Hematocrit 20.6 (*)     RDW-CV 14.7 (*)     RDW-SD 48.6 (*)     Platelets 27 (*)     All other components within normal limits   COMPREHENSIVE METABOLIC PANEL - Abnormal; Notable for the following components:    Glucose 112 (*)     Creatinine 1.3 (*)     Potassium 3.3 (*)     All other components within normal limits   TROPONIN - Abnormal; Notable for the following components:    Troponin, High Sensitivity 21 (*)     All other components within normal limits   BRAIN NATRIURETIC PEPTIDE - Abnormal; Notable for the following components:    Pro-.8 (*)     All other components within normal limits   TROPONIN - Abnormal; Notable for the following components:    Troponin, High Sensitivity 17 (*)     All other components within normal limits   COVID-19 & INFLUENZA COMBO   LIPASE   ANION GAP   OSMOLALITY   GLOMERULAR FILTRATION RATE, ESTIMATED   MANUAL DIFFERENTIAL   TYPE AND SCREEN   LEUKOREDUCED RED CELLS, IRRADIATED    Narrative:     [id number]     issued     CTA CHEST W WO CONTRAST PE Eval   Final Result   1. There is a small loculated left pleural effusion demonstrated. Cannot exclude a malignant effusion. 2. There are nodular densities demonstrated within the right upper lobe extending centrally towards the right suprahilar region along the bronchovasculature which is concerning for neoplastic process. 3. There are numerable enlarged lymph nodes within the mediastinum. The largest of these measures approximately 5.5 x 4.2 cm on axial image 92. There is also lymphadenopathy at the upper rightward mediastinum in the mass effect upon the trachea at the    thoracic inlet seen on axial image 58. This is highly concerning for metastatic disease. 4. There is no CT angiographic evidence of pulmonary embolism. **This report has been created using voice recognition software. It may contain minor errors which are inherent in voice recognition technology. **          Final report electronically signed by Dr. Solo Sanchez on 10/8/2023 4:24 PM      XR CHEST PORTABLE   Final Result   1. Left-sided pleural effusion with adjacent atelectasis/infiltrate. 2. Right upper lung atelectasis/infiltrate. 3. Stable right superior mediastinal mass. **This report has been created using voice recognition software. It may contain minor errors which are inherent in voice recognition technology. **      Final report electronically signed by Dr. Ryann Jay on 10/8/2023 3:14 PM        1. Acute and chronic respiratory failure with hypoxia (HCC)    2. Chest pain, unspecified type    3. Anemia, unspecified type      I have seen this patient with Dr. Sam Arteaga and the residents and agree with their assessment and plan.      Odessa Girard, DO  10/08/23 5538

## 2023-10-08 NOTE — ED PROVIDER NOTES
[facility] DEPT  EMERGENCY DEPARTMENT ENCOUNTER          Pt Name: Eric Andrews  MRN: [id number]  [address] [facility] 1955  Date of evaluation: 10/8/2023  Resident Physician: Giovanny Wong MD EM Resident PGY-2  Attending Physician: Nikole Sweet DO      CHIEF COMPLAINT       Chief Complaint   Patient presents with    Shortness of Breath    Chest Pain         HISTORY OF PRESENT ILLNESS    HPI  Eric Andrews is a 79 y.o. male who presents to the emergency department from home, as a walk in to the ED lobby for evaluation of pain, shortness of breath. 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 Patient states that today he awoke with 8/10 severity right-sided chest pain nonradiating. States that he has had associated palpitations. Dors is chronic shortness of breath. Patient found to be hypoxic 88% on 2 L nasal cannula increased to 4 L. Patient states that he has previously had breathing treatments for COPD but took none prior to arrival.      The patient has no other acute complaints at this time. ROS negative except as stated above.     PAST MEDICAL AND SURGICAL HISTORY     Past Medical History:   Diagnosis Date    Arthritis     Cancer ([address])     COPD (chronic obstructive pulmonary disease) ([address])     Hypertension      Past Surgical History:   Procedure Laterality Date    CT BONE MARROW BIOPSY  8/25/2023    CT BONE MARROW BIOPSY 8/25/2023 [facility] CT SCAN         MEDICATIONS     Current Facility-Administered Medications:     0.9 % sodium chloride infusion, , IntraVENous, PRN, Giovanny oWng MD    Current Outpatient Medications:     diclofenac sodium (VOLTAREN) 1 % GEL, Apply to bilateral wrists, Disp: 100 g, Rfl: 0    tiotropium (SPIRIVA RESPIMAT) 2.5 MCG/ACT AERS inhaler, Inhale 2 puffs into the lungs daily (Patient not taking:

## 2023-10-08 NOTE — PROGRESS NOTES
1846: Julia called: pt up from ED- c/o chest pain 8/10 pressure mid chest, RR elevated, breathing labored,  /67. This RN unable to reach Hospitalist attempted to call, and PS; pt has no orders for nitro. EKG completed, 1854 Nitro given- pt with some relief pain rated 5/10. Labs drawn, Dr. Angel Hamilton at bedside. Cardio consulted and transfer order placed.

## 2023-10-08 NOTE — H&P
Medicine Admission History & Physical      Patient:  Grant Gray  YOB: 1955  Date of Service: 10/8/2023  MRN: [id number]   Acct: [de-identified]   Primary Care Physician: YESY Machuca - CNP    Admitting Faculty MD: No admitting provider for patient encounter. Code Status: Prior No additional code details    Date of Service: Pt seen/examined in consultation on 10/8/2023     Assessment / Plan     Briefly, pt Grant Gray is a 79 y.o. male with a PMH of MDS/CMML, COPD, hypertension, insomnia, BPH who presented with shortness of breath and substernal chest pain x1 day. #Acute Hypoxic Respiratory failure 2/2 acute on chronic normocytic anemia 2/2 MDS/CMML: Complaining of shortness of breath. Hypoxic in ED requiring 4 L nasal cannula. Baseline 2L O2 at home. CBC shows hemoglobin of 6.7. Baseline hemoglobin between 7 and 8.  1 cycle of Vidaza in July and 1 cycle of decitabine at [place] in August 2023. Also on hydroxyurea and allopurinol. Blood products ordered in ED. Currently being administered. Chest x-ray shows left pleural effusion with adjacent atelectasis/infiltrate, right upper lung atelectasis/infiltrate, stable right superior mediastinal mass. CTA chest negative for pulmonary embolism, but shows a small loculated left pleural effusion. Nodular densities seen in the right upper lobe extending towards suprahilar region concerning for neoplastic process. Also other numerous enlarged lymph nodes within the mediastinum very concerning for metastatic disease. Patient complaining of chest pain. Nonradiating, worse with inspiration, dull in nature. Never had a heart cath or stents in the past. Recent echocardiogram on 8/3/2023 shows ejection fraction of 60 to 65%. EKG shows atrial fibrillation, but no ischemic changes. Initial troponin 21, repeat 17. Unlikely ACS at this time.     Plan:  -Transfuse for hemoglobin > 8  -Consider thoracentesis if patient does not Low-density lesions within the kidneys are partially visualized and incompletely characterized. Further evaluation can be obtained with routine nonemergent renal ultrasound. Limited evaluation of the upper abdomen appears otherwise unremarkable. No acute osseous findings are seen. 1. There is a small loculated left pleural effusion demonstrated. Cannot exclude a malignant effusion. 2. There are nodular densities demonstrated within the right upper lobe extending centrally towards the right suprahilar region along the bronchovasculature which is concerning for neoplastic process. 3. There are numerable enlarged lymph nodes within the mediastinum. The largest of these measures approximately 5.5 x 4.2 cm on axial image 92. There is also lymphadenopathy at the upper rightward mediastinum in the mass effect upon the trachea at the thoracic inlet seen on axial image 58. This is highly concerning for metastatic disease. 4. There is no CT angiographic evidence of pulmonary embolism. **This report has been created using voice recognition software. It may contain minor errors which are inherent in voice recognition technology. **  Final report electronically signed by Dr. Singh Given on 10/8/2023 4:24 PM    XR CHEST PORTABLE    Result Date: 10/8/2023  PROCEDURE: XR CHEST PORTABLE CLINICAL INFORMATION: Shortness of breath, hypoxia TECHNIQUE: Mobile AP chest radiograph. COMPARISON: Mobile AP chest radiograph 10/5/2023 FINDINGS: Cardiac silhouette is at the upper limits of normal in size. The left costophrenic angle remains blunted. There are alveolar and reticular opacities in the right upper and left mid lung. A right superior mediastinal mass is poorly visualized but likely stable. This mass is evaluated on recent CTA of the chest. Degenerative changes in the thoracic spine are poorly visualized. 1. Left-sided pleural effusion with adjacent atelectasis/infiltrate. 2. Right upper lung atelectasis/infiltrate.  3. Stable

## 2023-10-08 NOTE — ED TRIAGE NOTES
Patient to ER due to \"feeling his heart pump it hurts\" and shortness of breath. Patient states hes always short of breath but the chest pain started at 0800 this AM. Patient pain is a 8/10. Patient took percocet a couple of hours ago but it didn't help. Patient states he has no heart or lung history. Patient oxygen 88% on 2L NC. Oxygen increased to 4L NC. Patient has breathing treatments at home for his COPD, he has not done one prior to come. Patient denies any known infection. Patient denies being around anyone who has been sick. Patient is on chemo for leukemia.

## 2023-10-08 NOTE — SIGNIFICANT EVENT
Rapid response called on this patient shortly after he got up to the floor. Complaining of substernal chest pain, worse with inspiration, sharp. Similar to the pain that he was having down in the ED. EKG in ED showed atrial fibrillation. Troponins 21 and 17. Repeat EKG at bedside showed patient was back in normal sinus rhythm, no acute ischemic changes. Repeat troponins were drawn. Sublingual nitro given which did not relieve his pain in any significant manner. Hemodynamically stable. Cardiology consulted Patient is being moved to stepdown unit. Repeat H&H was drawn, will give another unit of blood if hemoglobin less than 8. Will order Toradol to see if this will improve his pain.     Lorenzo Conn, DO  Internal Medicine Resident PGY-1

## 2023-10-08 NOTE — CONSENT
Informed Consent for Blood Component Transfusion Note    I have discussed with the patient the rationale for blood component transfusion; its benefits in treating or preventing fatigue, organ damage, or death; and its risk which includes mild transfusion reactions, rare risk of blood borne infection, or more serious but rare reactions. I have discussed the alternatives to transfusion, including the risk and consequences of not receiving transfusion. The patient had an opportunity to ask questions and had agreed to proceed with transfusion of blood components.     Electronically signed by Pedro Cadet MD on 10/8/23 at 3:38 PM EDT

## 2023-10-08 NOTE — ED NOTES
Pt transported to [facility] in stable condition. Floor contacted before transport,spoke to floor staff.       [employer]  10/08/23 1218

## 2023-10-09 PROBLEM — R07.9 CHEST PAIN: Status: ACTIVE | Noted: 2023-10-09

## 2023-10-09 PROBLEM — D64.9 NORMOCYTIC ANEMIA: Status: ACTIVE | Noted: 2023-10-09

## 2023-10-09 PROBLEM — Z87.891 PERSONAL HISTORY OF TOBACCO USE, PRESENTING HAZARDS TO HEALTH: Status: ACTIVE | Noted: 2023-10-09

## 2023-10-09 PROBLEM — J96.21 ACUTE AND CHRONIC RESPIRATORY FAILURE WITH HYPOXIA (HCC): Status: ACTIVE | Noted: 2023-09-18

## 2023-10-09 LAB
ANION GAP SERPL CALC-SCNC: 12 MEQ/L (ref 8–16)
AUTO DIFF PNL BLD: ABNORMAL
BASOPHILS ABSOLUTE: 0 THOU/MM3 (ref 0–0.1)
BASOPHILS ABSOLUTE: 0.1 THOU/MM3 (ref 0–0.1)
BASOPHILS NFR BLD AUTO: 0 %
BASOPHILS NFR BLD AUTO: 0.4 %
BLASTS: 3 % (ref 0–1)
BUN SERPL-MCNC: 16 MG/DL (ref 7–22)
CALCIUM SERPL-MCNC: 8.6 MG/DL (ref 8.5–10.5)
CHLORIDE SERPL-SCNC: 101 MEQ/L (ref 98–111)
CO2 SERPL-SCNC: 26 MEQ/L (ref 23–33)
CREAT SERPL-MCNC: 1.3 MG/DL (ref 0.4–1.2)
DEPRECATED RDW RBC AUTO: 48.6 FL (ref 35–45)
DEPRECATED RDW RBC AUTO: 52 FL (ref 35–45)
EKG ATRIAL RATE: 90 BPM
EKG ATRIAL RATE: 94 BPM
EKG P AXIS: 33 DEGREES
EKG P AXIS: 43 DEGREES
EKG P-R INTERVAL: 156 MS
EKG P-R INTERVAL: 158 MS
EKG Q-T INTERVAL: 378 MS
EKG Q-T INTERVAL: 400 MS
EKG QRS DURATION: 88 MS
EKG QRS DURATION: 92 MS
EKG QTC CALCULATION (BAZETT): 462 MS
EKG QTC CALCULATION (BAZETT): 500 MS
EKG R AXIS: 20 DEGREES
EKG R AXIS: 23 DEGREES
EKG T AXIS: 57 DEGREES
EKG T AXIS: 58 DEGREES
EKG VENTRICULAR RATE: 90 BPM
EKG VENTRICULAR RATE: 94 BPM
EOSINOPHIL NFR BLD AUTO: 0 %
EOSINOPHIL NFR BLD AUTO: 0 %
EOSINOPHILS ABSOLUTE: 0 THOU/MM3 (ref 0–0.4)
EOSINOPHILS ABSOLUTE: 0 THOU/MM3 (ref 0–0.4)
ERYTHROCYTE [DISTWIDTH] IN BLOOD BY AUTOMATED COUNT: 14.7 % (ref 11.5–14.5)
ERYTHROCYTE [DISTWIDTH] IN BLOOD BY AUTOMATED COUNT: 15.4 % (ref 11.5–14.5)
GFR SERPL CREATININE-BSD FRML MDRD: 60 ML/MIN/1.73M2
GLUCOSE SERPL-MCNC: 102 MG/DL (ref 70–108)
HCT VFR BLD AUTO: 20.6 % (ref 42–52)
HCT VFR BLD AUTO: 20.6 % (ref 42–52)
HCT VFR BLD AUTO: 20.7 % (ref 42–52)
HCT VFR BLD AUTO: 23.5 % (ref 42–52)
HCT VFR BLD AUTO: 23.8 % (ref 42–52)
HGB BLD-MCNC: 6.7 GM/DL (ref 14–18)
HGB BLD-MCNC: 6.8 GM/DL (ref 14–18)
HGB BLD-MCNC: 6.9 GM/DL (ref 14–18)
HGB BLD-MCNC: 7.6 GM/DL (ref 14–18)
HGB BLD-MCNC: 8.1 GM/DL (ref 14–18)
IMM GRANULOCYTES # BLD AUTO: 0.53 THOU/MM3 (ref 0–0.07)
IMM GRANULOCYTES NFR BLD AUTO: 3.8 %
LYMPHOCYTES ABSOLUTE: 1.8 THOU/MM3 (ref 1–4.8)
LYMPHOCYTES ABSOLUTE: 4.4 THOU/MM3 (ref 1–4.8)
LYMPHOCYTES NFR BLD AUTO: 12.8 %
LYMPHOCYTES NFR BLD AUTO: 23 %
MAGNESIUM SERPL-MCNC: 1.8 MG/DL (ref 1.6–2.4)
MCH RBC QN AUTO: 29.5 PG (ref 26–33)
MCH RBC QN AUTO: 29.7 PG (ref 26–33)
MCHC RBC AUTO-ENTMCNC: 31.9 GM/DL (ref 32.2–35.5)
MCHC RBC AUTO-ENTMCNC: 32.5 GM/DL (ref 32.2–35.5)
MCV RBC AUTO: 90.7 FL (ref 80–94)
MCV RBC AUTO: 93 FL (ref 80–94)
METAMYELOCYTES: 1 %
MICROORGANISM SPEC CULT: NORMAL
MICROORGANISM SPEC CULT: NORMAL
MICROORGANISM/AGENT SPEC: NORMAL
MICROORGANISM/AGENT SPEC: NORMAL
MONOCYTES ABSOLUTE: 7.1 THOU/MM3 (ref 0.4–1.3)
MONOCYTES ABSOLUTE: 8.9 THOU/MM3 (ref 0.4–1.3)
MONOCYTES NFR BLD AUTO: 37 %
MONOCYTES NFR BLD AUTO: 63.6 %
MRSA DNA SPEC QL NAA+PROBE: NEGATIVE
MYELOCYTES: 1 %
NEUTROPHILS NFR BLD AUTO: 19.4 %
NEUTROPHILS NFR BLD AUTO: 35 %
NRBC BLD AUTO-RTO: 0 /100 WBC
NRBC BLD AUTO-RTO: 0 /100 WBC
PATHOLOGIST REVIEW: ABNORMAL
PATHOLOGIST REVIEW: ABNORMAL
PLATELET # BLD AUTO: 25 THOU/MM3 (ref 130–400)
PLATELET # BLD AUTO: 27 THOU/MM3 (ref 130–400)
PLATELET BLD QL SMEAR: ABNORMAL
PMV BLD AUTO: 11.6 FL (ref 9.4–12.4)
PMV BLD AUTO: 12 FL (ref 9.4–12.4)
POIKILOCYTES: ABNORMAL
POTASSIUM SERPL-SCNC: 3.4 MEQ/L (ref 3.5–5.2)
RBC # BLD AUTO: 2.27 MILL/MM3 (ref 4.7–6.1)
RBC # BLD AUTO: 2.56 MILL/MM3 (ref 4.7–6.1)
ROULEAUX BLD QL SMEAR: PRESENT
SCAN OF BLOOD SMEAR: NORMAL
SEGMENTED NEUTROPHILS ABSOLUTE COUNT: 2.7 THOU/MM3 (ref 1.8–7.7)
SEGMENTED NEUTROPHILS ABSOLUTE COUNT: 6.7 THOU/MM3 (ref 1.8–7.7)
SODIUM SERPL-SCNC: 139 MEQ/L (ref 135–145)
SPECIMEN DESCRIPTION: NORMAL
SPECIMEN DESCRIPTION: NORMAL
WBC # BLD AUTO: 14 THOU/MM3 (ref 4.8–10.8)
WBC # BLD AUTO: 19.2 THOU/MM3 (ref 4.8–10.8)

## 2023-10-09 PROCEDURE — 99223 1ST HOSP IP/OBS HIGH 75: CPT | Performed by: INTERNAL MEDICINE

## 2023-10-09 PROCEDURE — 6360000002 HC RX W HCPCS

## 2023-10-09 PROCEDURE — 83735 ASSAY OF MAGNESIUM: CPT

## 2023-10-09 PROCEDURE — 94761 N-INVAS EAR/PLS OXIMETRY MLT: CPT

## 2023-10-09 PROCEDURE — 94669 MECHANICAL CHEST WALL OSCILL: CPT

## 2023-10-09 PROCEDURE — 2700000000 HC OXYGEN THERAPY PER DAY

## 2023-10-09 PROCEDURE — 99233 SBSQ HOSP IP/OBS HIGH 50: CPT

## 2023-10-09 PROCEDURE — 93010 ELECTROCARDIOGRAM REPORT: CPT | Performed by: INTERNAL MEDICINE

## 2023-10-09 PROCEDURE — 6370000000 HC RX 637 (ALT 250 FOR IP): Performed by: PHYSICIAN ASSISTANT

## 2023-10-09 PROCEDURE — 2140000000 HC CCU INTERMEDIATE R&B

## 2023-10-09 PROCEDURE — 80048 BASIC METABOLIC PNL TOTAL CA: CPT

## 2023-10-09 PROCEDURE — 2580000003 HC RX 258

## 2023-10-09 PROCEDURE — 6370000000 HC RX 637 (ALT 250 FOR IP)

## 2023-10-09 PROCEDURE — 94010 BREATHING CAPACITY TEST: CPT

## 2023-10-09 PROCEDURE — 99222 1ST HOSP IP/OBS MODERATE 55: CPT | Performed by: PHYSICIAN ASSISTANT

## 2023-10-09 PROCEDURE — 85014 HEMATOCRIT: CPT

## 2023-10-09 PROCEDURE — 0W9B3ZZ DRAINAGE OF LEFT PLEURAL CAVITY, PERCUTANEOUS APPROACH: ICD-10-PCS | Performed by: INTERNAL MEDICINE

## 2023-10-09 PROCEDURE — 94640 AIRWAY INHALATION TREATMENT: CPT

## 2023-10-09 PROCEDURE — 87641 MR-STAPH DNA AMP PROBE: CPT

## 2023-10-09 PROCEDURE — 93307 TTE W/O DOPPLER COMPLETE: CPT

## 2023-10-09 PROCEDURE — 85018 HEMOGLOBIN: CPT

## 2023-10-09 PROCEDURE — 36415 COLL VENOUS BLD VENIPUNCTURE: CPT

## 2023-10-09 PROCEDURE — 85025 COMPLETE CBC W/AUTO DIFF WBC: CPT

## 2023-10-09 PROCEDURE — 36430 TRANSFUSION BLD/BLD COMPNT: CPT

## 2023-10-09 RX ORDER — TRAMADOL HYDROCHLORIDE 50 MG/1
50 TABLET ORAL EVERY 6 HOURS PRN
Status: DISCONTINUED | OUTPATIENT
Start: 2023-10-09 | End: 2023-10-11 | Stop reason: HOSPADM

## 2023-10-09 RX ORDER — TRAMADOL HYDROCHLORIDE 50 MG/1
100 TABLET ORAL EVERY 6 HOURS PRN
Status: DISCONTINUED | OUTPATIENT
Start: 2023-10-09 | End: 2023-10-11 | Stop reason: HOSPADM

## 2023-10-09 RX ORDER — POTASSIUM CHLORIDE 7.45 MG/ML
10 INJECTION INTRAVENOUS PRN
Status: DISCONTINUED | OUTPATIENT
Start: 2023-10-09 | End: 2023-10-11 | Stop reason: HOSPADM

## 2023-10-09 RX ORDER — POTASSIUM CHLORIDE 20 MEQ/1
40 TABLET, EXTENDED RELEASE ORAL PRN
Status: DISCONTINUED | OUTPATIENT
Start: 2023-10-09 | End: 2023-10-11 | Stop reason: HOSPADM

## 2023-10-09 RX ORDER — TRAZODONE HYDROCHLORIDE 100 MG/1
100 TABLET ORAL NIGHTLY PRN
Status: DISCONTINUED | OUTPATIENT
Start: 2023-10-09 | End: 2023-10-11 | Stop reason: HOSPADM

## 2023-10-09 RX ORDER — LANOLIN ALCOHOL/MO/W.PET/CERES
6 CREAM (GRAM) TOPICAL NIGHTLY PRN
Status: DISCONTINUED | OUTPATIENT
Start: 2023-10-09 | End: 2023-10-11 | Stop reason: HOSPADM

## 2023-10-09 RX ORDER — SODIUM CHLORIDE 9 MG/ML
INJECTION, SOLUTION INTRAVENOUS PRN
Status: DISCONTINUED | OUTPATIENT
Start: 2023-10-09 | End: 2023-10-11 | Stop reason: HOSPADM

## 2023-10-09 RX ORDER — ALBUTEROL SULFATE 90 UG/1
2 AEROSOL, METERED RESPIRATORY (INHALATION) 2 TIMES DAILY
Status: DISCONTINUED | OUTPATIENT
Start: 2023-10-09 | End: 2023-10-11 | Stop reason: HOSPADM

## 2023-10-09 RX ADMIN — ALLOPURINOL 300 MG: 300 TABLET ORAL at 07:53

## 2023-10-09 RX ADMIN — METOPROLOL TARTRATE 12.5 MG: 25 TABLET, FILM COATED ORAL at 07:52

## 2023-10-09 RX ADMIN — MOMETASONE FUROATE AND FORMOTEROL FUMARATE DIHYDRATE 2 PUFF: 200; 5 AEROSOL RESPIRATORY (INHALATION) at 21:13

## 2023-10-09 RX ADMIN — HYDROXYUREA 500 MG: 500 CAPSULE ORAL at 07:52

## 2023-10-09 RX ADMIN — DICLOFENAC SODIUM 2 G: 10 GEL TOPICAL at 22:03

## 2023-10-09 RX ADMIN — TAMSULOSIN HYDROCHLORIDE 0.8 MG: 0.4 CAPSULE ORAL at 07:52

## 2023-10-09 RX ADMIN — ALBUTEROL SULFATE 2 PUFF: 90 AEROSOL, METERED RESPIRATORY (INHALATION) at 02:19

## 2023-10-09 RX ADMIN — TRAMADOL HYDROCHLORIDE 50 MG: 50 TABLET, COATED ORAL at 23:22

## 2023-10-09 RX ADMIN — PIPERACILLIN AND TAZOBACTAM 3375 MG: 3; .375 INJECTION, POWDER, LYOPHILIZED, FOR SOLUTION INTRAVENOUS at 01:47

## 2023-10-09 RX ADMIN — QUETIAPINE FUMARATE 25 MG: 25 TABLET ORAL at 20:06

## 2023-10-09 RX ADMIN — TRAZODONE HYDROCHLORIDE 100 MG: 100 TABLET ORAL at 01:53

## 2023-10-09 RX ADMIN — FOLIC ACID 1 MG: 1 TABLET ORAL at 07:53

## 2023-10-09 RX ADMIN — METOPROLOL TARTRATE 12.5 MG: 25 TABLET, FILM COATED ORAL at 20:05

## 2023-10-09 RX ADMIN — SODIUM CHLORIDE, PRESERVATIVE FREE 10 ML: 5 INJECTION INTRAVENOUS at 20:06

## 2023-10-09 RX ADMIN — POTASSIUM CHLORIDE 40 MEQ: 1500 TABLET, EXTENDED RELEASE ORAL at 10:38

## 2023-10-09 RX ADMIN — MOMETASONE FUROATE AND FORMOTEROL FUMARATE DIHYDRATE 2 PUFF: 200; 5 AEROSOL RESPIRATORY (INHALATION) at 09:55

## 2023-10-09 RX ADMIN — TRAZODONE HYDROCHLORIDE 100 MG: 100 TABLET ORAL at 23:23

## 2023-10-09 RX ADMIN — PIPERACILLIN AND TAZOBACTAM 3375 MG: 3; .375 INJECTION, POWDER, LYOPHILIZED, FOR SOLUTION INTRAVENOUS at 10:39

## 2023-10-09 RX ADMIN — TIOTROPIUM BROMIDE INHALATION SPRAY 2 PUFF: 3.12 SPRAY, METERED RESPIRATORY (INHALATION) at 09:55

## 2023-10-09 ASSESSMENT — PAIN SCALES - GENERAL
PAINLEVEL_OUTOF10: 0
PAINLEVEL_OUTOF10: 6
PAINLEVEL_OUTOF10: 0
PAINLEVEL_OUTOF10: 2
PAINLEVEL_OUTOF10: 6
PAINLEVEL_OUTOF10: 0

## 2023-10-09 ASSESSMENT — PAIN DESCRIPTION - PAIN TYPE: TYPE: ACUTE PAIN

## 2023-10-09 ASSESSMENT — PAIN DESCRIPTION - DESCRIPTORS: DESCRIPTORS: SHARP

## 2023-10-09 ASSESSMENT — PAIN DESCRIPTION - ORIENTATION: ORIENTATION: MID

## 2023-10-09 ASSESSMENT — PAIN DESCRIPTION - FREQUENCY: FREQUENCY: INTERMITTENT

## 2023-10-09 ASSESSMENT — PAIN DESCRIPTION - ONSET: ONSET: GRADUAL

## 2023-10-09 ASSESSMENT — PAIN DESCRIPTION - LOCATION: LOCATION: CHEST;HIP

## 2023-10-09 ASSESSMENT — PAIN - FUNCTIONAL ASSESSMENT: PAIN_FUNCTIONAL_ASSESSMENT: ACTIVITIES ARE NOT PREVENTED

## 2023-10-09 NOTE — CONSULTS
[facility]      Patient - Sekou Ontiveros   MRN -  [id number]   [address] [place] [address] # - [de-identified]   - 1955      Date of Admission -  10/8/2023  2:14 PM  Date of evaluation -  10/9/2023  Room - 3B--A   Hospital Day - 1  Consulting - Gladys Samaniego Primary Care Physician - YESY Lucero - CNP     Problem List      Active Hospital Problems    Diagnosis Date Noted    Chest pain [R07.9] 10/09/2023    Acute and chronic respiratory failure with hypoxia [facility]) [J96.21] 2023    Acute respiratory failure with hypoxia ([address]) [J96.01] 2023    Anemia [D64.9] 2023     Reason for Consult    Hypoxia and shortness of breath. HPI   History Obtained From: Patient and electronic medical record. Sekou Ontiveros is a 79 y.o. male with a past medical history of CML currently undergoing chemotherapy, COPD, A-fib, hypertension, BPH, insomnia who presented with shortness of breath and substernal chest pain that worsens with exertion and breathing (pleuritic) nonradiating. He requires 2 L oxygen at home. Patient regularly gets transfusions secondary to his cancer condition. His chemotherapy regimen includes 1 cycle of Vidaza in July and 1 cycle of decitabine at [place] in 2023. He recently underwent a blast crisis which is now resolved. He also gets recurrent left pleural effusions which required multiple thoracentesis, latest thoracentesis was done approximately 3 days ago per the patient at [facility]. Patient notes that after thoracentesis his breathing gets a lot better. He is having shortness of breath: Yes  Onset: gradual   Duration:3days. Diurnal variation:  None. Worse in the morning  Functional status prior to beginning of symptoms: 0 block/s on level ground. Current functional capacity on level ground: 0 block/s on level ground. He can climb steps: No  Flights of steps she can climb: 0  He admits to orthopnea.   He denies

## 2023-10-09 NOTE — CONSULTS
[facility] [address] [place] [address]    Patient's Name/Date of Birth: Catherine Leahy / 1955 (75 y.o.)    Date: October 9, 2023     Referring Provider: Elana Rodriguez PA-C    CHIEF COMPLAINT:      HPI: This is a pleasant 79 y.o. male hx of MDS/CMML, HTN\, BPH, COPD, PAF not on [address] due to low plts,  who presents with sob and SSCP over the last 1-2 days. No arm or jaw pain. Not associated with exertion. He does get chest pain when he is anemic and did have a recent blast crisis. He has had left sided thoracentesis as well for possibly malignant effusion. 8/2023 TTE shows EF 60-65%, PASP 35 -40 mmHg. Cr 1.3.  K 3.4. . Hgb remains low 6.8, Plt 27. Trop 18--17. He did have PRBCs given last night as well. NTG SL did not improve the pain. He was given Toradol. No active chest pain, angina, MCDOWELL, orthopnea, PND, sob at rest, palpitations, LE edema, or syncope. CT showing small left sided loculated effusion, nodular densities in the RUL that may be neoplastic, no CT PE, and large LN 5.5 x 4.2. cm     Echo: No results found for this or any previous visit.        All labs, EKG's, diagnostic testing and images as well as cardiac cath, stress testing were reviewed during this encounter    Past Medical History:   Diagnosis Date    Arthritis     Cancer ([address] )     COPD (chronic obstructive pulmonary disease) ([address])     Hypertension      Past Surgical History:   Procedure Laterality Date    CT BONE MARROW BIOPSY  8/25/2023    CT BONE MARROW BIOPSY 8/25/2023 [facility] CT SCAN     Current Facility-Administered Medications   Medication Dose Route Frequency Provider Last Rate Last Admin    traZODone (DESYREL) tablet 100 mg  100 mg Oral Nightly PRN KYLER Batista   100 mg at 10/09/23 0153    melatonin tablet 6 mg  6 mg Oral Nightly PRN KYLER Batista        0.9 % sodium chloride infusion   IntraVENous PRN Jeffrey Shah DO        0.9 % sodium chloride infusion   IntraVENous PRN Kizzy Strauss

## 2023-10-09 NOTE — PROGRESS NOTES
Bedside spirometry done at this time with pt in high fowlers. Pt tolerated well. Results in Epic under chart review then procedures. Nurse notified that test completed.

## 2023-10-09 NOTE — CONSULTS
[facility]    Patient - Jerica Mora   MRN -  [id number]   [address] [place] [address] # - [de-identified]   - 1955      Date of Admission -  10/8/2023  2:14 PM  Date of evaluation -  10/9/2023  Room - 3B--A   Hospital Day - 1  Referred by- Gema Gonzales PA-C Primary Care Physician - Lorna Nieto, APRN - CNP       Reason for Consult    CMML  Active Hospital Problem List      Active Hospital Problems    Diagnosis Date Noted    Acute respiratory failure with hypoxia [facility]) [J96.01] 2023     HPI   Jerica Mora is a 79 y.o. male admitted for acute respiratory failure with hypoxia. The patient has a past medical history of CMML diagnosed May 2023 and has had multiple hospitalizations since. He presented to the ED on 10/8/2023 with shortness of breath, acute chest pain, palpitations. He was noted to have hypoxia in the ED and placed on 4L/min NC from baseline 2L/min NC. CTA of chest in ED showed small loculated left pleural effusion, nodular densities demonstrated within the right upper lobe extending centrally towards the right suprahilar region along the bronchovascular chair which is concerning for neoplastic process. numerable enlarged lymph nodes within the mediastinum. The largest of these measures approximately 5.5 x 4.2 cm on axial image 92. There is also lymphadenopathy at the upper rightward mediastinum and the mass effect upon the trachea at the thoracic inlet seen on axial image 58. This is highly concerning for metastatic disease. No CTA evidence of pulmonary embolism. CBC revealed Hgb 6.7, hct 20.6, platelet count 14,402. He received PRBCs. He was admitted under the Hospitalist Service. He was started on IV Zosyn. Pulmonology, Cardiology consulted. Medical Oncology consulted to follow up on above. The patient has CMML. He has followed at the [facility] and then transferred care to [facility]. [facility] due to insurance coverage.  He received inpatient decitabine for 7 [facility]) TECHNOLOGIST PROVIDED HISTORY: Which side should the procedure be performed? ->Left Does fluid need testing? If yes, please place LAB Orders. ->Yes Is the procedure for Diagnostic or Therapeutic reasons? ->Therapeutic TECHNIQUE: This procedure was performed by Jami Hunter PA-C under indirect supervision of Dr. Lamar Burdick. Informed consent was obtained including a discussion of the procedure and associated risks. The procedure was performed with the patient sitting up, back exposed. The posterior aspect of the left hemithorax was prepped and draped in sterile fashion, and local anesthesia was achieved with 1% lidocaine. A valved 5 Cuban needle sheath was advanced under ultrasound guidance into the pleural effusion. A saved sonographic image demonstrates safe tract access. The catheter was connected to a Vacutainer bottle, and 900 mL mL of clear isaac fluid were drained. The catheter was removed. Postprocedural ultrasound demonstrated no significant residual fluid. The skin entry site was dressed appropriately. A sample was not sent for laboratory analysis. Estimated blood loss was minimum. The patient tolerated the procedure well and left the department in stable condition. FINDINGS: A total of 900 mL of clear isaac fluid was removed. Successful ultrasound guided left thoracentesis. Assessment/Recommendations    CMML  Treatment history as detailed above. With recent blast crisis. Received 1 cycle of azacitidine in June 2023, 1 cycle of decitabine at [facility] 8/2023. He was admitted to [facility]. [facility] 9/18/23 to 9/26/23 with blast crisis. He received inpatient decitabine completed on 9/24/23. Following with Dr. Everette Lui at [facility]. [facility]. Left voicemail with [facility] Oncology team to inform of admission. 2. Normocytic Anemia   Secondary to #1. Recommend transfusion for Hgb 7.0 or less. Give irradiated, leukoreduced PRBCs. 3. Thrombocytopenia   Secondary to #1.  Recommend to

## 2023-10-09 NOTE — CARE COORDINATION
10/09/23 1052   Readmission Assessment   Number of Days since last admission? 1-7 days   Previous Disposition Home Alone   Who is being Interviewed Patient   What was the patient's/caregiver's perception as to why they think they needed to return back to the hospital? Other (Comment)  (symptoms returned, SOB and CP)   Did you visit your Primary Care Physician after you left the hospital, before you returned this time? No   Why weren't you able to visit your PCP? Other (Comment); Did not have an appointment  (no appt made)   Did you see a specialist, such as Cardiac, Pulmonary, Orthopedic Physician, etc. after you left the hospital? No   Who advised the patient to return to the hospital? Self-referral   Does the patient report anything that got in the way of taking their medications? No   In our efforts to provide the best possible care to you and others like you, can you think of anything that we could have done to help you after you left the hospital the first time, so that you might not have needed to return so soon?  Other (Comment)  (\"I don't think so\")

## 2023-10-09 NOTE — PROGRESS NOTES
Hospitalist Progress Note    Patient:  Chandrika Bee    YOB: 1955  Unit/Bed:3B-21/021-A  Date of Admission: 10/8/2023    Expected Discharge: pending clinical course  Disposition: Home alone    Assessment/Plan:    Acute on chronic Respiratory Failure with hypoxia: secondary to mediastinal mass vs. COPD exacerbation vs. Pneumonia  - Baseline oxygen is 2L NC. Placed on 4L NC on admission, however no hypoxia documented. Wean O2 to baseline and monitor.   - CXR: Left-sided pleural effusion with adjacent atelectasis/infiltrate, right upper lung atelectasis/infiltrate, stable right superior mediastinal mass   - CTA negative for pulmonary embolism, small loculated left pleural effusion, nodular densities in right upper lobe, numeral enlarged lymph nodes within the mediastinum, lymphadenopathy at the upper right mediastinum  - Possible PNA given SIRS criteria 2/4 WBC 19.2, tachypnea 30 with RUL infiltrate. Afebrile. Leukocytosis is noted to be chronic in setting of CML. Infiltrate is chronic, no change from prior. Low suspicion for infection. Received 3 doses of Zosyn, will discontinue.     - Blood cultures x2, follow   - MRSA screen negative  - Respiratory culture, pneumonia panel pending  - Continue Dulera, Spiriva, PRN albuterol  - Oxygen therapy, wean as tolerated, wears O2 at 2L at home  - Pulmonary toileting with IS, Acapella  - Pulmonary consult for possible EBUS    Chest pain: likely related to mediastinal mass  - Echo on 8/3/23 EF 60-65%, repeat Echo completed this AM  - Initial troponin 21, repeat 17  - Cardiology consulted, does not feel this is cardiac related  - Tylenol, Tramadol PRN for pain    Anemia of chronic disease:   - Hgb 6.7 on admit, received a total of 3 units PRBCs, repeat this AM 7.6  - Recheck Hgb at 1800, transfuse if <7. Needs irradiated, leukoreduced PRBCs    CML/MDS:   - Follows with Oncology at [facility] as OP, obtain records  - Continue hydroxyurea, Low-density lesions within the kidneys are partially visualized and incompletely characterized. Further evaluation can be obtained with routine nonemergent renal ultrasound. Limited evaluation of the upper abdomen appears otherwise unremarkable. No acute osseous findings are seen. 1. There is a small loculated left pleural effusion demonstrated. Cannot exclude a malignant effusion. 2. There are nodular densities demonstrated within the right upper lobe extending centrally towards the right suprahilar region along the bronchovasculature which is concerning for neoplastic process. 3. There are numerable enlarged lymph nodes within the mediastinum. The largest of these measures approximately 5.5 x 4.2 cm on axial image 92. There is also lymphadenopathy at the upper rightward mediastinum in the mass effect upon the trachea at the thoracic inlet seen on axial image 58. This is highly concerning for metastatic disease. 4. There is no CT angiographic evidence of pulmonary embolism. **This report has been created using voice recognition software. It may contain minor errors which are inherent in voice recognition technology. **  Final report electronically signed by Dr. Nette Hess on 10/8/2023 4:24 PM    XR CHEST PORTABLE    Result Date: 10/8/2023  PROCEDURE: XR CHEST PORTABLE CLINICAL INFORMATION: Shortness of breath, hypoxia TECHNIQUE: Mobile AP chest radiograph. COMPARISON: Mobile AP chest radiograph 10/5/2023 FINDINGS: Cardiac silhouette is at the upper limits of normal in size. The left costophrenic angle remains blunted. There are alveolar and reticular opacities in the right upper and left mid lung. A right superior mediastinal mass is poorly visualized but likely stable. This mass is evaluated on recent CTA of the chest. Degenerative changes in the thoracic spine are poorly visualized. 1. Left-sided pleural effusion with adjacent atelectasis/infiltrate. 2. Right upper lung atelectasis/infiltrate.  3. Stable

## 2023-10-09 NOTE — PLAN OF CARE
Problem: Pain  Goal: Verbalizes/displays adequate comfort level or baseline comfort level  Outcome: Progressing  Flowsheets (Taken 10/9/2023 0337)  Verbalizes/displays adequate comfort level or baseline comfort level:   Encourage patient to monitor pain and request assistance   Assess pain using appropriate pain scale     Problem: Discharge Planning  Goal: Discharge to home or other facility with appropriate resources  Outcome: Progressing  Flowsheets (Taken 10/9/2023 6755)  Discharge to home or other facility with appropriate resources:   Identify barriers to discharge with patient and caregiver   Identify discharge learning needs (meds, wound care, etc)     Problem: Safety - Adult  Goal: Free from fall injury  Outcome: Progressing  Flowsheets (Taken 10/9/2023 0337)  Free From Fall Injury: Based on caregiver fall risk screen, instruct family/caregiver to ask for assistance with transferring infant if caregiver noted to have fall risk factors     Problem: ABCDS Injury Assessment  Goal: Absence of physical injury  Outcome: Progressing  Flowsheets (Taken 10/9/2023 0337)  Absence of Physical Injury: Implement safety measures based on patient assessment     Problem: Respiratory - Adult  Goal: Achieves optimal ventilation and oxygenation  Outcome: Progressing  Flowsheets (Taken 10/9/2023 0337)  Achieves optimal ventilation and oxygenation: Assess for changes in respiratory status     Problem: Cardiovascular - Adult  Goal: Maintains optimal cardiac output and hemodynamic stability  Outcome: Progressing  Flowsheets (Taken 10/9/2023 0337)  Maintains optimal cardiac output and hemodynamic stability:   Monitor blood pressure and heart rate   Assess for signs of decreased cardiac output     Problem: Cardiovascular - Adult  Goal: Absence of cardiac dysrhythmias or at baseline  Outcome: Progressing  Flowsheets (Taken 10/9/2023 0337)  Absence of cardiac dysrhythmias or at baseline: Monitor cardiac rate and rhythm     Problem:

## 2023-10-09 NOTE — CARE COORDINATION
Case Management Assessment  Initial Evaluation    Date/Time of Evaluation: 10/9/2023 12:02 PM  Assessment Completed by: Grey Owens RN    If patient is discharged prior to next notation, then this note serves as note for discharge by case management. Patient Name: Sundeep Valenzuela                   YOB: 1955  Diagnosis: Acute and chronic respiratory failure with hypoxia ([address]) [J96.21]  Acute respiratory failure with hypoxia ([address]) [J96.01]  Anemia, unspecified type [D64.9]  Chest pain, unspecified type [R07.9]                   Date / Time: 10/8/2023  2:14 PM  Location: [address] [place] [address]     Patient Admission Status: Inpatient   Readmission Risk Low 0-14, Mod 15-19), High > 20: Readmission Risk Score: 31.2    Current PCP: YESY Tripp CNP  PCP verified by CM? Yes    Chart Reviewed: Yes      History Provided by: Patient  Patient Orientation: Alert and Oriented    Patient Cognition: Alert    Hospitalization in the last 30 days (Readmission):  Yes    If yes, Readmission Assessment in CM Navigator will be completed. Advance Directives:      Code Status: Full Code   Patient's Primary Decision Maker is: Patient Declined (Legal Next of Kin Remains as Decision Maker) (Has paperwork at home to fill out)    Primary Decision Maker: Edwige Robles - Spouse - [phone]    Secondary Decision Maker: Silvino Diana Child - [phone]    Discharge Planning:    Patient lives with: Alone Type of Home: House  Primary Care Giver: Self  Patient Support Systems include: Spouse/Significant Other, Children   Current Financial resources: Medicaid, Medicare  Current community resources: None  Current services prior to admission: Durable Medical Equipment, Oxygen Therapy            Current DME: Walker, Oxygen Therapy (Comment) (O2 [facility] 2L ATC with any activity)            Type of Home Care services:  None    ADLS  Prior functional level: Independent in ADLs/IADLs  Current functional level:  Independent in ADLs/IADLs    Family can provide assistance at DC: Yes  Would you like Case Management to discuss the discharge plan with any other family members/significant others, and if so, who? No  Plans to Return to Present Housing: Yes  Other Identified Issues/Barriers to RETURNING to current housing: none  Potential Assistance needed at discharge: N/A            Potential DME:  none  Patient expects to discharge to: [address] [place] [address] for transportation at discharge: Family    Financial    Payor: Eunice Fall / Plan: [address] / Product Type: *No Product type* /     Does insurance require precert for SNF: Yes    Potential assistance Purchasing Medications: No  Meds-to-Beds request: Yes      [address], [address] [facility] [phone] Danita Barcenas [address]  [address] [place] [address], [address] [address]  Phone: [phone] Fax: [phone]      Notes:    Factors facilitating achievement of predicted outcomes: Family support, Cooperative, Pleasant, Good insight into deficits, and Has needed Durable Medical Equipment at home    Barriers to discharge: Medical complications and Medication managment    Additional Case Management Notes: Admitted through ED with SOB and substernal CP. Requiring 4L O2 in ED (baseline 2L). O2 currently at 3L/nc. Sats 93%. Hgb was 6.7 upon arrival, this am 6.8. WBC 19.2 Troponins 21, 17 and 18. Planning to transfuse blood. Zosyn iv q8hr. Pt was admitted to , then complained of CP upon arrival to floor. Transferred to . Procedure:   10/8 CXR: Left-sided pleural effusion with adjacent atelectasis/infiltrate. Right upper lung atelectasis/infiltrate. Stable right superior mediastinal mass. 10/8 CTA chest:   1. There is a small loculated left pleural effusion demonstrated. Cannot exclude a malignant effusion.   2. There are nodular densities demonstrated within the right upper lobe extending centrally towards the right suprahilar region along the

## 2023-10-10 ENCOUNTER — APPOINTMENT (OUTPATIENT)
Dept: GENERAL RADIOLOGY | Age: 68
DRG: 177 | End: 2023-10-10
Payer: COMMERCIAL

## 2023-10-10 ENCOUNTER — APPOINTMENT (OUTPATIENT)
Dept: ULTRASOUND IMAGING | Age: 68
DRG: 177 | End: 2023-10-10
Payer: COMMERCIAL

## 2023-10-10 LAB
ALBUMIN FLD-MCNC: 2.7 GM/DL
ALBUMIN SERPL BCG-MCNC: 3.4 G/DL (ref 3.5–5.1)
ANION GAP SERPL CALC-SCNC: 12 MEQ/L (ref 8–16)
AUTO DIFF PNL BLD: ABNORMAL
BACTERIA BLD AEROBE CULT: NORMAL
BACTERIA BLD AEROBE CULT: NORMAL
BASOPHILS ABSOLUTE: 0 THOU/MM3 (ref 0–0.1)
BASOPHILS NFR BLD AUTO: 0 %
BLASTS: 1 % (ref 0–1)
BUN SERPL-MCNC: 14 MG/DL (ref 7–22)
CALCIUM SERPL-MCNC: 8.6 MG/DL (ref 8.5–10.5)
CHLORIDE SERPL-SCNC: 100 MEQ/L (ref 98–111)
CO2 SERPL-SCNC: 25 MEQ/L (ref 23–33)
CREAT SERPL-MCNC: 1.2 MG/DL (ref 0.4–1.2)
DEPRECATED RDW RBC AUTO: 51.8 FL (ref 35–45)
EOSINOPHIL NFR BLD AUTO: 2 %
EOSINOPHILS ABSOLUTE: 0.4 THOU/MM3 (ref 0–0.4)
ERYTHROCYTE [DISTWIDTH] IN BLOOD BY AUTOMATED COUNT: 15.9 % (ref 11.5–14.5)
GFR SERPL CREATININE-BSD FRML MDRD: > 60 ML/MIN/1.73M2
GLUCOSE FLD-MCNC: 113 MG/DL
GLUCOSE SERPL-MCNC: 108 MG/DL (ref 70–108)
HCT VFR BLD AUTO: 23.9 % (ref 42–52)
HGB BLD-MCNC: 7.9 GM/DL (ref 14–18)
LDH FLD L TO P-CCNC: 712 U/L
LDH SERPL L TO P-CCNC: 535 U/L (ref 100–190)
LYMPHOCYTES ABSOLUTE: 3.1 THOU/MM3 (ref 1–4.8)
LYMPHOCYTES NFR BLD AUTO: 17 %
MAGNESIUM SERPL-MCNC: 1.7 MG/DL (ref 1.6–2.4)
MANUAL DIFF BLD: NORMAL
MCH RBC QN AUTO: 29.6 PG (ref 26–33)
MCHC RBC AUTO-ENTMCNC: 33.1 GM/DL (ref 32.2–35.5)
MCV RBC AUTO: 89.5 FL (ref 80–94)
METAMYELOCYTES: 2 %
MONOCYTES ABSOLUTE: 10.9 THOU/MM3 (ref 0.4–1.3)
MONOCYTES NFR BLD AUTO: 59 %
MYELOCYTES: 3 %
NEUTROPHILS NFR BLD AUTO: 16 %
NRBC BLD AUTO-RTO: 0 /100 WBC
PATHOLOGIST REVIEW: ABNORMAL
PH BIFL: 7.55 [PH]
PLATELET # BLD AUTO: 27 THOU/MM3 (ref 130–400)
PLATELET BLD QL SMEAR: ABNORMAL
PMV BLD AUTO: 11.8 FL (ref 9.4–12.4)
POTASSIUM SERPL-SCNC: 3.3 MEQ/L (ref 3.5–5.2)
PROT FLD-MCNC: 4.2 GM/DL
PROT SERPL-MCNC: 6.4 G/DL (ref 6.1–8)
RBC # BLD AUTO: 2.67 MILL/MM3 (ref 4.7–6.1)
ROULEAUX BLD QL SMEAR: SLIGHT
SEGMENTED NEUTROPHILS ABSOLUTE COUNT: 3 THOU/MM3 (ref 1.8–7.7)
SODIUM SERPL-SCNC: 137 MEQ/L (ref 135–145)
WBC # BLD AUTO: 18.5 THOU/MM3 (ref 4.8–10.8)

## 2023-10-10 PROCEDURE — 36430 TRANSFUSION BLD/BLD COMPNT: CPT

## 2023-10-10 PROCEDURE — 85025 COMPLETE CBC W/AUTO DIFF WBC: CPT

## 2023-10-10 PROCEDURE — 99232 SBSQ HOSP IP/OBS MODERATE 35: CPT | Performed by: INTERNAL MEDICINE

## 2023-10-10 PROCEDURE — 6370000000 HC RX 637 (ALT 250 FOR IP)

## 2023-10-10 PROCEDURE — 94760 N-INVAS EAR/PLS OXIMETRY 1: CPT

## 2023-10-10 PROCEDURE — 82040 ASSAY OF SERUM ALBUMIN: CPT

## 2023-10-10 PROCEDURE — 88185 FLOWCYTOMETRY/TC ADD-ON: CPT

## 2023-10-10 PROCEDURE — 2140000000 HC CCU INTERMEDIATE R&B

## 2023-10-10 PROCEDURE — 88184 FLOWCYTOMETRY/ TC 1 MARKER: CPT

## 2023-10-10 PROCEDURE — 88112 CYTOPATH CELL ENHANCE TECH: CPT

## 2023-10-10 PROCEDURE — 99233 SBSQ HOSP IP/OBS HIGH 50: CPT

## 2023-10-10 PROCEDURE — 83615 LACTATE (LD) (LDH) ENZYME: CPT

## 2023-10-10 PROCEDURE — 80048 BASIC METABOLIC PNL TOTAL CA: CPT

## 2023-10-10 PROCEDURE — 6370000000 HC RX 637 (ALT 250 FOR IP): Performed by: PHYSICIAN ASSISTANT

## 2023-10-10 PROCEDURE — 32555 ASPIRATE PLEURA W/ IMAGING: CPT

## 2023-10-10 PROCEDURE — 88305 TISSUE EXAM BY PATHOLOGIST: CPT

## 2023-10-10 PROCEDURE — 89050 BODY FLUID CELL COUNT: CPT

## 2023-10-10 PROCEDURE — P9037 PLATE PHERES LEUKOREDU IRRAD: HCPCS

## 2023-10-10 PROCEDURE — 2700000000 HC OXYGEN THERAPY PER DAY

## 2023-10-10 PROCEDURE — 84155 ASSAY OF PROTEIN SERUM: CPT

## 2023-10-10 PROCEDURE — 83735 ASSAY OF MAGNESIUM: CPT

## 2023-10-10 PROCEDURE — 87070 CULTURE OTHR SPECIMN AEROBIC: CPT

## 2023-10-10 PROCEDURE — 82945 GLUCOSE OTHER FLUID: CPT

## 2023-10-10 PROCEDURE — 87205 SMEAR GRAM STAIN: CPT

## 2023-10-10 PROCEDURE — 82042 OTHER SOURCE ALBUMIN QUAN EA: CPT

## 2023-10-10 PROCEDURE — 94669 MECHANICAL CHEST WALL OSCILL: CPT

## 2023-10-10 PROCEDURE — 71045 X-RAY EXAM CHEST 1 VIEW: CPT

## 2023-10-10 PROCEDURE — 87075 CULTR BACTERIA EXCEPT BLOOD: CPT

## 2023-10-10 PROCEDURE — 84157 ASSAY OF PROTEIN OTHER: CPT

## 2023-10-10 PROCEDURE — 83986 ASSAY PH BODY FLUID NOS: CPT

## 2023-10-10 PROCEDURE — 2580000003 HC RX 258

## 2023-10-10 PROCEDURE — 94640 AIRWAY INHALATION TREATMENT: CPT

## 2023-10-10 PROCEDURE — 36415 COLL VENOUS BLD VENIPUNCTURE: CPT

## 2023-10-10 RX ORDER — BUMETANIDE 1 MG/1
1 TABLET ORAL DAILY
Status: DISCONTINUED | OUTPATIENT
Start: 2023-10-10 | End: 2023-10-11 | Stop reason: HOSPADM

## 2023-10-10 RX ORDER — OXYCODONE HYDROCHLORIDE AND ACETAMINOPHEN 5; 325 MG/1; MG/1
2 TABLET ORAL EVERY 4 HOURS PRN
Status: DISCONTINUED | OUTPATIENT
Start: 2023-10-10 | End: 2023-10-11 | Stop reason: HOSPADM

## 2023-10-10 RX ORDER — DOCUSATE SODIUM 100 MG/1
100 CAPSULE, LIQUID FILLED ORAL DAILY
Status: DISCONTINUED | OUTPATIENT
Start: 2023-10-10 | End: 2023-10-11 | Stop reason: HOSPADM

## 2023-10-10 RX ORDER — POTASSIUM CHLORIDE 20 MEQ/1
10 TABLET, EXTENDED RELEASE ORAL
Status: DISCONTINUED | OUTPATIENT
Start: 2023-10-10 | End: 2023-10-11 | Stop reason: HOSPADM

## 2023-10-10 RX ORDER — OXYCODONE HYDROCHLORIDE 5 MG/1
5 TABLET ORAL EVERY 4 HOURS PRN
Status: DISCONTINUED | OUTPATIENT
Start: 2023-10-10 | End: 2023-10-10

## 2023-10-10 RX ORDER — OXYCODONE HYDROCHLORIDE AND ACETAMINOPHEN 5; 325 MG/1; MG/1
1 TABLET ORAL EVERY 4 HOURS PRN
Status: DISCONTINUED | OUTPATIENT
Start: 2023-10-10 | End: 2023-10-11 | Stop reason: HOSPADM

## 2023-10-10 RX ORDER — BISACODYL 10 MG
10 SUPPOSITORY, RECTAL RECTAL DAILY PRN
Status: DISCONTINUED | OUTPATIENT
Start: 2023-10-10 | End: 2023-10-11 | Stop reason: HOSPADM

## 2023-10-10 RX ORDER — POLYETHYLENE GLYCOL 3350 17 G/17G
17 POWDER, FOR SOLUTION ORAL DAILY
Status: DISCONTINUED | OUTPATIENT
Start: 2023-10-10 | End: 2023-10-11 | Stop reason: HOSPADM

## 2023-10-10 RX ORDER — SODIUM CHLORIDE 0.9 % (FLUSH) 0.9 %
5-40 SYRINGE (ML) INJECTION EVERY 12 HOURS SCHEDULED
Status: DISCONTINUED | OUTPATIENT
Start: 2023-10-10 | End: 2023-10-11 | Stop reason: HOSPADM

## 2023-10-10 RX ADMIN — BUMETANIDE 1 MG: 1 TABLET ORAL at 09:42

## 2023-10-10 RX ADMIN — TRAMADOL HYDROCHLORIDE 100 MG: 50 TABLET, COATED ORAL at 09:34

## 2023-10-10 RX ADMIN — POLYETHYLENE GLYCOL 3350 17 G: 17 POWDER, FOR SOLUTION ORAL at 18:13

## 2023-10-10 RX ADMIN — DOCUSATE SODIUM 100 MG: 100 CAPSULE, LIQUID FILLED ORAL at 18:13

## 2023-10-10 RX ADMIN — ALBUTEROL SULFATE 2 PUFF: 90 AEROSOL, METERED RESPIRATORY (INHALATION) at 07:42

## 2023-10-10 RX ADMIN — ALLOPURINOL 300 MG: 300 TABLET ORAL at 09:42

## 2023-10-10 RX ADMIN — TAMSULOSIN HYDROCHLORIDE 0.8 MG: 0.4 CAPSULE ORAL at 09:42

## 2023-10-10 RX ADMIN — OXYCODONE HYDROCHLORIDE AND ACETAMINOPHEN 2 TABLET: 5; 325 TABLET ORAL at 22:27

## 2023-10-10 RX ADMIN — SODIUM CHLORIDE, PRESERVATIVE FREE 10 ML: 5 INJECTION INTRAVENOUS at 09:42

## 2023-10-10 RX ADMIN — ALBUTEROL SULFATE 2 PUFF: 90 AEROSOL, METERED RESPIRATORY (INHALATION) at 17:56

## 2023-10-10 RX ADMIN — MOMETASONE FUROATE AND FORMOTEROL FUMARATE DIHYDRATE 2 PUFF: 200; 5 AEROSOL RESPIRATORY (INHALATION) at 17:57

## 2023-10-10 RX ADMIN — METOPROLOL TARTRATE 12.5 MG: 25 TABLET, FILM COATED ORAL at 20:49

## 2023-10-10 RX ADMIN — METOPROLOL TARTRATE 12.5 MG: 25 TABLET, FILM COATED ORAL at 09:42

## 2023-10-10 RX ADMIN — OXYCODONE HYDROCHLORIDE AND ACETAMINOPHEN 2 TABLET: 5; 325 TABLET ORAL at 12:35

## 2023-10-10 RX ADMIN — OXYCODONE HYDROCHLORIDE AND ACETAMINOPHEN 2 TABLET: 5; 325 TABLET ORAL at 18:14

## 2023-10-10 RX ADMIN — TIOTROPIUM BROMIDE INHALATION SPRAY 2 PUFF: 3.12 SPRAY, METERED RESPIRATORY (INHALATION) at 07:42

## 2023-10-10 RX ADMIN — FOLIC ACID 1 MG: 1 TABLET ORAL at 09:42

## 2023-10-10 RX ADMIN — HYDROXYUREA 500 MG: 500 CAPSULE ORAL at 09:42

## 2023-10-10 RX ADMIN — POTASSIUM CHLORIDE 40 MEQ: 1500 TABLET, EXTENDED RELEASE ORAL at 06:07

## 2023-10-10 RX ADMIN — MOMETASONE FUROATE AND FORMOTEROL FUMARATE DIHYDRATE 2 PUFF: 200; 5 AEROSOL RESPIRATORY (INHALATION) at 07:42

## 2023-10-10 RX ADMIN — SODIUM CHLORIDE, PRESERVATIVE FREE 10 ML: 5 INJECTION INTRAVENOUS at 20:49

## 2023-10-10 RX ADMIN — QUETIAPINE FUMARATE 25 MG: 25 TABLET ORAL at 22:27

## 2023-10-10 ASSESSMENT — PAIN DESCRIPTION - DESCRIPTORS
DESCRIPTORS: DISCOMFORT
DESCRIPTORS: ACHING;DISCOMFORT
DESCRIPTORS: ACHING;DISCOMFORT
DESCRIPTORS: DISCOMFORT
DESCRIPTORS: SHOOTING;STABBING
DESCRIPTORS: DISCOMFORT

## 2023-10-10 ASSESSMENT — PAIN DESCRIPTION - PAIN TYPE
TYPE: CHRONIC PAIN

## 2023-10-10 ASSESSMENT — PAIN DESCRIPTION - LOCATION
LOCATION: BACK
LOCATION: BACK;HIP;NECK
LOCATION: NECK;BACK;HIP

## 2023-10-10 ASSESSMENT — PAIN - FUNCTIONAL ASSESSMENT
PAIN_FUNCTIONAL_ASSESSMENT: PREVENTS OR INTERFERES SOME ACTIVE ACTIVITIES AND ADLS
PAIN_FUNCTIONAL_ASSESSMENT: ACTIVITIES ARE NOT PREVENTED
PAIN_FUNCTIONAL_ASSESSMENT: PREVENTS OR INTERFERES SOME ACTIVE ACTIVITIES AND ADLS
PAIN_FUNCTIONAL_ASSESSMENT: ACTIVITIES ARE NOT PREVENTED
PAIN_FUNCTIONAL_ASSESSMENT: ACTIVITIES ARE NOT PREVENTED
PAIN_FUNCTIONAL_ASSESSMENT: PREVENTS OR INTERFERES SOME ACTIVE ACTIVITIES AND ADLS

## 2023-10-10 ASSESSMENT — PAIN DESCRIPTION - ORIENTATION
ORIENTATION: RIGHT;LEFT
ORIENTATION: LEFT
ORIENTATION: RIGHT;LEFT
ORIENTATION: RIGHT;LEFT
ORIENTATION: LOWER
ORIENTATION: LEFT

## 2023-10-10 ASSESSMENT — PAIN SCALES - GENERAL
PAINLEVEL_OUTOF10: 9
PAINLEVEL_OUTOF10: 5
PAINLEVEL_OUTOF10: 7
PAINLEVEL_OUTOF10: 8
PAINLEVEL_OUTOF10: 10
PAINLEVEL_OUTOF10: 5

## 2023-10-10 ASSESSMENT — PAIN DESCRIPTION - ONSET
ONSET: ON-GOING

## 2023-10-10 ASSESSMENT — PAIN DESCRIPTION - FREQUENCY
FREQUENCY: CONTINUOUS
FREQUENCY: INTERMITTENT
FREQUENCY: CONTINUOUS

## 2023-10-10 NOTE — PLAN OF CARE
Problem: Respiratory - Adult  Goal: Achieves optimal ventilation and oxygenation  10/10/2023 1804 by Cameron Lawson RCP  Outcome: Progressing   Patient is receiving mometasone-formoterol, tiotropium, albuterol, and vibratory PEP therapy to promote and preserve a clear, open airway and achieve optimal oxygenation and ventilation. Patient breath sounds improved post-treatment and will continue to improve with further therapy. Patient mutually agreed on goals.

## 2023-10-10 NOTE — PROGRESS NOTES
[facility]  Progress note      Patient - Terrence Greer   MRN -  [id number]   [address] [place] [address] # - [de-identified]   - 1955      Date of Admission -  10/8/2023  2:14 PM  Date of evaluation -  10/10/2023  Room - 3B--A   Hospital Day - 2  Consulting - Donna Marie Primary Care Physician - YESY Khalil - CNP     Problem List      Active Hospital Problems    Diagnosis Date Noted    Chest pain [R07.9] 10/09/2023    Normocytic anemia [D64.9] 10/09/2023    Personal history of tobacco use, presenting hazards to health [Z87.891] 10/09/2023    Acute and chronic respiratory failure with hypoxia ([address]) [J96.21] 2023    Pleural effusion, left [J90] 2023    Acute respiratory failure with hypoxia ([address]) [J96.01] 2023    Anemia [D64.9] 2023     Reason for Consult    shortness of breath and hypoxia  HPI   History Obtained From: Patient and electronic medical record. Terrence Greer is a 79 y.o. male with a past medical history of CML currently undergoing chemotherapy, COPD, A-fib, hypertension, BPH, insomnia who presented with shortness of breath and substernal chest pain that worsens with exertion and breathing (pleuritic) nonradiating. He requires 2 L oxygen at home. Patient regularly gets transfusions secondary to his cancer condition. His chemotherapy regimen includes 1 cycle of Vidaza in July and 1 cycle of decitabine at [place] in 2023. He recently underwent a blast crisis which is now resolved. He also gets recurrent left pleural effusions which required multiple thoracentesis, latest thoracentesis was done approximately 3 days ago per the patient at [facility]. Patient notes that after thoracentesis his breathing gets a lot better. He is having shortness of breath: Yes  Onset: gradual   Duration:3days. Diurnal variation:  None.   Worse in the morning  Functional status prior to beginning of symptoms: 0 block/s on level ultrasound-guided thoracentesis, send pleural fluid for routine analysis including pH, glucose, total protein, LDH, cell count, cytology with cell block, Gram stain, and cultures. Please send simultaneous serum total protein and LDH for comparison.  -Still awaiting response from Dr. Juvenal Ly.   -Plan to continue on current treatment including Dulera, Spiriva and albuterol.  -Bedside spirometry   -Patient to keep scheduled appointment with oncology. Oncology is currently managing recurrent thoracentesis at [facility] and will defer treatment objectives to them. -Patient to follow-up with Dr. Marie Montez Pulmonolgy for continued treatment and evaluation.   -Will defer EBUS to primary oncologist. Please call [phone] for further questions or concerns. -Appreciate recommendations from Dr. Ranjith Birch. Questions and concerns addressed. Electronically signed by   April Cueva DO on 10/10/2023 at 10:46 AM     Addendum by Dr. Elana Dobson MD:  Patient seen by me independently including key components of medical care. Face to face evaluation and examination was performed. Case discussed with Hailey Ramires [address] physician. Agree with resident's findings and plan as documented in the resident's note. Italicized font, if present,  represents changes to the note made by me. More than 50% of the encounter time involved with patient care by the Pulmonary & Critical care service team spent by me. Please see my modifications mentioned below: In no acute distress  For ultrasound-guided thoracentesis by interventional radiology on left side today. Patient to follow with his medical oncologist in [place], [place]. Patient educated about my impression and plan.     Electronically signed by   Briana Bartholomew MD on 10/10/2023 at 5:12 PM

## 2023-10-10 NOTE — PROGRESS NOTES
Hospitalist Progress Note    Patient:  Nicolasa Whitehead    YOB: 1955  Unit/Bed:3B-21/021-A  Date of Admission: 10/8/2023    Expected Discharge: pending clinical course, possibly 10/11 as patient has a follow up with his oncologist on 10/12. Disposition: Home alone    Assessment/Plan:    Acute on chronic Respiratory Failure with hypoxia: secondary to mediastinal mass vs. COPD exacerbation vs. Pneumonia  - Baseline oxygen is 2L NC. Placed on 4L NC on admission, however no hypoxia documented. Wean O2 to baseline and monitor.   - 10/8 CXR: Left-sided pleural effusion with adjacent atelectasis/infiltrate, right upper lung atelectasis/infiltrate, stable right superior mediastinal mass   - 10/8 CTA negative for pulmonary embolism, small loculated left pleural effusion, nodular densities in right upper lobe, numeral enlarged lymph nodes within the mediastinum, lymphadenopathy at the upper right mediastinum  - Possible PNA given SIRS criteria 2/4 WBC 19.2, tachypnea 30 with RUL infiltrate. Afebrile. Leukocytosis is noted to be chronic in setting of CML. Infiltrate is chronic, no change from prior. Low suspicion for infection.  Received 3 doses of Zosyn, discontinued 10/9.   - Blood cultures x2, NGTD   - MRSA screen negative  - Respiratory culture, pneumonia panel pending  - Albuterol inhaler BID  - Continue Dulera, Spiriva, PRN albuterol  - Oxygen therapy, wean as tolerated, wears O2 at 2L at home  - Pulmonary toileting with IS, Acapella  - Pulmonary consult appreciate recs  - Plan for therapeutic US thoracentesis today, send pleural fluid for analysis and cultures  - Home O2 eval prior to discharge    Chest pain: likely related to mediastinal mass  - 10/9 Echo shows EG 45-50% with mildly reduced systolic function previous Echo on 8/3/23 EF 60-65%  - Initial troponin 21, repeat 17/18  - Cardiology consulted, does not feel this is cardiac related  - Tylenol, Tramadol PRN for pain, add Percocet recognition software. It may contain minor errors which are inherent in voice recognition technology. **  Final report electronically signed by Dr. Edson Flood on 10/8/2023 4:24 PM    XR CHEST PORTABLE    Result Date: 10/8/2023  PROCEDURE: XR CHEST PORTABLE CLINICAL INFORMATION: Shortness of breath, hypoxia TECHNIQUE: Mobile AP chest radiograph. COMPARISON: Mobile AP chest radiograph 10/5/2023 FINDINGS: Cardiac silhouette is at the upper limits of normal in size. The left costophrenic angle remains blunted. There are alveolar and reticular opacities in the right upper and left mid lung. A right superior mediastinal mass is poorly visualized but likely stable. This mass is evaluated on recent CTA of the chest. Degenerative changes in the thoracic spine are poorly visualized. 1. Left-sided pleural effusion with adjacent atelectasis/infiltrate. 2. Right upper lung atelectasis/infiltrate. 3. Stable right superior mediastinal mass. **This report has been created using voice recognition software. It may contain minor errors which are inherent in voice recognition technology. ** Final report electronically signed by Dr. Milly Smith on 10/8/2023 3:14 PM          DVT prophylaxis: SCD's  Diet: ADULT DIET;  Regular  PT/OT: No  Tele: Yes  Code Status: Full Code      Electronically signed by YESY Looney CNP on 10/10/2023 at 3:58 PM

## 2023-10-10 NOTE — PLAN OF CARE
Problem: Cardiovascular - Adult  Goal: Maintains optimal cardiac output and hemodynamic stability  10/10/2023 1054 by Arjun Le RN  Outcome: Progressing  Flowsheets (Taken 10/10/2023 1054)  Maintains optimal cardiac output and hemodynamic stability:   Monitor blood pressure and heart rate   Assess for signs of decreased cardiac output  10/9/2023 2151 by Delicia Valente RN  Outcome: Progressing  Flowsheets (Taken 10/9/2023 2151)  Maintains optimal cardiac output and hemodynamic stability:   Monitor blood pressure and heart rate   Assess for signs of decreased cardiac output

## 2023-10-10 NOTE — PROGRESS NOTES
10/09/23 2311   RT Protocol   History Pulmonary Disease 2   Respiratory pattern 2   Breath sounds 2   Cough 0   Indications for Bronchodilator Therapy Decreased or absent breath sounds; On home bronchodilators   Bronchodilator Assessment Score 6

## 2023-10-10 NOTE — PLAN OF CARE
Problem: Respiratory - Adult  Goal: Achieves optimal ventilation and oxygenation  Outcome: Progressing     Continue inhalers for airway maintenance and acapella to improve aeration of lungs. Patient mutually agreed on goals.

## 2023-10-10 NOTE — CARE COORDINATION
10/10/23, 1:27 PM EDT    DISCHARGE ON GOING EVALUATION    [address] day: 2  Location: -21/021-A Reason for admit: Acute and chronic respiratory failure with hypoxia ([address]) [J96.21]  Acute respiratory failure with hypoxia ([address]) [J96.01]  Anemia, unspecified type [D64.9]  Chest pain, unspecified type [R07.9]   Procedure:   10/8 CXR: Left-sided pleural effusion with adjacent atelectasis/infiltrate. Right upper lung atelectasis/infiltrate. Stable right superior mediastinal mass. 10/8 CTA chest:   1. There is a small loculated left pleural effusion demonstrated. Cannot exclude a malignant effusion. 2. There are nodular densities demonstrated within the right upper lobe extending centrally towards the right suprahilar region along the bronchovasculature which is concerning for neoplastic process. 3. There are numerable enlarged lymph nodes within the mediastinum. The largest of these measures approximately 5.5 x 4.2 cm on axial image 92. There is also lymphadenopathy at the upper rightward mediastinum in the mass effect upon the trachea at the thoracic inlet seen on axial image 58. This is highly concerning for metastatic disease. 4. There is no CT angiographic evidence of pulmonary embolism. 10/9 ECHO: EF 45-50%. Mild global hypokinesis. Barriers to Discharge: Hospitalist, Pulmonology, Cardiology and Hem-Onc following. Hgb up to 7.9. Platelets 27. Potassium 3.3. Bumex po daily. Electrolyte replacements. Spoke with Hospitalist, plan to either transfuse platelets and then do thoracentesis or will aggressively diurese. Planning discharge home tomorrow. IS. Acapella. O2 at 3L/nc, sats 94% (baseline 2L with activity). PCP: YESY Baldwin CNP  Readmission Risk Score: 32%  Patient Goals/Plan/Treatment Preferences: Plans home alone. Has home O2 through [facility], 2L with activity is baseline.  Discussed with Hospitalist regarding getting home O2 eval at time of discharge as pt has been needing O2 ATC. Verbalized understanding.

## 2023-10-10 NOTE — PLAN OF CARE
Problem: Pain  Goal: Verbalizes/displays adequate comfort level or baseline comfort level  Outcome: Progressing  Flowsheets (Taken 10/9/2023 2151)  Verbalizes/displays adequate comfort level or baseline comfort level:   Encourage patient to monitor pain and request assistance   Assess pain using appropriate pain scale     Problem: Discharge Planning  Goal: Discharge to home or other facility with appropriate resources  Outcome: Progressing  Flowsheets (Taken 10/9/2023 2151)  Discharge to home or other facility with appropriate resources:   Identify barriers to discharge with patient and caregiver   Identify discharge learning needs (meds, wound care, etc)     Problem: Safety - Adult  Goal: Free from fall injury  Outcome: Progressing  Flowsheets (Taken 10/9/2023 2151)  Free From Fall Injury: Based on caregiver fall risk screen, instruct family/caregiver to ask for assistance with transferring infant if caregiver noted to have fall risk factors     Problem: ABCDS Injury Assessment  Goal: Absence of physical injury  Outcome: Progressing  Flowsheets (Taken 10/9/2023 2151)  Absence of Physical Injury: Implement safety measures based on patient assessment     Problem: Respiratory - Adult  Goal: Achieves optimal ventilation and oxygenation  10/9/2023 2151 by Renate Toro RN  Outcome: Progressing  Flowsheets (Taken 10/9/2023 2151)  Achieves optimal ventilation and oxygenation: Assess for changes in respiratory status     Problem: Cardiovascular - Adult  Goal: Maintains optimal cardiac output and hemodynamic stability  Outcome: Progressing  Flowsheets (Taken 10/9/2023 2151)  Maintains optimal cardiac output and hemodynamic stability:   Monitor blood pressure and heart rate   Assess for signs of decreased cardiac output     Problem: Cardiovascular - Adult  Goal: Absence of cardiac dysrhythmias or at baseline  Outcome: Progressing  Flowsheets (Taken 10/9/2023 2151)  Absence of cardiac dysrhythmias or at baseline: Monitor

## 2023-10-11 VITALS
HEART RATE: 94 BPM | SYSTOLIC BLOOD PRESSURE: 106 MMHG | RESPIRATION RATE: 16 BRPM | WEIGHT: 250.88 LBS | HEIGHT: 71 IN | BODY MASS INDEX: 35.12 KG/M2 | OXYGEN SATURATION: 92 % | DIASTOLIC BLOOD PRESSURE: 66 MMHG | TEMPERATURE: 97.5 F

## 2023-10-11 LAB
ANION GAP SERPL CALC-SCNC: 14 MEQ/L (ref 8–16)
AUTO DIFF PNL BLD: ABNORMAL
BASOPHILS ABSOLUTE: 0 THOU/MM3 (ref 0–0.1)
BASOPHILS NFR BLD AUTO: 0 %
BLASTS: 2 % (ref 0–1)
BUN SERPL-MCNC: 23 MG/DL (ref 7–22)
CALCIUM SERPL-MCNC: 8.4 MG/DL (ref 8.5–10.5)
CHARACTER, BODY FLUID: ABNORMAL
CHLORIDE SERPL-SCNC: 98 MEQ/L (ref 98–111)
CO2 SERPL-SCNC: 26 MEQ/L (ref 23–33)
COLOR FLD: YELLOW
CREAT SERPL-MCNC: 1.6 MG/DL (ref 0.4–1.2)
DEPRECATED RDW RBC AUTO: 51.3 FL (ref 35–45)
EOSINOPHIL NFR BLD AUTO: 1 %
EOSINOPHILS ABSOLUTE: 0.2 THOU/MM3 (ref 0–0.4)
ERYTHROCYTE [DISTWIDTH] IN BLOOD BY AUTOMATED COUNT: 15.6 % (ref 11.5–14.5)
GFR SERPL CREATININE-BSD FRML MDRD: 47 ML/MIN/1.73M2
GLUCOSE SERPL-MCNC: 111 MG/DL (ref 70–108)
GRANULOCYTES NFR FLD AUTO: 19 %
HCT VFR BLD AUTO: 23.2 % (ref 42–52)
HGB BLD-MCNC: 7.6 GM/DL (ref 14–18)
LYMPHOCYTES ABSOLUTE: 3.7 THOU/MM3 (ref 1–4.8)
LYMPHOCYTES NFR BLD AUTO: 19 %
MANUAL DIFF BLD: NORMAL
MCH RBC QN AUTO: 29.6 PG (ref 26–33)
MCHC RBC AUTO-ENTMCNC: 32.8 GM/DL (ref 32.2–35.5)
MCV RBC AUTO: 90.3 FL (ref 80–94)
MESOTHELIAL CELLS BODY FLUID: ABNORMAL
METAMYELOCYTES: 1 %
MONOCYTES ABSOLUTE: 11.8 THOU/MM3 (ref 0.4–1.3)
MONOCYTES NFR BLD AUTO: 61 %
MONONUC CELLS NFR FLD AUTO: 81 %
MYELOCYTES: 2 %
NEUTROPHILS NFR BLD AUTO: 14 %
NRBC BLD AUTO-RTO: 0 /100 WBC
NUC CELL # FLD AUTO: 4484 /CUMM (ref 0–500)
PATHOLOGIST REVIEW: ABNORMAL
PATHOLOGIST REVIEW: ABNORMAL
PLATELET # BLD AUTO: 48 THOU/MM3 (ref 130–400)
PLATELET BLD QL SMEAR: ABNORMAL
PMV BLD AUTO: 10.6 FL (ref 9.4–12.4)
POTASSIUM SERPL-SCNC: 3.6 MEQ/L (ref 3.5–5.2)
RBC # BLD AUTO: 2.57 MILL/MM3 (ref 4.7–6.1)
RBC # FLD AUTO: 6000 /CUMM
ROULEAUX BLD QL SMEAR: SLIGHT
SCAN OF BLOOD SMEAR: NORMAL
SEGMENTED NEUTROPHILS ABSOLUTE COUNT: 2.7 THOU/MM3 (ref 1.8–7.7)
SODIUM SERPL-SCNC: 138 MEQ/L (ref 135–145)
SPECIMEN: ABNORMAL
SPHEROCYTES BLD QL SMEAR: ABNORMAL
TOTAL VOLUME RECEIVED BODY FLUID: 73 ML
WBC # BLD AUTO: 19.4 THOU/MM3 (ref 4.8–10.8)

## 2023-10-11 PROCEDURE — 99239 HOSP IP/OBS DSCHRG MGMT >30: CPT

## 2023-10-11 PROCEDURE — 85025 COMPLETE CBC W/AUTO DIFF WBC: CPT

## 2023-10-11 PROCEDURE — 80048 BASIC METABOLIC PNL TOTAL CA: CPT

## 2023-10-11 PROCEDURE — 36415 COLL VENOUS BLD VENIPUNCTURE: CPT

## 2023-10-11 PROCEDURE — 6370000000 HC RX 637 (ALT 250 FOR IP)

## 2023-10-11 PROCEDURE — 2580000003 HC RX 258

## 2023-10-11 PROCEDURE — 94640 AIRWAY INHALATION TREATMENT: CPT

## 2023-10-11 PROCEDURE — 94761 N-INVAS EAR/PLS OXIMETRY MLT: CPT

## 2023-10-11 PROCEDURE — 2700000000 HC OXYGEN THERAPY PER DAY

## 2023-10-11 PROCEDURE — 99232 SBSQ HOSP IP/OBS MODERATE 35: CPT | Performed by: INTERNAL MEDICINE

## 2023-10-11 PROCEDURE — 6370000000 HC RX 637 (ALT 250 FOR IP): Performed by: PHYSICIAN ASSISTANT

## 2023-10-11 PROCEDURE — 94669 MECHANICAL CHEST WALL OSCILL: CPT

## 2023-10-11 RX ORDER — OXYCODONE HYDROCHLORIDE AND ACETAMINOPHEN 5; 325 MG/1; MG/1
1 TABLET ORAL 2 TIMES DAILY
COMMUNITY
End: 2023-10-13 | Stop reason: SDUPTHER

## 2023-10-11 RX ORDER — MAGNESIUM OXIDE 400 MG/1
400 TABLET ORAL DAILY
COMMUNITY

## 2023-10-11 RX ADMIN — ALLOPURINOL 300 MG: 300 TABLET ORAL at 08:48

## 2023-10-11 RX ADMIN — MOMETASONE FUROATE AND FORMOTEROL FUMARATE DIHYDRATE 2 PUFF: 200; 5 AEROSOL RESPIRATORY (INHALATION) at 08:01

## 2023-10-11 RX ADMIN — FOLIC ACID 1 MG: 1 TABLET ORAL at 08:49

## 2023-10-11 RX ADMIN — DOCUSATE SODIUM 100 MG: 100 CAPSULE, LIQUID FILLED ORAL at 08:47

## 2023-10-11 RX ADMIN — ALBUTEROL SULFATE 2 PUFF: 90 AEROSOL, METERED RESPIRATORY (INHALATION) at 08:02

## 2023-10-11 RX ADMIN — HYDROXYUREA 500 MG: 500 CAPSULE ORAL at 08:49

## 2023-10-11 RX ADMIN — TAMSULOSIN HYDROCHLORIDE 0.8 MG: 0.4 CAPSULE ORAL at 08:49

## 2023-10-11 RX ADMIN — POLYETHYLENE GLYCOL 3350 17 G: 17 POWDER, FOR SOLUTION ORAL at 08:47

## 2023-10-11 RX ADMIN — OXYCODONE HYDROCHLORIDE AND ACETAMINOPHEN 2 TABLET: 5; 325 TABLET ORAL at 03:53

## 2023-10-11 RX ADMIN — TIOTROPIUM BROMIDE INHALATION SPRAY 2 PUFF: 3.12 SPRAY, METERED RESPIRATORY (INHALATION) at 08:07

## 2023-10-11 RX ADMIN — POTASSIUM CHLORIDE 10 MEQ: 1500 TABLET, EXTENDED RELEASE ORAL at 08:47

## 2023-10-11 RX ADMIN — SODIUM CHLORIDE, PRESERVATIVE FREE 10 ML: 5 INJECTION INTRAVENOUS at 08:48

## 2023-10-11 ASSESSMENT — PAIN DESCRIPTION - LOCATION
LOCATION: NECK;BACK;HIP
LOCATION: NECK;BACK;HIP

## 2023-10-11 ASSESSMENT — PAIN DESCRIPTION - ORIENTATION
ORIENTATION: LEFT
ORIENTATION: LEFT

## 2023-10-11 ASSESSMENT — PAIN SCALES - GENERAL
PAINLEVEL_OUTOF10: 9
PAINLEVEL_OUTOF10: 0
PAINLEVEL_OUTOF10: 4

## 2023-10-11 ASSESSMENT — PAIN DESCRIPTION - ONSET
ONSET: PROGRESSIVE
ONSET: ON-GOING

## 2023-10-11 ASSESSMENT — PAIN DESCRIPTION - DESCRIPTORS
DESCRIPTORS: ACHING;DISCOMFORT;PRESSURE;SORE
DESCRIPTORS: ACHING;DISCOMFORT;PRESSURE;SORE

## 2023-10-11 ASSESSMENT — PAIN DESCRIPTION - PAIN TYPE
TYPE: CHRONIC PAIN
TYPE: CHRONIC PAIN

## 2023-10-11 ASSESSMENT — PAIN DESCRIPTION - FREQUENCY
FREQUENCY: CONTINUOUS
FREQUENCY: CONTINUOUS

## 2023-10-11 NOTE — PROGRESS NOTES
Patient discharged to home with friend. Patient left with all belongings, AVS. All questions answered at this time. Patient has follow up appointments scheduled with PCP and Pulmonology in [facility].

## 2023-10-11 NOTE — CARE COORDINATION
10/11/23, 1:23 PM EDT    Patient goals/plan/ treatment preferences discussed by  and . Patient goals/plan/ treatment preferences reviewed with patient/ family. Patient/ family verbalize understanding of discharge plan and are in agreement with goal/plan/treatment preferences. Understanding was demonstrated using the teach back method. AVS provided by RN at time of discharge, which includes all necessary medical information pertaining to the patients current course of illness, treatment, post-discharge goals of care, and treatment preferences. Services At/After Discharge: DME       IMM Letter  IMM Letter given to Patient/Family/Significant other/Guardian/POA/by[de-identified] Rambo West RN CM  IMM Letter date given[de-identified] 10/11/23  IMM Letter time given[de-identified] 3235     Spoke with pt, planning home. [place] O2 orders received. Phone call to [facility] [employer] and new orders faxed.      Electronically signed by Grey Owens RN on 10/11/2023 at 1:34 PM

## 2023-10-11 NOTE — PROGRESS NOTES
A home oxygen evaluation has been completed. [x]Patient is an inpatient. It is expected that the patient will be discharged within the next 48 hours. Qualified provider to write order for home prescription if patient qualifies. Social service/care managers will arrange for home oxygen. If patient is active, arrange for Home Medical supplier to assess for Oxygen Conserving Device per pulse oximetry. []Patient is an outpatient. Results will be faxed to the ordering provider. Qualified provider to write order for home prescription if patient qualifies and arranges for home oxygen. Patient was placed on room air for 10 minutes. SpO2 was 85 % on room air at rest. Patients SpO2 was below 89% and qualified for home oxygen. Oxygen was applied at 1 lpm via nasal cannula to maintain a SpO2 between 90-92% while at rest. Actual SpO2 was 92 %. Patient can ambulate for exercise flow rate. Patients was ambulated, SpO2 was 90% on 4 lpm to maintain SpO2 between 90-92% while exercising. Note: For any SpO2 at 57% see policy and procedure for possible qualifications.

## 2023-10-11 NOTE — PLAN OF CARE
Problem: Pain  Goal: Verbalizes/displays adequate comfort level or baseline comfort level  10/10/2023 2219 by Frederick Arias RN  Outcome: Progressing  Flowsheets (Taken 10/10/2023 2219)  Verbalizes/displays adequate comfort level or baseline comfort level:   Encourage patient to monitor pain and request assistance   Assess pain using appropriate pain scale   Administer analgesics based on type and severity of pain and evaluate response   Implement non-pharmacological measures as appropriate and evaluate response  Note: Ongoing assessment & interventions provided throughout shift. Reminded patient to report any pain, pressure, or shortness of breath to the nurse. Pain medications provided per physician's orders. Problem: Discharge Planning  Goal: Discharge to home or other facility with appropriate resources  10/10/2023 2219 by Frederick Arias RN  Outcome: Progressing  Flowsheets (Taken 10/10/2023 0800 by Celia Arita RN)  Discharge to home or other facility with appropriate resources: Identify barriers to discharge with patient and caregiver     Problem: Safety - Adult  Goal: Free from fall injury  10/10/2023 2219 by Frederick Arias RN  Outcome: Progressing  Flowsheets (Taken 10/10/2023 2219)  Free From Fall Injury: Instruct family/caregiver on patient safety  Note: Bed locked & in low position, call light in reach, side-rails up x2, bed/chair alarm utilized, non-slip socks on when ambulating, reminded patient to use call light to call for assistance.       Problem: ABCDS Injury Assessment  Goal: Absence of physical injury  10/10/2023 2219 by Frederick Arias RN  Outcome: Progressing  Flowsheets (Taken 10/9/2023 2151 by Dariela Pascal RN)  Absence of Physical Injury: Implement safety measures based on patient assessment  Note: Bed locked & in low position, call light in reach, side-rails up x2, bed/chair alarm utilized, non-slip socks on when ambulating, reminded patient to use call light to call for mobility to safest level of function  10/10/2023 2219 by Rambo Pyle RN  Outcome: Progressing  Flowsheets (Taken 10/10/2023 0800 by Ede Levy RN)  Return Mobility to Safest Level of Function: Assess patient stability and activity tolerance for standing, transferring and ambulating with or without assistive devices     Problem: Infection - Adult  Goal: Absence of infection at discharge  10/10/2023 2219 by Rambo Pyle RN  Outcome: Progressing  Flowsheets (Taken 10/10/2023 0800 by Ede Levy RN)  Absence of infection at discharge: Assess and monitor for signs and symptoms of infection   Care plan reviewed with patient. Patient verbalizes understanding of the care plan and contributed to goal setting.

## 2023-10-11 NOTE — DISCHARGE SUMMARY
Hospitalist Discharge Summary    Patient: Junaid Mckeon  YOB: 1955  MRN: [id number]   Acct: [de-identified]    Primary Care Physician: YESY Nunez - CNP    Admit date  10/8/2023    Discharge date: 10/11/2023     Discharge Assessment and Plan:    Acute on chronic Respiratory Failure with hypoxia: secondary to mediastinal mass vs. COPD exacerbation vs. Pneumonia  - Baseline oxygen is 2L NC. Placed on 4L NC on admission, however no hypoxia documented. Home O2 eval, 1LNC while at rest, 4LNC with activity. - 10/8 CXR: Left-sided pleural effusion with adjacent atelectasis/infiltrate, right upper lung atelectasis/infiltrate, stable right superior mediastinal mass   - 10/8 CTA negative for pulmonary embolism, small loculated left pleural effusion, nodular densities in right upper lobe, numeral enlarged lymph nodes within the mediastinum, lymphadenopathy at the upper right mediastinum  - Possible PNA given SIRS criteria 2/4 WBC 19.2, tachypnea 30 with RUL infiltrate. Afebrile. Leukocytosis is noted to be chronic in setting of CML. Infiltrate is chronic, no change from prior. Low suspicion for infection. Received 3 doses of Zosyn, discontinued 10/9.   - Blood cultures x2, NGTD   - MRSA screen negative  - Respiratory culture, pneumonia panel pending  - Albuterol inhaler BID  - Continue Dulera, Spiriva, PRN albuterol  - Pleural Fluid culture, pending  - Follow up with [facility] for weekly thoracentesis as OP       Chest pain: likely related to mediastinal mass  - 10/9 Echo shows EG 45-50% with mildly reduced systolic function previous Echo on 8/3/23 EF 60-65%  - Initial troponin 21, repeat 17/18  - Cardiology consulted, does not feel this is cardiac related  - Percocet PRN, has at home, no script sent     Anemia of chronic disease:   - Hgb 6.7 on admit, received a total of 3 units PRBCs.    - Hgb stable       CML/MDS:   - Recent admission 9/18-9/26/23 with blast crisis  - Follows with

## 2023-10-11 NOTE — PROGRESS NOTES
Pharmacy Medication History Note      List of current medications patient is taking is complete. Source of information: patient, fill history    Changes made to medication list:  Medications removed (include reason, ex. therapy complete or physician discontinued):  None    Medications added/doses adjusted:  Adjusted potassium 20 mEq to include instructions \"take 1 tablet by mouth twice daily\"  Added magnesium oxide 400 mg by mouth daily  Added percocet 5/325 by mouth twice daily as needed for a 7 day supply -starting 9/30/23  Added Trelegy Ellipta 1 puff daily    Other notes (ex. Recent course of antibiotics, Coumadin dosing):  Pt recently took levofloxacin 500 mg starting 8/11/23 for 30 day supply  Pt recently took prednisone 10 mg starting 8/10/23 for 21 day supply  Pt recently took posaconazole 100 mg starting 8/08/23 for 30 day supply  Pt reports not taking spiriva because he is taking trelegy   Denies use of other OTC or herbal medications.     Allergies reviewed    Electronically signed by Moira Grossman on 10/11/2023 at 11:18 AM     Jo LouisD   Pharmacy Resident  10/11/2023 11:36 AM

## 2023-10-11 NOTE — PROGRESS NOTES
effusion with adjacent atelectasis/infiltrate. 2. Right upper lung atelectasis/infiltrate. 3. Stable right superior mediastinal mass. CT Scans    (See actual reports for details)  small loculated left pleural effusion, nodular densities in right upper lobe, numeral enlarged lymph nodes within the mediastinum, lymphadenopathy at the upper right mediastinum    Assessment   -Recurrent exudative Left Pleural Effusions: Last available thoracentesis analysis done on 9/13/2023 shows total serum protein of 5.8 pleural fluid fluid protein of 4 serum LDH of 592 and pleural fluid LDH of 520 showing exudative per lights criteria. The differential count of pleural fluid is Predominant Mononuclear cells ( Lymphocytes + Monocytes) Likely secondary to ? underlying neoplastic process ( CML with blast crisis)  -Severe COPD- according to bedside spirometry 1/9/2023 chronic history of tobacco smoking for 30 years with a 2 packs of cigarettes per day. Patient is on 2 L nasal cannula at baseline Home inhalers include albuterol and Trelegy. Follows with Dr. 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 .  Possible myasthenia gravis, however differential broad. Chaka Number CTA head and neck and brain MRI within normal limits. Patient to follow-up with neurology and ophthalmology however patient never did. -Blast crisis phase of CML: Resolved was [facility] Placed on hydroxy urea and status post decitabine. Patient to follow-up with oncology  -Paroxysmal atrial fibrillation  -Thrombocytopenia  -Anemia of chronic disease  -CML.     Plan   -Recurrent exudative left pleural effusion suspect 2/2 underlying malignancy stable s/p thoracentesis   -Plan to continue on current treatment including Dulera, Spiriva and albuterol.  -Bedside spirometry-shows severe obstruction continue on home regimen after discharge  -Patient to keep scheduled appointment with oncology. Oncology is currently managing recurrent thoracentesis at [facility] and will defer treatment objectives to them. -Appreciate recommendations from Dr. Darrel Cross.   -Will sign off on patient he is planing to follow-up with his Oncologist in [place] who has referred to [facility] for weekly thoracentesis patient is agreeable to to this, not interested in pleurx due to risk for infection while on chemotherapy  -No follow-up needed with our service needed   -Patient to follow-up with Dr. Amber Lu Pulmonolgy for continued treatment and evaluation. Questions and concerns addressed. Electronically signed by   YESY Michael CNP on 10/11/2023 at 9:16 AM     Addendum by Dr. Anuja Ho MD:  Patient seen by me independently including key components of medical care. Face to face evaluation and examination was performed. Case discussed with Mr. Chris Membreno CNP. Agree with Certified nurse practitioner's findings and plan as documented in the Certified nurse practitioner's note. Italicized font, if present,  represents changes to the note made by me. More than 50% of the encounter time involved with patient care by the Pulmonary & Critical care service team spent by me. Please see my modifications mentioned below:  Post procedure CXR- No pneumothorax. Follow up as above. Milton Fermin educated about my impression and plan. He verbalizes understanding.     Electronically signed by   Camilla Barron MD on 10/11/2023 at 7:07 PM

## 2023-10-11 NOTE — PLAN OF CARE
Patient was evaluated today for the diagnosis of COPD, pleural effusion 2/2 CML . I entered a DME order for home oxygen at 1-4 lpm because the diagnosis and testing require the patient to have supplemental oxygen. Condition will improve or be benefited by oxygen use. The patient is  able to perform good mobility in a home setting and therefore does require the use of a portable oxygen system. The need for this equipment was discussed with the patient and he understands and is in agreement.     Electronically signed by Martín Bobo PA-C on 10/11/2023 at 12:53 PM

## 2023-10-11 NOTE — PROGRESS NOTES
CLINICAL PHARMACY: DISCHARGE MED RECONCILIATION/REVIEW    [facility] ([facility]) Select Patient?: No  Total # of Interventions Recommended: 1 -   - Updated Order #: 1  Total # Interventions Accepted: 1  Intervention Severity:   - Level 1 Intervention Present?: No   - Level 2 #: 0   - Level 3 #: 1   Time Spent (min): 15    Jo JonesD   Pharmacy Resident  10/11/2023 2:36 PM

## 2023-10-11 NOTE — DISCHARGE INSTRUCTIONS
Continue on current treatment including Dulera, Spiriva and albuterol    Follow-up with Oncologist in [facility] who has referred to [facility] for weekly thoracentesis  Patient to follow-up with Dr. Ysabel Lobo Pulmonolgy for continued treatment and evaluation    New oxygen requirements:  1 lpm via nasal cannula to maintain at rest. 4 lpm while exercising/walking.

## 2023-10-13 ENCOUNTER — TELEPHONE (OUTPATIENT)
Dept: ONCOLOGY | Age: 68
End: 2023-10-13

## 2023-10-13 ENCOUNTER — OFFICE VISIT (OUTPATIENT)
Dept: ONCOLOGY | Age: 68
End: 2023-10-13
Payer: COMMERCIAL

## 2023-10-13 VITALS
WEIGHT: 259.9 LBS | DIASTOLIC BLOOD PRESSURE: 63 MMHG | RESPIRATION RATE: 18 BRPM | TEMPERATURE: 98.2 F | BODY MASS INDEX: 36.25 KG/M2 | OXYGEN SATURATION: 90 % | SYSTOLIC BLOOD PRESSURE: 100 MMHG | HEART RATE: 85 BPM

## 2023-10-13 DIAGNOSIS — C93.10 CHRONIC MYELOMONOCYTIC LEUKEMIA NOT HAVING ACHIEVED REMISSION (HCC): Primary | ICD-10-CM

## 2023-10-13 DIAGNOSIS — J90 PLEURAL EFFUSION: ICD-10-CM

## 2023-10-13 LAB
BACTERIA BLD AEROBE CULT: NORMAL
BACTERIA BLD AEROBE CULT: NORMAL

## 2023-10-13 PROCEDURE — G8417 CALC BMI ABV UP PARAM F/U: HCPCS | Performed by: INTERNAL MEDICINE

## 2023-10-13 PROCEDURE — 99214 OFFICE O/P EST MOD 30 MIN: CPT | Performed by: INTERNAL MEDICINE

## 2023-10-13 PROCEDURE — G8427 DOCREV CUR MEDS BY ELIG CLIN: HCPCS | Performed by: INTERNAL MEDICINE

## 2023-10-13 PROCEDURE — 1111F DSCHRG MED/CURRENT MED MERGE: CPT | Performed by: INTERNAL MEDICINE

## 2023-10-13 PROCEDURE — 99211 OFF/OP EST MAY X REQ PHY/QHP: CPT | Performed by: INTERNAL MEDICINE

## 2023-10-13 PROCEDURE — 1036F TOBACCO NON-USER: CPT | Performed by: INTERNAL MEDICINE

## 2023-10-13 PROCEDURE — G8484 FLU IMMUNIZE NO ADMIN: HCPCS | Performed by: INTERNAL MEDICINE

## 2023-10-13 PROCEDURE — 3017F COLORECTAL CA SCREEN DOC REV: CPT | Performed by: INTERNAL MEDICINE

## 2023-10-13 PROCEDURE — 1124F ACP DISCUSS-NO DSCNMKR DOCD: CPT | Performed by: INTERNAL MEDICINE

## 2023-10-13 RX ORDER — OXYCODONE HYDROCHLORIDE AND ACETAMINOPHEN 5; 325 MG/1; MG/1
1 TABLET ORAL EVERY 6 HOURS PRN
Qty: 120 TABLET | Refills: 0 | Status: SHIPPED | OUTPATIENT
Start: 2023-10-13 | End: 2023-11-12

## 2023-10-13 NOTE — TELEPHONE ENCOUNTER
AVS from 10/13/23    IR for pleurx catheter placement  Transfuse one unit platelets before procedure  IR for skin lesion biopsy  Continue Decitabine  RV 2-3 weeks      *IR notified of referrals and will call patient to sched  *labs 10/17 for possible transfusion   *rv is sched for  Preet@[employer]  '    PT was given AVS and appt schedule

## 2023-10-13 NOTE — PATIENT INSTRUCTIONS
IR for pleurx catheter placement  Transfuse one unit platelets before procedure  IR for skin lesion biopsy  Continue Decitabine  RV 2-3 weeks

## 2023-10-13 NOTE — PROGRESS NOTES
_                       Chief Complaint   Patient presents with    Follow-up     2 week and hospital     DIAGNOSIS:       CMML    CURRENT THERAPY:         Decitabine  Transfusions as needed. BRIEF CASE HISTORY:      The patient is a 79 y.o.  male who is admitted to the hospital for further management of increasing shortness of breath and pleural effusion. Patient had multiple hospitalizations with similar condition. For the last few days he was having increasing shortness of breath and he was noted to have anemia and received blood transfusion. No improvement after transfusion. He had pleural effusion and he had thoracentesis on his last hospitalization. Patient's repeated labs in outside facility showed significant increase in the white blood cells with 35% blasts. Patient was recently diagnosed with MDS. He had CMML. Patient received 1 cycle of Vidaza and subsequently evaluated at [facility] and he received 1 cycle of IV decitabine in August 2023. No complications or side effects. Patient states that his insurance is no longer accepted at [facility]. He was transferred to [facility] for further care of the recent changes in his labs. He has no fever or chills. No active bleeding. No headaches or dizziness. INTERIM HISTORY:   Seen for follow up after recent hospitalization. Had blood and platelts transfusion. Received decitabine. No complications. He had thoracentesis. Much better. Recently as well as due to increasing shortness of breath. He had pleural effusion. It was drained. Continues to have shortness of breath. Past Medical History:   has a past medical history of Arthritis, Cancer ([address]), COPD (chronic obstructive pulmonary disease) ([address]), and Hypertension. Past Surgical History:   has a past surgical history that includes CT BIOPSY BONE MARROW (8/25/2023).    Family History: family history includes

## 2023-10-14 LAB — LEUKEMIA/LYMPHOMA PHENOTYPING MISC: NORMAL

## 2023-10-15 LAB
BACTERIA SPEC ANAEROBE CULT: NORMAL
BACTERIA SPEC BFLD CULT: NORMAL
GRAM STN SPEC: NORMAL

## 2023-10-16 LAB
MICROORGANISM SPEC CULT: NORMAL
MICROORGANISM/AGENT SPEC: NORMAL
SPECIMEN DESCRIPTION: NORMAL

## 2023-10-17 ENCOUNTER — HOSPITAL ENCOUNTER (OUTPATIENT)
Dept: INFUSION THERAPY | Age: 68
Discharge: HOME | End: 2023-10-17
Payer: COMMERCIAL

## 2023-10-17 VITALS
HEART RATE: 68 BPM | TEMPERATURE: 98.5 F | RESPIRATION RATE: 18 BRPM | DIASTOLIC BLOOD PRESSURE: 73 MMHG | SYSTOLIC BLOOD PRESSURE: 125 MMHG

## 2023-10-17 DIAGNOSIS — C92.10 CML (CHRONIC MYELOID LEUKEMIA) (HCC): ICD-10-CM

## 2023-10-17 LAB
BASOPHILS # BLD: 0.11 K/UL (ref 0–0.2)
BASOPHILS NFR BLD: 1 % (ref 0–2)
EOSINOPHIL # BLD: 0.11 K/UL (ref 0–0.4)
EOSINOPHILS RELATIVE PERCENT: 1 % (ref 1–4)
ERYTHROCYTE [DISTWIDTH] IN BLOOD BY AUTOMATED COUNT: 15.6 % (ref 12.5–15.4)
HCT VFR BLD AUTO: 19.1 % (ref 41–53)
HGB BLD-MCNC: 6.6 G/DL (ref 13.5–17.5)
LYMPHOCYTES NFR BLD: 3.75 K/UL (ref 1–4.8)
LYMPHOCYTES RELATIVE PERCENT: 35 % (ref 24–44)
MCH RBC QN AUTO: 30.1 PG (ref 26–34)
MCHC RBC AUTO-ENTMCNC: 34.6 G/DL (ref 31–37)
MCV RBC AUTO: 87 FL (ref 80–100)
METAMYELOCYTES ABSOLUTE COUNT: 0.21 K/UL
METAMYELOCYTES: 2 %
MONOCYTES NFR BLD: 4.38 K/UL (ref 0.1–0.8)
MONOCYTES NFR BLD: 41 % (ref 1–7)
MORPHOLOGY: ABNORMAL
NEUTROPHILS NFR BLD: 20 % (ref 36–66)
NEUTS SEG NFR BLD: 2.14 K/UL (ref 1.8–7.7)
PLATELET # BLD AUTO: 52 K/UL (ref 140–450)
PMV BLD AUTO: 8.5 FL (ref 6–12)
RBC # BLD AUTO: 2.2 M/UL (ref 4.5–5.9)
WBC OTHER # BLD: 10.7 K/UL (ref 3.5–11)

## 2023-10-17 PROCEDURE — 86900 BLOOD TYPING SEROLOGIC ABO: CPT

## 2023-10-17 PROCEDURE — 86901 BLOOD TYPING SEROLOGIC RH(D): CPT

## 2023-10-17 PROCEDURE — 2580000003 HC RX 258: Performed by: INTERNAL MEDICINE

## 2023-10-17 PROCEDURE — P9016 RBC LEUKOCYTES REDUCED: HCPCS

## 2023-10-17 PROCEDURE — 85025 COMPLETE CBC W/AUTO DIFF WBC: CPT

## 2023-10-17 PROCEDURE — 86920 COMPATIBILITY TEST SPIN: CPT

## 2023-10-17 PROCEDURE — 86850 RBC ANTIBODY SCREEN: CPT

## 2023-10-17 PROCEDURE — 36430 TRANSFUSION BLD/BLD COMPNT: CPT

## 2023-10-17 RX ORDER — SODIUM CHLORIDE 9 MG/ML
INJECTION, SOLUTION INTRAVENOUS PRN
Status: COMPLETED | OUTPATIENT
Start: 2023-10-17 | End: 2023-10-17

## 2023-10-17 RX ADMIN — SODIUM CHLORIDE: 9 INJECTION, SOLUTION INTRAVENOUS at 15:24

## 2023-10-17 NOTE — PROGRESS NOTES
Patient arrived for lab draw. Pt states he is needing another thoracentesis. Checked with scheduling regarding pleurx cath placement and there are no new updates from IR. Instructed pt to go to ER if shortness of breath continues or worsens. Hgb 6.6, 1 uPRBC given today. Transfusion completed without issue. Pt stable at discharge. Scheduled to return 10/24/23 for lab draw.

## 2023-10-19 PROBLEM — R79.89 ELEVATED TROPONIN: Status: RESOLVED | Noted: 2023-09-19 | Resolved: 2023-10-19

## 2023-10-23 LAB
MICROORGANISM SPEC CULT: NORMAL
MICROORGANISM/AGENT SPEC: NORMAL
SPECIMEN DESCRIPTION: NORMAL

## 2023-10-24 ENCOUNTER — APPOINTMENT (OUTPATIENT)
Dept: GENERAL RADIOLOGY | Age: 68
DRG: 840 | End: 2023-10-24
Payer: COMMERCIAL

## 2023-10-24 ENCOUNTER — HOSPITAL ENCOUNTER (OUTPATIENT)
Dept: INFUSION THERAPY | Age: 68
End: 2023-10-24

## 2023-10-24 ENCOUNTER — HOSPITAL ENCOUNTER (INPATIENT)
Age: 68
LOS: 4 days | Discharge: ANOTHER HOSPITAL | DRG: 840 | End: 2023-10-28
Attending: EMERGENCY MEDICINE | Admitting: STUDENT IN AN ORGANIZED HEALTH CARE EDUCATION/TRAINING PROGRAM
Payer: COMMERCIAL

## 2023-10-24 ENCOUNTER — APPOINTMENT (OUTPATIENT)
Dept: CT IMAGING | Age: 68
DRG: 840 | End: 2023-10-24
Payer: COMMERCIAL

## 2023-10-24 DIAGNOSIS — I50.33 ACUTE ON CHRONIC DIASTOLIC CHF (CONGESTIVE HEART FAILURE) (HCC): ICD-10-CM

## 2023-10-24 DIAGNOSIS — D64.9 SYMPTOMATIC ANEMIA: Primary | ICD-10-CM

## 2023-10-24 DIAGNOSIS — J90 PLEURAL EFFUSION: ICD-10-CM

## 2023-10-24 DIAGNOSIS — J96.01 ACUTE HYPOXIC RESPIRATORY FAILURE (HCC): ICD-10-CM

## 2023-10-24 PROBLEM — R06.02 SHORTNESS OF BREATH: Status: ACTIVE | Noted: 2023-10-24

## 2023-10-24 LAB
ABO: NORMAL
ALBUMIN SERPL BCG-MCNC: 3.8 G/DL (ref 3.5–5.1)
ALP SERPL-CCNC: 97 U/L (ref 38–126)
ALT SERPL W/O P-5'-P-CCNC: 15 U/L (ref 11–66)
ANION GAP SERPL CALC-SCNC: 16 MEQ/L (ref 8–16)
ANTIBODY SCREEN: NORMAL
APTT PPP: 28.1 SECONDS (ref 22–38)
AST SERPL-CCNC: 15 U/L (ref 5–40)
BACTERIA URNS QL MICRO: ABNORMAL /HPF
BASOPHILS ABSOLUTE: 0 THOU/MM3 (ref 0–0.1)
BASOPHILS NFR BLD AUTO: 0 %
BILIRUB SERPL-MCNC: 0.4 MG/DL (ref 0.3–1.2)
BILIRUB UR QL STRIP.AUTO: NEGATIVE
BLASTS: 5 % (ref 0–1)
BUN SERPL-MCNC: 23 MG/DL (ref 7–22)
CALCIUM SERPL-MCNC: 9.2 MG/DL (ref 8.5–10.5)
CASTS #/AREA URNS LPF: ABNORMAL /LPF
CASTS 2: ABNORMAL /LPF
CHARACTER UR: ABNORMAL
CHLORIDE SERPL-SCNC: 97 MEQ/L (ref 98–111)
CO2 SERPL-SCNC: 26 MEQ/L (ref 23–33)
COLOR: YELLOW
CREAT SERPL-MCNC: 1.6 MG/DL (ref 0.4–1.2)
CRYSTALS URNS MICRO: ABNORMAL
DEPRECATED RDW RBC AUTO: 49.1 FL (ref 35–45)
DEPRECATED RDW RBC AUTO: 50.1 FL (ref 35–45)
DEPRECATED RDW RBC AUTO: 50.9 FL (ref 35–45)
DEPRECATED RDW RBC AUTO: 51.4 FL (ref 35–45)
EOSINOPHIL NFR BLD AUTO: 0 %
EOSINOPHILS ABSOLUTE: 0 THOU/MM3 (ref 0–0.4)
EPITHELIAL CELLS, UA: ABNORMAL /HPF
ERYTHROCYTE [DISTWIDTH] IN BLOOD BY AUTOMATED COUNT: 14.9 % (ref 11.5–14.5)
ERYTHROCYTE [DISTWIDTH] IN BLOOD BY AUTOMATED COUNT: 15.6 % (ref 11.5–14.5)
ERYTHROCYTE [DISTWIDTH] IN BLOOD BY AUTOMATED COUNT: 15.6 % (ref 11.5–14.5)
ERYTHROCYTE [DISTWIDTH] IN BLOOD BY AUTOMATED COUNT: 15.8 % (ref 11.5–14.5)
FLUAV RNA RESP QL NAA+PROBE: NOT DETECTED
FLUBV RNA RESP QL NAA+PROBE: NOT DETECTED
GFR SERPL CREATININE-BSD FRML MDRD: 47 ML/MIN/1.73M2
GLUCOSE SERPL-MCNC: 112 MG/DL (ref 70–108)
GLUCOSE UR QL STRIP.AUTO: NEGATIVE MG/DL
HCT VFR BLD AUTO: 19.4 % (ref 42–52)
HCT VFR BLD AUTO: 20.9 % (ref 42–52)
HCT VFR BLD AUTO: 21 % (ref 42–52)
HCT VFR BLD AUTO: 23.8 % (ref 42–52)
HGB BLD-MCNC: 6.2 GM/DL (ref 14–18)
HGB BLD-MCNC: 6.8 GM/DL (ref 14–18)
HGB BLD-MCNC: 7 GM/DL (ref 14–18)
HGB BLD-MCNC: 7.7 GM/DL (ref 14–18)
HGB UR QL STRIP.AUTO: NEGATIVE
INR PPP: 1.16 (ref 0.85–1.13)
KETONES UR QL STRIP.AUTO: NEGATIVE
LDH SERPL L TO P-CCNC: 1289 U/L (ref 100–190)
LYMPHOCYTES ABSOLUTE: 4.5 THOU/MM3 (ref 1–4.8)
LYMPHOCYTES NFR BLD AUTO: 23 %
MAGNESIUM SERPL-MCNC: 1.9 MG/DL (ref 1.6–2.4)
MANUAL DIFF BLD: NORMAL
MCH RBC QN AUTO: 28.8 PG (ref 26–33)
MCH RBC QN AUTO: 28.9 PG (ref 26–33)
MCH RBC QN AUTO: 29 PG (ref 26–33)
MCH RBC QN AUTO: 29.2 PG (ref 26–33)
MCHC RBC AUTO-ENTMCNC: 32 GM/DL (ref 32.2–35.5)
MCHC RBC AUTO-ENTMCNC: 32.4 GM/DL (ref 32.2–35.5)
MCHC RBC AUTO-ENTMCNC: 32.5 GM/DL (ref 32.2–35.5)
MCHC RBC AUTO-ENTMCNC: 33.3 GM/DL (ref 32.2–35.5)
MCV RBC AUTO: 87.5 FL (ref 80–94)
MCV RBC AUTO: 88.9 FL (ref 80–94)
MCV RBC AUTO: 89.1 FL (ref 80–94)
MCV RBC AUTO: 90.7 FL (ref 80–94)
METAMYELOCYTES: 2 %
MISCELLANEOUS 2: ABNORMAL
MONOCYTES ABSOLUTE: 11.4 THOU/MM3 (ref 0.4–1.3)
MONOCYTES NFR BLD AUTO: 59 %
MYELOCYTES: 6 %
NEUTROPHILS NFR BLD AUTO: 5 %
NITRITE UR QL STRIP: NEGATIVE
NRBC BLD AUTO-RTO: 0 /100 WBC
NT-PROBNP SERPL IA-MCNC: 616.9 PG/ML (ref 0–124)
OSMOLALITY SERPL CALC.SUM OF ELEC: 282 MOSMOL/KG (ref 275–300)
PATHOLOGIST REVIEW: ABNORMAL
PH UR STRIP.AUTO: 5 [PH] (ref 5–9)
PLATELET # BLD AUTO: 51 THOU/MM3 (ref 130–400)
PLATELET # BLD AUTO: 54 THOU/MM3 (ref 130–400)
PLATELET # BLD AUTO: 54 THOU/MM3 (ref 130–400)
PLATELET # BLD AUTO: 60 THOU/MM3 (ref 130–400)
PMV BLD AUTO: 11.1 FL (ref 9.4–12.4)
PMV BLD AUTO: 11.8 FL (ref 9.4–12.4)
PMV BLD AUTO: 12.2 FL (ref 9.4–12.4)
PMV BLD AUTO: 12.5 FL (ref 9.4–12.4)
POTASSIUM SERPL-SCNC: 3.6 MEQ/L (ref 3.5–5.2)
PROT SERPL-MCNC: 6.9 G/DL (ref 6.1–8)
PROT SERPL-MCNC: 7.4 G/DL (ref 6.1–8)
PROT UR STRIP.AUTO-MCNC: ABNORMAL MG/DL
RBC # BLD AUTO: 2.14 MILL/MM3 (ref 4.7–6.1)
RBC # BLD AUTO: 2.35 MILL/MM3 (ref 4.7–6.1)
RBC # BLD AUTO: 2.4 MILL/MM3 (ref 4.7–6.1)
RBC # BLD AUTO: 2.67 MILL/MM3 (ref 4.7–6.1)
RBC URINE: ABNORMAL /HPF
RENAL EPI CELLS #/AREA URNS HPF: ABNORMAL /[HPF]
RH FACTOR: NORMAL
SARS-COV-2 RNA RESP QL NAA+PROBE: NOT DETECTED
SEGMENTED NEUTROPHILS ABSOLUTE COUNT: 1 THOU/MM3 (ref 1.8–7.7)
SODIUM SERPL-SCNC: 139 MEQ/L (ref 135–145)
SP GR UR REFRACT.AUTO: 1.02 (ref 1–1.03)
TROPONIN, HIGH SENSITIVITY: 21 NG/L (ref 0–12)
TROPONIN, HIGH SENSITIVITY: 25 NG/L (ref 0–12)
URATE SERPL-MCNC: 8.7 MG/DL (ref 3.7–7)
UROBILINOGEN, URINE: 0.2 EU/DL (ref 0–1)
WBC # BLD AUTO: 18.6 THOU/MM3 (ref 4.8–10.8)
WBC # BLD AUTO: 19.4 THOU/MM3 (ref 4.8–10.8)
WBC # BLD AUTO: 22.6 THOU/MM3 (ref 4.8–10.8)
WBC # BLD AUTO: 24.1 THOU/MM3 (ref 4.8–10.8)
WBC #/AREA URNS HPF: ABNORMAL /HPF
WBC #/AREA URNS HPF: NEGATIVE /[HPF]
YEAST LIKE FUNGI URNS QL MICRO: ABNORMAL

## 2023-10-24 PROCEDURE — 2580000003 HC RX 258

## 2023-10-24 PROCEDURE — 71046 X-RAY EXAM CHEST 2 VIEWS: CPT

## 2023-10-24 PROCEDURE — 85025 COMPLETE CBC W/AUTO DIFF WBC: CPT

## 2023-10-24 PROCEDURE — 86900 BLOOD TYPING SEROLOGIC ABO: CPT

## 2023-10-24 PROCEDURE — 6360000002 HC RX W HCPCS: Performed by: STUDENT IN AN ORGANIZED HEALTH CARE EDUCATION/TRAINING PROGRAM

## 2023-10-24 PROCEDURE — 87636 SARSCOV2 & INF A&B AMP PRB: CPT

## 2023-10-24 PROCEDURE — 86923 COMPATIBILITY TEST ELECTRIC: CPT

## 2023-10-24 PROCEDURE — 88108 CYTOPATH CONCENTRATE TECH: CPT

## 2023-10-24 PROCEDURE — 6370000000 HC RX 637 (ALT 250 FOR IP)

## 2023-10-24 PROCEDURE — 85027 COMPLETE CBC AUTOMATED: CPT

## 2023-10-24 PROCEDURE — 84484 ASSAY OF TROPONIN QUANT: CPT

## 2023-10-24 PROCEDURE — P9037 PLATE PHERES LEUKOREDU IRRAD: HCPCS

## 2023-10-24 PROCEDURE — 71275 CT ANGIOGRAPHY CHEST: CPT

## 2023-10-24 PROCEDURE — 86850 RBC ANTIBODY SCREEN: CPT

## 2023-10-24 PROCEDURE — 2140000000 HC CCU INTERMEDIATE R&B

## 2023-10-24 PROCEDURE — 6360000004 HC RX CONTRAST MEDICATION: Performed by: STUDENT IN AN ORGANIZED HEALTH CARE EDUCATION/TRAINING PROGRAM

## 2023-10-24 PROCEDURE — 84155 ASSAY OF PROTEIN SERUM: CPT

## 2023-10-24 PROCEDURE — 83735 ASSAY OF MAGNESIUM: CPT

## 2023-10-24 PROCEDURE — 83615 LACTATE (LD) (LDH) ENZYME: CPT

## 2023-10-24 PROCEDURE — 36430 TRANSFUSION BLD/BLD COMPNT: CPT

## 2023-10-24 PROCEDURE — 96374 THER/PROPH/DIAG INJ IV PUSH: CPT

## 2023-10-24 PROCEDURE — 93005 ELECTROCARDIOGRAM TRACING: CPT | Performed by: STUDENT IN AN ORGANIZED HEALTH CARE EDUCATION/TRAINING PROGRAM

## 2023-10-24 PROCEDURE — 81001 URINALYSIS AUTO W/SCOPE: CPT

## 2023-10-24 PROCEDURE — 84550 ASSAY OF BLOOD/URIC ACID: CPT

## 2023-10-24 PROCEDURE — 6370000000 HC RX 637 (ALT 250 FOR IP): Performed by: STUDENT IN AN ORGANIZED HEALTH CARE EDUCATION/TRAINING PROGRAM

## 2023-10-24 PROCEDURE — 80053 COMPREHEN METABOLIC PANEL: CPT

## 2023-10-24 PROCEDURE — 86901 BLOOD TYPING SEROLOGIC RH(D): CPT

## 2023-10-24 PROCEDURE — 87205 SMEAR GRAM STAIN: CPT

## 2023-10-24 PROCEDURE — 36415 COLL VENOUS BLD VENIPUNCTURE: CPT

## 2023-10-24 PROCEDURE — 85730 THROMBOPLASTIN TIME PARTIAL: CPT

## 2023-10-24 PROCEDURE — 99285 EMERGENCY DEPT VISIT HI MDM: CPT

## 2023-10-24 PROCEDURE — 93010 ELECTROCARDIOGRAM REPORT: CPT | Performed by: INTERNAL MEDICINE

## 2023-10-24 PROCEDURE — 85610 PROTHROMBIN TIME: CPT

## 2023-10-24 PROCEDURE — 83880 ASSAY OF NATRIURETIC PEPTIDE: CPT

## 2023-10-24 PROCEDURE — P9040 RBC LEUKOREDUCED IRRADIATED: HCPCS

## 2023-10-24 PROCEDURE — 99223 1ST HOSP IP/OBS HIGH 75: CPT | Performed by: STUDENT IN AN ORGANIZED HEALTH CARE EDUCATION/TRAINING PROGRAM

## 2023-10-24 RX ORDER — PREDNISONE 20 MG/1
40 TABLET ORAL DAILY
Status: DISCONTINUED | OUTPATIENT
Start: 2023-10-25 | End: 2023-10-28 | Stop reason: HOSPADM

## 2023-10-24 RX ORDER — OXYCODONE HYDROCHLORIDE AND ACETAMINOPHEN 5; 325 MG/1; MG/1
1 TABLET ORAL ONCE
Status: COMPLETED | OUTPATIENT
Start: 2023-10-24 | End: 2023-10-24

## 2023-10-24 RX ORDER — POLYETHYLENE GLYCOL 3350 17 G/17G
17 POWDER, FOR SOLUTION ORAL DAILY PRN
Status: DISCONTINUED | OUTPATIENT
Start: 2023-10-24 | End: 2023-10-26

## 2023-10-24 RX ORDER — SODIUM CHLORIDE 9 MG/ML
INJECTION, SOLUTION INTRAVENOUS PRN
Status: DISCONTINUED | OUTPATIENT
Start: 2023-10-24 | End: 2023-10-24 | Stop reason: SDUPTHER

## 2023-10-24 RX ORDER — ONDANSETRON 2 MG/ML
4 INJECTION INTRAMUSCULAR; INTRAVENOUS EVERY 6 HOURS PRN
Status: DISCONTINUED | OUTPATIENT
Start: 2023-10-24 | End: 2023-10-28 | Stop reason: HOSPADM

## 2023-10-24 RX ORDER — ALLOPURINOL 300 MG/1
300 TABLET ORAL DAILY
Status: DISCONTINUED | OUTPATIENT
Start: 2023-10-24 | End: 2023-10-28 | Stop reason: HOSPADM

## 2023-10-24 RX ORDER — SODIUM CHLORIDE 0.9 % (FLUSH) 0.9 %
5-40 SYRINGE (ML) INJECTION PRN
Status: DISCONTINUED | OUTPATIENT
Start: 2023-10-24 | End: 2023-10-24 | Stop reason: SDUPTHER

## 2023-10-24 RX ORDER — SODIUM CHLORIDE 0.9 % (FLUSH) 0.9 %
5-40 SYRINGE (ML) INJECTION EVERY 12 HOURS SCHEDULED
Status: DISCONTINUED | OUTPATIENT
Start: 2023-10-24 | End: 2023-10-28 | Stop reason: HOSPADM

## 2023-10-24 RX ORDER — SODIUM CHLORIDE 0.9 % (FLUSH) 0.9 %
5-40 SYRINGE (ML) INJECTION EVERY 12 HOURS SCHEDULED
Status: DISCONTINUED | OUTPATIENT
Start: 2023-10-24 | End: 2023-10-24 | Stop reason: SDUPTHER

## 2023-10-24 RX ORDER — ONDANSETRON 4 MG/1
4 TABLET, ORALLY DISINTEGRATING ORAL EVERY 8 HOURS PRN
Status: DISCONTINUED | OUTPATIENT
Start: 2023-10-24 | End: 2023-10-28 | Stop reason: HOSPADM

## 2023-10-24 RX ORDER — SODIUM CHLORIDE 9 MG/ML
INJECTION, SOLUTION INTRAVENOUS PRN
Status: DISCONTINUED | OUTPATIENT
Start: 2023-10-24 | End: 2023-10-25

## 2023-10-24 RX ORDER — SODIUM CHLORIDE 0.9 % (FLUSH) 0.9 %
5-40 SYRINGE (ML) INJECTION PRN
Status: DISCONTINUED | OUTPATIENT
Start: 2023-10-24 | End: 2023-10-28 | Stop reason: HOSPADM

## 2023-10-24 RX ORDER — ACETAMINOPHEN 650 MG/1
650 SUPPOSITORY RECTAL EVERY 6 HOURS PRN
Status: DISCONTINUED | OUTPATIENT
Start: 2023-10-24 | End: 2023-10-28 | Stop reason: HOSPADM

## 2023-10-24 RX ORDER — OXYCODONE HYDROCHLORIDE AND ACETAMINOPHEN 5; 325 MG/1; MG/1
1 TABLET ORAL ONCE
Status: COMPLETED | OUTPATIENT
Start: 2023-10-24 | End: 2023-10-25

## 2023-10-24 RX ORDER — FUROSEMIDE 10 MG/ML
40 INJECTION INTRAMUSCULAR; INTRAVENOUS ONCE
Status: CANCELLED | OUTPATIENT
Start: 2023-10-24 | End: 2023-10-24

## 2023-10-24 RX ORDER — HYDROXYUREA 500 MG/1
500 CAPSULE ORAL DAILY
Status: DISCONTINUED | OUTPATIENT
Start: 2023-10-25 | End: 2023-10-28 | Stop reason: HOSPADM

## 2023-10-24 RX ORDER — TAMSULOSIN HYDROCHLORIDE 0.4 MG/1
0.8 CAPSULE ORAL DAILY
Status: DISCONTINUED | OUTPATIENT
Start: 2023-10-24 | End: 2023-10-28 | Stop reason: HOSPADM

## 2023-10-24 RX ORDER — ENOXAPARIN SODIUM 100 MG/ML
30 INJECTION SUBCUTANEOUS 2 TIMES DAILY
Status: DISCONTINUED | OUTPATIENT
Start: 2023-10-24 | End: 2023-10-28 | Stop reason: HOSPADM

## 2023-10-24 RX ORDER — ACETAMINOPHEN 325 MG/1
650 TABLET ORAL EVERY 6 HOURS PRN
Status: DISCONTINUED | OUTPATIENT
Start: 2023-10-24 | End: 2023-10-28 | Stop reason: HOSPADM

## 2023-10-24 RX ORDER — IPRATROPIUM BROMIDE AND ALBUTEROL SULFATE 2.5; .5 MG/3ML; MG/3ML
1 SOLUTION RESPIRATORY (INHALATION) EVERY 4 HOURS PRN
Status: DISCONTINUED | OUTPATIENT
Start: 2023-10-24 | End: 2023-10-28 | Stop reason: HOSPADM

## 2023-10-24 RX ORDER — FOLIC ACID 1 MG/1
1 TABLET ORAL DAILY
Status: DISCONTINUED | OUTPATIENT
Start: 2023-10-25 | End: 2023-10-28 | Stop reason: HOSPADM

## 2023-10-24 RX ORDER — SODIUM CHLORIDE 9 MG/ML
INJECTION, SOLUTION INTRAVENOUS PRN
Status: DISCONTINUED | OUTPATIENT
Start: 2023-10-24 | End: 2023-10-28 | Stop reason: HOSPADM

## 2023-10-24 RX ORDER — FUROSEMIDE 10 MG/ML
40 INJECTION INTRAMUSCULAR; INTRAVENOUS ONCE
Status: COMPLETED | OUTPATIENT
Start: 2023-10-24 | End: 2023-10-24

## 2023-10-24 RX ADMIN — FUROSEMIDE 40 MG: 10 INJECTION, SOLUTION INTRAMUSCULAR; INTRAVENOUS at 13:09

## 2023-10-24 RX ADMIN — ACETAMINOPHEN 650 MG: 325 TABLET ORAL at 16:58

## 2023-10-24 RX ADMIN — ALLOPURINOL 300 MG: 300 TABLET ORAL at 20:20

## 2023-10-24 RX ADMIN — TAMSULOSIN HYDROCHLORIDE 0.8 MG: 0.4 CAPSULE ORAL at 20:20

## 2023-10-24 RX ADMIN — IOPAMIDOL 80 ML: 755 INJECTION, SOLUTION INTRAVENOUS at 13:07

## 2023-10-24 RX ADMIN — SODIUM CHLORIDE, PRESERVATIVE FREE 10 ML: 5 INJECTION INTRAVENOUS at 20:24

## 2023-10-24 RX ADMIN — OXYCODONE AND ACETAMINOPHEN 1 TABLET: 5; 325 TABLET ORAL at 21:25

## 2023-10-24 RX ADMIN — METOPROLOL TARTRATE 12.5 MG: 25 TABLET, FILM COATED ORAL at 20:20

## 2023-10-24 ASSESSMENT — PAIN DESCRIPTION - ORIENTATION
ORIENTATION: RIGHT;LEFT
ORIENTATION: RIGHT
ORIENTATION: LOWER
ORIENTATION: RIGHT

## 2023-10-24 ASSESSMENT — PAIN - FUNCTIONAL ASSESSMENT
PAIN_FUNCTIONAL_ASSESSMENT: 0-10
PAIN_FUNCTIONAL_ASSESSMENT: NONE - DENIES PAIN
PAIN_FUNCTIONAL_ASSESSMENT: ACTIVITIES ARE NOT PREVENTED
PAIN_FUNCTIONAL_ASSESSMENT: ACTIVITIES ARE NOT PREVENTED

## 2023-10-24 ASSESSMENT — PAIN SCALES - GENERAL
PAINLEVEL_OUTOF10: 7
PAINLEVEL_OUTOF10: 7
PAINLEVEL_OUTOF10: 3
PAINLEVEL_OUTOF10: 0
PAINLEVEL_OUTOF10: 9
PAINLEVEL_OUTOF10: 2
PAINLEVEL_OUTOF10: 9
PAINLEVEL_OUTOF10: 7

## 2023-10-24 ASSESSMENT — PAIN DESCRIPTION - PAIN TYPE
TYPE: CHRONIC PAIN

## 2023-10-24 ASSESSMENT — PAIN DESCRIPTION - LOCATION
LOCATION: GENERALIZED
LOCATION: ABDOMEN
LOCATION: ARM
LOCATION: LEG
LOCATION: ARM

## 2023-10-24 ASSESSMENT — PAIN DESCRIPTION - FREQUENCY
FREQUENCY: CONTINUOUS
FREQUENCY: INTERMITTENT

## 2023-10-24 ASSESSMENT — PAIN DESCRIPTION - DESCRIPTORS
DESCRIPTORS: ACHING
DESCRIPTORS: ACHING

## 2023-10-24 ASSESSMENT — PAIN DESCRIPTION - ONSET
ONSET: ON-GOING
ONSET: ON-GOING

## 2023-10-24 NOTE — ED NOTES
Patient resting in chair at bedside, patient updated on plan of care and room assignment at this time.      Miya Zhu RN  10/24/23 [id number]

## 2023-10-24 NOTE — ED PROVIDER NOTES
[facility]    EMERGENCY MEDICINE     Patient Name: Brina Hampton  MRN: [id number]  YOB: 1955  Date of Evaluation: 10/24/2023  Treating Resident Physician: Chelsea Burton MD  Supervising Physician: Cindy Johnson MD    [address]       Chief Complaint   Patient presents with    Shortness of Breath    Leg Pain     Bilateral         HISTORY OF PRESENT ILLNESS    HPI    History obtained from chart review and the patient. Brina Hampton is a 79 y.o. male who presents to the emergency department from home by private vehicle for evaluation of some breath, fatigue. Patient is actively being treated for [facility] with oncology and caries. Lori Hayden Has been receiving transfusions almost weekly for platelets and/or red blood cells. Reports he is supposed to go to [facility] today for transfusion however felt too sick to drive up there. Reports worsening shortness of breath over the past 2 to 3 weeks that he is now needed to turn up his oxygen for he had been on 2 L/min of oxygen and now is using 4 L for oxygen regularly. Reports that after straining hard to have a bowel movement yesterday is having some bilateral leg pain. Denies any recent cold runny nose or fever. Has been having frequent thoracenteses and there is plans in the works to have a Pleurx catheter placed so we can drain at home. Pertinent previous and/or external records reviewed:   Oncology notes reviewed    REVIEW OF SYSTEMS   Review of Systems  Negative unless documented in HPI    PAST MEDICAL AND SURGICAL HISTORY     Past Medical History:   Diagnosis Date    Arthritis     Cancer ([address])     COPD (chronic obstructive pulmonary disease) ([address])     Hypertension        Past Surgical History:   Procedure Laterality Date    CT BONE MARROW BIOPSY  8/25/2023    CT BONE MARROW BIOPSY 8/25/2023 [facility] CT SCAN       CURRENT MEDICATIONS     Current Discharge Medication List        CONTINUE these medications which have NOT CHANGED    Details chloride flush 0.9 % injection 5-40 mL (has no administration in time range)   sodium chloride flush 0.9 % injection 5-40 mL (has no administration in time range)   0.9 % sodium chloride infusion (has no administration in time range)   enoxaparin Sodium (LOVENOX) injection 30 mg ( SubCUTAneous Automatically Held 10/27/23 2100)   ondansetron (ZOFRAN-ODT) disintegrating tablet 4 mg (has no administration in time range)     Or   ondansetron (ZOFRAN) injection 4 mg (has no administration in time range)   polyethylene glycol (GLYCOLAX) packet 17 g (has no administration in time range)   acetaminophen (TYLENOL) tablet 650 mg (650 mg Oral Given 10/24/23 1658)     Or   acetaminophen (TYLENOL) suppository 650 mg ( Rectal See Alternative 10/24/23 1658)   allopurinol (ZYLOPRIM) tablet 300 mg (has no administration in time range)   folic acid (FOLVITE) tablet 1 mg (has no administration in time range)   hydroxyurea (HYDREA) chemo capsule 500 mg (has no administration in time range)   metoprolol tartrate (LOPRESSOR) tablet 12.5 mg (has no administration in time range)   tamsulosin (FLOMAX) capsule 0.8 mg (has no administration in time range)   tiotropium (SPIRIVA RESPIMAT) 2.5 MCG/ACT inhaler 2 puff (2 puffs Inhalation Not Given 10/24/23 1545)   predniSONE (DELTASONE) tablet 40 mg (has no administration in time range)   sodium chloride flush 0.9 % injection 5-40 mL (has no administration in time range)   sodium chloride flush 0.9 % injection 5-40 mL (has no administration in time range)   0.9 % sodium chloride infusion (has no administration in time range)   0.9 % sodium chloride infusion (has no administration in time range)   furosemide (LASIX) injection 40 mg (40 mg IntraVENous Given 10/24/23 1309)   iopamidol (ISOVUE-370) 76 % injection 80 mL (80 mLs IntraVENous Given 10/24/23 1307)       ED Course as of 10/24/23 1741   Tue Oct 24, 2023   1002 Hemoglobin Quant(!!): 6.2  Will give 1 unit PRBC [SC]   1045 consent [SC]   0669

## 2023-10-24 NOTE — H&P
Internal Medicine Resident History and Physical          Name: Celsa Cervantes, male, : 1955, MRN: [id number]    PCP: Jose J Monahan, YESY - CNP    Date of Admission: 10/24/2023  Date of Service: Pt seen/examined on 10/24/23  and Admitted to Inpatient with expected LOS greater than two midnights due to medical therapy. Assessment and Plan:  Acute on chronic hypoxic respiratory failure, 2/2 COPD exacerbation, mediastinal mass iso CML, and history of recurrent pleural effusions: Patient has increased oxygen need, with history as noted in HPI below. December given frusemide in ED, noted to have 700 mL urine output. Patient was discussing possibility of Pleurx drain with oncologist due to recurrent pleural effusions. Started on prednisone  Continue Spiriva  Continue metoprolol for history of pulmonary hypertension  If no improvement in pulmonary function, will start Bumex 1 IV tomorrow ordered morning  CTA chest negative for PE  Echo 10/9 EF 45 to 50%  BiPAP ordered  Blood smear reviewed for blood  IR consulted for ultrasound-guided thoracentesis of left-sided loculated effusion  Loculated left sided pleural effusion iso recurrent pleural effusions: States was in plans to have a pleurx drain placed per oncology recommendations. Baseline 2L oxygen use, is currently on 5L in ED. Given furosemide in ED. 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 Preston Dunn, oncology. Noted to have global cutaneous nodules on torso and upper extremities.   Continue hydroxyurea and allopurinol  Request IR for skin biopsy  Pending uric acid  Monitoring ANC  Chronic Conditions (reviewed and stable unless otherwise stated)   HTN: continue home metoprolol  BPH: continue home Flomax  Obesity: BMI Peripheral Pulses: +2 palpable, equal bilaterally. EKG:  I have reviewed the EKG with the following interpretation: NSR      Labs:     Recent Labs     10/24/23  0930   WBC 19.4*   HGB 6.2*   HCT 19.4*   PLT 54*     Recent Labs     10/24/23  0930      K 3.6   CL 97*   CO2 26   BUN 23*   CREATININE 1.6*   CALCIUM 9.2     Recent Labs     10/24/23  0930   AST 15   ALT 15   BILITOT 0.4   ALKPHOS 97     Recent Labs     10/24/23  0930   INR 1.16*     No results for input(s): \"TROPONINT\" in the last 72 hours. No results for input(s): \"PROCAL\" in the last 72 hours. Lab Results   Component Value Date/Time    NITRU NEGATIVE 10/24/2023 02:35 PM    [address] 5-9 10/24/2023 02:35 PM    BACTERIA NONE SEEN 10/24/2023 02:35 PM    RBCUA NONE SEEN 10/24/2023 02:35 PM    BLOODU NEGATIVE 10/24/2023 02:35 PM    SPECGRAV >1.030 10/08/2023 08:40 PM    GLUCOSEU NEGATIVE 10/24/2023 02:35 PM         Radiology:   CTA CHEST W WO CONTRAST   Final Result   1. No pulmonary embolism. 2. Small pericardial effusion and loculated left-sided pleural effusion. 3. Bilateral atelectasis/infiltrate. 4. Nodular densities in the right upper lung highly suspicious for malignancy. 5. Enlarged mediastinal, right hilar and bilateral axillary lymph nodes, highly suspicious for metastatic disease. Final report electronically signed by Dr. Cher Lira on 10/24/2023 1:53 PM      XR CHEST (2 VW)   Final Result   1. Moderate cardiomegaly. Marked soft tissue fullness right paratracheal region and right hilum, consistent with relatively massive adenopathy. 2. Tiny pleural effusion bilaterally. Moderate atelectasis/pneumonia left lung base as well as right mid and lower lung fields. 3. Overall appearance of chest has worsened somewhat since prior. **This report has been created using voice recognition software. It may contain minor errors which are inherent in voice recognition technology. **      Final report electronically

## 2023-10-24 NOTE — ED TRIAGE NOTES
Pt presents to the ED through triage with c/c SOB x 2-3 weeks. Pt reports hx leukemia. Reports he goes to get treatment in [place]. Reports he was supposed to have treatment today but he feels too bad. Pt also reporting 9/10 bilateral leg pain after \"straining too hard trying to poop yesterday\". Pt breathing labored on arrival. Pt wears 2L NC at all times at home. Pt 83% on 2L NC.  Pt oxygen supply increased and now sating at 95% on 4L NC

## 2023-10-24 NOTE — PROGRESS NOTES
Patient arrived per cart to 3B. Heart monitor applied and vitals taken. Admission paperwork completed. Explained to patient that [facility] is not responsible for any lost or stolen items. Patient verbalized understanding. Oriented to room and use of call light and bed controls. Patient denies pain or needs. No signs of distress noted. Bed locked & in low position, side-rails up x2. Call light in reach. Reminded patient to call nurse if any needs arise. 2 person skin assessment performed by this nurse and Johnathan Ivey RN.

## 2023-10-24 NOTE — ED NOTES
Pt observed for initial 15 minutes of blood transfusion. Pt tolerated well. VSS with telemetry in place.  Call light in reach     Vivi Lindsay RN  10/24/23 [id number]

## 2023-10-24 NOTE — ED PROVIDER NOTES
[facility]  EMERGENCY MEDICINE ATTENDING ATTESTATION      Evaluation of Deon Snyder. Case discussed and care plan developed with resident physician. I agree with the resident physician documentation and plan as documented by him, except if my documentation differs. Patient seen, interviewed and examined by me. I reviewed the medical, surgical, family and social history, medications and allergies. I have reviewed and interpreted all available lab, radiology and ekg results available at the moment. I have reviewed the nursing documentation. Brief H&P   Patient c/o worsening shortness of breath. Patient gets weekly platelet transfusions and occasional PRBC transfusions by his oncologist but patient stated he has been needing to use more oxygen but not tolerated going there today, so he came to the emergency department instead. Physical exam is notable for chronically ill appearing, visibly short of breath but not in respiratory distress. Medical Decision Making   MDM:   Symptomatic anemia with history of fluid overload doing transfusions in the past  Plan:   IV line, labs  EKG  Imaging  Observation in the ED while awaiting results  We will admit    Please see the resident physician completed note for final disposition except as documented on this attestation. I have reviewed and interpreted all available lab, radiology and ekg results available at the moment. Diagnosis, treatment and disposition plans were discussed and agreed upon by patient. This transcription was electronically signed. It was dictated by use of voice recognition software and electronically transcribed. The transcription may contain errors not detected in proofreading.      I performed direct supervision and was present for the critical portion following procedures: None  Critical care time on this case: None    Electronically signed by Ena Shaw MD on 10/24/23 at 10:44 AM EDT

## 2023-10-24 NOTE — CONSENT
Informed Consent for Blood Component Transfusion Note    I have discussed with the patient the rationale for blood component transfusion; its benefits in treating or preventing fatigue, organ damage, or death; and its risk which includes mild transfusion reactions, rare risk of blood borne infection, or more serious but rare reactions. I have discussed the alternatives to transfusion, including the risk and consequences of not receiving transfusion. The patient had an opportunity to ask questions and had agreed to proceed with transfusion of blood components.     Electronically signed by Bruno Mack MD on 10/24/23 at 11:33 AM EDT

## 2023-10-24 NOTE — ED NOTES
Provider at bedside discussing blood transfusion with pt. Pt verbalized understanding. Consent form signed at this time.       Rodrigo Galvin RN  10/24/23 3838

## 2023-10-24 NOTE — ED NOTES
Covid swab obtained and sent to the lab. Vitals remain stable.       Anibal Mullins RN  10/24/23 1023

## 2023-10-25 ENCOUNTER — APPOINTMENT (OUTPATIENT)
Dept: CT IMAGING | Age: 68
DRG: 840 | End: 2023-10-25
Payer: COMMERCIAL

## 2023-10-25 LAB
ANION GAP SERPL CALC-SCNC: 11 MEQ/L (ref 8–16)
ARTERIAL PATENCY WRIST A: POSITIVE
AUTO DIFF PNL BLD: ABNORMAL
BASE EXCESS BLDA CALC-SCNC: 3.4 MMOL/L (ref -2.5–2.5)
BASOPHILS ABSOLUTE: 0 THOU/MM3 (ref 0–0.1)
BASOPHILS NFR BLD AUTO: 0 %
BDY SITE: ABNORMAL
BLASTS: 10 % (ref 0–1)
BUN SERPL-MCNC: 19 MG/DL (ref 7–22)
CALCIUM SERPL-MCNC: 8.6 MG/DL (ref 8.5–10.5)
CHLORIDE SERPL-SCNC: 97 MEQ/L (ref 98–111)
CO2 SERPL-SCNC: 30 MEQ/L (ref 23–33)
COLLECTED BY:: ABNORMAL
CREAT SERPL-MCNC: 1.3 MG/DL (ref 0.4–1.2)
DEPRECATED RDW RBC AUTO: 51.5 FL (ref 35–45)
DEVICE: ABNORMAL
EOSINOPHIL NFR BLD AUTO: 0 %
EOSINOPHILS ABSOLUTE: 0 THOU/MM3 (ref 0–0.4)
ERYTHROCYTE [DISTWIDTH] IN BLOOD BY AUTOMATED COUNT: 15.9 % (ref 11.5–14.5)
FIO2 ON VENT O2 ANALYZER: 4 %
GFR SERPL CREATININE-BSD FRML MDRD: 60 ML/MIN/1.73M2
GLUCOSE FLD-MCNC: 179 MG/DL
GLUCOSE SERPL-MCNC: 96 MG/DL (ref 70–108)
HCO3 BLDA-SCNC: 27 MMOL/L (ref 23–28)
HCT VFR BLD AUTO: 23.2 % (ref 42–52)
HCT VFR BLD AUTO: 23.7 % (ref 42–52)
HCT VFR BLD AUTO: 26.8 % (ref 42–52)
HGB BLD-MCNC: 7.7 GM/DL (ref 14–18)
HGB BLD-MCNC: 7.9 GM/DL (ref 14–18)
HGB BLD-MCNC: 8.9 GM/DL (ref 14–18)
LDH FLD L TO P-CCNC: 925 U/L
LDH SERPL L TO P-CCNC: 1299 U/L (ref 100–190)
LYMPHOCYTES ABSOLUTE: 1.3 THOU/MM3 (ref 1–4.8)
LYMPHOCYTES NFR BLD AUTO: 6 %
MANUAL DIFF BLD: NORMAL
MCH RBC QN AUTO: 29.1 PG (ref 26–33)
MCHC RBC AUTO-ENTMCNC: 32.5 GM/DL (ref 32.2–35.5)
MCV RBC AUTO: 89.4 FL (ref 80–94)
MONOCYTES ABSOLUTE: 17.7 THOU/MM3 (ref 0.4–1.3)
MONOCYTES NFR BLD AUTO: 81 %
NEUTROPHILS NFR BLD AUTO: 2 %
NRBC BLD AUTO-RTO: 0 /100 WBC
PATHOLOGIST REVIEW: ABNORMAL
PCO2 BLDA: 38 MMHG (ref 35–45)
PH BIFL: 7.56 [PH]
PH BLDA: 7.47 [PH] (ref 7.35–7.45)
PHOSPHATE SERPL-MCNC: 4.3 MG/DL (ref 2.4–4.7)
PLATELET # BLD AUTO: 45 THOU/MM3 (ref 130–400)
PLATELET BLD QL SMEAR: ABNORMAL
PMV BLD AUTO: 12 FL (ref 9.4–12.4)
PO2 BLDA: 56 MMHG (ref 71–104)
POTASSIUM SERPL-SCNC: 3.7 MEQ/L (ref 3.5–5.2)
PROMYELOCYTES: 1 %
PROT FLD-MCNC: 4 GM/DL
PROT SERPL-MCNC: 6.2 G/DL (ref 6.1–8)
RBC # BLD AUTO: 2.65 MILL/MM3 (ref 4.7–6.1)
REVIEWED BY: NORMAL
ROULEAUX BLD QL SMEAR: SLIGHT
SAO2 % BLDA: 91 %
SCAN OF BLOOD SMEAR: NORMAL
SEGMENTED NEUTROPHILS ABSOLUTE COUNT: 0.4 THOU/MM3 (ref 1.8–7.7)
SMEAR REVIEW: NORMAL
SODIUM SERPL-SCNC: 138 MEQ/L (ref 135–145)
SPHEROCYTES BLD QL SMEAR: ABNORMAL
STOMATOCYTES: ABNORMAL
WBC # BLD AUTO: 21.9 THOU/MM3 (ref 4.8–10.8)

## 2023-10-25 PROCEDURE — 85027 COMPLETE CBC AUTOMATED: CPT

## 2023-10-25 PROCEDURE — 89050 BODY FLUID CELL COUNT: CPT

## 2023-10-25 PROCEDURE — 0W9B30Z DRAINAGE OF LEFT PLEURAL CAVITY WITH DRAINAGE DEVICE, PERCUTANEOUS APPROACH: ICD-10-PCS | Performed by: RADIOLOGY

## 2023-10-25 PROCEDURE — 6370000000 HC RX 637 (ALT 250 FOR IP)

## 2023-10-25 PROCEDURE — 36415 COLL VENOUS BLD VENIPUNCTURE: CPT

## 2023-10-25 PROCEDURE — 94660 CPAP INITIATION&MGMT: CPT

## 2023-10-25 PROCEDURE — 88341 IMHCHEM/IMCYTCHM EA ADD ANTB: CPT

## 2023-10-25 PROCEDURE — P9037 PLATE PHERES LEUKOREDU IRRAD: HCPCS

## 2023-10-25 PROCEDURE — 88184 FLOWCYTOMETRY/ TC 1 MARKER: CPT

## 2023-10-25 PROCEDURE — 36600 WITHDRAWAL OF ARTERIAL BLOOD: CPT

## 2023-10-25 PROCEDURE — 87075 CULTR BACTERIA EXCEPT BLOOD: CPT

## 2023-10-25 PROCEDURE — 2700000000 HC OXYGEN THERAPY PER DAY

## 2023-10-25 PROCEDURE — 82803 BLOOD GASES ANY COMBINATION: CPT

## 2023-10-25 PROCEDURE — 87070 CULTURE OTHR SPECIMN AEROBIC: CPT

## 2023-10-25 PROCEDURE — 83615 LACTATE (LD) (LDH) ENZYME: CPT

## 2023-10-25 PROCEDURE — 36430 TRANSFUSION BLD/BLD COMPNT: CPT

## 2023-10-25 PROCEDURE — 84157 ASSAY OF PROTEIN OTHER: CPT

## 2023-10-25 PROCEDURE — 82945 GLUCOSE OTHER FLUID: CPT

## 2023-10-25 PROCEDURE — 88305 TISSUE EXAM BY PATHOLOGIST: CPT

## 2023-10-25 PROCEDURE — 85014 HEMATOCRIT: CPT

## 2023-10-25 PROCEDURE — 6360000002 HC RX W HCPCS

## 2023-10-25 PROCEDURE — 77012 CT SCAN FOR NEEDLE BIOPSY: CPT

## 2023-10-25 PROCEDURE — 85018 HEMOGLOBIN: CPT

## 2023-10-25 PROCEDURE — 6370000000 HC RX 637 (ALT 250 FOR IP): Performed by: STUDENT IN AN ORGANIZED HEALTH CARE EDUCATION/TRAINING PROGRAM

## 2023-10-25 PROCEDURE — 88342 IMHCHEM/IMCYTCHM 1ST ANTB: CPT

## 2023-10-25 PROCEDURE — 2580000003 HC RX 258

## 2023-10-25 PROCEDURE — 99223 1ST HOSP IP/OBS HIGH 75: CPT | Performed by: INTERNAL MEDICINE

## 2023-10-25 PROCEDURE — 2500000003 HC RX 250 WO HCPCS

## 2023-10-25 PROCEDURE — 0HBBXZX EXCISION OF RIGHT UPPER ARM SKIN, EXTERNAL APPROACH, DIAGNOSTIC: ICD-10-PCS

## 2023-10-25 PROCEDURE — 2140000000 HC CCU INTERMEDIATE R&B

## 2023-10-25 PROCEDURE — 84100 ASSAY OF PHOSPHORUS: CPT

## 2023-10-25 PROCEDURE — 99233 SBSQ HOSP IP/OBS HIGH 50: CPT | Performed by: STUDENT IN AN ORGANIZED HEALTH CARE EDUCATION/TRAINING PROGRAM

## 2023-10-25 PROCEDURE — 85025 COMPLETE CBC W/AUTO DIFF WBC: CPT

## 2023-10-25 PROCEDURE — 88185 FLOWCYTOMETRY/TC ADD-ON: CPT

## 2023-10-25 PROCEDURE — 88112 CYTOPATH CELL ENHANCE TECH: CPT

## 2023-10-25 PROCEDURE — 80048 BASIC METABOLIC PNL TOTAL CA: CPT

## 2023-10-25 PROCEDURE — 94640 AIRWAY INHALATION TREATMENT: CPT

## 2023-10-25 PROCEDURE — 94761 N-INVAS EAR/PLS OXIMETRY MLT: CPT

## 2023-10-25 PROCEDURE — 84155 ASSAY OF PROTEIN SERUM: CPT

## 2023-10-25 PROCEDURE — 2709999900 CT GUIDED CHEST TUBE

## 2023-10-25 PROCEDURE — 6360000002 HC RX W HCPCS: Performed by: STUDENT IN AN ORGANIZED HEALTH CARE EDUCATION/TRAINING PROGRAM

## 2023-10-25 PROCEDURE — 83986 ASSAY PH BODY FLUID NOS: CPT

## 2023-10-25 RX ORDER — TRAMADOL HYDROCHLORIDE 50 MG/1
50 TABLET ORAL EVERY 6 HOURS PRN
Status: DISCONTINUED | OUTPATIENT
Start: 2023-10-25 | End: 2023-10-25

## 2023-10-25 RX ORDER — SODIUM CHLORIDE 9 MG/ML
INJECTION, SOLUTION INTRAVENOUS PRN
Status: DISCONTINUED | OUTPATIENT
Start: 2023-10-25 | End: 2023-10-25

## 2023-10-25 RX ORDER — QUETIAPINE FUMARATE 25 MG/1
25 TABLET, FILM COATED ORAL NIGHTLY
Status: DISCONTINUED | OUTPATIENT
Start: 2023-10-25 | End: 2023-10-28 | Stop reason: HOSPADM

## 2023-10-25 RX ORDER — TRAMADOL HYDROCHLORIDE 50 MG/1
100 TABLET ORAL EVERY 6 HOURS PRN
Status: DISCONTINUED | OUTPATIENT
Start: 2023-10-25 | End: 2023-10-25

## 2023-10-25 RX ORDER — FUROSEMIDE 10 MG/ML
40 INJECTION INTRAMUSCULAR; INTRAVENOUS ONCE
Status: COMPLETED | OUTPATIENT
Start: 2023-10-25 | End: 2023-10-25

## 2023-10-25 RX ORDER — LIDOCAINE HYDROCHLORIDE 10 MG/ML
5 INJECTION, SOLUTION INFILTRATION; PERINEURAL ONCE
Status: COMPLETED | OUTPATIENT
Start: 2023-10-25 | End: 2023-10-25

## 2023-10-25 RX ORDER — SODIUM CHLORIDE 9 MG/ML
INJECTION, SOLUTION INTRAVENOUS PRN
Status: DISCONTINUED | OUTPATIENT
Start: 2023-10-25 | End: 2023-10-28 | Stop reason: HOSPADM

## 2023-10-25 RX ADMIN — OXYCODONE AND ACETAMINOPHEN 1 TABLET: 5; 325 TABLET ORAL at 05:28

## 2023-10-25 RX ADMIN — LIDOCAINE HYDROCHLORIDE 5 ML: 10 INJECTION, SOLUTION INFILTRATION; PERINEURAL at 10:00

## 2023-10-25 RX ADMIN — METOPROLOL TARTRATE 12.5 MG: 25 TABLET, FILM COATED ORAL at 08:40

## 2023-10-25 RX ADMIN — HYDROXYUREA 500 MG: 500 CAPSULE ORAL at 08:55

## 2023-10-25 RX ADMIN — IPRATROPIUM BROMIDE AND ALBUTEROL SULFATE 1 DOSE: .5; 3 SOLUTION RESPIRATORY (INHALATION) at 02:10

## 2023-10-25 RX ADMIN — ALLOPURINOL 300 MG: 300 TABLET ORAL at 08:40

## 2023-10-25 RX ADMIN — TAMSULOSIN HYDROCHLORIDE 0.8 MG: 0.4 CAPSULE ORAL at 08:41

## 2023-10-25 RX ADMIN — SILVER NITRATE APPLICATORS 1 EACH: 25; 75 STICK TOPICAL at 09:59

## 2023-10-25 RX ADMIN — SODIUM CHLORIDE, PRESERVATIVE FREE 10 ML: 5 INJECTION INTRAVENOUS at 08:46

## 2023-10-25 RX ADMIN — FUROSEMIDE 40 MG: 10 INJECTION, SOLUTION INTRAMUSCULAR; INTRAVENOUS at 12:17

## 2023-10-25 RX ADMIN — QUETIAPINE FUMARATE 25 MG: 25 TABLET ORAL at 20:53

## 2023-10-25 RX ADMIN — TIOTROPIUM BROMIDE INHALATION SPRAY 2 PUFF: 3.12 SPRAY, METERED RESPIRATORY (INHALATION) at 13:11

## 2023-10-25 RX ADMIN — FOLIC ACID 1 MG: 1 TABLET ORAL at 08:40

## 2023-10-25 RX ADMIN — QUETIAPINE FUMARATE 25 MG: 25 TABLET ORAL at 01:52

## 2023-10-25 RX ADMIN — METOPROLOL TARTRATE 12.5 MG: 25 TABLET, FILM COATED ORAL at 20:53

## 2023-10-25 RX ADMIN — SODIUM CHLORIDE, PRESERVATIVE FREE 10 ML: 5 INJECTION INTRAVENOUS at 20:53

## 2023-10-25 RX ADMIN — MOMETASONE FUROATE AND FORMOTEROL FUMARATE DIHYDRATE 2 PUFF: 200; 5 AEROSOL RESPIRATORY (INHALATION) at 18:05

## 2023-10-25 RX ADMIN — HYDROMORPHONE HYDROCHLORIDE 0.25 MG: 1 INJECTION, SOLUTION INTRAMUSCULAR; INTRAVENOUS; SUBCUTANEOUS at 23:36

## 2023-10-25 RX ADMIN — ACETAMINOPHEN 650 MG: 325 TABLET ORAL at 23:08

## 2023-10-25 RX ADMIN — PREDNISONE 40 MG: 20 TABLET ORAL at 08:41

## 2023-10-25 ASSESSMENT — PAIN SCALES - GENERAL
PAINLEVEL_OUTOF10: 7
PAINLEVEL_OUTOF10: 3
PAINLEVEL_OUTOF10: 7

## 2023-10-25 ASSESSMENT — PAIN - FUNCTIONAL ASSESSMENT
PAIN_FUNCTIONAL_ASSESSMENT: ACTIVITIES ARE NOT PREVENTED
PAIN_FUNCTIONAL_ASSESSMENT: PREVENTS OR INTERFERES SOME ACTIVE ACTIVITIES AND ADLS
PAIN_FUNCTIONAL_ASSESSMENT: ACTIVITIES ARE NOT PREVENTED

## 2023-10-25 ASSESSMENT — ENCOUNTER SYMPTOMS
SORE THROAT: 0
CONSTIPATION: 0
CHEST TIGHTNESS: 0
BLOOD IN STOOL: 0
WHEEZING: 0
CHOKING: 0
DIARRHEA: 0
COUGH: 1
NAUSEA: 0
SHORTNESS OF BREATH: 1
VOMITING: 0

## 2023-10-25 ASSESSMENT — PAIN DESCRIPTION - FREQUENCY
FREQUENCY: CONTINUOUS

## 2023-10-25 ASSESSMENT — PAIN DESCRIPTION - DESCRIPTORS
DESCRIPTORS: ACHING
DESCRIPTORS: ACHING
DESCRIPTORS: ACHING;DISCOMFORT

## 2023-10-25 ASSESSMENT — PAIN DESCRIPTION - ORIENTATION
ORIENTATION: LEFT
ORIENTATION: LEFT
ORIENTATION: RIGHT

## 2023-10-25 ASSESSMENT — PAIN DESCRIPTION - LOCATION
LOCATION: CHEST
LOCATION: CHEST
LOCATION: ARM

## 2023-10-25 ASSESSMENT — PAIN DESCRIPTION - PAIN TYPE
TYPE: CHRONIC PAIN
TYPE: CHRONIC PAIN
TYPE: ACUTE PAIN

## 2023-10-25 ASSESSMENT — PAIN DESCRIPTION - ONSET
ONSET: ON-GOING
ONSET: PROGRESSIVE
ONSET: SUDDEN

## 2023-10-25 NOTE — CARE COORDINATION
10/25/23, 3:11 PM EDT    DISCHARGE PLANNING EVALUATION    Discussed discharge plan with patient. Patient is agreeable to home health services when discharged. Provided patient with list of home health agencies and SW will follow up tomorrow to get his choice of agency.

## 2023-10-25 NOTE — CARE COORDINATION
Case Management Assessment  Initial Evaluation    Date/Time of Evaluation: 10/25/2023 1:55 PM  Assessment Completed by: Karlie Kimbrough RN    If patient is discharged prior to next notation, then this note serves as note for discharge by case management. Patient Name: Otis Arredondo                   YOB: 1955  Diagnosis: Shortness of breath [R06.02]  Acute on chronic diastolic CHF (congestive heart failure) ([address]) [I50.33]  Symptomatic anemia [D64.9]  Acute hypoxic respiratory failure ([address]) [J96.01]                   Date / Time: 10/24/2023  9:14 AM  Location: [address] [place] [address]     Patient Admission Status: Inpatient   Readmission Risk Low 0-14, Mod 15-19), High > 20: Readmission Risk Score: 34    Current PCP: YESY Rubio CNP  PCP verified by CM? Yes    Chart Reviewed: Yes      History Provided by: Patient  Patient Orientation: Alert and Oriented    Patient Cognition: Alert    Hospitalization in the last 30 days (Readmission):  Yes    If yes, Readmission Assessment in CM Navigator will be completed. Advance Directives:      Code Status: Full Code   Patient's Primary Decision Maker is: Legal Next of Kin    Primary Decision MakerMelnicole Hawkins Spouse - [phone]    Secondary Decision Maker: Sherly Douglas Child - [phone]    Discharge Planning:    Patient lives with: Alone Type of Home: House  Primary Care Giver: Self  Patient Support Systems include: Family Members   Current Financial resources: Medicaid, Medicare  Current community resources: None  Current services prior to admission: Durable Medical Equipment, Oxygen Therapy            Current DME: Walker, Oxygen Therapy (Comment) (walker, O2 from [employer] (1L and 4L). )            Type of Home Care services:  None    ADLS  Prior functional level: Independent in ADLs/IADLs  Current functional level: Independent in ADLs/IADLs    Family can provide assistance at DC:  Yes  Would you like Case Management to discuss the discharge plan with hilar and bilateral axillary lymph nodes, highly suspicious for metastatic disease. 10/25 US guided left thoracentesis: ordered. The Plan for Transition of Care is related to the following treatment goals of Shortness of breath [R06.02]  Acute on chronic diastolic CHF (congestive heart failure) (HCC) [I50.33]  Symptomatic anemia [D64.9]  Acute hypoxic respiratory failure ([address]) [J96.01]    Patient Goals/Plan/Treatment Preferences: Spoke with pt. He lives at home alone. He has home O2 through [employer], 1L at rest and 4L with activity. Discussed multiple readmissions, and possible need for [address] [facility],[address]. Pt verbalized understanding and is open to [address] [place] [address],[address]. Transportation/Food Security/Housekeeping Addressed: No issues identified.      Emil Aragon RN  Case Management Department

## 2023-10-25 NOTE — PLAN OF CARE
Problem: Discharge Planning  Goal: Discharge to home or other facility with appropriate resources  10/25/2023 1555 by Clif Jimenez RN  Outcome: Progressing  Flowsheets (Taken 10/25/2023 1555)  Discharge to home or other facility with appropriate resources:   Identify barriers to discharge with patient and caregiver   Arrange for needed discharge resources and transportation as appropriate     Problem: Pain  Goal: Verbalizes/displays adequate comfort level or baseline comfort level  10/25/2023 1555 by Clif Jimenez RN  Outcome: Progressing  Flowsheets (Taken 10/25/2023 1555)  Verbalizes/displays adequate comfort level or baseline comfort level:   Encourage patient to monitor pain and request assistance   Assess pain using appropriate pain scale   Administer analgesics based on type and severity of pain and evaluate response   Implement non-pharmacological measures as appropriate and evaluate response     Problem: ABCDS Injury Assessment  Goal: Absence of physical injury  10/25/2023 1555 by Clif Jimenez RN  Outcome: Progressing  Flowsheets (Taken 10/25/2023 1555)  Absence of Physical Injury: Implement safety measures based on patient assessment     Problem: Safety - Adult  Goal: Free from fall injury  10/25/2023 1555 by Clif Jimenez RN  Outcome: Progressing  Flowsheets (Taken 10/25/2023 1555)  Free From Fall Injury: Instruct family/caregiver on patient safety     Problem: Respiratory - Adult  Goal: Achieves optimal ventilation and oxygenation  Outcome: Progressing  Flowsheets (Taken 10/25/2023 1556)  Achieves optimal ventilation and oxygenation:   Assess for changes in respiratory status   Encourage broncho-pulmonary hygiene including cough, deep breathe, incentive spirometry   Oxygen supplementation based on oxygen saturation or arterial blood gases     Problem: Hematologic - Adult  Goal: Maintains hematologic stability  Outcome: Progressing  Flowsheets (Taken 10/25/2023 1556)  Maintains

## 2023-10-25 NOTE — PLAN OF CARE
Problem: Discharge Planning  Goal: Discharge to home or other facility with appropriate resources  Outcome: Progressing  Flowsheets (Taken 10/25/2023 0510)  Discharge to home or other facility with appropriate resources:   Identify barriers to discharge with patient and caregiver   Arrange for needed discharge resources and transportation as appropriate     Problem: Pain  Goal: Verbalizes/displays adequate comfort level or baseline comfort level  Outcome: Progressing  Flowsheets (Taken 10/25/2023 0510)  Verbalizes/displays adequate comfort level or baseline comfort level:   Encourage patient to monitor pain and request assistance   Assess pain using appropriate pain scale     Problem: ABCDS Injury Assessment  Goal: Absence of physical injury  Outcome: Progressing  Flowsheets (Taken 10/25/2023 0510)  Absence of Physical Injury: Implement safety measures based on patient assessment     Problem: Safety - Adult  Goal: Free from fall injury  Outcome: Progressing  Flowsheets (Taken 10/25/2023 0510)  Free From Fall Injury: Instruct family/caregiver on patient safety     Care plan reviewed with patient. Patient verbalizes understanding of the care plan and contributed to goal setting.

## 2023-10-25 NOTE — CONSULTS
[facility]      Patient - Jerica Mora   MRN -  [id number]   [facility]   - 1955      Date of Admission -  10/24/2023  9:14 AM  Date of evaluation -  10/25/2023  Room - 3B--A   [facility] Sekou Duarte MD Primary Care Physician - Lorna Nieto, APRN - CNP     Problem List      Active Hospital Problems    Diagnosis Date Noted    Shortness of breath [R06.02] 10/24/2023     Reason for Consult    \"Mediastinal mass\"  HPI   History Obtained From: Patient and electronic medical record. Jerica Mora is a 79 y.o. male w/ h/o of CMML-2 who presents 10/24/2023 with worsening shortness of breath. Per chart review, pt's SOB has been worsening x 2 months. Of note, ~2023, pt was hospitalized for blast crisis which was subsequently treated and resolved. Pt states that he was told to have a PleurX Catheter placed for his L recurrent pleural effusion but then it was never placed. He thought it might be due to infection however he is unsure. Spoke with Dr. Maria Isabel Gregorio MD, Hem/Onc and asked if PleurX Catheter should be placed given clinical picture is consistent with malignant pleural effusion.      PMHx   Past Medical History      Diagnosis Date    Arthritis     Cancer ([address])     COPD (chronic obstructive pulmonary disease) ([address])     Hypertension       Past Surgical History        Procedure Laterality Date    CT BONE MARROW BIOPSY  2023    CT BONE MARROW BIOPSY 2023 [facility] CT SCAN     Meds    Current Medications    QUEtiapine  25 mg Oral Nightly    [Held by provider] enoxaparin  30 mg SubCUTAneous BID    allopurinol  300 mg Oral Daily    folic acid  1 mg Oral Daily    hydroxyurea  500 mg Oral Daily    metoprolol tartrate  12.5 mg Oral Q12H    tamsulosin  0.8 mg Oral Daily    tiotropium  2 puff Inhalation Daily RT    predniSONE  40 mg Oral Daily    sodium chloride flush  5-40 mL IntraVENous 2 times per day educated about Pleurex catheter procedure and its associated compliacations including but not limited to bleeding, infection leading to development of empyema and pneumothorax. He agreed to take risk. He verbalizes understading of complications. CT angiogram of chest with IV contrast performed on 24 October 2023:    Awilda Herrera MD  [phone] 10/24/2023      Narrative & Impression  PROCEDURE: CTA CHEST W WO CONTRAST     CLINICAL INFORMATION: Increased oxygen requirements, COPD    IMPRESSION:  1. No pulmonary embolism. 2. Small pericardial effusion and loculated left-sided pleural effusion. 3. Bilateral atelectasis/infiltrate. 4. Nodular densities in the right upper lung highly suspicious for malignancy. 5. Enlarged mediastinal, right hilar and bilateral axillary lymph nodes, highly suspicious for metastatic disease. Final report electronically signed by Dr. Pratt Postal on 10/24/2023 1:53 PM.        I spoke with Hannah Briseno MD from [facility] hematology oncology service by calling him on his mobile through perfect [employer] today at 7:30 PM.  I explained to him in detail about patient's current clinical condition along with the CT angiogram of chest report dated 24 October 2023. He requested me to address only eft-sided pleural effusion at this time. He agreed with our plan of putting a chest tube on left side by interventional radiology service along with a CT surgery consult for management of loculated left-sided pleural effusion. He told me that he is well aware of for the mediastinal lymphadenopathy and hilar lymphadenopathy, most likely due to patient's underlying CML. He requested me to not to do anything at this time. He is going to follow the patient after his discharge from the [facility].   If patient requires any further diagnostic procedures including EBUS, Dr.Adnan Finn MD from [facility] hematology oncology service is going to arrange in [facility] by contacting a

## 2023-10-25 NOTE — PROGRESS NOTES
Pharmacy Medication History Note      List of current medications patient is taking is complete. Source of information: patient, fill history    Changes made to medication list:  Medications removed (include reason, ex. therapy complete or physician discontinued):  none    Medications added/doses adjusted:  Adjusted colace from 1 capsule twice daily to 3 capsules daily  Added multivitamin 2-3 tablets daily as reported by patient    Other notes (ex. Recent course of antibiotics, Coumadin dosing):  Denies use of other OTC or herbal medications.     Allergies reviewed    Electronically signed by Shelbie Louie on 10/25/2023 at 11:34 AM     Toy , PharmD   Pharmacy Resident  10/25/2023 2:52 PM

## 2023-10-25 NOTE — PALLIATIVE CARE
Initial Evaluation        Patient:   Sundeep Valenzuela  YOB: 1955  Age:  79 y.o. Room:  [address] [place] [address]  MRN:  [id number]   Acct: [de-identified]    Date of Admission:  10/24/2023  9:14 AM  Date of Service:  10/25/2023  Completed By:  Jorge Rachel RN                 Reason for Palliative Care Evaluation:-               [] Code Status Discussion              [x] Goals of Care              [] Pain/Symptom Management               [] Emotional Support              [] Other:                    Current Issues:-   []  Pain  []  Fatigue  []  Nausea  []  Anxiety  []  Depression  [x]  Shortness of Breath  []  Constipation  []  Appetite  []  Other:              Advance Directives:-  none on file. Roya Jensen wishes to complete documents while here. Will contact [facility] for assistance.     [] [place] DNR Form  [] Living Will  [] Medical POA              Current Code Status:-     [x] Full Resuscitation  [] DNR-Comfort Care-Arrest  [] DNR-Comfort Care       [] Limited Resuscitation             [] No CPR            [] No shock            [] No ET intubation/reintubation            [] No resuscitative medications            [] Other limitation:               Palliative Performance Status:-      [] 100%  Full ambulation; normal activity and work; no evidence of disease; able to do own self care; normal intake; fully conscious     [] 90%   Full ambulation; normal activity and work; some evidence of disease; able to do own self care; normal intake; fully conscious    [] 80%   Full ambulation; normal activity with effort; some evidence of disease; able to do own self care; normal or reduced intake; fully conscious    [x] 70%  Ambulation reduced; unable to perform normal job/work; significant  disease; able to do own self care; normal or reduced intake; fully conscious    [] 60%  Ambulation reduced; Significant disease;Can't do hobbies/housework; intake normal or reduced; occasional assist; LOC full/confusion    [] 50%

## 2023-10-25 NOTE — PROCEDURES
Procedure  Biopsy Note    Biopsy Type: Punch Biopsy of skin each separate/additional lesion    Performed by: Macy Erazo MD    Consent obtained: Yes    Time out taken: Yes    Pain Control:  Lidocaine 1% injected subcutaneously. Location of Biopsy:  Wound/Ulcer Number(s)  Wound/Ulcer # 1 and 2    Instruments used to perform Biopsy: 5 mm dermal skin punch    Specimen sent to Pathology:Yes    Total number of Biopsy Specimen: 2     Estimated Blood Loss: with a small amount of bleeding    Hemostasis Achieved: by pressure and by silver nitrate stick and 4-0 vicryl x2 stiches for each biopsy site. Procedural Pain: 1  / 10     Post Procedural Pain: 1 / 10     Response to treatment: Patient tolerated procedure well with no complaints of pain.      Pre-biopsy Picture 10/25/23 @ ~1015      Post-biopsy Picture 10/25/23 @ ~1030      Plan:  -Will await pathology  -Keep areas CDI; monitor for evidence of infection, bleeding, or skin injury.   -Can remove sutures in 1-2 days if there is adequate hemostasis and good wound approximation  -Please do not hesitate to contact me with any questions    Signed:  Dr. Macy Erazo MD  Internal Medicine, PGY-3  10/25/23  10:40 AM

## 2023-10-25 NOTE — PROGRESS NOTES
Internal Medicine Resident Progress Note    Name: Chandrika Bee, male, : 1955, MRN: [id number]    PCP: YESY Alegre CNP    Date of Admission: 10/24/2023  Date of Service: Pt seen/examined on 10/25/23      Assessment/Plan:  Acute on chronic hypoxic respiratory failure, 2/2 COPD exacerbation, mediastinal mass iso CML, and history of recurrent pleural effusions: Patient has increased oxygen need, with history as noted in HPI below. December given frusemide in ED, noted to have 700 mL urine output. Patient was discussing possibility of Pleurx drain with oncologist due to recurrent pleural effusions. Started on prednisone  Continue inhalers and nebulizers per pulmonology  Continue metoprolol for history of pulmonary hypertension  If no improvement in pulmonary function, will start Bumex 1 IV tomorrow ordered morning  CTA chest negative for PE  Echo 10/9 EF 45 to 50%  BiPAP ordered  Blood smear reviewed by pathologist  IR consulted for ultrasound-guided thoracentesis of left-sided loculated effusion  Loculated left sided pleural effusion iso recurrent pleural effusions: States was in plans to have a pleurx drain placed per oncology recommendations. Baseline 2L oxygen use, is currently on 5L in ED. Given furosemide in ED. 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 Yonatan Lao, oncology. Noted to have global cutaneous nodules on torso and upper extremities.   Continue hydroxyurea and allopurinol  Punch biopsy of skin performed, pending pathology report  Uric acid 8.7  Monitoring ANC  Chronic Conditions (reviewed and stable unless otherwise stated)   HTN: continue home metoprolol  BPH: continue home Flomax  Obesity: BMI

## 2023-10-25 NOTE — PROGRESS NOTES
Patient was instructed on NIV. Education  that was discussed was purpose and advantage of NIV, information on level of care, vital sign frequency, and monitoring requirements, agreement to be NPO for duration of NIV and  to not routinely disrupt NIV except for routine oral care. Patient was also told to advise nursing of any nausea, chest discomfort, sudden increase in shortness of breath or severe headache. Pt was not able to tolerate mask at this time, requested that it be removed. I placed the patient back on a NC at this time and messaged ordering provider on patient not being able to tolerate, currently waiting on response.

## 2023-10-25 NOTE — OP NOTE
Department of Radiology  Post Procedure Progress Note      Pre-Procedure Diagnosis:  Loculated left pleural effusion    Procedure Performed:  CT guided chest tube placement    Anesthesia: local     Findings: successful    Immediate Complications:  None    Estimated Blood Loss: minimal    SEE DICTATED PROCEDURE NOTE FOR COMPLETE DETAILS.     Electronically signed by Virginia Ferrara MD on 10/25/2023 at 5:27 PM

## 2023-10-25 NOTE — PLAN OF CARE
Spoke with Janis Mane MD. Pt's loculated L-sided pleural effusion is not amenable to PleurX Catheter placement at this time due to volume of effusion and Plt <50K.  Discussed this with Dr. David Sweet MD, Pulmonology, who recommended the following:    Plan:  -Transfuse 1 unit Plt to achieve Plt >50K  -Order IR placement of 14 [place] chest tube - only once Plt >50K  -Consult CT Surgery for assistance in evaluation and management of complex L-sided loculated effusion    Electronically signed by Kalee Green MD on 10/25/2023 at 2:11 PM

## 2023-10-25 NOTE — PROGRESS NOTES
1649 Pt arrived to CT holding. Skin natural for race, warm, dry. Respirations even and unlabored. 36 Dr. Jerry Franklin in to evaluate pt and explain procedure. 1656 Consent obtained. Pt verbalizes agreement of site of procedure and procedure to be performed. 1705 Pt positioned  right side down on CT table. Monitor applied. 1710 Pre scans complete. 3843 Procedure started with Dr. Jerry Franklin. Platelet transfusion complete. 1723 Procedure complete. :Left chest tube secured with stay fix and covered with opsite, to gwyn close bag. 63 ml of red tinged fluid obtained and sent to lab.   1725 Pt back to bed. Positioned for comfort. 1729 Report called to . 1735 Pt transported to  via bed. Skin natural for race, warm, dry. Respirations even and unlabored. No complaints voiced at this time.

## 2023-10-26 ENCOUNTER — APPOINTMENT (OUTPATIENT)
Dept: GENERAL RADIOLOGY | Age: 68
DRG: 840 | End: 2023-10-26
Payer: COMMERCIAL

## 2023-10-26 PROBLEM — R59.0 MEDIASTINAL LYMPHADENOPATHY: Status: ACTIVE | Noted: 2023-10-26

## 2023-10-26 PROBLEM — R65.10 SIRS (SYSTEMIC INFLAMMATORY RESPONSE SYNDROME) (HCC): Status: ACTIVE | Noted: 2023-10-26

## 2023-10-26 LAB
ANION GAP SERPL CALC-SCNC: 11 MEQ/L (ref 8–16)
AUTO DIFF PNL BLD: ABNORMAL
AUTO DIFF PNL BLD: ABNORMAL
BASOPHILS ABSOLUTE: 0 THOU/MM3 (ref 0–0.1)
BASOPHILS ABSOLUTE: 0.1 THOU/MM3 (ref 0–0.1)
BASOPHILS NFR BLD AUTO: 0 %
BASOPHILS NFR BLD AUTO: 1 %
BLASTS: 7 % (ref 0–1)
BLASTS: 8 % (ref 0–1)
BUN SERPL-MCNC: 30 MG/DL (ref 7–22)
CA-I BLD ISE-SCNC: 1.15 MMOL/L (ref 1.12–1.32)
CALCIUM SERPL-MCNC: 8.4 MG/DL (ref 8.5–10.5)
CHARACTER, BODY FLUID: ABNORMAL
CHLORIDE SERPL-SCNC: 95 MEQ/L (ref 98–111)
CO2 SERPL-SCNC: 30 MEQ/L (ref 23–33)
COLOR FLD: ABNORMAL
CREAT SERPL-MCNC: 1.5 MG/DL (ref 0.4–1.2)
DEPRECATED RDW RBC AUTO: 50.7 FL (ref 35–45)
DEPRECATED RDW RBC AUTO: 51.5 FL (ref 35–45)
EKG ATRIAL RATE: 97 BPM
EKG P AXIS: 18 DEGREES
EKG P-R INTERVAL: 148 MS
EKG Q-T INTERVAL: 360 MS
EKG QRS DURATION: 80 MS
EKG QTC CALCULATION (BAZETT): 457 MS
EKG R AXIS: 24 DEGREES
EKG T AXIS: 64 DEGREES
EKG VENTRICULAR RATE: 97 BPM
EOSINOPHIL NFR BLD AUTO: 0 %
EOSINOPHIL NFR BLD AUTO: 2 %
EOSINOPHILS ABSOLUTE: 0 THOU/MM3 (ref 0–0.4)
EOSINOPHILS ABSOLUTE: 0.2 THOU/MM3 (ref 0–0.4)
ERYTHROCYTE [DISTWIDTH] IN BLOOD BY AUTOMATED COUNT: 15.5 % (ref 11.5–14.5)
ERYTHROCYTE [DISTWIDTH] IN BLOOD BY AUTOMATED COUNT: 15.7 % (ref 11.5–14.5)
GFR SERPL CREATININE-BSD FRML MDRD: 51 ML/MIN/1.73M2
GLUCOSE SERPL-MCNC: 107 MG/DL (ref 70–108)
GRANULOCYTES NFR FLD AUTO: 14.9 %
HCT VFR BLD AUTO: 23.5 % (ref 42–52)
HCT VFR BLD AUTO: 23.6 % (ref 42–52)
HCT VFR BLD AUTO: 26 % (ref 42–52)
HCT VFR BLD AUTO: 27.2 % (ref 42–52)
HGB BLD-MCNC: 7.7 GM/DL (ref 14–18)
HGB BLD-MCNC: 7.8 GM/DL (ref 14–18)
HGB BLD-MCNC: 8.5 GM/DL (ref 14–18)
HGB BLD-MCNC: 9 GM/DL (ref 14–18)
IMM GRANULOCYTES # BLD AUTO: 0.04 THOU/MM3 (ref 0–0.07)
LYMPHOCYTES ABSOLUTE: 1.5 THOU/MM3 (ref 1–4.8)
LYMPHOCYTES ABSOLUTE: 2.7 THOU/MM3 (ref 1–4.8)
LYMPHOCYTES NFR BLD AUTO: 14 %
LYMPHOCYTES NFR BLD AUTO: 9 %
MAGNESIUM SERPL-MCNC: 1.6 MG/DL (ref 1.6–2.4)
MAGNESIUM SERPL-MCNC: 2.1 MG/DL (ref 1.6–2.4)
MANUAL DIFF BLD: NORMAL
MCH RBC QN AUTO: 29.4 PG (ref 26–33)
MCH RBC QN AUTO: 29.7 PG (ref 26–33)
MCHC RBC AUTO-ENTMCNC: 32.8 GM/DL (ref 32.2–35.5)
MCHC RBC AUTO-ENTMCNC: 33.1 GM/DL (ref 32.2–35.5)
MCV RBC AUTO: 89.7 FL (ref 80–94)
MCV RBC AUTO: 89.8 FL (ref 80–94)
METAMYELOCYTES: 1 %
METAMYELOCYTES: 2 %
MONOCYTES ABSOLUTE: 20.8 THOU/MM3 (ref 0.4–1.3)
MONOCYTES ABSOLUTE: 7.7 THOU/MM3 (ref 0.4–1.3)
MONOCYTES NFR BLD AUTO: 70 %
MONOCYTES NFR BLD AUTO: 72 %
MONONUC CELLS NFR FLD AUTO: 85.1 %
MYELOCYTES: 12 %
NEUTROPHILS NFR BLD AUTO: 0 %
NEUTROPHILS NFR BLD AUTO: 2 %
NRBC BLD AUTO-RTO: 0 /100 WBC
NRBC BLD AUTO-RTO: 0 /100 WBC
NUC CELL # FLD AUTO: 806 /CUMM (ref 0–500)
PATHOLOGIST REVIEW: ABNORMAL
PLATELET # BLD AUTO: 44 THOU/MM3 (ref 130–400)
PLATELET # BLD AUTO: 52 THOU/MM3 (ref 130–400)
PLATELET BLD QL SMEAR: ABNORMAL
PLATELET BLD QL SMEAR: ABNORMAL
PMV BLD AUTO: 11.8 FL (ref 9.4–12.4)
PMV BLD AUTO: 12.2 FL (ref 9.4–12.4)
POIKILOCYTES: ABNORMAL
POTASSIUM SERPL-SCNC: 3.2 MEQ/L (ref 3.5–5.2)
POTASSIUM SERPL-SCNC: 4.3 MEQ/L (ref 3.5–5.2)
RBC # BLD AUTO: 2.62 MILL/MM3 (ref 4.7–6.1)
RBC # BLD AUTO: 3.03 MILL/MM3 (ref 4.7–6.1)
RBC # FLD AUTO: ABNORMAL /CUMM
SEGMENTED NEUTROPHILS ABSOLUTE COUNT: 0 THOU/MM3 (ref 1.8–7.7)
SEGMENTED NEUTROPHILS ABSOLUTE COUNT: 0.2 THOU/MM3 (ref 1.8–7.7)
SODIUM SERPL-SCNC: 136 MEQ/L (ref 135–145)
SPECIMEN: ABNORMAL
SPHEROCYTES BLD QL SMEAR: ABNORMAL
STOMATOCYTES: ABNORMAL
TOTAL VOLUME RECEIVED BODY FLUID: 60 ML
WBC # BLD AUTO: 10.7 THOU/MM3 (ref 4.8–10.8)
WBC # BLD AUTO: 29.7 THOU/MM3 (ref 4.8–10.8)

## 2023-10-26 PROCEDURE — 2580000003 HC RX 258

## 2023-10-26 PROCEDURE — 6370000000 HC RX 637 (ALT 250 FOR IP)

## 2023-10-26 PROCEDURE — 99233 SBSQ HOSP IP/OBS HIGH 50: CPT | Performed by: INTERNAL MEDICINE

## 2023-10-26 PROCEDURE — 85025 COMPLETE CBC W/AUTO DIFF WBC: CPT

## 2023-10-26 PROCEDURE — APPSS60 APP SPLIT SHARED TIME 46-60 MINUTES: Performed by: PHYSICIAN ASSISTANT

## 2023-10-26 PROCEDURE — 82330 ASSAY OF CALCIUM: CPT

## 2023-10-26 PROCEDURE — 99232 SBSQ HOSP IP/OBS MODERATE 35: CPT | Performed by: STUDENT IN AN ORGANIZED HEALTH CARE EDUCATION/TRAINING PROGRAM

## 2023-10-26 PROCEDURE — 84132 ASSAY OF SERUM POTASSIUM: CPT

## 2023-10-26 PROCEDURE — 71045 X-RAY EXAM CHEST 1 VIEW: CPT

## 2023-10-26 PROCEDURE — 80048 BASIC METABOLIC PNL TOTAL CA: CPT

## 2023-10-26 PROCEDURE — 6360000002 HC RX W HCPCS

## 2023-10-26 PROCEDURE — 94761 N-INVAS EAR/PLS OXIMETRY MLT: CPT

## 2023-10-26 PROCEDURE — 6370000000 HC RX 637 (ALT 250 FOR IP): Performed by: STUDENT IN AN ORGANIZED HEALTH CARE EDUCATION/TRAINING PROGRAM

## 2023-10-26 PROCEDURE — 83735 ASSAY OF MAGNESIUM: CPT

## 2023-10-26 PROCEDURE — 85018 HEMOGLOBIN: CPT

## 2023-10-26 PROCEDURE — 2140000000 HC CCU INTERMEDIATE R&B

## 2023-10-26 PROCEDURE — 85014 HEMATOCRIT: CPT

## 2023-10-26 PROCEDURE — 36415 COLL VENOUS BLD VENIPUNCTURE: CPT

## 2023-10-26 PROCEDURE — 94640 AIRWAY INHALATION TREATMENT: CPT

## 2023-10-26 RX ORDER — POLYETHYLENE GLYCOL 3350 17 G/17G
17 POWDER, FOR SOLUTION ORAL 2 TIMES DAILY
Status: DISCONTINUED | OUTPATIENT
Start: 2023-10-26 | End: 2023-10-28 | Stop reason: HOSPADM

## 2023-10-26 RX ORDER — DOCUSATE SODIUM 100 MG/1
100 CAPSULE, LIQUID FILLED ORAL DAILY
Status: DISCONTINUED | OUTPATIENT
Start: 2023-10-26 | End: 2023-10-28 | Stop reason: HOSPADM

## 2023-10-26 RX ORDER — POTASSIUM CHLORIDE 20 MEQ/1
20 TABLET, EXTENDED RELEASE ORAL ONCE
Status: COMPLETED | OUTPATIENT
Start: 2023-10-26 | End: 2023-10-26

## 2023-10-26 RX ORDER — MAGNESIUM SULFATE IN WATER 40 MG/ML
2000 INJECTION, SOLUTION INTRAVENOUS ONCE
Status: COMPLETED | OUTPATIENT
Start: 2023-10-26 | End: 2023-10-26

## 2023-10-26 RX ORDER — SENNOSIDES A AND B 8.6 MG/1
1 TABLET, FILM COATED ORAL NIGHTLY
Status: DISCONTINUED | OUTPATIENT
Start: 2023-10-26 | End: 2023-10-28 | Stop reason: HOSPADM

## 2023-10-26 RX ADMIN — ALLOPURINOL 300 MG: 300 TABLET ORAL at 09:32

## 2023-10-26 RX ADMIN — SODIUM CHLORIDE, PRESERVATIVE FREE 10 ML: 5 INJECTION INTRAVENOUS at 19:52

## 2023-10-26 RX ADMIN — MAGNESIUM SULFATE HEPTAHYDRATE 2000 MG: 40 INJECTION, SOLUTION INTRAVENOUS at 06:41

## 2023-10-26 RX ADMIN — POLYETHYLENE GLYCOL 3350 17 G: 17 POWDER, FOR SOLUTION ORAL at 12:58

## 2023-10-26 RX ADMIN — MOMETASONE FUROATE AND FORMOTEROL FUMARATE DIHYDRATE 2 PUFF: 200; 5 AEROSOL RESPIRATORY (INHALATION) at 07:41

## 2023-10-26 RX ADMIN — ACETAMINOPHEN 650 MG: 325 TABLET ORAL at 23:24

## 2023-10-26 RX ADMIN — POTASSIUM CHLORIDE 20 MEQ: 1500 TABLET, EXTENDED RELEASE ORAL at 06:41

## 2023-10-26 RX ADMIN — QUETIAPINE FUMARATE 25 MG: 25 TABLET ORAL at 19:52

## 2023-10-26 RX ADMIN — FOLIC ACID 1 MG: 1 TABLET ORAL at 09:32

## 2023-10-26 RX ADMIN — HYDROMORPHONE HYDROCHLORIDE 0.25 MG: 1 INJECTION, SOLUTION INTRAMUSCULAR; INTRAVENOUS; SUBCUTANEOUS at 06:09

## 2023-10-26 RX ADMIN — POTASSIUM BICARBONATE 40 MEQ: 782 TABLET, EFFERVESCENT ORAL at 06:41

## 2023-10-26 RX ADMIN — TAMSULOSIN HYDROCHLORIDE 0.8 MG: 0.4 CAPSULE ORAL at 09:32

## 2023-10-26 RX ADMIN — SENNOSIDES 8.6 MG: 8.6 TABLET, FILM COATED ORAL at 19:52

## 2023-10-26 RX ADMIN — TIOTROPIUM BROMIDE INHALATION SPRAY 2 PUFF: 3.12 SPRAY, METERED RESPIRATORY (INHALATION) at 07:40

## 2023-10-26 RX ADMIN — PREDNISONE 40 MG: 20 TABLET ORAL at 09:32

## 2023-10-26 RX ADMIN — HYDROXYUREA 500 MG: 500 CAPSULE ORAL at 09:33

## 2023-10-26 RX ADMIN — SODIUM CHLORIDE, PRESERVATIVE FREE 10 ML: 5 INJECTION INTRAVENOUS at 09:33

## 2023-10-26 RX ADMIN — MOMETASONE FUROATE AND FORMOTEROL FUMARATE DIHYDRATE 2 PUFF: 200; 5 AEROSOL RESPIRATORY (INHALATION) at 18:39

## 2023-10-26 RX ADMIN — METOPROLOL TARTRATE 12.5 MG: 25 TABLET, FILM COATED ORAL at 19:52

## 2023-10-26 RX ADMIN — DOCUSATE SODIUM 100 MG: 100 CAPSULE, LIQUID FILLED ORAL at 12:58

## 2023-10-26 RX ADMIN — METOPROLOL TARTRATE 12.5 MG: 25 TABLET, FILM COATED ORAL at 09:32

## 2023-10-26 RX ADMIN — POLYETHYLENE GLYCOL 3350 17 G: 17 POWDER, FOR SOLUTION ORAL at 19:52

## 2023-10-26 ASSESSMENT — PAIN SCALES - GENERAL
PAINLEVEL_OUTOF10: 2
PAINLEVEL_OUTOF10: 2
PAINLEVEL_OUTOF10: 7
PAINLEVEL_OUTOF10: 7
PAINLEVEL_OUTOF10: 2
PAINLEVEL_OUTOF10: 0
PAINLEVEL_OUTOF10: 0
PAINLEVEL_OUTOF10: 4
PAINLEVEL_OUTOF10: 2
PAINLEVEL_OUTOF10: 3
PAINLEVEL_OUTOF10: 2
PAINLEVEL_OUTOF10: 2

## 2023-10-26 ASSESSMENT — PAIN DESCRIPTION - LOCATION
LOCATION: GENERALIZED
LOCATION: GENERALIZED
LOCATION: CHEST
LOCATION: GENERALIZED
LOCATION: GENERALIZED
LOCATION: BACK;SHOULDER
LOCATION: BACK;SHOULDER
LOCATION: GENERALIZED

## 2023-10-26 ASSESSMENT — ENCOUNTER SYMPTOMS
CHEST TIGHTNESS: 0
DIARRHEA: 0
CONSTIPATION: 0
BLOOD IN STOOL: 0
VOMITING: 0
COUGH: 1
WHEEZING: 0
NAUSEA: 0
SHORTNESS OF BREATH: 1
SORE THROAT: 0
CHOKING: 0

## 2023-10-26 ASSESSMENT — PAIN DESCRIPTION - ORIENTATION
ORIENTATION: LEFT

## 2023-10-26 ASSESSMENT — PAIN DESCRIPTION - FREQUENCY
FREQUENCY: CONTINUOUS

## 2023-10-26 ASSESSMENT — PAIN DESCRIPTION - DESCRIPTORS
DESCRIPTORS: ACHING
DESCRIPTORS: ACHING;DISCOMFORT
DESCRIPTORS: ACHING

## 2023-10-26 ASSESSMENT — PAIN DESCRIPTION - PAIN TYPE
TYPE: ACUTE PAIN
TYPE: ACUTE PAIN;CHRONIC PAIN
TYPE: ACUTE PAIN

## 2023-10-26 ASSESSMENT — PAIN DESCRIPTION - ONSET
ONSET: PROGRESSIVE
ONSET: GRADUAL

## 2023-10-26 NOTE — CARE COORDINATION
10/26/23, 9:09 AM EDT    DISCHARGE ON [facility] day: 2  Location: -31/031-A Reason for admit: Shortness of breath [R06.02]  Acute on chronic diastolic CHF (congestive heart failure) (HCC) [I50.33]  Symptomatic anemia [D64.9]  Acute hypoxic respiratory failure ([address]) [J96.01]   Procedure:   10/24 CXR: Moderate cardiomegaly. Marked soft tissue fullness right paratracheal region and right hilum, consistent with relatively massive adenopathy. Tiny pleural effusion bilaterally. Moderate atelectasis/ pneumonia left lung base as well as right mid and lower lung fields. Overall appearance of chest has worsened somewhat since prior. 10/24 CTA chest:   1. No pulmonary embolism. 2. Small pericardial effusion and loculated left-sided pleural effusion. 3. Bilateral atelectasis/infiltrate. 4. Nodular densities in the right upper lung highly suspicious for malignancy. 5. Enlarged mediastinal, right hilar and bilateral axillary lymph nodes, highly suspicious for metastatic disease. 10/25 CT guided left thoracentesis: Truclose drain placed. Barriers to Discharge: Hospitalist, Pulmonology following. Consulted CTS. Truclose drain placed yesterday. Palliative Care. O2 at 5L/nc, sats 93%. Dulera and Spiriva. Prednisone. Lasix 40mg iv x1. Chest tube being changed to [facility] for drainage per RN report. PCP: YESY Nunez CNP  Readmission Risk Score: 35.4%  Patient Goals/Plan/Treatment Preferences: From home alone. He has home O2 through [employer], 1L at rest and 4L with activity. Open to [facility]. SW following.

## 2023-10-26 NOTE — PLAN OF CARE
Problem: Discharge Planning  Goal: Discharge to home or other facility with appropriate resources  Outcome: Progressing  Flowsheets (Taken 10/26/2023 0200)  Discharge to home or other facility with appropriate resources:   Identify discharge learning needs (meds, wound care, etc)   Arrange for needed discharge resources and transportation as appropriate   Identify barriers to discharge with patient and caregiver     Problem: Pain  Goal: Verbalizes/displays adequate comfort level or baseline comfort level  Outcome: Progressing  Flowsheets (Taken 10/26/2023 0200)  Verbalizes/displays adequate comfort level or baseline comfort level:   Encourage patient to monitor pain and request assistance   Assess pain using appropriate pain scale   Administer analgesics based on type and severity of pain and evaluate response     Problem: ABCDS Injury Assessment  Goal: Absence of physical injury  Outcome: Progressing  Flowsheets  Taken 10/26/2023 0200  Absence of Physical Injury: Implement safety measures based on patient assessment  Taken 10/25/2023 2250  Absence of Physical Injury: Implement safety measures based on patient assessment     Problem: Safety - Adult  Goal: Free from fall injury  Outcome: Progressing  Flowsheets  Taken 10/26/2023 0200  Free From Fall Injury: Based on caregiver fall risk screen, instruct family/caregiver to ask for assistance with transferring infant if caregiver noted to have fall risk factors  Taken 10/25/2023 2250  Free From Fall Injury: Based on caregiver fall risk screen, instruct family/caregiver to ask for assistance with transferring infant if caregiver noted to have fall risk factors     Problem: Respiratory - Adult  Goal: Clear lung sounds  10/26/2023 0759 by Jamey Lara RN  Outcome: Progressing     Problem: Respiratory - Adult  Goal: Achieves optimal ventilation and oxygenation  10/26/2023 0759 by Jamey Lara RN  Outcome: Progressing  Flowsheets (Taken 10/26/2023

## 2023-10-26 NOTE — CONSULTS
Nightly    mometasone-formoterol  2 puff Inhalation BID RT    And    tiotropium  2 puff Inhalation Daily RT    [Held by provider] enoxaparin  30 mg SubCUTAneous BID    allopurinol  300 mg Oral Daily    folic acid  1 mg Oral Daily    hydroxyurea  500 mg Oral Daily    metoprolol tartrate  12.5 mg Oral Q12H    tamsulosin  0.8 mg Oral Daily    predniSONE  40 mg Oral Daily    sodium chloride flush  5-40 mL IntraVENous 2 times per day         PastMedical History:  Ashly Segovia  has a past medical history of Arthritis, Cancer ([address]), COPD (chronic obstructive pulmonary disease) ([address]), and Hypertension. Past Surgical History:  The patient  has a past surgical history that includes CT BIOPSY BONE MARROW (8/25/2023) and CT GUIDED CHEST TUBE (10/25/2023). Allergies: The patient has No Known Allergies. Family History: This patient's family history includes Diabetes in his father and mother; Heart Disease in his brother, father, and mother; High Blood Pressure in his father and mother; Stroke in his brother. Social History:  Ashly Segovia  reports that he quit smoking about 9 months ago. His smoking use included cigarettes. He smoked an average of 1 pack per day. He has never used smokeless tobacco. He reports that he does not currently use alcohol after a past usage of about 6.0 standard drinks of alcohol per week. He reports that he does not currently use drugs after having used the following drugs: Marijuana (Weed) and Cocaine. ROS:  Constitutional: Negative for activity change, chills, fatigue, fever and unexpected weight change. HENT: Negative for congestion, facial swelling, sore throat, and changes in voice. Eyes: Negative for photophobia, redness, itching and visual disturbance. Respiratory: Left pigtail catheter in place. Admitted with shortness of breath   Cardiovascular: Negative for chest pain, palpitations and leg swelling.    Gastrointestinal: Negative for abdominal distention, constipation, nausea and vomiting. Endocrine: Negative for cold intolerance, heat intolerance, polyphagia and polyuria. Musculoskeletal: Negative for arthralgias, gait problem, and myalgias. Skin: Negative for color change, rash and wound. Allergic/Immunologic: Negative for food allergies and immunocompromised state. Neurological: Negative for dizziness, tremors, speech difficulty, weakness, numbness and headaches. Hematological: as above  Psychiatric/Behavioral: Negative for agitation, confusion, and dysphoric mood. Physical Exam:   General appearance:  No apparent distress, appears stated age and cooperative. HEENT:  Normal cephalic, atraumatic without obvious deformity. Conjunctivae/corneas clear. Neck: Supple, with full range of motion. No jugular venous distention. Trachea midline. Respiratory:  Left chest pigtail catheter. Normal respiratory effort. Clear to auscultation, bilaterally without rales/wheezes/rhonchi. Cardiovascular:  Regular rate and rhythm with normal S1/S2 without murmurs, rubs or gallops. Abdomen: Soft, non-tender, non-distended with normal bowel sounds. Musculoskeletal:  No clubbing, cyanosis or edema bilaterally. Full range of motion without deformity. Skin: Skin color, texture, turgor normal.  No rashes or lesions. Neurologic:  Neurovascularly intact without any focal sensory/motor deficits. Psychiatric:  Alert and oriented, thought content appropriate, normal insight.        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

## 2023-10-26 NOTE — PROGRESS NOTES
Physician Progress Note      Augusto Torrez  CSN #:                  [id number]  :                       1955  ADMIT DATE:       10/24/2023 9:14 AM  [facility] DATE:  RESPONDING  PROVIDER #:        Juan Daniel          QUERY TEXT:    Dr Susana La, Dr Dukes Number,    Pt admitted with Acute on chronic hypoxic respiratory failure. Per Pulmonary:   Catheter should be placed given clinical picture is consistent with malignant   pleural effusion. If possible, please document in progress notes and discharge   summary the relationship, if any, between Acute on chronic hypoxic   respiratory failure and malignant pleural effusion. The medical record reflects the following:  Risk Factors: CML with oncology and caries  Clinical Indicators:  Acute on chronic hypoxic respiratory failure. Per   Pulmonary: Catheter should be placed given clinical picture is consistent with   malignant pleural effusion. Treatment: left-sided chest tube placement    Maira LEMUS, RN  Options provided:  -- Acute on chronic hypoxic respiratory failure due to malignant pleural   effusion  -- Acute on chronic hypoxic respiratory failure due to  please specify, Please   specify other incidental risk factor. -- Acute on chronic hypoxic respiratory failure unrelated to malignant pleural   effusion  -- Other - I will add my own diagnosis  -- Disagree - Not applicable / Not valid  -- Disagree - Clinically unable to determine / Unknown  -- Refer to Clinical Documentation Reviewer    PROVIDER RESPONSE TEXT:    This patient has Acute on chronic hypoxic respiratory failure due to malignant   pleural effusion.     Query created by: Sherwin Rubalcava on 10/26/2023 9:38 AM      Electronically signed by:  Juan Daniel 10/26/2023 9:53 AM

## 2023-10-26 NOTE — PLAN OF CARE
Problem: Respiratory - Adult  Goal: Clear lung sounds  Outcome: Progressing     Problem: Respiratory - Adult  Goal: Achieves optimal ventilation and oxygenation  Outcome: Progressing   Breath sounds diminished, continuing inhalers as ordered. Weaning oxygen as tolerated. Patient mutually agreed on goals.

## 2023-10-26 NOTE — PROGRESS NOTES
10/24/23  0930   APTT 28.1       Glucose  No results for input(s): \"POCGLU\" in the last 72 hours. UA   Recent Labs     10/24/23  1435 10/25/23  1730   PHUR 5.0  --    COLORU YELLOW RED   PROTEINU TRACE*  --    BLOODU NEGATIVE  --    RBCUA NONE SEEN  --    WBCUA 5-9  --    BACTERIA NONE SEEN  --    NITRU NEGATIVE  --    GLUCOSEU NEGATIVE  --    BILIRUBINUR NEGATIVE  --    UROBILINOGEN 0.2  --    KETUA NEGATIVE  --      . PFTs   10/09/23 Spirometry w/o BD Study        Sleep studies   None per chart review    Cultures      Results       Procedure Component Value Units Date/Time    Culture, Body Fluid [[id number]] Collected: 10/25/23 1719    Order Status: Completed Specimen: Body Fluid from Pleural Fluid Updated: 10/26/23 0934     Body Fluid Culture, Sterile No growth-preliminary     Gram Stain Result Rare segmented neutrophils observed. No organisms observed. performed on cytospun specimen    Narrative:      Source: pleural fluid       Site:           Current Antibiotics: not stated    Gram stain [[id number]] Collected: 10/24/23 1600    Order Status: Canceled Specimen: Pleural     COVID-19 & Influenza Combo [[id number]] Collected: 10/24/23 1014    Order Status: Completed Specimen: Nasopharyngeal Swab Updated: 10/24/23 1047     SARS-CoV-2 RNA, RT PCR NOT DETECTED     Comment: Not Detected results do not preclude SARS-CoV-2 infection and  should not be used as the sole basis for patient management  decisions. Results must be combined with clinical observations,  patient history, and epidemiological information. Testing was performed using DANILO Fransisca SARS-CoV-2 and Influenza A/B nucleic  acid assay.  This test is a multiplex Real-Time Reverse Transcriptase  Polymerase Chain Reaction (RT-PCR)-based in vitro diagnostic test intended  for the qualitative detection of nucleic acids from SARS-CoV-2, influenza A,  and influenza B in nasopharyngeal and nasal swab specimens for use under the  [facility] Emergency Use low at this time. If pt continues to worsen, will start antibiotic therapy. Afebrile however pt is immunocompromised. Denies productive cough. 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 Exudative by lights criteria. TNC: 4484 cells (81% Mononuclear / 19% PMN). Flow cytometry revealed reduced viability (78% viable leukocytes - increased atypical immature monocytes) and minute population of atypical myeloblasts (0.03% of viable leukocytes). 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 Chest tubed placed 10/25/23 draining serosanguinous fluid. Plan:   -Will continue to follow for chest tube management; pt will likely be discharged with this. -[facility] for [facility]  -CT Surgery consulted; appreciate recs. #R-sided Kassandra-Bronchial Infiltrate along Horizontal Fissure: Active  Seen on CTA Chest 10/25/23. Concerning for PNA vs Mass. Plan: As above    #Mediastinal / Hilar LAD: Active  Noted on admission thoracic imaging as above. Discussed with Amauri Patricio MD. [facility] hematology oncology service by calling him on his mobile through [employer] today at 7:30 PM 10/25/23. He is aware of the mediastinal lymphadenopathy and hilar lymphadenopathy, most likely due to patient's underlying CMML. He requested me to not to do anything at this time. He is going to follow the patient after his discharge from the [facility]. If patient requires any further diagnostic procedures including EBUS, Dr.Adnan Finn MD from [facility] hematology oncology service is going to arrange in [facility] by contacting a local pulmonologist in [facility].   Plan: Noted    #Mixed

## 2023-10-26 NOTE — PROGRESS NOTES
Advanced Directives Consult: Pt feels more comfortable to have his son who will be his POA to be present before the document is completed. Prayer was appreciated.     10/26/23 1113   Encounter Summary   Encounter Overview/Reason  Advance Care Planning   Service Provided For: Patient   Referral/Consult From: [address] [place] [address]   Last Encounter  10/26/23   Complexity of Encounter Low   Begin Time 0741   End Time  0747   Total Time Calculated 6 min   Spiritual/Emotional needs   Type Spiritual Support   Advance Care Planning   Type ACP conversation   Assessment/Intervention/Outcome   Assessment Hopeful   Intervention Empowerment

## 2023-10-26 NOTE — PLAN OF CARE
Problem: Discharge Planning  Goal: Discharge to home or other facility with appropriate resources  10/26/2023 1022 by Joselyn Ferrara RN  Outcome: Progressing  10/26/2023 0759 by Eula Hayes RN  Outcome: Progressing  Flowsheets (Taken 10/26/2023 0200)  Discharge to home or other facility with appropriate resources:   Identify discharge learning needs (meds, wound care, etc)   Arrange for needed discharge resources and transportation as appropriate   Identify barriers to discharge with patient and caregiver     Problem: Pain  Goal: Verbalizes/displays adequate comfort level or baseline comfort level  10/26/2023 1022 by Joselyn Ferrara RN  Outcome: Progressing  10/26/2023 0759 by Eula Hayes RN  Outcome: Progressing  Flowsheets (Taken 10/26/2023 0200)  Verbalizes/displays adequate comfort level or baseline comfort level:   Encourage patient to monitor pain and request assistance   Assess pain using appropriate pain scale   Administer analgesics based on type and severity of pain and evaluate response     Problem: ABCDS Injury Assessment  Goal: Absence of physical injury  10/26/2023 1022 by Joselyn Ferrara RN  Outcome: Progressing  10/26/2023 0759 by Eula Hayes RN  Outcome: Progressing  Flowsheets  Taken 10/26/2023 0200  Absence of Physical Injury: Implement safety measures based on patient assessment  Taken 10/25/2023 2250  Absence of Physical Injury: Implement safety measures based on patient assessment     Problem: Safety - Adult  Goal: Free from fall injury  10/26/2023 1022 by Joselyn Ferrara RN  Outcome: Progressing  10/26/2023 0759 by Eula Hayes RN  Outcome: Progressing  Flowsheets  Taken 10/26/2023 0200  Free From Fall Injury: Based on caregiver fall risk screen, instruct family/caregiver to ask for assistance with transferring infant if caregiver noted to have fall risk factors  Taken 10/25/2023 2250  Free From Fall Injury: Based on caregiver fall risk screen, instruct

## 2023-10-26 NOTE — CARE COORDINATION
10/26/23, 1:45 PM EDT    DISCHARGE PLANNING EVALUATION    Stopped by Mal's room to ask if he had decided on a home health agency. He had not. Will stop back today or tomorrow. He asked SW to reach out to spiritual care to see if someone without an accent could help him fill out the paperwork. He said he could not understand the person who brought him the paperwork. Called and left a message for spiritual care. Update 2:02 pm: Planning on patient going to a tertiary facility. Reached out to his [place] plan to ask if The [address] [place] [address] is in network. Contacted them by email, waiting for a response.

## 2023-10-27 ENCOUNTER — APPOINTMENT (OUTPATIENT)
Dept: CT IMAGING | Age: 68
DRG: 840 | End: 2023-10-27
Payer: COMMERCIAL

## 2023-10-27 ENCOUNTER — APPOINTMENT (OUTPATIENT)
Dept: GENERAL RADIOLOGY | Age: 68
DRG: 840 | End: 2023-10-27
Payer: COMMERCIAL

## 2023-10-27 LAB
ANION GAP SERPL CALC-SCNC: 13 MEQ/L (ref 8–16)
AUTO DIFF PNL BLD: ABNORMAL
BACTERIA SPEC ANAEROBE CULT: NORMAL
BACTERIA SPEC BFLD CULT: NORMAL
BASOPHILS ABSOLUTE: 0 THOU/MM3 (ref 0–0.1)
BASOPHILS NFR BLD AUTO: 0 %
BLASTS: 13 % (ref 0–1)
BUN SERPL-MCNC: 34 MG/DL (ref 7–22)
CALCIUM SERPL-MCNC: 8.5 MG/DL (ref 8.5–10.5)
CHLORIDE SERPL-SCNC: 98 MEQ/L (ref 98–111)
CO2 SERPL-SCNC: 30 MEQ/L (ref 23–33)
CREAT SERPL-MCNC: 1.7 MG/DL (ref 0.4–1.2)
DEPRECATED RDW RBC AUTO: 51.2 FL (ref 35–45)
EOSINOPHIL NFR BLD AUTO: 0 %
EOSINOPHILS ABSOLUTE: 0 THOU/MM3 (ref 0–0.4)
ERYTHROCYTE [DISTWIDTH] IN BLOOD BY AUTOMATED COUNT: 15.7 % (ref 11.5–14.5)
GFR SERPL CREATININE-BSD FRML MDRD: 44 ML/MIN/1.73M2
GLUCOSE SERPL-MCNC: 92 MG/DL (ref 70–108)
GRAM STN SPEC: NORMAL
HCT VFR BLD AUTO: 22.1 % (ref 42–52)
HCT VFR BLD AUTO: 24.3 % (ref 42–52)
HCT VFR BLD AUTO: 24.4 % (ref 42–52)
HGB BLD-MCNC: 7.3 GM/DL (ref 14–18)
HGB BLD-MCNC: 7.7 GM/DL (ref 14–18)
HGB BLD-MCNC: 7.8 GM/DL (ref 14–18)
IMM GRANULOCYTES # BLD AUTO: 0 THOU/MM3 (ref 0–0.07)
IMM GRANULOCYTES NFR BLD AUTO: 0 %
LEUKEMIA/LYMPHOMA PHENOTYPING MISC: NORMAL
LEUKEMIA/LYMPHOMA PHENOTYPING MISC: NORMAL
LYMPHOCYTES ABSOLUTE: 3.2 THOU/MM3 (ref 1–4.8)
LYMPHOCYTES NFR BLD AUTO: 9 %
MAGNESIUM SERPL-MCNC: 2 MG/DL (ref 1.6–2.4)
MCH RBC QN AUTO: 29.6 PG (ref 26–33)
MCHC RBC AUTO-ENTMCNC: 33 GM/DL (ref 32.2–35.5)
MCV RBC AUTO: 89.5 FL (ref 80–94)
MONOCYTES ABSOLUTE: 26.3 THOU/MM3 (ref 0.4–1.3)
MONOCYTES NFR BLD AUTO: 75 %
MYELOCYTES: 1 %
NEUTROPHILS NFR BLD AUTO: 4 %
NRBC BLD AUTO-RTO: 0 /100 WBC
PATHOLOGIST REVIEW: ABNORMAL
PLATELET # BLD AUTO: 50 THOU/MM3 (ref 130–400)
PLATELET BLD QL SMEAR: ABNORMAL
PMV BLD AUTO: 12.4 FL (ref 9.4–12.4)
POTASSIUM SERPL-SCNC: 3.5 MEQ/L (ref 3.5–5.2)
POTASSIUM SERPL-SCNC: 4.2 MEQ/L (ref 3.5–5.2)
RBC # BLD AUTO: 2.47 MILL/MM3 (ref 4.7–6.1)
SCAN OF BLOOD SMEAR: NORMAL
SEGMENTED NEUTROPHILS ABSOLUTE COUNT: 1.4 THOU/MM3 (ref 1.8–7.7)
SODIUM SERPL-SCNC: 141 MEQ/L (ref 135–145)
URATE SERPL-MCNC: 8.2 MG/DL (ref 3.7–7)
WBC # BLD AUTO: 35.1 THOU/MM3 (ref 4.8–10.8)

## 2023-10-27 PROCEDURE — 2700000000 HC OXYGEN THERAPY PER DAY

## 2023-10-27 PROCEDURE — APPSS30 APP SPLIT SHARED TIME 16-30 MINUTES: Performed by: PHYSICIAN ASSISTANT

## 2023-10-27 PROCEDURE — 94640 AIRWAY INHALATION TREATMENT: CPT

## 2023-10-27 PROCEDURE — 99233 SBSQ HOSP IP/OBS HIGH 50: CPT | Performed by: STUDENT IN AN ORGANIZED HEALTH CARE EDUCATION/TRAINING PROGRAM

## 2023-10-27 PROCEDURE — 2140000000 HC CCU INTERMEDIATE R&B

## 2023-10-27 PROCEDURE — 94761 N-INVAS EAR/PLS OXIMETRY MLT: CPT

## 2023-10-27 PROCEDURE — 6370000000 HC RX 637 (ALT 250 FOR IP)

## 2023-10-27 PROCEDURE — 71250 CT THORAX DX C-: CPT

## 2023-10-27 PROCEDURE — 6370000000 HC RX 637 (ALT 250 FOR IP): Performed by: STUDENT IN AN ORGANIZED HEALTH CARE EDUCATION/TRAINING PROGRAM

## 2023-10-27 PROCEDURE — 85025 COMPLETE CBC W/AUTO DIFF WBC: CPT

## 2023-10-27 PROCEDURE — 99232 SBSQ HOSP IP/OBS MODERATE 35: CPT | Performed by: INTERNAL MEDICINE

## 2023-10-27 PROCEDURE — 85014 HEMATOCRIT: CPT

## 2023-10-27 PROCEDURE — 80048 BASIC METABOLIC PNL TOTAL CA: CPT

## 2023-10-27 PROCEDURE — 85018 HEMOGLOBIN: CPT

## 2023-10-27 PROCEDURE — 83735 ASSAY OF MAGNESIUM: CPT

## 2023-10-27 PROCEDURE — 36415 COLL VENOUS BLD VENIPUNCTURE: CPT

## 2023-10-27 PROCEDURE — 71045 X-RAY EXAM CHEST 1 VIEW: CPT

## 2023-10-27 PROCEDURE — 2580000003 HC RX 258

## 2023-10-27 PROCEDURE — 84550 ASSAY OF BLOOD/URIC ACID: CPT

## 2023-10-27 PROCEDURE — 84132 ASSAY OF SERUM POTASSIUM: CPT

## 2023-10-27 RX ADMIN — ALLOPURINOL 300 MG: 300 TABLET ORAL at 07:54

## 2023-10-27 RX ADMIN — MOMETASONE FUROATE AND FORMOTEROL FUMARATE DIHYDRATE 2 PUFF: 200; 5 AEROSOL RESPIRATORY (INHALATION) at 17:21

## 2023-10-27 RX ADMIN — DOCUSATE SODIUM 100 MG: 100 CAPSULE, LIQUID FILLED ORAL at 07:52

## 2023-10-27 RX ADMIN — PREDNISONE 40 MG: 20 TABLET ORAL at 07:55

## 2023-10-27 RX ADMIN — METOPROLOL TARTRATE 12.5 MG: 25 TABLET, FILM COATED ORAL at 20:20

## 2023-10-27 RX ADMIN — TAMSULOSIN HYDROCHLORIDE 0.8 MG: 0.4 CAPSULE ORAL at 07:54

## 2023-10-27 RX ADMIN — MOMETASONE FUROATE AND FORMOTEROL FUMARATE DIHYDRATE 2 PUFF: 200; 5 AEROSOL RESPIRATORY (INHALATION) at 07:20

## 2023-10-27 RX ADMIN — SODIUM CHLORIDE, PRESERVATIVE FREE 10 ML: 5 INJECTION INTRAVENOUS at 07:54

## 2023-10-27 RX ADMIN — HYDROXYUREA 500 MG: 500 CAPSULE ORAL at 07:54

## 2023-10-27 RX ADMIN — SODIUM CHLORIDE, PRESERVATIVE FREE 10 ML: 5 INJECTION INTRAVENOUS at 20:22

## 2023-10-27 RX ADMIN — POTASSIUM BICARBONATE 40 MEQ: 782 TABLET, EFFERVESCENT ORAL at 07:51

## 2023-10-27 RX ADMIN — NALOXEGOL OXALATE 25 MG: 25 TABLET, FILM COATED ORAL at 15:05

## 2023-10-27 RX ADMIN — POLYETHYLENE GLYCOL 3350 17 G: 17 POWDER, FOR SOLUTION ORAL at 07:52

## 2023-10-27 RX ADMIN — SENNOSIDES 8.6 MG: 8.6 TABLET, FILM COATED ORAL at 21:57

## 2023-10-27 RX ADMIN — FOLIC ACID 1 MG: 1 TABLET ORAL at 07:54

## 2023-10-27 RX ADMIN — QUETIAPINE FUMARATE 25 MG: 25 TABLET ORAL at 20:20

## 2023-10-27 RX ADMIN — TIOTROPIUM BROMIDE INHALATION SPRAY 2 PUFF: 3.12 SPRAY, METERED RESPIRATORY (INHALATION) at 07:20

## 2023-10-27 RX ADMIN — ACETAMINOPHEN 650 MG: 325 TABLET ORAL at 05:49

## 2023-10-27 RX ADMIN — METOPROLOL TARTRATE 12.5 MG: 25 TABLET, FILM COATED ORAL at 07:56

## 2023-10-27 ASSESSMENT — PAIN DESCRIPTION - LOCATION: LOCATION: GENERALIZED

## 2023-10-27 ASSESSMENT — PAIN DESCRIPTION - DESCRIPTORS: DESCRIPTORS: ACHING;DISCOMFORT

## 2023-10-27 ASSESSMENT — PAIN SCALES - GENERAL
PAINLEVEL_OUTOF10: 0
PAINLEVEL_OUTOF10: 3

## 2023-10-27 ASSESSMENT — PAIN - FUNCTIONAL ASSESSMENT: PAIN_FUNCTIONAL_ASSESSMENT: ACTIVITIES ARE NOT PREVENTED

## 2023-10-27 NOTE — PROGRESS NOTES
Internal Medicine Resident Progress Note    Name: Nicolasa Whitehead, male, : 1955, MRN: [id number]    PCP: YESY Mayen CNP    Date of Admission: 10/24/2023  Date of Service: Pt seen/examined on 10/26/23      Assessment/Plan:  Acute on chronic hypoxic respiratory failure, 2/2 COPD exacerbation, mediastinal mass iso CML, and history of recurrent pleural effusions: Patient has increased oxygen need, with history as noted in HPI below. December given frusemide in ED, noted to have 700 mL urine output. Patient was discussing possibility of Pleurx drain with oncologist due to recurrent pleural effusions. Started on prednisone  Continue inhalers and nebulizers per pulmonology  Continue metoprolol for history of pulmonary hypertension  If no improvement in pulmonary function, will start Bumex 1 IV tomorrow ordered morning  CTA chest negative for PE  Echo 10/9 EF 45 to 50%  BiPAP ordered  Blood smear reviewed by pathologist, noting 15% blasts  S/p thoracentesis, fluid analysis noted for exudative fluid. Will likely [facility] to [facility]  Patient pending transfer to [address] [place] [address] consulted  Loculated left sided pleural effusion iso recurrent pleural effusions: States was in plans to have a pleurx drain placed per oncology recommendations. Baseline 2L oxygen use, is currently on 5L in ED. Given furosemide in ED. 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 Bard Duane, oncology. Noted to have global cutaneous nodules on torso and upper extremities.   Continue hydroxyurea and allopurinol  Punch biopsy of skin performed, initial report noting concerns for malignancy, pending final report  Uric acid 8.7  Monitoring ANC  Constipation: technique, a needle was successfully passed into the pleural fluid. A small amount of fluid was aspirated to confirm appropriate needle position. A guidewire was then passed through the needle followed by insertion of progressively larger dilators up to a 14 [place] size. A 14 [place] multi-side-hole drainage catheter was then passed over the wire and was coiled within the pleural effusion. The retaining suture was then locked in the standard fashion. Catheter was then hooked to a Juice-Close drainage bag. The catheter was stabilized to the skin with a Percu-Stay device. A sample of the fluid was sent to laboratory for culture and sensitivity testing. Successful, uncomplicated left-sided chest tube placement using CT guidance. . **This report has been created using voice recognition software. It may contain minor errors which are inherent in voice recognition technology. ** Final report electronically signed by Dr Nitza Ames on 10/26/2023 8:32 AM    CT GUIDED NEEDLE PLACEMENT    Result Date: 10/26/2023  CT-GUIDED DRAIN PLACEMENT PERFORMED BY: Lita Choi. EVELINA Rosario: Left pleural space APPROACH: Lateral left side of chest CATHETER: 14 [place]an multipurpose drainage catheter. The patient was not sedated during this procedure but was monitored with EKG and pulse ox monitoring devices by a registered nurse. PROCEDURE: Signed informed consent was obtained prior to performing this procedure. ALL CT SCANS AT THIS FACILITY use dose modulation, iterative reconstruction, and/or weight-based dosing when appropriate to reduce radiation dose to as low as reasonably achievable. CT images were initially obtained to determine appropriate puncture site. The skin was marked, prepped, and draped in a sterile fashion. Following local anesthesia and utilizing aseptic technique, a needle was successfully passed into the pleural fluid. A small amount of fluid was aspirated to confirm appropriate needle position.  A

## 2023-10-27 NOTE — CARE COORDINATION
catheter pleural space left side near complete drainage of the previously noted pleural fluid collection. Small residual loculated fluid collection in the left major fissure. 2. Relatively massive mediastinal adenopathy in addition to bilateral axillary and right hilar adenopathy. Cannot exclude lymphoma. 3. Fibrocalcific Calcific plaques seen in the right side of the chest. Patchy parenchymal densities right lung diffusely and left lower lung field, consistent with atelectasis/pneumonia/fibrosis. Overall appearance of these infiltrates somewhat improved from prior. 4. There are a few small nodules on both sides of the chest, likely poorly calcified granulomas and/or inflammatory nodules. 10/27 PleurX drain to be placed in IR. Barriers to Discharge: Alyssa Stager and Pulmonology. Consulted Hem/Onc. CTS signed off. Truclose drain. Palliative Care. O2 at 4L/nc, sats 93%. WBC 35.1. Platelets 50. Hgb 7.8. IS. Acapella. New mediastinal mass is CML. Planning transfer to [facility] in [facility]. PCP: YESY Wong CNP  Readmission Risk Score: 35.2%  Patient Goals/Plan/Treatment Preferences: From home alone. He has home O2 through [employer], 1L at rest and 4L with activity. Open to [facility]. SW following. Planning transfer to [facility] in [facility].

## 2023-10-27 NOTE — PLAN OF CARE
Problem: Discharge Planning  Goal: Discharge to home or other facility with appropriate resources  Outcome: Progressing  Flowsheets (Taken 10/26/2023 2125 by Carol Cota RN)  Discharge to home or other facility with appropriate resources:   Identify barriers to discharge with patient and caregiver   Arrange for needed discharge resources and transportation as appropriate   Identify discharge learning needs (meds, wound care, etc)  Note: He is from home with his wife but plans on transfer to [facility] at discharge. Problem: Pain  Goal: Verbalizes/displays adequate comfort level or baseline comfort level  Outcome: Progressing  Flowsheets (Taken 10/26/2023 0200 by José Miguel Tejeda RN)  Verbalizes/displays adequate comfort level or baseline comfort level:   Encourage patient to monitor pain and request assistance   Assess pain using appropriate pain scale   Administer analgesics based on type and severity of pain and evaluate response  Note: He has pain rated a 3/10 from his left chest pacemaker incision site which was done yesterday. Problem: ABCDS Injury Assessment  Goal: Absence of physical injury  Outcome: Progressing  Flowsheets (Taken 10/26/2023 2132 by Carol Cota RN)  Absence of Physical Injury: Implement safety measures based on patient assessment  Note: Bed locked & in low position, call light in reach, side-rails up x2, bed/chair alarm utilized, non-slip socks on when ambulating, reminded patient to use call light to call for assistance. Problem: Safety - Adult  Goal: Free from fall injury  Outcome: Progressing     Problem: Respiratory - Adult  Goal: Clear lung sounds  10/27/2023 1152 by Anya Grullon RN  Outcome: Progressing  Note: Lung sounds, pulse ox, and breathing monitored throughout shift. Discussed correct technique and importance of deep breathing & coughing exercises with patient. Patient able to demonstrate cough & deep breathing exercises to nurse.     10/27/2023 4065 by Violet Santos RCP  Outcome: Progressing  Goal: Achieves optimal ventilation and oxygenation  Outcome: Progressing     Problem: Hematologic - Adult  Goal: Maintains hematologic stability  Outcome: Progressing  Flowsheets (Taken 10/26/2023 2125 by Alla Bolaños RN)  Maintains hematologic stability:   Assess for signs and symptoms of bleeding or hemorrhage   Monitor labs for bleeding or clotting disorders  Note: Ongoing assessment & interventions provided throughout shift. Patient on continuous telemetry monitoring, heart tones, vitals & pulses checked at least 3 times per shift & PRN. Vitals within acceptable limits. Peripheral pulses palpable. Problem: Skin/Tissue Integrity - Adult  Goal: Incisions, wounds, or drain sites healing without S/S of infection  Outcome: Progressing  Flowsheets (Taken 10/26/2023 2132 by Alla Bolaños RN)  Incisions, Wounds, or Drain Sites Healing Without Sign and Symptoms of Infection: TWICE DAILY: Assess and document skin integrity  Note: Ongoing assessment & interventions provided throughout shift. Skin assessments provided. Encouraging/assisting patient to turn as needed.        Problem: Infection - Adult  Goal: Absence of infection during hospitalization  Outcome: Progressing  Flowsheets (Taken 10/26/2023 2125 by Alla Bolaños RN)  Absence of infection during hospitalization:   Assess and monitor for signs and symptoms of infection   Monitor lab/diagnostic results   Monitor all insertion sites i.e., indwelling lines, tubes and drains  Note: Afebrile     Problem: Metabolic/Fluid and Electrolytes - Adult  Goal: Electrolytes maintained within normal limits  Outcome: Progressing  Flowsheets (Taken 10/26/2023 2125 by Alla Bolaños RN)  Electrolytes maintained within normal limits:   Monitor labs and assess patient for signs and symptoms of electrolyte imbalances   Monitor response to electrolyte replacements, including repeat lab results as

## 2023-10-27 NOTE — CONSULTS
Consult received. Recommendations given by Vanessa Curtis PA-C yesterday to transfer to [facility], neutropenic precautions, supportive blood/platelet transfusions that need to be irradiated, leuko-reduced. Per pt's oncologist Dr. Dmitri Mills MD with [address] (note from 10/13/2023): Transfuse 1 unit PRBCs Hgb < 7.0. Transfuse 1 pack platelets for platelet count < 10.  Irradiated, leuko-reduced transfusions. Please call with questions/concerns.     Electronically signed by Margarett Cabot, APRN - CNP on 10/27/2023 at 1:39 PM

## 2023-10-27 NOTE — PROGRESS NOTES
Exudative by lights criteria. TNC: 4484 cells (81% Mononuclear / 19% PMN). Flow cytometry revealed reduced viability (78% viable leukocytes - increased atypical immature monocytes) and minute population of atypical myeloblasts (0.03% of viable leukocytes). 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 Chest tubed placed 10/25/23 draining serosanguinous fluid. Plan:   -CT Surgery consulted    +Keep chest tube clamped with plan for Pleur-X catheter placement when effusion large enough for IR to perform    +Signing off 10/27/23    #R-sided Kassandra-Bronchial Infiltrate along Horizontal Fissure: Active  Seen on CTA Chest 10/25/23. Concerning for PNA vs Mass. Plan: As above    #Mediastinal / Hilar LAD: Active  Noted on admission thoracic imaging as above. Discussed with Jorge Anthony MD. [facility] hematology oncology service by calling him on his mobile through perfect serve today at 7:30 PM 10/25/23. He is aware of the mediastinal lymphadenopathy and hilar lymphadenopathy, most likely due to patient's underlying CMML. He requested me to not to do anything at this time. He is going to follow the patient after his discharge from the [facility]. If patient requires any further diagnostic procedures including EBUS, Dr.Adnan Finn MD from [facility] hematology oncology service is going to arrange in [facility] by contacting a local pulmonologist in [facility]. 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 FV Loop also demonstrates diminished lung volumes concerning for restrictive lung defect which is consistent with aforementioned findings and pathology. Exacerbation Hx: >/=2x. Smoker: 28 pyh, quit 2023. A1AT: never screened. Pneumonia vaccine: No. Home meds: Trelegy maintenance and albuterol rescue. Baseline O2: 2 L/min NC.    Plan:  -Switch from spiriva to home trelegy ellipta  -DuoNeb rescue  -Continue prednisone 40 mg daily (10/25/23-10/29/23)  -Recommend PNA Vaccine Prior to Discharge  -Recommend Influenzae Vaccine as appropriate  -Recommend RSV Vaccine as appropriate  -Recommend continued smoking cessation    Remainder per primary:  Troponinemia  Chronic monocytosis  CMML-2 confirmed on Bone Bx 08/25/23 w/ h/o Blast Crisis f/b [facility] (Dr. Mat Jones MD / Dr. Jose Antonio Lawrence. MD Alexa)  HFmrEF EF 45-50% 10/09/23  HTN  BPH  Insomnia    Questions and concerns addressed. Electronically signed by   Sabiha Mccoy MD on 10/27/2023 at 10:30 AM    Addendum by Dr. Mauricio Lindsay MD:  Patient seen by me independently including key components of medical care. Face to face evaluation and examination was performed. Case discussed with Dr. Erich Zhang MD-resident physician. Agree with resident's findings and plan as documented in the resident's note. Italicized font, if present,  represents changes to the note made by me. More than 50% of the encounter time involved with patient care by the Pulmonary & Critical care service team spent by me. Please see my modifications mentioned below:  Improved shortness of breath  Continue incentive spirometer Masroor A Abro as tolerated  Follow-up CT scan of chest without IV contrast performed on 27th October 2023 to follow his a left-sided pleural effusion. Keep chest tube clamped with plan for Pleur-X catheter placement when effusion large enough for IR to perform. Wes Needs educated about my impression and plan. He verbalizes understanding.         Electronically signed by   Mikayla Valle MD on 10/27/2023 at 1:11 PM

## 2023-10-27 NOTE — PLAN OF CARE
respiratory status   Assess for changes in mentation and behavior   Position to facilitate oxygenation and minimize respiratory effort   Oxygen supplementation based on oxygen saturation or arterial blood gases  10/26/2023 0746 by Alex Langley RCP  Outcome: Progressing  Flowsheets (Taken 10/26/2023 0200 by Cristy Oates RN)  Achieves optimal ventilation and oxygenation:   Assess for changes in respiratory status   Assess for changes in mentation and behavior   Position to facilitate oxygenation and minimize respiratory effort   Oxygen supplementation based on oxygen saturation or arterial blood gases     Problem: Hematologic - Adult  Goal: Maintains hematologic stability  10/26/2023 2133 by Cindy Gore RN  Outcome: Progressing  Flowsheets (Taken 10/26/2023 2125)  Maintains hematologic stability:   Assess for signs and symptoms of bleeding or hemorrhage   Monitor labs for bleeding or clotting disorders  10/26/2023 1022 by Rose Plunkett RN  Outcome: Progressing  10/26/2023 0759 by Cristy Oates RN  Outcome: Progressing  Flowsheets (Taken 10/26/2023 0200)  Maintains hematologic stability:   Monitor labs for bleeding or clotting disorders   Assess for signs and symptoms of bleeding or hemorrhage     Problem: Skin/Tissue Integrity - Adult  Goal: Incisions, wounds, or drain sites healing without S/S of infection  10/26/2023 2133 by Cindy Gore RN  Outcome: Progressing  Flowsheets  Taken 10/26/2023 2132  Incisions, Wounds, or Drain Sites Healing Without Sign and Symptoms of Infection: TWICE DAILY: Assess and document skin integrity  Taken 10/26/2023 2125  Incisions, Wounds, or Drain Sites Healing Without Sign and Symptoms of Infection: TWICE DAILY: Assess and document skin integrity  10/26/2023 1022 by Rose Plunkett RN  Outcome: Progressing  10/26/2023 0759 by Cristy Oates RN  Outcome: Progressing  Flowsheets (Taken 10/26/2023 0200)  Incisions, Wounds, or Drain Sites Healing Without Sign and Symptoms of Infection: TWICE DAILY: Assess and document skin integrity     Problem: Infection - Adult  Goal: Absence of infection during hospitalization  10/26/2023 2133 by Leonor Parry RN  Outcome: Progressing  Flowsheets (Taken 10/26/2023 2125)  Absence of infection during hospitalization:   Assess and monitor for signs and symptoms of infection   Monitor lab/diagnostic results   Monitor all insertion sites i.e., indwelling lines, tubes and drains  10/26/2023 1022 by Malka Cowan RN  Outcome: Progressing  10/26/2023 0759 by Peter Castellanos RN  Outcome: Progressing  Flowsheets (Taken 10/26/2023 0200)  Absence of infection during hospitalization:   Assess and monitor for signs and symptoms of infection   Monitor lab/diagnostic results   Monitor all insertion sites i.e., indwelling lines, tubes and drains     Problem: Metabolic/Fluid and Electrolytes - Adult  Goal: Electrolytes maintained within normal limits  10/26/2023 2133 by Leonor Parry RN  Outcome: Progressing  Flowsheets (Taken 10/26/2023 2125)  Electrolytes maintained within normal limits:   Monitor labs and assess patient for signs and symptoms of electrolyte imbalances   Monitor response to electrolyte replacements, including repeat lab results as appropriate   Administer electrolyte replacement as ordered  10/26/2023 1022 by Malka Cowan RN  Outcome: Progressing  10/26/2023 0759 by Peter Castellanos RN  Outcome: Progressing  Flowsheets (Taken 10/26/2023 0200)  Electrolytes maintained within normal limits:   Monitor labs and assess patient for signs and symptoms of electrolyte imbalances   Administer electrolyte replacement as ordered

## 2023-10-27 NOTE — PROGRESS NOTES
Fibrocalcific Calcific plaques seen in the right side of the chest. Patchy parenchymal densities right lung diffusely and left lower lung field, consistent with atelectasis/pneumonia/fibrosis. Overall appearance of these infiltrates somewhat improved from prior. 4. There are a few small nodules on both sides of the chest, likely poorly calcified granulomas and/or inflammatory nodules. See comments above. **This report has been created using voice recognition software. It may contain minor errors which are inherent in voice recognition technology. ** Final report electronically signed by Dr. Berkley Mauro on 10/27/2023 2:14 PM    XR CHEST PORTABLE    Result Date: 10/27/2023  PROCEDURE: XR CHEST PORTABLE CLINICAL INFORMATION: shortness of breath - chest tube clamped with plan for Pleur-X when effusion large enough to perform COMPARISON: 10/26/2023 TECHNIQUE: A single mobile view of the chest was obtained. 1. Moderate cardiomegaly. . A pigtail catheter has been inserted into the pleural space left side. Moderate pleural thickening along the lateral aspect of each lung. 2. Moderate atelectasis/pneumonia left lung base as well as the right mid and lower lung fields. 3. Marked soft tissue fullness right hilar and paratracheal region, consistent with extensive adenopathy. 4. Overall appearance of chest slightly improved from prior. **This report has been created using voice recognition software. It may contain minor errors which are inherent in voice recognition technology. ** Final report electronically signed by Dr. Berkley Mauro on 10/27/2023 7:57 AM    XR CHEST PORTABLE    Result Date: 10/26/2023  PROCEDURE: XR CHEST PORTABLE CLINICAL INFORMATION: evaluation of chest tube placement COMPARISON: 10/24/2023 TECHNIQUE: A single mobile view of the chest was obtained. 1. Cardiomegaly. Pigtail catheter pleural space left side. Tiny residual effusion left side versus pleural thickening.  2. Moderate atelectasis/pneumonia left lung base as well as in the right mid and lower lung fields, overall appearance slightly improved from prior. 3. Marked soft tissue fullness right hilar and suprahilar regions, consistent with adenopathy. **This report has been created using voice recognition software. It may contain minor errors which are inherent in voice recognition technology. ** Final report electronically signed by Dr. Bhavik Navarro on 10/26/2023 12:06 PM      DVT prophylaxis:    [] Lovenox  [x] SCDs  [] SQ Heparin  [] Encourage ambulation   [] Already on Anticoagulation  Diet: ADULT DIET;  Regular  Code Status: Full Code  PT/OT: no  Tele: yes  IVF: no    Electronically signed by Amparo Barnes MD on 10/27/2023 at 3:43 PM    Case was discussed with Attending, Dr. Susana La

## 2023-10-27 NOTE — PLAN OF CARE
Problem: Respiratory - Adult  Goal: Clear lung sounds  10/27/2023 0727 by Wesly Mcmahon RCP  Outcome: Progressing  Continue inhalers to improve aeration of lungs. Patient mutually agreed on goals.

## 2023-10-27 NOTE — FLOWSHEET NOTE
10/27/23 1828   Treatment Team Notification   Reason for Communication Review case   Name of Team Member Notified Rohan Holt   Treatment Team Role Consulting Provider   Method of Communication Secure Message   Response Waiting for response   Notification Time [phone]     IR said they won't be able to place a pleurex. They said the fluid is loculated and in several small pockets and the area is not big enough to place the pleurex tube. Dr. Rohan Holt was notified and he said to unclamp the tube and connect the atrium to continuous suction at -20.

## 2023-10-27 NOTE — PROGRESS NOTES
10/27/23 1302   Encounter Summary   Encounter Overview/Reason  Advance Care Planning   Service Provided For: Patient and family together   Referral/Consult From: Multi-disciplinary team   Support System Children;Spouse   Last Encounter  10/27/23  (ACP Completion)   Complexity of Encounter Moderate   Begin Time 1230   End Time  1302   Total Time Calculated 32 min   Spiritual/Emotional needs   Type Spiritual Support   Advance Care Planning   Type ACP conversation   Assessment/Intervention/Outcome   Assessment Calm   Intervention Active listening;Empowerment;Nurtured Hope;Sustaining Presence/Ministry of presence   Outcome Comfort     Advance Care Planning     Advance Care Planning Inpatient Note  Spiritual Care Department    Today's Date: 10/27/2023  Unit: [facility]    Received request from IDT Member. Upon review of chart and communication with care team, patient's decision making abilities are not in question. . Child/Children was/were present in the room during visit. Goals of ACP Conversation:  Discuss advance care planning documents    Health Care Decision Makers:       Primary Decision MakerBlue [facility] Hospital[facility]ar Jones Child - [phone]    Secondary Decision Maker: [facility] - Brother/Sister - [phone]  Summary:  Completed New Documents    Advance Care Planning Documents (Patient Wishes):  Healthcare Power of /Advance Directive Appointment of Health Care Agent     Assessment:  The  explained and completed Advance Directive documents with the patient. Interventions:  Assisted in the completion of documents according to patient's wishes at this time    Care Preferences Communicated:     Hospitalization:  If the patient's health worsens and it becomes clear that the chance of recovery is unlikely,     the patient wants hospitalization. Outcomes/Plan:  Copy of advance directive given to staff to scan into medical record.     Electronically signed by Mariaa Parikh [facility] on

## 2023-10-28 VITALS
OXYGEN SATURATION: 94 % | HEIGHT: 71 IN | SYSTOLIC BLOOD PRESSURE: 112 MMHG | HEART RATE: 87 BPM | BODY MASS INDEX: 35.86 KG/M2 | DIASTOLIC BLOOD PRESSURE: 64 MMHG | WEIGHT: 256.17 LBS | RESPIRATION RATE: 20 BRPM | TEMPERATURE: 98 F

## 2023-10-28 LAB
ANION GAP SERPL CALC-SCNC: 11 MEQ/L (ref 8–16)
AUTO DIFF PNL BLD: ABNORMAL
BASOPHILS ABSOLUTE: 0.4 THOU/MM3 (ref 0–0.1)
BLASTS: 27 % (ref 0–1)
BUN SERPL-MCNC: 36 MG/DL (ref 7–22)
CALCIUM SERPL-MCNC: 8.3 MG/DL (ref 8.5–10.5)
CHLORIDE SERPL-SCNC: 99 MEQ/L (ref 98–111)
CO2 SERPL-SCNC: 30 MEQ/L (ref 23–33)
CREAT SERPL-MCNC: 1.5 MG/DL (ref 0.4–1.2)
DEPRECATED RDW RBC AUTO: 53.8 FL (ref 35–45)
EOSINOPHILS ABSOLUTE: 0 THOU/MM3 (ref 0–0.4)
ERYTHROCYTE [DISTWIDTH] IN BLOOD BY AUTOMATED COUNT: 15.7 % (ref 11.5–14.5)
GFR SERPL CREATININE-BSD FRML MDRD: 51 ML/MIN/1.73M2
GLUCOSE SERPL-MCNC: 91 MG/DL (ref 70–108)
HCT VFR BLD AUTO: 22.7 % (ref 42–52)
HGB BLD-MCNC: 7.4 GM/DL (ref 14–18)
LYMPHOCYTES ABSOLUTE: 4.3 THOU/MM3 (ref 1–4.8)
LYMPHOCYTES NFR BLD AUTO: 12 %
MAGNESIUM SERPL-MCNC: 1.8 MG/DL (ref 1.6–2.4)
MANUAL DIFF BLD: NORMAL
MCH RBC QN AUTO: 30.3 PG (ref 26–33)
MCHC RBC AUTO-ENTMCNC: 32.6 GM/DL (ref 32.2–35.5)
MCV RBC AUTO: 93 FL (ref 80–94)
METAMYELOCYTES: 1 %
MONOCYTES ABSOLUTE: 24.1 THOU/MM3 (ref 0.4–1.3)
MONOCYTES NFR BLD AUTO: 67 %
MYELOCYTES: 2 %
NEUTROPHILS NFR BLD AUTO: 1 %
NRBC BLD AUTO-RTO: 0 /100 WBC
PATHOLOGIST REVIEW: ABNORMAL
PLATELET # BLD AUTO: 39 THOU/MM3 (ref 130–400)
PLATELET BLD QL SMEAR: ABNORMAL
PMV BLD AUTO: 12.3 FL (ref 9.4–12.4)
POTASSIUM SERPL-SCNC: 3.6 MEQ/L (ref 3.5–5.2)
RBC # BLD AUTO: 2.44 MILL/MM3 (ref 4.7–6.1)
SCAN OF BLOOD SMEAR: NORMAL
SEGMENTED NEUTROPHILS ABSOLUTE COUNT: 0.4 THOU/MM3 (ref 1.8–7.7)
SODIUM SERPL-SCNC: 140 MEQ/L (ref 135–145)
WBC # BLD AUTO: 35.9 THOU/MM3 (ref 4.8–10.8)

## 2023-10-28 PROCEDURE — 85027 COMPLETE CBC AUTOMATED: CPT

## 2023-10-28 PROCEDURE — 83735 ASSAY OF MAGNESIUM: CPT

## 2023-10-28 PROCEDURE — 80048 BASIC METABOLIC PNL TOTAL CA: CPT

## 2023-10-28 PROCEDURE — 6370000000 HC RX 637 (ALT 250 FOR IP)

## 2023-10-28 PROCEDURE — 36415 COLL VENOUS BLD VENIPUNCTURE: CPT

## 2023-10-28 PROCEDURE — 99239 HOSP IP/OBS DSCHRG MGMT >30: CPT | Performed by: STUDENT IN AN ORGANIZED HEALTH CARE EDUCATION/TRAINING PROGRAM

## 2023-10-28 RX ORDER — OXYCODONE HYDROCHLORIDE AND ACETAMINOPHEN 5; 325 MG/1; MG/1
1 TABLET ORAL ONCE
Status: COMPLETED | OUTPATIENT
Start: 2023-10-28 | End: 2023-10-28

## 2023-10-28 RX ADMIN — ACETAMINOPHEN 650 MG: 325 TABLET ORAL at 04:35

## 2023-10-28 RX ADMIN — OXYCODONE AND ACETAMINOPHEN 1 TABLET: 5; 325 TABLET ORAL at 05:48

## 2023-10-28 ASSESSMENT — PAIN DESCRIPTION - LOCATION
LOCATION: BACK;ARM;SHOULDER
LOCATION: BACK

## 2023-10-28 ASSESSMENT — PAIN DESCRIPTION - FREQUENCY
FREQUENCY: CONTINUOUS
FREQUENCY: CONTINUOUS

## 2023-10-28 ASSESSMENT — PAIN SCALES - GENERAL
PAINLEVEL_OUTOF10: 3
PAINLEVEL_OUTOF10: 8
PAINLEVEL_OUTOF10: 0

## 2023-10-28 ASSESSMENT — PAIN DESCRIPTION - DESCRIPTORS
DESCRIPTORS: ACHING
DESCRIPTORS: ACHING

## 2023-10-28 ASSESSMENT — PAIN DESCRIPTION - ORIENTATION
ORIENTATION: MID;UPPER
ORIENTATION: MID;UPPER

## 2023-10-28 ASSESSMENT — PAIN DESCRIPTION - PAIN TYPE
TYPE: CHRONIC PAIN
TYPE: CHRONIC PAIN

## 2023-10-28 ASSESSMENT — PAIN DESCRIPTION - ONSET
ONSET: ON-GOING
ONSET: ON-GOING

## 2023-10-28 NOTE — PROGRESS NOTES
Report called to [facility]. Patient transported by [facility]. All belongings obtained with patient and wife. No concerns voiced at this time. Patient in stable condition at this time.

## 2023-10-30 LAB
BACTERIA SPEC ANAEROBE CULT: NORMAL
BACTERIA SPEC BFLD CULT: NORMAL
GRAM STN SPEC: NORMAL

## 2023-10-31 ENCOUNTER — HOSPITAL ENCOUNTER (OUTPATIENT)
Dept: INFUSION THERAPY | Age: 68
End: 2023-10-31

## 2023-11-02 ENCOUNTER — TELEPHONE (OUTPATIENT)
Dept: INTERVENTIONAL RADIOLOGY/VASCULAR | Age: 68
End: 2023-11-02

## 2023-11-02 NOTE — TELEPHONE ENCOUNTER
I spoke with the patient to schedule him for a pleurex cath placement. He is currently hospitalized at [facility]. He states he has a chest tube and they are going to remove it.

## 2023-11-03 ENCOUNTER — TELEPHONE (OUTPATIENT)
Dept: INFUSION THERAPY | Age: 68
End: 2023-11-03

## 2023-11-03 NOTE — TELEPHONE ENCOUNTER
Vanessa from [facility] called asking if Dr. Devin Dougherty does LP IT chemo. RN spoke to Dr. Devin Dougherty and he said they can do that. Vanessa updated at [phone] and was advised that Dr. Devin Dougherty just needs the chart and orders.   Verbalized understanding Fax number given for records

## 2023-11-07 ENCOUNTER — TELEPHONE (OUTPATIENT)
Dept: INFUSION THERAPY | Age: 68
End: 2023-11-07

## 2023-11-07 NOTE — TELEPHONE ENCOUNTER
Mary Ann Choi called to see if we received the progress notes and orders for IT chemo. RN advised her to refax this and she will give it to Dr. Ever Lovell.

## 2023-11-08 ENCOUNTER — TELEPHONE (OUTPATIENT)
Dept: INFUSION THERAPY | Age: 68
End: 2023-11-08

## 2023-11-08 NOTE — TELEPHONE ENCOUNTER
Ann-Marie Mckeon from home health called regarding admitting him to home health tomorrow. [facility] discharge paperwork says he is to come to [facility] for a cbc. Ann-Marie Mckeon asked if she can draw it and send us the results so she can get him admitted for home health. RN told her that was fine and to fax us the results and that if he needs hgb we will call to get him scheduled for prbc. Verbalized understanding.

## 2023-11-10 ENCOUNTER — TELEPHONE (OUTPATIENT)
Dept: INFUSION THERAPY | Age: 68
End: 2023-11-10

## 2023-11-10 NOTE — TELEPHONE ENCOUNTER
[facility] called regarding labs. Hgb 7.7 Platelet 24 They were advised that we don't transfuse unless hgb <7 and platelets < 20 due to chemotherapy. Just to monitor the patient and that he is to come in on Monday and Thursdays for labs. We will review the results then also. Verbalized understanding.

## 2023-11-12 ENCOUNTER — HOSPITAL ENCOUNTER (INPATIENT)
Age: 68
LOS: 5 days | Discharge: HOSPICE | End: 2023-11-17
Attending: EMERGENCY MEDICINE
Payer: COMMERCIAL

## 2023-11-12 ENCOUNTER — APPOINTMENT (OUTPATIENT)
Dept: GENERAL RADIOLOGY | Age: 68
End: 2023-11-12
Payer: COMMERCIAL

## 2023-11-12 DIAGNOSIS — D69.6 THROMBOCYTOPENIA (HCC): ICD-10-CM

## 2023-11-12 DIAGNOSIS — R50.81 NEUTROPENIC FEVER (HCC): Primary | ICD-10-CM

## 2023-11-12 DIAGNOSIS — J18.9 PNEUMONIA OF BOTH LUNGS DUE TO INFECTIOUS ORGANISM, UNSPECIFIED PART OF LUNG: ICD-10-CM

## 2023-11-12 DIAGNOSIS — D70.9 NEUTROPENIC FEVER (HCC): Primary | ICD-10-CM

## 2023-11-12 DIAGNOSIS — D64.9 SYMPTOMATIC ANEMIA: ICD-10-CM

## 2023-11-12 DIAGNOSIS — C92.00 ACUTE MYELOID LEUKEMIA NOT HAVING ACHIEVED REMISSION (HCC): ICD-10-CM

## 2023-11-12 LAB
ABO: NORMAL
ANION GAP SERPL CALC-SCNC: 12 MEQ/L (ref 8–16)
ANTIBODY SCREEN: NORMAL
APTT PPP: 26.6 SECONDS (ref 22–38)
BACTERIA URNS QL MICRO: ABNORMAL /HPF
BILIRUB UR QL STRIP.AUTO: NEGATIVE
BUN SERPL-MCNC: 22 MG/DL (ref 7–22)
CALCIUM SERPL-MCNC: 8.4 MG/DL (ref 8.5–10.5)
CASTS #/AREA URNS LPF: ABNORMAL /LPF
CASTS 2: ABNORMAL /LPF
CHARACTER UR: CLEAR
CHLORIDE SERPL-SCNC: 97 MEQ/L (ref 98–111)
CO2 SERPL-SCNC: 24 MEQ/L (ref 23–33)
COLOR: YELLOW
CREAT SERPL-MCNC: 1.1 MG/DL (ref 0.4–1.2)
CRYSTALS URNS MICRO: ABNORMAL
EPITHELIAL CELLS, UA: ABNORMAL /HPF
FLUAV RNA RESP QL NAA+PROBE: NOT DETECTED
FLUBV RNA RESP QL NAA+PROBE: NOT DETECTED
GFR SERPL CREATININE-BSD FRML MDRD: > 60 ML/MIN/1.73M2
GLUCOSE SERPL-MCNC: 104 MG/DL (ref 70–108)
GLUCOSE UR QL STRIP.AUTO: NEGATIVE MG/DL
HCT VFR BLD AUTO: 16.1 % (ref 42–52)
HGB BLD-MCNC: 5.3 GM/DL (ref 14–18)
HGB UR QL STRIP.AUTO: NEGATIVE
INR PPP: 1.3 (ref 0.85–1.13)
KETONES UR QL STRIP.AUTO: NEGATIVE
LACTIC ACID, SEPSIS: 0.6 MMOL/L (ref 0.5–1.9)
LACTIC ACID, SEPSIS: 1.1 MMOL/L (ref 0.5–1.9)
LDH SERPL L TO P-CCNC: 183 U/L (ref 100–190)
LIPASE SERPL-CCNC: 20 U/L (ref 5.6–51.3)
MANUAL DIFF BLD: NORMAL
MISCELLANEOUS 2: ABNORMAL
NITRITE UR QL STRIP: NEGATIVE
NT-PROBNP SERPL IA-MCNC: 879.5 PG/ML (ref 0–124)
OSMOLALITY SERPL CALC.SUM OF ELEC: 270 MOSMOL/KG (ref 275–300)
PH UR STRIP.AUTO: 5.5 [PH] (ref 5–9)
POTASSIUM SERPL-SCNC: 3.7 MEQ/L (ref 3.5–5.2)
PROCALCITONIN SERPL IA-MCNC: 0.21 NG/ML (ref 0.01–0.09)
PROT UR STRIP.AUTO-MCNC: ABNORMAL MG/DL
RBC URINE: ABNORMAL /HPF
RENAL EPI CELLS #/AREA URNS HPF: ABNORMAL /[HPF]
RH FACTOR: NORMAL
SARS-COV-2 RNA RESP QL NAA+PROBE: NOT DETECTED
SCAN OF BLOOD SMEAR: NORMAL
SODIUM SERPL-SCNC: 133 MEQ/L (ref 135–145)
SP GR UR REFRACT.AUTO: 1.01 (ref 1–1.03)
TROPONIN, HIGH SENSITIVITY: 30 NG/L (ref 0–12)
URATE SERPL-MCNC: 3 MG/DL (ref 3.7–7)
UROBILINOGEN, URINE: 0.2 EU/DL (ref 0–1)
WBC #/AREA URNS HPF: ABNORMAL /HPF
WBC #/AREA URNS HPF: NEGATIVE /[HPF]
YEAST LIKE FUNGI URNS QL MICRO: ABNORMAL

## 2023-11-12 PROCEDURE — 2060000000 HC ICU INTERMEDIATE R&B

## 2023-11-12 PROCEDURE — 30233N1 TRANSFUSION OF NONAUTOLOGOUS RED BLOOD CELLS INTO PERIPHERAL VEIN, PERCUTANEOUS APPROACH: ICD-10-PCS | Performed by: INTERNAL MEDICINE

## 2023-11-12 PROCEDURE — 71045 X-RAY EXAM CHEST 1 VIEW: CPT

## 2023-11-12 PROCEDURE — 6370000000 HC RX 637 (ALT 250 FOR IP): Performed by: INTERNAL MEDICINE

## 2023-11-12 PROCEDURE — 84484 ASSAY OF TROPONIN QUANT: CPT

## 2023-11-12 PROCEDURE — 80048 BASIC METABOLIC PNL TOTAL CA: CPT

## 2023-11-12 PROCEDURE — 36430 TRANSFUSION BLD/BLD COMPNT: CPT

## 2023-11-12 PROCEDURE — 85025 COMPLETE CBC W/AUTO DIFF WBC: CPT

## 2023-11-12 PROCEDURE — 84145 PROCALCITONIN (PCT): CPT

## 2023-11-12 PROCEDURE — 99285 EMERGENCY DEPT VISIT HI MDM: CPT

## 2023-11-12 PROCEDURE — 86901 BLOOD TYPING SEROLOGIC RH(D): CPT

## 2023-11-12 PROCEDURE — 85014 HEMATOCRIT: CPT

## 2023-11-12 PROCEDURE — 87636 SARSCOV2 & INF A&B AMP PRB: CPT

## 2023-11-12 PROCEDURE — 96365 THER/PROPH/DIAG IV INF INIT: CPT

## 2023-11-12 PROCEDURE — P9037 PLATE PHERES LEUKOREDU IRRAD: HCPCS

## 2023-11-12 PROCEDURE — 83880 ASSAY OF NATRIURETIC PEPTIDE: CPT

## 2023-11-12 PROCEDURE — 93010 ELECTROCARDIOGRAM REPORT: CPT | Performed by: INTERNAL MEDICINE

## 2023-11-12 PROCEDURE — 86923 COMPATIBILITY TEST ELECTRIC: CPT

## 2023-11-12 PROCEDURE — 6360000002 HC RX W HCPCS: Performed by: INTERNAL MEDICINE

## 2023-11-12 PROCEDURE — 87040 BLOOD CULTURE FOR BACTERIA: CPT

## 2023-11-12 PROCEDURE — 2580000003 HC RX 258: Performed by: STUDENT IN AN ORGANIZED HEALTH CARE EDUCATION/TRAINING PROGRAM

## 2023-11-12 PROCEDURE — 86850 RBC ANTIBODY SCREEN: CPT

## 2023-11-12 PROCEDURE — P9040 RBC LEUKOREDUCED IRRADIATED: HCPCS

## 2023-11-12 PROCEDURE — 99238 HOSP IP/OBS DSCHRG MGMT 30/<: CPT | Performed by: INTERNAL MEDICINE

## 2023-11-12 PROCEDURE — 83615 LACTATE (LD) (LDH) ENZYME: CPT

## 2023-11-12 PROCEDURE — 85730 THROMBOPLASTIN TIME PARTIAL: CPT

## 2023-11-12 PROCEDURE — 36415 COLL VENOUS BLD VENIPUNCTURE: CPT

## 2023-11-12 PROCEDURE — 81001 URINALYSIS AUTO W/SCOPE: CPT

## 2023-11-12 PROCEDURE — 84550 ASSAY OF BLOOD/URIC ACID: CPT

## 2023-11-12 PROCEDURE — 93005 ELECTROCARDIOGRAM TRACING: CPT | Performed by: STUDENT IN AN ORGANIZED HEALTH CARE EDUCATION/TRAINING PROGRAM

## 2023-11-12 PROCEDURE — 96375 TX/PRO/DX INJ NEW DRUG ADDON: CPT

## 2023-11-12 PROCEDURE — 2580000003 HC RX 258: Performed by: INTERNAL MEDICINE

## 2023-11-12 PROCEDURE — 85610 PROTHROMBIN TIME: CPT

## 2023-11-12 PROCEDURE — 83605 ASSAY OF LACTIC ACID: CPT

## 2023-11-12 PROCEDURE — 6370000000 HC RX 637 (ALT 250 FOR IP): Performed by: STUDENT IN AN ORGANIZED HEALTH CARE EDUCATION/TRAINING PROGRAM

## 2023-11-12 PROCEDURE — 86900 BLOOD TYPING SEROLOGIC ABO: CPT

## 2023-11-12 PROCEDURE — 83690 ASSAY OF LIPASE: CPT

## 2023-11-12 PROCEDURE — 85018 HEMOGLOBIN: CPT

## 2023-11-12 PROCEDURE — 6360000002 HC RX W HCPCS: Performed by: STUDENT IN AN ORGANIZED HEALTH CARE EDUCATION/TRAINING PROGRAM

## 2023-11-12 RX ORDER — SODIUM CHLORIDE 9 MG/ML
INJECTION, SOLUTION INTRAVENOUS PRN
Status: DISCONTINUED | OUTPATIENT
Start: 2023-11-12 | End: 2023-11-15

## 2023-11-12 RX ORDER — MULTIVITAMIN WITH IRON
1 TABLET ORAL DAILY
Status: DISCONTINUED | OUTPATIENT
Start: 2023-11-12 | End: 2023-11-15

## 2023-11-12 RX ORDER — 0.9 % SODIUM CHLORIDE 0.9 %
1000 INTRAVENOUS SOLUTION INTRAVENOUS ONCE
Status: COMPLETED | OUTPATIENT
Start: 2023-11-12 | End: 2023-11-12

## 2023-11-12 RX ORDER — FOLIC ACID 1 MG/1
1 TABLET ORAL DAILY
Status: DISCONTINUED | OUTPATIENT
Start: 2023-11-12 | End: 2023-11-17 | Stop reason: HOSPADM

## 2023-11-12 RX ORDER — SODIUM CHLORIDE 0.9 % (FLUSH) 0.9 %
5-40 SYRINGE (ML) INJECTION PRN
Status: DISCONTINUED | OUTPATIENT
Start: 2023-11-12 | End: 2023-11-15

## 2023-11-12 RX ORDER — DOXYCYCLINE 100 MG/1
100 CAPSULE ORAL 2 TIMES DAILY
Status: ON HOLD | COMMUNITY
End: 2023-11-17 | Stop reason: HOSPADM

## 2023-11-12 RX ORDER — DOCUSATE SODIUM 100 MG/1
300 CAPSULE, LIQUID FILLED ORAL DAILY PRN
Status: DISCONTINUED | OUTPATIENT
Start: 2023-11-12 | End: 2023-11-17 | Stop reason: HOSPADM

## 2023-11-12 RX ORDER — ACETAMINOPHEN 650 MG/1
650 SUPPOSITORY RECTAL EVERY 6 HOURS PRN
Status: DISCONTINUED | OUTPATIENT
Start: 2023-11-12 | End: 2023-11-17 | Stop reason: HOSPADM

## 2023-11-12 RX ORDER — TAMSULOSIN HYDROCHLORIDE 0.4 MG/1
0.8 CAPSULE ORAL DAILY
Status: DISCONTINUED | OUTPATIENT
Start: 2023-11-12 | End: 2023-11-17 | Stop reason: HOSPADM

## 2023-11-12 RX ORDER — ACETAMINOPHEN 325 MG/1
325 TABLET ORAL EVERY 4 HOURS PRN
Status: DISCONTINUED | OUTPATIENT
Start: 2023-11-12 | End: 2023-11-12 | Stop reason: SDUPTHER

## 2023-11-12 RX ORDER — POLYETHYLENE GLYCOL 3350 17 G/17G
17 POWDER, FOR SOLUTION ORAL DAILY PRN
Status: DISCONTINUED | OUTPATIENT
Start: 2023-11-12 | End: 2023-11-17 | Stop reason: HOSPADM

## 2023-11-12 RX ORDER — SODIUM CHLORIDE 0.9 % (FLUSH) 0.9 %
5-40 SYRINGE (ML) INJECTION EVERY 12 HOURS SCHEDULED
Status: DISCONTINUED | OUTPATIENT
Start: 2023-11-12 | End: 2023-11-15

## 2023-11-12 RX ORDER — ACETAMINOPHEN 500 MG
1000 TABLET ORAL ONCE
Status: COMPLETED | OUTPATIENT
Start: 2023-11-12 | End: 2023-11-12

## 2023-11-12 RX ORDER — POSACONAZOLE 100 MG/1
300 TABLET, DELAYED RELEASE ORAL
Status: ON HOLD | COMMUNITY
End: 2023-11-17 | Stop reason: HOSPADM

## 2023-11-12 RX ORDER — ONDANSETRON 4 MG/1
4 TABLET, FILM COATED ORAL EVERY 8 HOURS PRN
Status: DISCONTINUED | OUTPATIENT
Start: 2023-11-12 | End: 2023-11-17 | Stop reason: HOSPADM

## 2023-11-12 RX ORDER — LANOLIN ALCOHOL/MO/W.PET/CERES
400 CREAM (GRAM) TOPICAL DAILY
Status: DISCONTINUED | OUTPATIENT
Start: 2023-11-12 | End: 2023-11-15

## 2023-11-12 RX ORDER — CETIRIZINE HYDROCHLORIDE 10 MG/1
10 TABLET ORAL DAILY
Status: DISCONTINUED | OUTPATIENT
Start: 2023-11-12 | End: 2023-11-15

## 2023-11-12 RX ORDER — ALLOPURINOL 300 MG/1
300 TABLET ORAL DAILY
Status: DISCONTINUED | OUTPATIENT
Start: 2023-11-12 | End: 2023-11-12

## 2023-11-12 RX ORDER — LEVOFLOXACIN 250 MG/1
500 TABLET, FILM COATED ORAL DAILY
Status: ON HOLD | COMMUNITY
End: 2023-11-17 | Stop reason: HOSPADM

## 2023-11-12 RX ORDER — ACETAMINOPHEN 325 MG/1
650 TABLET ORAL EVERY 6 HOURS PRN
Status: DISCONTINUED | OUTPATIENT
Start: 2023-11-12 | End: 2023-11-17 | Stop reason: HOSPADM

## 2023-11-12 RX ORDER — OXYCODONE HYDROCHLORIDE AND ACETAMINOPHEN 5; 325 MG/1; MG/1
1 TABLET ORAL EVERY 6 HOURS PRN
Status: DISCONTINUED | OUTPATIENT
Start: 2023-11-12 | End: 2023-11-17 | Stop reason: HOSPADM

## 2023-11-12 RX ORDER — ACYCLOVIR 800 MG/1
800 TABLET ORAL 2 TIMES DAILY
Status: DISCONTINUED | OUTPATIENT
Start: 2023-11-12 | End: 2023-11-15

## 2023-11-12 RX ORDER — POTASSIUM CHLORIDE 20 MEQ/1
20 TABLET, EXTENDED RELEASE ORAL 2 TIMES DAILY
Status: DISCONTINUED | OUTPATIENT
Start: 2023-11-12 | End: 2023-11-15

## 2023-11-12 RX ORDER — QUETIAPINE FUMARATE 25 MG/1
25 TABLET, FILM COATED ORAL NIGHTLY
Status: DISCONTINUED | OUTPATIENT
Start: 2023-11-12 | End: 2023-11-14

## 2023-11-12 RX ADMIN — METOPROLOL TARTRATE 12.5 MG: 25 TABLET, FILM COATED ORAL at 21:04

## 2023-11-12 RX ADMIN — QUETIAPINE FUMARATE 25 MG: 25 TABLET ORAL at 21:04

## 2023-11-12 RX ADMIN — CEFEPIME 2000 MG: 2 INJECTION, POWDER, FOR SOLUTION INTRAVENOUS at 22:05

## 2023-11-12 RX ADMIN — CEFEPIME 2000 MG: 2 INJECTION, POWDER, FOR SOLUTION INTRAVENOUS at 14:42

## 2023-11-12 RX ADMIN — CETIRIZINE HYDROCHLORIDE 10 MG: 10 TABLET ORAL at 21:04

## 2023-11-12 RX ADMIN — Medication 400 MG: at 22:04

## 2023-11-12 RX ADMIN — ACETAMINOPHEN 650 MG: 325 TABLET ORAL at 21:05

## 2023-11-12 RX ADMIN — ACYCLOVIR 800 MG: 800 TABLET ORAL at 21:04

## 2023-11-12 RX ADMIN — ACETAMINOPHEN 1000 MG: 500 TABLET ORAL at 14:41

## 2023-11-12 RX ADMIN — SODIUM CHLORIDE, PRESERVATIVE FREE 10 ML: 5 INJECTION INTRAVENOUS at 21:05

## 2023-11-12 RX ADMIN — POTASSIUM CHLORIDE 20 MEQ: 1500 TABLET, EXTENDED RELEASE ORAL at 21:05

## 2023-11-12 RX ADMIN — Medication 1 TABLET: at 21:04

## 2023-11-12 RX ADMIN — OXYCODONE AND ACETAMINOPHEN 1 TABLET: 5; 325 TABLET ORAL at 22:32

## 2023-11-12 RX ADMIN — TAMSULOSIN HYDROCHLORIDE 0.8 MG: 0.4 CAPSULE ORAL at 21:04

## 2023-11-12 RX ADMIN — FOLIC ACID 1 MG: 1 TABLET ORAL at 21:04

## 2023-11-12 RX ADMIN — VANCOMYCIN HYDROCHLORIDE 2500 MG: 5 INJECTION, POWDER, LYOPHILIZED, FOR SOLUTION INTRAVENOUS at 15:29

## 2023-11-12 RX ADMIN — SODIUM CHLORIDE 1000 ML: 9 INJECTION, SOLUTION INTRAVENOUS at 14:50

## 2023-11-12 ASSESSMENT — PAIN SCALES - GENERAL
PAINLEVEL_OUTOF10: 0
PAINLEVEL_OUTOF10: 0

## 2023-11-12 ASSESSMENT — PAIN - FUNCTIONAL ASSESSMENT
PAIN_FUNCTIONAL_ASSESSMENT: NONE - DENIES PAIN

## 2023-11-12 NOTE — ED NOTES
Patient monitored for 15minutes after platelet infusion started. No signs of distress or reaction. Patient resting in cot, talking with family and admission provider at bedside.       Monty Garcia Ma  11/12/23 8211

## 2023-11-12 NOTE — ED PROVIDER NOTES
Transfer of Care Note:   Physician Signing out: Dr. Anatoliy Lazo  Receiving Physician: Refugio Alaniz MD  Sign out time: 65      Brief history:  78 y/o w/ PMH CML w/ recent AML presenting for neutropenic fever. Pending transfer to [facility] however pending bed. Likely admission prior to transfer. . ON Vanc, Cefepime. Items pending that need to be checked:  Need for PCP (formerly [facility]) coverage  Labs lacate, uA, post transfusion labs. Tentative Impression of patient:  Stable, likely admit in setting of transfer to [facility] pending    Expected disposition of patient:  Pending results, admitted for pending transfer. Additional Assessment and results:   I have personally performed a face to face diagnostic evaluation on this patient. The patient's initial evaluation and plan have been discussed with the prior physician who initially evaluated the patient. Nursing Notes, Past Medical Hx, Past Surgical Hx, Social Hx, Allergies, vital signs and Family Hx were all reviewed. Vitals:    11/12/23 1711   BP:    Pulse: 77   Resp: 19   Temp:    SpO2:      Physical Exam  Constitutional:       General: He is not in acute distress. Appearance: Normal appearance. He is obese. He is ill-appearing and toxic-appearing. He is not diaphoretic. Interventions: Nasal cannula in place. Comments: NC in place. HENT:      Head: Normocephalic and atraumatic. Nose: Nose normal. No congestion or rhinorrhea. Mouth/Throat:      Mouth: Mucous membranes are moist.      Pharynx: Oropharynx is clear. No oropharyngeal exudate or posterior oropharyngeal erythema. Neck:      Vascular: No carotid bruit. Cardiovascular:      Rate and Rhythm: Normal rate and regular rhythm. Pulses: Normal pulses. Heart sounds: Normal heart sounds. No murmur heard. No friction rub. No gallop. Pulmonary:      Effort: Pulmonary effort is normal. No respiratory distress. Breath sounds: Normal breath sounds.  No stridor. No wheezing, rhonchi or rales. Chest:      Chest wall: No tenderness. Abdominal:      General: Abdomen is flat. Bowel sounds are normal. There is no distension. Palpations: Abdomen is soft. Tenderness: There is no abdominal tenderness. There is no guarding or rebound. Musculoskeletal:         General: No swelling, tenderness or signs of injury. Cervical back: Normal range of motion and neck supple. No rigidity or tenderness. Right lower leg: No edema. Left lower leg: No edema. Skin:     General: Skin is warm and dry. Capillary Refill: Capillary refill takes less than 2 seconds. Coloration: Skin is not jaundiced or pale. Findings: No erythema, lesion or rash. Neurological:      General: No focal deficit present. Mental Status: He is alert and oriented to person, place, and time. Cranial Nerves: No cranial nerve deficit. Sensory: No sensory deficit. Motor: No weakness. Psychiatric:         Mood and Affect: Mood normal.         Behavior: Behavior normal.         Thought Content:  Thought content normal.         Judgment: Judgment normal.           Labs Reviewed   BASIC METABOLIC PANEL W/ REFLEX TO MG FOR LOW K - Abnormal; Notable for the following components:       Result Value    Sodium 133 (*)     Chloride 97 (*)     Calcium 8.4 (*)     All other components within normal limits   BRAIN NATRIURETIC PEPTIDE - Abnormal; Notable for the following components:    Pro-.5 (*)     All other components within normal limits   CBC WITH AUTO DIFFERENTIAL - Abnormal; Notable for the following components:    WBC 0.4 (*)     RBC 2.15 (*)     Hemoglobin 6.3 (*)     Hematocrit 18.7 (*)     Platelets 6 (*)     All other components within normal limits   TROPONIN - Abnormal; Notable for the following components:    Troponin, High Sensitivity 30 (*)     All other components within normal limits   PROTIME-INR - Abnormal; Notable for the following

## 2023-11-12 NOTE — H&P
Hospitalist - History & Physical      Patient: Chyrel Saint    Unit/Bed:22/022A  YOB: 1955  MRN: [id number]   Acct: [de-identified]   PCP: YESY Velez - CNP    Date of Service: Pt seen/examined on 11/12/23  and Admitted to Inpatient with expected LOS greater than two midnights due to medical therapy. Chief Complaint:  Not feeling self,disoriented    Assessment and Plan:-    #Neutropenic fever.  -Patient was running fever of 38.8.  -Has severe neutropenia.  -Started on iv cefepime and Vancomycin.  -Pharmacy consult to dose vancomycin. -COVID,Influenza A&B not detected. -Chest xray done showed Patchy opacities are seen in the right lung. Left basal opacities are noted. 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 -S/P multiple cycles of chemotherapy including spinal chemotherapy at [facility].  -No beds available at [facility].  -Patient platelet count is 6 and hemoglobin is 6.3  -Getting one unit  of platelet  and one unit of PRBC. -Goal to keep platelet above 10 and Hg above 7.  -Continue with acyclovir and allopurinol.  -Oncology consulted. #COPD on 2l home oxygen.  -Started on DuoNeb. -Continue with home inhaler. #HTN. -Continue with home metoprolol. #BPH. -Continue with flomax. DVT Prophylaxis: SCD/ZOHRA  Code status: Full code  Diet: Regular. History Of Present Illness:    Patient is a very pleasant 79year old male with past medical history of MDA/CMML,COPD on 2l home oxygen,HTN,BPH who presents to ED with chief complaint of not feeling self and disoriented. Patient reports that Wednesday he was discharge from [address] [place] [address] and since being has been feeling disoriented,confused and very lethargic and for the past 2 days he also reports subjective fever. His brother and sister at bed side stated that he is usually active,but since he discharged from [facility] he is not doing good. Patient morning and 0.5 tablets in the evening. docusate (COLACE, DULCOLAX) 100 MG CAPS Take 3 capsules by mouth daily as needed      potassium chloride (KLOR-CON M) 20 MEQ extended release tablet 1 tablet 2 times daily      acyclovir (ZOVIRAX) 800 MG tablet       Handicap Placard MISC by Does not apply route EXP. 7/2028  DX:46.9 1 each 0    ondansetron (ZOFRAN) 4 MG tablet Take 1 tablet by mouth every 8 hours as needed for Nausea or Vomiting 90 tablet 3    allopurinol (ZYLOPRIM) 300 MG tablet Take 1 tablet by mouth daily 30 tablet 5    acetaminophen (TYLENOL) 325 MG tablet Take 1 tablet by mouth every 4 hours as needed for Pain      loratadine (CLARITIN) 10 MG tablet Take 1 tablet by mouth daily      QUEtiapine (SEROQUEL) 25 MG tablet Take 1 tablet by mouth nightly Take 0.5-1 tab at bedtime for insomnia      tamsulosin (FLOMAX) 0.4 MG capsule Take 2 capsules by mouth daily      albuterol (PROVENTIL) (2.5 MG/3ML) 0.083% nebulizer solution Take 3 mLs by nebulization every 6 hours as needed for Wheezing      albuterol sulfate HFA (PROVENTIL;VENTOLIN;PROAIR) 108 (90 Base) MCG/ACT inhaler Inhale 2 puffs into the lungs every 4 hours as needed for Wheezing or Shortness of Breath      fluticasone-umeclidin-vilant (TRELEGY ELLIPTA) 100-62.5-25 MCG/ACT AEPB inhaler Inhale 1 puff into the lungs daily      OXYGEN Inhale 2 L/min into the lungs As directed         Allergies:    Patient has no known allergies. Social History:    reports that he quit smoking about 10 months ago. His smoking use included cigarettes. He smoked an average of 1 pack per day. He has never used smokeless tobacco. He reports that he does not currently use alcohol after a past usage of about 6.0 standard drinks of alcohol per week. He reports that he does not currently use drugs after having used the following drugs: Marijuana (Weed) and Cocaine.     Family History:       Problem Relation Age of Onset    Heart Disease Mother     High Blood Pressure Mother

## 2023-11-12 NOTE — ED NOTES
Pt transported to [facility]. Floor contacted prior to transport, spoke to [facility].              Julio Garcia, RN  11/12/23 0074

## 2023-11-12 NOTE — ED NOTES
Admission provider in to evaluate patient and update on POC. Family members at bedside.       [place] Garcia  11/12/23 1349

## 2023-11-12 NOTE — ED PROVIDER NOTES
[facility]    EMERGENCY MEDICINE     Patient Name: Laura Manzanares  MRN: [id number]  YOB: 1955  Date of Evaluation: 11/12/2023  Treating Resident Physician: Jazmin Amato MD  Supervising Physician: Kristyn Kincaid       Chief Complaint   Patient presents with    Fatigue    Fever    Cancer       HISTORY OF PRESENT ILLNESS    HPI    History obtained from chart review and the patient. Laura Manzanares is a 79 y.o. male who presents to the emergency department from home as a walk in to the ED lobby for evaluation of fatigue, altered mental status, fever. 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 He was recently discharged from [facility] 3 days ago after receiving cycle 1 of a azacitidine, Venetoclax, cytarabine, methotrexate intrathecally. His family reports that since being discharged she has been increasing amount of oxygen at home and has been increasingly fatigued and not acting himself. Patient reports he is short of breath but denies any other new symptoms.     Pertinent previous and/or external records reviewed:  Reviewed records from [facility] as well as [facility]    REVIEW OF SYSTEMS   Review of Systems  Negative unless documented in HPI    PAST MEDICAL AND SURGICAL HISTORY     Past Medical History:   Diagnosis Date    Arthritis     Cancer ([address])     COPD (chronic obstructive pulmonary disease) ([address])     Hypertension        Past Surgical History:   Procedure Laterality Date    CT BONE MARROW BIOPSY  8/25/2023    CT BONE MARROW BIOPSY 8/25/2023 [facility] CT SCAN    CT GUIDED CHEST TUBE  10/25/2023    CT GUIDED CHEST TUBE 10/25/2023 [facility] CT SCAN       CURRENT MEDICATIONS     Previous Medications    ACETAMINOPHEN (TYLENOL) 325 MG TABLET    Take 1 tablet by mouth every 4 hours as needed for Pain    ACYCLOVIR (ZOVIRAX) 800 MG TABLET        ALBUTEROL (PROVENTIL) (2.5 MG/3ML) 0.083% NEBULIZER SOLUTION    Take 3 mLs by nebulization every

## 2023-11-13 ENCOUNTER — CLINICAL DOCUMENTATION (OUTPATIENT)
Dept: ONCOLOGY | Age: 68
End: 2023-11-13

## 2023-11-13 DIAGNOSIS — C93.10 CHRONIC MYELOMONOCYTIC LEUKEMIA NOT HAVING ACHIEVED REMISSION (HCC): Primary | ICD-10-CM

## 2023-11-13 LAB
ALBUMIN SERPL BCG-MCNC: 2.9 G/DL (ref 3.5–5.1)
ALP SERPL-CCNC: 88 U/L (ref 38–126)
ALT SERPL W/O P-5'-P-CCNC: 13 U/L (ref 11–66)
ANION GAP SERPL CALC-SCNC: 10 MEQ/L (ref 8–16)
ANION GAP SERPL CALC-SCNC: 11 MEQ/L (ref 8–16)
AST SERPL-CCNC: 6 U/L (ref 5–40)
BASOPHILS ABSOLUTE: 0 THOU/MM3 (ref 0–0.1)
BASOPHILS ABSOLUTE: 0 THOU/MM3 (ref 0–0.1)
BASOPHILS NFR BLD AUTO: 0 %
BASOPHILS NFR BLD AUTO: 0 %
BILIRUB CONJ SERPL-MCNC: 0.3 MG/DL (ref 0–0.3)
BILIRUB SERPL-MCNC: 1.1 MG/DL (ref 0.3–1.2)
BLASTS: 2 % (ref 0–1)
BUN SERPL-MCNC: 19 MG/DL (ref 7–22)
BUN SERPL-MCNC: 19 MG/DL (ref 7–22)
CA-I BLD ISE-SCNC: 1.06 MMOL/L (ref 1.12–1.32)
CA-I BLD ISE-SCNC: 1.21 MMOL/L (ref 1.12–1.32)
CALCIUM SERPL-MCNC: 8.2 MG/DL (ref 8.5–10.5)
CALCIUM SERPL-MCNC: 8.2 MG/DL (ref 8.5–10.5)
CHLORIDE SERPL-SCNC: 101 MEQ/L (ref 98–111)
CHLORIDE SERPL-SCNC: 102 MEQ/L (ref 98–111)
CO2 SERPL-SCNC: 24 MEQ/L (ref 23–33)
CO2 SERPL-SCNC: 24 MEQ/L (ref 23–33)
CREAT SERPL-MCNC: 1.1 MG/DL (ref 0.4–1.2)
CREAT SERPL-MCNC: 1.1 MG/DL (ref 0.4–1.2)
CRP SERPL-MCNC: 4.8 MG/DL (ref 0–1)
DEPRECATED RDW RBC AUTO: 44.5 FL (ref 35–45)
DEPRECATED RDW RBC AUTO: 45.1 FL (ref 35–45)
EOSINOPHIL NFR BLD AUTO: 0 %
EOSINOPHIL NFR BLD AUTO: 0 %
EOSINOPHILS ABSOLUTE: 0 THOU/MM3 (ref 0–0.4)
EOSINOPHILS ABSOLUTE: 0 THOU/MM3 (ref 0–0.4)
ERYTHROCYTE [DISTWIDTH] IN BLOOD BY AUTOMATED COUNT: 14 % (ref 11.5–14.5)
ERYTHROCYTE [DISTWIDTH] IN BLOOD BY AUTOMATED COUNT: 14.4 % (ref 11.5–14.5)
FERRITIN SERPL IA-MCNC: 3840 NG/ML (ref 22–322)
FIBRINOGEN PPP-MCNC: 442 MG/100ML (ref 155–475)
FOLATE SERPL-MCNC: > 20 NG/ML (ref 4.8–24.2)
GFR SERPL CREATININE-BSD FRML MDRD: > 60 ML/MIN/1.73M2
GFR SERPL CREATININE-BSD FRML MDRD: > 60 ML/MIN/1.73M2
GLUCOSE SERPL-MCNC: 96 MG/DL (ref 70–108)
GLUCOSE SERPL-MCNC: 98 MG/DL (ref 70–108)
HCT VFR BLD AUTO: 17 % (ref 42–52)
HCT VFR BLD AUTO: 18.7 % (ref 42–52)
HCT VFR BLD AUTO: 19.4 % (ref 42–52)
HCT VFR BLD AUTO: 20.6 % (ref 42–52)
HGB BLD-MCNC: 5.6 GM/DL (ref 14–18)
HGB BLD-MCNC: 6.3 GM/DL (ref 14–18)
HGB BLD-MCNC: 6.4 GM/DL (ref 14–18)
HGB BLD-MCNC: 6.9 GM/DL (ref 14–18)
HGB RETIC QN AUTO: 32.5 PG (ref 28.2–35.7)
IMM GRANULOCYTES # BLD AUTO: 0 THOU/MM3 (ref 0–0.07)
IMM GRANULOCYTES NFR BLD AUTO: 0 %
IMM RETICS NFR: 4.5 % (ref 2.3–13.4)
INR PPP: 1.26 (ref 0.85–1.13)
LACTATE SERPL-SCNC: 0.7 MMOL/L (ref 0.5–2)
LDH SERPL L TO P-CCNC: 179 U/L (ref 100–190)
LYMPHOCYTES ABSOLUTE: 0.3 THOU/MM3 (ref 1–4.8)
LYMPHOCYTES ABSOLUTE: 0.4 THOU/MM3 (ref 1–4.8)
LYMPHOCYTES NFR BLD AUTO: 89 %
LYMPHOCYTES NFR BLD AUTO: 93.1 %
MAGNESIUM SERPL-MCNC: 1.6 MG/DL (ref 1.6–2.4)
MAGNESIUM SERPL-MCNC: 1.6 MG/DL (ref 1.6–2.4)
MCH RBC QN AUTO: 28.7 PG (ref 26–33)
MCH RBC QN AUTO: 29.3 PG (ref 26–33)
MCHC RBC AUTO-ENTMCNC: 32.9 GM/DL (ref 32.2–35.5)
MCHC RBC AUTO-ENTMCNC: 33.7 GM/DL (ref 32.2–35.5)
MCV RBC AUTO: 87 FL (ref 80–94)
MCV RBC AUTO: 87.2 FL (ref 80–94)
MONOCYTES ABSOLUTE: 0 THOU/MM3 (ref 0.4–1.3)
MONOCYTES ABSOLUTE: 0 THOU/MM3 (ref 0.4–1.3)
MONOCYTES NFR BLD AUTO: 3.4 %
MONOCYTES NFR BLD AUTO: 6 %
MYELOCYTES: 1 %
NEUTROPHILS NFR BLD AUTO: 2 %
NEUTROPHILS NFR BLD AUTO: 3.5 %
NRBC BLD AUTO-RTO: 0 /100 WBC
NRBC BLD AUTO-RTO: 0 /100 WBC
PATHOLOGIST REVIEW: ABNORMAL
PH BLDV: 7.4 [PH] (ref 7.31–7.41)
PLATELET # BLD AUTO: 6 THOU/MM3 (ref 130–400)
PLATELET # BLD AUTO: 8 THOU/MM3 (ref 130–400)
PLATELET # BLD AUTO: 9 THOU/MM3 (ref 130–400)
PLATELET BLD QL SMEAR: ABNORMAL
PLATELET BLD QL SMEAR: ABNORMAL
PMV BLD AUTO: 10.6 FL (ref 9.4–12.4)
PMV BLD AUTO: 11.2 FL (ref 9.4–12.4)
POTASSIUM SERPL-SCNC: 3.6 MEQ/L (ref 3.5–5.2)
POTASSIUM SERPL-SCNC: 3.7 MEQ/L (ref 3.5–5.2)
PROT SERPL-MCNC: 5.9 G/DL (ref 6.1–8)
RBC # BLD AUTO: 1.95 MILL/MM3 (ref 4.7–6.1)
RBC # BLD AUTO: 2.15 MILL/MM3 (ref 4.7–6.1)
RETICS # AUTO: 9 THOU/MM3 (ref 20–115)
RETICS/RBC NFR AUTO: 0.5 % (ref 0.5–2)
SCAN OF BLOOD SMEAR: NORMAL
SEGMENTED NEUTROPHILS ABSOLUTE COUNT: 0 THOU/MM3 (ref 1.8–7.7)
SEGMENTED NEUTROPHILS ABSOLUTE COUNT: 0 THOU/MM3 (ref 1.8–7.7)
SODIUM SERPL-SCNC: 136 MEQ/L (ref 135–145)
SODIUM SERPL-SCNC: 136 MEQ/L (ref 135–145)
VIT B12 SERPL-MCNC: 400 PG/ML (ref 211–911)
WBC # BLD AUTO: 0.3 THOU/MM3 (ref 4.8–10.8)
WBC # BLD AUTO: 0.4 THOU/MM3 (ref 4.8–10.8)

## 2023-11-13 PROCEDURE — 86140 C-REACTIVE PROTEIN: CPT

## 2023-11-13 PROCEDURE — 6370000000 HC RX 637 (ALT 250 FOR IP): Performed by: INTERNAL MEDICINE

## 2023-11-13 PROCEDURE — 85610 PROTHROMBIN TIME: CPT

## 2023-11-13 PROCEDURE — 85025 COMPLETE CBC W/AUTO DIFF WBC: CPT

## 2023-11-13 PROCEDURE — 99222 1ST HOSP IP/OBS MODERATE 55: CPT | Performed by: INTERNAL MEDICINE

## 2023-11-13 PROCEDURE — 85384 FIBRINOGEN ACTIVITY: CPT

## 2023-11-13 PROCEDURE — 82728 ASSAY OF FERRITIN: CPT

## 2023-11-13 PROCEDURE — 85046 RETICYTE/HGB CONCENTRATE: CPT

## 2023-11-13 PROCEDURE — 2700000000 HC OXYGEN THERAPY PER DAY

## 2023-11-13 PROCEDURE — 83615 LACTATE (LD) (LDH) ENZYME: CPT

## 2023-11-13 PROCEDURE — 85014 HEMATOCRIT: CPT

## 2023-11-13 PROCEDURE — 82607 VITAMIN B-12: CPT

## 2023-11-13 PROCEDURE — 82746 ASSAY OF FOLIC ACID SERUM: CPT

## 2023-11-13 PROCEDURE — 94640 AIRWAY INHALATION TREATMENT: CPT

## 2023-11-13 PROCEDURE — 36430 TRANSFUSION BLD/BLD COMPNT: CPT

## 2023-11-13 PROCEDURE — 85049 AUTOMATED PLATELET COUNT: CPT

## 2023-11-13 PROCEDURE — 94760 N-INVAS EAR/PLS OXIMETRY 1: CPT

## 2023-11-13 PROCEDURE — 83735 ASSAY OF MAGNESIUM: CPT

## 2023-11-13 PROCEDURE — 82330 ASSAY OF CALCIUM: CPT

## 2023-11-13 PROCEDURE — 82248 BILIRUBIN DIRECT: CPT

## 2023-11-13 PROCEDURE — 82800 BLOOD PH: CPT

## 2023-11-13 PROCEDURE — 2060000000 HC ICU INTERMEDIATE R&B

## 2023-11-13 PROCEDURE — 85018 HEMOGLOBIN: CPT

## 2023-11-13 PROCEDURE — 36415 COLL VENOUS BLD VENIPUNCTURE: CPT

## 2023-11-13 PROCEDURE — 6360000002 HC RX W HCPCS: Performed by: INTERNAL MEDICINE

## 2023-11-13 PROCEDURE — 80053 COMPREHEN METABOLIC PANEL: CPT

## 2023-11-13 PROCEDURE — 6360000002 HC RX W HCPCS: Performed by: NURSE PRACTITIONER

## 2023-11-13 PROCEDURE — P9037 PLATE PHERES LEUKOREDU IRRAD: HCPCS

## 2023-11-13 PROCEDURE — 83605 ASSAY OF LACTIC ACID: CPT

## 2023-11-13 PROCEDURE — 2580000003 HC RX 258: Performed by: INTERNAL MEDICINE

## 2023-11-13 RX ORDER — SODIUM CHLORIDE 9 MG/ML
INJECTION, SOLUTION INTRAVENOUS PRN
Status: DISCONTINUED | OUTPATIENT
Start: 2023-11-13 | End: 2023-11-15

## 2023-11-13 RX ORDER — CALCIUM GLUCONATE 20 MG/ML
2000 INJECTION, SOLUTION INTRAVENOUS ONCE
Status: COMPLETED | OUTPATIENT
Start: 2023-11-13 | End: 2023-11-13

## 2023-11-13 RX ORDER — ACETAMINOPHEN 325 MG/1
650 TABLET ORAL
OUTPATIENT
Start: 2023-11-29

## 2023-11-13 RX ORDER — ACETAMINOPHEN 325 MG/1
650 TABLET ORAL
OUTPATIENT
Start: 2023-11-22

## 2023-11-13 RX ORDER — SODIUM CHLORIDE 9 MG/ML
5-250 INJECTION, SOLUTION INTRAVENOUS PRN
OUTPATIENT
Start: 2023-11-15

## 2023-11-13 RX ORDER — EPINEPHRINE 1 MG/ML
0.3 INJECTION, SOLUTION, CONCENTRATE INTRAVENOUS PRN
OUTPATIENT
Start: 2023-11-22

## 2023-11-13 RX ORDER — ONDANSETRON 2 MG/ML
8 INJECTION INTRAMUSCULAR; INTRAVENOUS
OUTPATIENT
Start: 2023-11-29

## 2023-11-13 RX ORDER — SODIUM CHLORIDE 9 MG/ML
INJECTION, SOLUTION INTRAVENOUS CONTINUOUS
OUTPATIENT
Start: 2023-11-29

## 2023-11-13 RX ORDER — MEPERIDINE HYDROCHLORIDE 50 MG/ML
12.5 INJECTION INTRAMUSCULAR; INTRAVENOUS; SUBCUTANEOUS PRN
OUTPATIENT
Start: 2023-11-29

## 2023-11-13 RX ORDER — ONDANSETRON 2 MG/ML
8 INJECTION INTRAMUSCULAR; INTRAVENOUS EVERY 8 HOURS PRN
OUTPATIENT
Start: 2023-11-29 | End: 2023-11-30

## 2023-11-13 RX ORDER — ALBUTEROL SULFATE 90 UG/1
4 AEROSOL, METERED RESPIRATORY (INHALATION) PRN
OUTPATIENT
Start: 2023-11-22

## 2023-11-13 RX ORDER — DIPHENHYDRAMINE HYDROCHLORIDE 50 MG/ML
50 INJECTION INTRAMUSCULAR; INTRAVENOUS
OUTPATIENT
Start: 2023-11-15

## 2023-11-13 RX ORDER — SODIUM CHLORIDE 9 MG/ML
5-250 INJECTION, SOLUTION INTRAVENOUS PRN
OUTPATIENT
Start: 2023-11-22

## 2023-11-13 RX ORDER — MEPERIDINE HYDROCHLORIDE 50 MG/ML
12.5 INJECTION INTRAMUSCULAR; INTRAVENOUS; SUBCUTANEOUS PRN
OUTPATIENT
Start: 2023-11-15

## 2023-11-13 RX ORDER — ONDANSETRON 2 MG/ML
8 INJECTION INTRAMUSCULAR; INTRAVENOUS
OUTPATIENT
Start: 2023-11-22

## 2023-11-13 RX ORDER — EPINEPHRINE 1 MG/ML
0.3 INJECTION, SOLUTION, CONCENTRATE INTRAVENOUS PRN
OUTPATIENT
Start: 2023-11-29

## 2023-11-13 RX ORDER — ONDANSETRON 2 MG/ML
8 INJECTION INTRAMUSCULAR; INTRAVENOUS EVERY 8 HOURS PRN
OUTPATIENT
Start: 2023-11-22 | End: 2023-11-23

## 2023-11-13 RX ORDER — ALBUTEROL SULFATE 90 UG/1
4 AEROSOL, METERED RESPIRATORY (INHALATION) PRN
OUTPATIENT
Start: 2023-11-29

## 2023-11-13 RX ORDER — SODIUM CHLORIDE 9 MG/ML
5-250 INJECTION, SOLUTION INTRAVENOUS PRN
OUTPATIENT
Start: 2023-11-29

## 2023-11-13 RX ORDER — HEPARIN SODIUM (PORCINE) LOCK FLUSH IV SOLN 100 UNIT/ML 100 UNIT/ML
500 SOLUTION INTRAVENOUS PRN
OUTPATIENT
Start: 2023-11-15

## 2023-11-13 RX ORDER — MEPERIDINE HYDROCHLORIDE 50 MG/ML
12.5 INJECTION INTRAMUSCULAR; INTRAVENOUS; SUBCUTANEOUS PRN
OUTPATIENT
Start: 2023-11-22

## 2023-11-13 RX ORDER — HEPARIN SODIUM (PORCINE) LOCK FLUSH IV SOLN 100 UNIT/ML 100 UNIT/ML
500 SOLUTION INTRAVENOUS PRN
OUTPATIENT
Start: 2023-11-22

## 2023-11-13 RX ORDER — ONDANSETRON 2 MG/ML
8 INJECTION INTRAMUSCULAR; INTRAVENOUS
OUTPATIENT
Start: 2023-11-15

## 2023-11-13 RX ORDER — SODIUM CHLORIDE 0.9 % (FLUSH) 0.9 %
5-40 SYRINGE (ML) INJECTION PRN
OUTPATIENT
Start: 2023-11-15

## 2023-11-13 RX ORDER — ALBUTEROL SULFATE 90 UG/1
4 AEROSOL, METERED RESPIRATORY (INHALATION) PRN
OUTPATIENT
Start: 2023-11-15

## 2023-11-13 RX ORDER — DIPHENHYDRAMINE HYDROCHLORIDE 50 MG/ML
50 INJECTION INTRAMUSCULAR; INTRAVENOUS
OUTPATIENT
Start: 2023-11-29

## 2023-11-13 RX ORDER — EPINEPHRINE 1 MG/ML
0.3 INJECTION, SOLUTION, CONCENTRATE INTRAVENOUS PRN
OUTPATIENT
Start: 2023-11-15

## 2023-11-13 RX ORDER — HEPARIN SODIUM (PORCINE) LOCK FLUSH IV SOLN 100 UNIT/ML 100 UNIT/ML
500 SOLUTION INTRAVENOUS PRN
OUTPATIENT
Start: 2023-11-29

## 2023-11-13 RX ORDER — SODIUM CHLORIDE 0.9 % (FLUSH) 0.9 %
5-40 SYRINGE (ML) INJECTION PRN
OUTPATIENT
Start: 2023-11-22

## 2023-11-13 RX ORDER — FAMOTIDINE 10 MG/ML
20 INJECTION, SOLUTION INTRAVENOUS
OUTPATIENT
Start: 2023-11-22

## 2023-11-13 RX ORDER — ONDANSETRON 2 MG/ML
8 INJECTION INTRAMUSCULAR; INTRAVENOUS EVERY 8 HOURS PRN
OUTPATIENT
Start: 2023-11-15 | End: 2023-11-16

## 2023-11-13 RX ORDER — FAMOTIDINE 10 MG/ML
20 INJECTION, SOLUTION INTRAVENOUS
OUTPATIENT
Start: 2023-11-29

## 2023-11-13 RX ORDER — SODIUM CHLORIDE 0.9 % (FLUSH) 0.9 %
5-40 SYRINGE (ML) INJECTION PRN
OUTPATIENT
Start: 2023-11-29

## 2023-11-13 RX ORDER — FAMOTIDINE 10 MG/ML
20 INJECTION, SOLUTION INTRAVENOUS
OUTPATIENT
Start: 2023-11-15

## 2023-11-13 RX ORDER — SODIUM CHLORIDE 9 MG/ML
INJECTION, SOLUTION INTRAVENOUS CONTINUOUS
OUTPATIENT
Start: 2023-11-22

## 2023-11-13 RX ORDER — DIPHENHYDRAMINE HYDROCHLORIDE 50 MG/ML
50 INJECTION INTRAMUSCULAR; INTRAVENOUS
OUTPATIENT
Start: 2023-11-22

## 2023-11-13 RX ORDER — ACETAMINOPHEN 325 MG/1
650 TABLET ORAL
OUTPATIENT
Start: 2023-11-15

## 2023-11-13 RX ORDER — SODIUM CHLORIDE 9 MG/ML
INJECTION, SOLUTION INTRAVENOUS CONTINUOUS
OUTPATIENT
Start: 2023-11-15

## 2023-11-13 RX ADMIN — Medication 1 TABLET: at 09:54

## 2023-11-13 RX ADMIN — POTASSIUM CHLORIDE 20 MEQ: 1500 TABLET, EXTENDED RELEASE ORAL at 09:53

## 2023-11-13 RX ADMIN — METOPROLOL TARTRATE 12.5 MG: 25 TABLET, FILM COATED ORAL at 09:54

## 2023-11-13 RX ADMIN — TIOTROPIUM BROMIDE INHALATION SPRAY 2 PUFF: 3.12 SPRAY, METERED RESPIRATORY (INHALATION) at 08:34

## 2023-11-13 RX ADMIN — MOMETASONE FUROATE AND FORMOTEROL FUMARATE DIHYDRATE 2 PUFF: 200; 5 AEROSOL RESPIRATORY (INHALATION) at 08:34

## 2023-11-13 RX ADMIN — POTASSIUM CHLORIDE 20 MEQ: 1500 TABLET, EXTENDED RELEASE ORAL at 20:32

## 2023-11-13 RX ADMIN — OXYCODONE AND ACETAMINOPHEN 1 TABLET: 5; 325 TABLET ORAL at 20:32

## 2023-11-13 RX ADMIN — QUETIAPINE FUMARATE 25 MG: 25 TABLET ORAL at 20:33

## 2023-11-13 RX ADMIN — CALCIUM GLUCONATE 2000 MG: 20 INJECTION, SOLUTION INTRAVENOUS at 04:04

## 2023-11-13 RX ADMIN — ACYCLOVIR 800 MG: 800 TABLET ORAL at 09:55

## 2023-11-13 RX ADMIN — FOLIC ACID 1 MG: 1 TABLET ORAL at 09:54

## 2023-11-13 RX ADMIN — METOPROLOL TARTRATE 12.5 MG: 25 TABLET, FILM COATED ORAL at 20:33

## 2023-11-13 RX ADMIN — Medication 400 MG: at 09:54

## 2023-11-13 RX ADMIN — CEFEPIME 2000 MG: 2 INJECTION, POWDER, FOR SOLUTION INTRAVENOUS at 15:20

## 2023-11-13 RX ADMIN — TAMSULOSIN HYDROCHLORIDE 0.8 MG: 0.4 CAPSULE ORAL at 09:53

## 2023-11-13 RX ADMIN — ACYCLOVIR 800 MG: 800 TABLET ORAL at 20:33

## 2023-11-13 RX ADMIN — CETIRIZINE HYDROCHLORIDE 10 MG: 10 TABLET ORAL at 09:54

## 2023-11-13 RX ADMIN — SODIUM CHLORIDE, PRESERVATIVE FREE 10 ML: 5 INJECTION INTRAVENOUS at 20:37

## 2023-11-13 RX ADMIN — VANCOMYCIN HYDROCHLORIDE 1000 MG: 1 INJECTION, POWDER, LYOPHILIZED, FOR SOLUTION INTRAVENOUS at 03:44

## 2023-11-13 RX ADMIN — VANCOMYCIN HYDROCHLORIDE 1000 MG: 1 INJECTION, POWDER, LYOPHILIZED, FOR SOLUTION INTRAVENOUS at 18:31

## 2023-11-13 RX ADMIN — CEFEPIME 2000 MG: 2 INJECTION, POWDER, FOR SOLUTION INTRAVENOUS at 06:15

## 2023-11-13 ASSESSMENT — PAIN SCALES - GENERAL
PAINLEVEL_OUTOF10: 0
PAINLEVEL_OUTOF10: 4

## 2023-11-13 ASSESSMENT — PAIN DESCRIPTION - ONSET: ONSET: ON-GOING

## 2023-11-13 ASSESSMENT — PAIN DESCRIPTION - DESCRIPTORS: DESCRIPTORS: ACHING

## 2023-11-13 ASSESSMENT — PAIN DESCRIPTION - LOCATION: LOCATION: GENERALIZED

## 2023-11-13 ASSESSMENT — PAIN DESCRIPTION - ORIENTATION: ORIENTATION: MID

## 2023-11-13 ASSESSMENT — PAIN - FUNCTIONAL ASSESSMENT: PAIN_FUNCTIONAL_ASSESSMENT: PREVENTS OR INTERFERES SOME ACTIVE ACTIVITIES AND ADLS

## 2023-11-13 ASSESSMENT — PAIN DESCRIPTION - PAIN TYPE: TYPE: CHRONIC PAIN

## 2023-11-13 ASSESSMENT — PAIN DESCRIPTION - FREQUENCY: FREQUENCY: CONTINUOUS

## 2023-11-13 NOTE — PROGRESS NOTES
Patient admitted to 4A Room 05 from ED  No complaints upon arrival to the room. IV site free of s/s of infection or infiltration. Vital signs obtained. Assessment and data collection initiated. Oriented to room. Policies and procedures for 4A explained All questions answered with no further questions at this time. Fall prevention and safety brochure discussed with patient. 2 person skin check completed with [facility] RN. Patient declines PCP notification. Patient declines family notification.

## 2023-11-13 NOTE — CARE COORDINATION
11/13/23, 10:44 AM EST    DISCHARGE PLANNING EVALUATION    Met with Marlaine Cushing this morning. He reports he was at home with [facility]. Spoke with Cuca Verdugo at the agency and she verified he was current with nursing. Marlaine Cushing told SW that he will be going to [facility] from here. He is not sure if he will be able to return home after [facility], or if he will need to go to an ECF for rehab. Offered and ECF list and he denied at this time. A palliative care consult has been placed for goals of care. He has be here then [facility] multiple times.

## 2023-11-13 NOTE — FLOWSHEET NOTE
11/13/23 [facility]   Safe Environment   Safety Measures Call light within reach;Standard Safety Measures  (virtual nurse safety round complete)     VN called into patients room and introduced myself and role. Pt did not respond to voice, camera turned on for safety. Pt resting in bed with eyes closed. Chest visibly rising and falling. No apparent signs of distress.

## 2023-11-13 NOTE — CARE COORDINATION
Case Management Assessment  Initial Evaluation    Date/Time of Evaluation: 11/13/2023 12:19 PM  Assessment Completed by: Kinjal Rai RN    If patient is discharged prior to next notation, then this note serves as note for discharge by case management. Patient Name: Renzo Vazquez                   YOB: 1955  Diagnosis: Thrombocytopenia ([address]) [D69.6]  Neutropenic fever ([address]) [D70.9, R50.81]  Symptomatic anemia [D64.9]  Pneumonia of both lungs due to infectious organism, unspecified part of lung [J18.9]  Acute myeloid leukemia not having achieved remission ([address]) [C92.00]                   Date / Time: 11/12/2023  1:33 PM  Location: [address] [place] [address]     Patient Admission Status: Inpatient   Readmission Risk Low 0-14, Mod 15-19), High > 20: Readmission Risk Score: 35.7    Current PCP: YESY Wong CNP  PCP verified by CM? Yes    Chart Reviewed: Yes      History Provided by: Patient  Patient Orientation: Alert and Oriented    Patient Cognition: Alert    Hospitalization in the last 30 days (Readmission):  Yes    If yes, Readmission Assessment in CM Navigator will be completed. Advance Directives:      Code Status: Full Code   Patient's Primary Decision Maker is: Legal Next of Kin    Primary Decision MakerDina Paris Child - [phone]    Secondary Decision Maker: Juliette Diallo - Brother/Sister - [phone]    Discharge Planning:    Patient lives with: Spouse/Significant Other Type of Home: House  Primary Care Giver: Self  Patient Support Systems include: Spouse/Significant Other, Children   Current Financial resources: Medicaid, Medicare  Current community resources: ECF/Home Care [facility])  Current services prior to admission: Oxygen Therapy (2 liters DASCO)            Current DME: Marcial Diallo            Type of Home Care services:  Nursing Services    ADLS  Prior functional level:  Independent in ADLs/IADLs  Current functional level: Assistance with the

## 2023-11-13 NOTE — PROGRESS NOTES
This Virtual RN completed admission requirements with patient except med list. Patient states he does not know current medications at this time and would like to go to sleep at this time. The bedside nurse Chiquita Rubio has been updated via perfect serve messenger. Patient call light within reach at this time, this nurse educated patient to use the call light to report any change in condition.

## 2023-11-13 NOTE — FLOWSHEET NOTE
11/13/23 1877   Safe Environment   Safety Measures Side rails X 2;Standard Safety Measures  (virtual nurse safety round complete)     VN called into patients room and introduced myself and role. Patient answered and permitted video. Video activated. Pt resting in bed awake and alert. No signs of distress noted. Patient voiced no needs or concerns at this time.  VN educated pt on utilizing call light if condition changes

## 2023-11-13 NOTE — PROGRESS NOTES
Hospitalist Progress Note      Patient:  Laura Route    Unit/Bed:4A-05/005-A  YOB: 1955  MRN: [id number]   Acct: [de-identified]   PCP: YESY Gamez CNP  Date of Admission: 11/12/2023    Assessment/Plan:    #Neutropenic fever.  -Continue with  iv cefepime and Vancomycin day 2  -Pharmacy consult to dose vancomycin. -COVID,Influenza A&B not detected. -Chest xray done showed Patchy opacities are seen in the right lung. Left basal opacities are noted. 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 -S/P multiple cycles of chemotherapy including spinal chemotherapy at [facility].  -No beds available at [facility].  -Patient platelet count was 6 and hemoglobin is 6.3 at time of admission.  -Was transfused  one unit  of platelet  and one unit of PRBC.  -Post transfusion Hg 6.4 and platelet 9.  -Morning lab Hg 6.3 and Platelets count 6. Will transfuse one more unit of platelet and PRBC. -Goal to keep platelet above 10 and Hg above 7.  -Continue with acyclovir and allopurinol.  -Oncology consulted. Pending        #COPD on 2l home oxygen.  -Started on DuoNeb. -Continue with home inhaler. #HTN. -Continue with home metoprolol. #BPH. -Continue with flomax. DVT Prophylaxis: SCD/ZOHRA  Code status: Full code  Diet: Regular. Subjective (past 24 hours):   Patient says feeling little better today. No new complaints. No acute distress noted      Past medical history, family history, social history and allergies reviewed again and is unchanged since admission. ROS (All review of systems completed. Pertinent positives noted.  Otherwise All other systems reviewed and negative.)     Medications:  Reviewed    Infusion Medications    sodium chloride      sodium chloride      sodium chloride       Scheduled Medications    calcium replacement protocol upper extremities. color, texture, turgor normal.   Neurologic:  Neurovascularly intact without any focal sensory/motor deficits. Cranial nerves: II-XII intact, grossly non-focal.  Psychiatric: Alert and oriented, thought content appropriate, normal insight  Labs:   Recent Labs     11/12/23  1354 11/12/23 2041 11/13/23  0114   WBC 0.4*  --  0.3*   HGB 6.3* 5.3* 5.6*   HCT 18.7* 16.1* 17.0*   PLT 6*  --  9*     Recent Labs     11/12/23  1354 11/13/23  0114   * 136  136   K 3.7 3.6  3.7   CL 97* 102  101   CO2 24 24  24   BUN 22 19  19   CREATININE 1.1 1.1  1.1   CALCIUM 8.4* 8.2*  8.2*     Recent Labs     11/13/23  0114   AST 6   ALT 13   BILIDIR 0.3   BILITOT 1.1   ALKPHOS 88     Recent Labs     11/12/23  1354 11/13/23  0114   INR 1.30* 1.26*     No results for input(s): \"CKTOTAL\", \"TROPONINI\" in the last 72 hours. Microbiology:    Blood culture #1:   Lab Results   Component Value Date/Time    [facility]  10/08/2023 06:48 PM     No growth 24 hours. No growth 48 hours. No growth at 5 days       Blood culture #2:No results found for: \"BLOODCULT2\"    Organism:No results found for: \"ORG\"      Lab Results   Component Value Date/Time    [facility]  10/25/2023 05:19 PM     Rare segmented neutrophils observed. No organisms observed.  performed on cytospun specimen       MRSA culture only:No results found for: \"MRSAC\"    Urine culture:   Lab Results   Component Value Date/Time    LABURIN No growth-preliminary No growth 07/31/2023 01:30 PM       Respiratory culture: No results found for: \"CULTRESP\"    Aerobic and Anaerobic :  No results found for: \"LABAERO\"  Lab Results   Component Value Date/Time    LABANAE No growth-preliminary No growth 10/25/2023 05:19 PM       Urinalysis:      Lab Results   Component Value Date/Time    NITRU NEGATIVE 11/12/2023 03:15 PM    WBCUA 2-4 11/12/2023 03:15 PM    BACTERIA NONE SEEN 11/12/2023 03:15 PM    RBCUA 0-2 11/12/2023 03:15 PM    BLOODU NEGATIVE 11/12/2023 03:15 PM    SPECGRAV >1.030

## 2023-11-13 NOTE — PROGRESS NOTES
Notified provider at this time that patient Hgb is 5.3, down from 6.3, 1 uPRBC was ordered on standby and going to be administered now.

## 2023-11-13 NOTE — PLAN OF CARE
Problem: Respiratory - Adult  Goal: Achieves optimal ventilation and oxygenation  Outcome: Progressing  Flowsheets (Taken 11/13/2023 0838)  Achieves optimal ventilation and oxygenation:   Assess for changes in respiratory status   Assess for changes in mentation and behavior   Respiratory therapy support as indicated  Note: Pt had no questions on purpose or side effects of medication   Will continue with treatments to help improve aeration t/o lungs   Pt mutually agreed upon goals

## 2023-11-13 NOTE — CARE COORDINATION
11/13/23 1223   Readmission Assessment   Number of Days since last admission? 8-30 days   Previous Disposition Home with Home Health   Who is being Interviewed Patient   What was the patient's/caregiver's perception as to why they think they needed to return back to the hospital? Other (Comment)  (\"became more weak, fever\")   Did you visit your Primary Care Physician after you left the hospital, before you returned this time? Yes   Did you see a specialist, such as Cardiac, Pulmonary, Orthopedic Physician, etc. after you left the hospital? Yes  (Oncologist)   Who advised the patient to return to the hospital? Self-referral   Does the patient report anything that got in the way of taking their medications? No   In our efforts to provide the best possible care to you and others like you, can you think of anything that we could have done to help you after you left the hospital the first time, so that you might not have needed to return so soon?  Other (Comment)  (\"I felt ready\")

## 2023-11-13 NOTE — PROGRESS NOTES
Received consult for this patient via [employer] but pt never fell on our list.  Called patient's RN to discuss who states pt has been accepted to [facility] and no needs from us at this time. Please call with questions/concerns or contact us if needed.     Electronically signed by YESY Pop CNP on 11/13/2023 at 12:47 PM

## 2023-11-13 NOTE — PALLIATIVE CARE
Initial Evaluation        Patient:   Tho Woodruff  YOB: 1955  Age:  79 y.o. Room:  [address] [place] [address]  MRN:  [id number]   Acct: [de-identified]    Date of Admission:  11/12/2023  1:33 PM  Date of Service:  11/13/2023  Completed By:  Carlo Persaud RN                 Reason for Palliative Care Evaluation:-               [] Code Status Discussion              [x] Goals of Care - order received \"per protocol\". Plan is for transfer to [facility], pt has been accepted. Palliative care did not see at this time to discuss goals of care. Palliative care will follow peripherally, if condition changes or transfer cancels, we will revisit to assess goals of care.              [] Pain/Symptom Management               [] Emotional Support              [] Other:                          Advance Directives:-  pt completed AD's on last admission but the forms are not yet on file   [] [place] DNR Form  [] Living Will   [] Medical POA              Current Code Status:-     [x] Full Resuscitation  [] DNR-Comfort Care-Arrest  [] DNR-Comfort Care       [] Limited Resuscitation             [] No CPR            [] No shock            [] No ET intubation/reintubation            [] No resuscitative medications            [] Other limitation:                 Electronically signed by Carlo Persaud RN on 11/13/2023 at 1:03 PM           Palliative Care Office: [phone]

## 2023-11-13 NOTE — CONSENT
Informed Consent for Blood Component Transfusion Note    I have discussed with the patient the rationale for blood component transfusion; its benefits in treating or preventing fatigue, organ damage, or death; and its risk which includes mild transfusion reactions, rare risk of blood borne infection, or more serious but rare reactions. I have discussed the alternatives to transfusion, including the risk and consequences of not receiving transfusion. The patient had an opportunity to ask questions and had agreed to proceed with transfusion of blood components.     Electronically signed by Mayi Norman MD on 11/13/23 at 12:03 PM EST

## 2023-11-13 NOTE — PLAN OF CARE
Problem: Discharge Planning  Goal: Discharge to home or other facility with appropriate resources  11/13/2023 1320 by Kaylee Richards RN  Outcome: Progressing  Flowsheets (Taken 11/13/2023 1429)  Discharge to home or other facility with appropriate resources: Identify barriers to discharge with patient and caregiver  Note: Patient educated that discharge plan is still in progress. Transfer to [facility] pending. Problem: Safety - Adult  Goal: Free from fall injury  Outcome: Progressing  Flowsheets (Taken 11/13/2023 1320)  Free From Fall Injury: Instruct family/caregiver on patient safety  Note: Patient remains free from falls this shift. Fall precautions in place with bed/chair exit alarmed. Fall sign posted and fall armband in place. Nonskid footwear used with transferring. Educated patient to use call light when in need of staff assistance with transferring, ambulating, and other activities of daily living. Patient appropriately uses call light this shift. Problem: Pain  Goal: Verbalizes/displays adequate comfort level or baseline comfort level  Outcome: Progressing  Flowsheets (Taken 11/13/2023 0922)  Verbalizes/displays adequate comfort level or baseline comfort level:   Assess pain using appropriate pain scale   Encourage patient to monitor pain and request assistance     Problem: Skin/Tissue Integrity  Goal: Absence of new skin breakdown  Description: 1. Monitor for areas of redness and/or skin breakdown  2. Assess vascular access sites hourly  3. Every 4-6 hours minimum:  Change oxygen saturation probe site  4. Every 4-6 hours:  If on nasal continuous positive airway pressure, respiratory therapy assess nares and determine need for appliance change or resting period. Outcome: Progressing  Note: Patient exhibits no new skin breakdown this shift. Patient repositioned Q2H and as needed with staff assistance. All skin integrity issuse charted in Flowsheets. Will continue to monitor.        Problem:

## 2023-11-14 PROBLEM — C92.00 ACUTE MYELOID LEUKEMIA NOT HAVING ACHIEVED REMISSION (HCC): Status: ACTIVE | Noted: 2023-11-14

## 2023-11-14 LAB
DEPRECATED RDW RBC AUTO: 48.1 FL (ref 35–45)
ERYTHROCYTE [DISTWIDTH] IN BLOOD BY AUTOMATED COUNT: 15.1 % (ref 11.5–14.5)
HCT VFR BLD AUTO: 21.2 % (ref 42–52)
HCT VFR BLD AUTO: 23.2 % (ref 42–52)
HGB BLD-MCNC: 6.9 GM/DL (ref 14–18)
HGB BLD-MCNC: 7.7 GM/DL (ref 14–18)
MCH RBC QN AUTO: 28.7 PG (ref 26–33)
MCHC RBC AUTO-ENTMCNC: 33.2 GM/DL (ref 32.2–35.5)
MCV RBC AUTO: 86.6 FL (ref 80–94)
PLATELET # BLD AUTO: 24 THOU/MM3 (ref 130–400)
PMV BLD AUTO: 11.5 FL (ref 9.4–12.4)
RBC # BLD AUTO: 2.68 MILL/MM3 (ref 4.7–6.1)
REASON FOR REJECTION: NORMAL
REJECTED TEST: NORMAL
VANCOMYCIN SERPL-MCNC: 11.6 UG/ML (ref 0.1–39.9)
WBC # BLD AUTO: 0.4 THOU/MM3 (ref 4.8–10.8)

## 2023-11-14 PROCEDURE — 6370000000 HC RX 637 (ALT 250 FOR IP): Performed by: INTERNAL MEDICINE

## 2023-11-14 PROCEDURE — 80202 ASSAY OF VANCOMYCIN: CPT

## 2023-11-14 PROCEDURE — 85014 HEMATOCRIT: CPT

## 2023-11-14 PROCEDURE — 2580000003 HC RX 258: Performed by: INTERNAL MEDICINE

## 2023-11-14 PROCEDURE — 36415 COLL VENOUS BLD VENIPUNCTURE: CPT

## 2023-11-14 PROCEDURE — 99222 1ST HOSP IP/OBS MODERATE 55: CPT | Performed by: NURSE PRACTITIONER

## 2023-11-14 PROCEDURE — 36430 TRANSFUSION BLD/BLD COMPNT: CPT

## 2023-11-14 PROCEDURE — 2060000000 HC ICU INTERMEDIATE R&B

## 2023-11-14 PROCEDURE — 85018 HEMOGLOBIN: CPT

## 2023-11-14 PROCEDURE — 99222 1ST HOSP IP/OBS MODERATE 55: CPT | Performed by: INTERNAL MEDICINE

## 2023-11-14 PROCEDURE — 6360000002 HC RX W HCPCS: Performed by: INTERNAL MEDICINE

## 2023-11-14 PROCEDURE — 94640 AIRWAY INHALATION TREATMENT: CPT

## 2023-11-14 PROCEDURE — 6370000000 HC RX 637 (ALT 250 FOR IP): Performed by: PHYSICIAN ASSISTANT

## 2023-11-14 PROCEDURE — 85027 COMPLETE CBC AUTOMATED: CPT

## 2023-11-14 RX ORDER — QUETIAPINE FUMARATE 100 MG/1
100 TABLET, FILM COATED ORAL NIGHTLY
Status: DISCONTINUED | OUTPATIENT
Start: 2023-11-14 | End: 2023-11-14

## 2023-11-14 RX ORDER — SODIUM CHLORIDE 9 MG/ML
INJECTION, SOLUTION INTRAVENOUS PRN
Status: DISCONTINUED | OUTPATIENT
Start: 2023-11-14 | End: 2023-11-15

## 2023-11-14 RX ORDER — QUETIAPINE FUMARATE 25 MG/1
50 TABLET, FILM COATED ORAL NIGHTLY
Status: DISCONTINUED | OUTPATIENT
Start: 2023-11-14 | End: 2023-11-17 | Stop reason: HOSPADM

## 2023-11-14 RX ADMIN — CEFEPIME 2000 MG: 2 INJECTION, POWDER, FOR SOLUTION INTRAVENOUS at 06:25

## 2023-11-14 RX ADMIN — Medication 400 MG: at 07:51

## 2023-11-14 RX ADMIN — CEFEPIME 2000 MG: 2 INJECTION, POWDER, FOR SOLUTION INTRAVENOUS at 00:00

## 2023-11-14 RX ADMIN — CETIRIZINE HYDROCHLORIDE 10 MG: 10 TABLET ORAL at 07:52

## 2023-11-14 RX ADMIN — MOMETASONE FUROATE AND FORMOTEROL FUMARATE DIHYDRATE 2 PUFF: 200; 5 AEROSOL RESPIRATORY (INHALATION) at 05:54

## 2023-11-14 RX ADMIN — SODIUM CHLORIDE, PRESERVATIVE FREE 10 ML: 5 INJECTION INTRAVENOUS at 20:34

## 2023-11-14 RX ADMIN — METOPROLOL TARTRATE 12.5 MG: 25 TABLET, FILM COATED ORAL at 20:35

## 2023-11-14 RX ADMIN — POTASSIUM CHLORIDE 20 MEQ: 1500 TABLET, EXTENDED RELEASE ORAL at 20:34

## 2023-11-14 RX ADMIN — SODIUM CHLORIDE, PRESERVATIVE FREE 10 ML: 5 INJECTION INTRAVENOUS at 07:52

## 2023-11-14 RX ADMIN — METOPROLOL TARTRATE 12.5 MG: 25 TABLET, FILM COATED ORAL at 07:50

## 2023-11-14 RX ADMIN — VANCOMYCIN HYDROCHLORIDE 1000 MG: 1 INJECTION, POWDER, LYOPHILIZED, FOR SOLUTION INTRAVENOUS at 03:59

## 2023-11-14 RX ADMIN — Medication 1 TABLET: at 07:51

## 2023-11-14 RX ADMIN — MOMETASONE FUROATE AND FORMOTEROL FUMARATE DIHYDRATE 2 PUFF: 200; 5 AEROSOL RESPIRATORY (INHALATION) at 18:49

## 2023-11-14 RX ADMIN — TAMSULOSIN HYDROCHLORIDE 0.8 MG: 0.4 CAPSULE ORAL at 07:51

## 2023-11-14 RX ADMIN — ACYCLOVIR 800 MG: 800 TABLET ORAL at 20:36

## 2023-11-14 RX ADMIN — Medication 1250 MG: at 17:32

## 2023-11-14 RX ADMIN — TIOTROPIUM BROMIDE INHALATION SPRAY 2 PUFF: 3.12 SPRAY, METERED RESPIRATORY (INHALATION) at 05:54

## 2023-11-14 RX ADMIN — FOLIC ACID 1 MG: 1 TABLET ORAL at 07:51

## 2023-11-14 RX ADMIN — CEFEPIME 2000 MG: 2 INJECTION, POWDER, FOR SOLUTION INTRAVENOUS at 15:40

## 2023-11-14 RX ADMIN — QUETIAPINE FUMARATE 50 MG: 25 TABLET ORAL at 21:08

## 2023-11-14 RX ADMIN — POTASSIUM CHLORIDE 20 MEQ: 1500 TABLET, EXTENDED RELEASE ORAL at 07:51

## 2023-11-14 RX ADMIN — ACYCLOVIR 800 MG: 800 TABLET ORAL at 07:52

## 2023-11-14 ASSESSMENT — PAIN SCALES - GENERAL
PAINLEVEL_OUTOF10: 0

## 2023-11-14 NOTE — PROGRESS NOTES
Hospitalist Progress Note      Patient:  Aileen Boot    Unit/Bed:4A-05/005-A  YOB: 1955  MRN: [id number]   Acct: [de-identified]   PCP: EYSY Hernandez - CNP  Date of Admission: 11/12/2023    Assessment/Plan:    #Neutropenic fever.  -Continue with  iv cefepime and Vancomycin day 3  -Pharmacy consult to dose vancomycin. -COVID,Influenza A&B not detected. -Chest xray done showed Patchy opacities are seen in the right lung. Left basal opacities are noted. 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 -S/P multiple cycles of chemotherapy including spinal chemotherapy at [facility].  -No beds available at [facility].  -Patient platelet count was 6 and hemoglobin is 6.3 at time of admission. -S/P 2 units of PRBC and 2 units of platelets.  -Posttransfusion Hg 7.7 and platelet 24.  -Goal to keep platelet above 10 and Hg above 7.  -Continue with acyclovir and allopurinol.  -Oncology consulted. Pending.  -Chemotherapy as per oncology. #COPD on 2l home oxygen.  -Started on DuoNeb. -Continue with home inhaler. #HTN. -Continue with home metoprolol. #BPH. -Continue with flomax. DVT Prophylaxis: SCD/ZOHRA  Code status: Full code  Diet: Regular. Subjective (past 24 hours):   Patient says feeling better. No new complaints. No acute distress noted      Past medical history, family history, social history and allergies reviewed again and is unchanged since admission. ROS (All review of systems completed. Pertinent positives noted.  Otherwise All other systems reviewed and negative.)     Medications:  Reviewed    Infusion Medications    sodium chloride      sodium chloride      sodium chloride      sodium chloride      sodium chloride      sodium chloride       Scheduled Medications    calcium replacement protocol   Other RX Placeholder

## 2023-11-14 NOTE — PLAN OF CARE
Problem: Discharge Planning  Goal: Discharge to home or other facility with appropriate resources  Outcome: Progressing  Discharge to home or other facility with appropriate resources: Identify barriers to discharge with patient and caregiver  Note: Patient educated that discharge plan is still in progress. Transfer to [facility] pending. Problem: Safety - Adult  Goal: Free from fall injury  Outcome: Progressing  Free From Fall Injury: Instruct family/caregiver on patient safety  Note: Patient remains free from falls this shift. Fall precautions in place with bed/chair exit alarmed. Fall sign posted and fall armband in place. Nonskid footwear used with transferring. Educated patient to use call light when in need of staff assistance with transferring, ambulating, and other activities of daily living. Patient appropriately uses call light this shift. Problem: Pain  Goal: Verbalizes/displays adequate comfort level or baseline comfort level    Verbalizes/displays Outcome: Progressing adequate comfort level or baseline comfort level:   Assess pain using appropriate pain scale   Encourage patient to monitor pain and request assistance     Problem: Skin/Tissue Integrity  Goal: Absence of new skin breakdown  Description: 1. Monitor for areas of redness and/or skin breakdown  2. Assess vascular access sites hourly  3. Every 4-6 hours minimum:  Change oxygen saturation probe site  4. Every 4-6 hours:  If on nasal continuous positive airway pressure, respiratory therapy assess nares and determine need for appliance change or resting period. Outcome: Progressing  Note: Patient exhibits no new skin breakdown this shift. Patient repositioned Q2H and as needed with staff assistance. All skin integrity issuse charted in Flowsheets. Will continue to monitor.        Problem: Infection - Adult  Goal: Absence of fever/infection during anticipated neutropenic period  Outcome: Progressing  Absence of fever/infection during

## 2023-11-14 NOTE — CONSULTS
prepped utilizing sterile barrier technique at the appropriate vertebral body level. Procedure: A time-out was performed. 2 mL 1% lidocaine was used to locally anesthetize the puncture site. Lumbar puncture was performed under fluoroscopic guidance at the L2-L3 interlaminar space using a 3.5 inch 20 gauge spinal needle. 7 ml of clear, colorless cerebrospinal fluid was removed and sent to the laboratory for analysis. Intrathecal chemotherapy administration was then performed by ARIC Che under the supervision of Dr. Ale Faith with 12  mg of Methotrexate  (PF) and 50 mg of hydrocortisone sodium succinate (Solu-Cortef) (PF) in 3.52 mL Broderick's B slowly instilled into the thecal sac at 1353 hour. The needle was then removed and a bandage applied to the site. Total needle time was from 1351 to 1358 hours. All materials utilized for chemotherapy administration or potentially contaminated were disposed of in designated containers. The patient tolerated the procedure well without any immediate complications. Blood loss: None. IMPRESSION: 1. Successful fluoroscopic-guided lumbar puncture with intrathecal chemotherapy administration as described above. 2.  12  mg of Methotrexate  (PF) and 50 mg of hydrocortisone sodium succinate (Solu-Cortef) (PF) in 3.52 mL Broderick's B administered on 10/8/2023 at 1353 hour. Roseanne Lopes M.D., attest that I was present and provided direct supervision of this procedure. I personally viewed and interpreted these images and I have reviewed and approved this report. Electronically Signed By: Ale Faith M.D. on 11/8/2023 4:27 PM    LUMBAR PUNCTURE WITH IT CHEMO    Result Date: 11/6/2023  [facility]     11/6/2023  2:18 PM LP with Chemotherapy Administration PERFORMED BY: Samir Martines PA-C ASSISTED BY:  Billy Granados RN PREOPERATIVE DIAGNOSIS(ES): AML.  POSTOPERATIVE DIAGNOSIS(ES): AML PROCEDURE START [id number] PROCEDURE STOP TIME: 1401 FINDINGS: None INDICATIONS FOR has worsened somewhat since prior. **This report has been created using voice recognition software. It may contain minor errors which are inherent in voice recognition technology. ** Final report electronically signed by Dr. 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 Myeloid mutational NGS with FLT3, NPM1 mutations. Has previously received Azacitidine x 1 cycle, decitabine x 2 cycles. Follows locally with Dr. Joseline Krishnan at [facility]. CNS involvement. LP with IT chemo ongoing weekly. Transfusion recommendations include 1 unit PRBCs for Hgb < 7, 1 pack platelets for platelet count < 20. Irradiated, leuko-reduced transfusions advised. HOLD Venetoclax with acute infection, severe thrombocytopenia. Neutropenic precautions, low microbial diet. Daily CBC WITH DIFF, need to trend blast count with diff. Sent perfectserve message to pt current oncologist at [facility], to provide update and review case, awaiting call back. Continue treatment for PNA. Pt states he wants to go to [facility]-await open bed. Case discussed with nurse and patient/family. Questions and concerns addressed. Plan made in collaboration with Dr. Kaylynn Gloria.     Electronically signed by   YESY Mims CNP on 11/14/2023 at 1:02 PM

## 2023-11-14 NOTE — PLAN OF CARE
Problem: Respiratory - Adult  Goal: Achieves optimal ventilation and oxygenation  11/14/2023 1852 by Indu Head RCP  Outcome: Progressing  Note:  Patient lung sounds are considered normal for their current lung condition. No signs of distress noted.  Current treatment regimen appropriate

## 2023-11-14 NOTE — FLOWSHEET NOTE
11/14/23 1255   Safe Environment   Safety Measures Bed in low position;Call light within reach; Family at bedside;Side rails X 2  (Virtual Nurse Safety Round Completed)     Call placed to patients room, patient responds to audio, permitted video. Patient alert . family at bedisde. Patient educated on utilizing call light.  Call light within reach, no signs or symptoms of distress noted

## 2023-11-14 NOTE — FLOWSHEET NOTE
11/14/23 1009   Safe Environment   Safety Measures Bed in low position;Call light within reach;Nurse at bedside;Side rails X 2  (Virtual Nurse Safety Round Completed)     Placed call to patients room, patient did not respond to audio, video turned on for safety purposes. Patient is in bed pulling at lines, re-guided patient,educated patient on lines, call light within reach, no signs or symptoms of distress noted.

## 2023-11-14 NOTE — PROGRESS NOTES
This RN attempted to contact Rosio Talavera CNP per Dr. Simuel Denver request. Message left on nurse line at [facility].

## 2023-11-14 NOTE — PROGRESS NOTES
Notified provider of Hgb 6.9, platelets are infusing currently. New orders for 1u PRBC when platelets complete.

## 2023-11-14 NOTE — CARE COORDINATION
11/14/23, 2:42 PM EST    DISCHARGE ON GOING EVALUATION    [address] day: 2  Location: -05/005-A Reason for admit: Thrombocytopenia ([address]) [D69.6]  Neutropenic fever ([address]) [D70.9, R50.81]  Symptomatic anemia [D64.9]  Pneumonia of both lungs due to infectious organism, unspecified part of lung [J18.9]  Acute myeloid leukemia not having achieved remission ([address]) [C92.00]   Procedure:   11/12 CXR 1. Patchy opacities are seen in the right lung. Left basal opacities are noted. Findings can relate to infiltrates in the right clinical setting. 2. Mild to moderate cardiomegaly. 3. No pleural effusions. No pneumothorax. 4. The bones are demineralized. Degenerative changes of the thoracic spine. Barriers to Discharge: Await bed at [facility]. Hospitalist and oncology following. Hgb 7.7, Hct 23.2. IV cefepime. IV vanc. Acyclovir. PCP: YESY Reinoso CNP  Readmission Risk Score: 35.7%  Patient Goals/Plan/Treatment Preferences: Await bed at [facility]. He currently lives at home. He has support from his ex-wife and son. He uses a walker and is [facility]. He has a nebulizer. He is current with [facility]. SW following.

## 2023-11-14 NOTE — PROGRESS NOTES
Physician Progress Note      Mukund Reyes  CSN #:                  [id number]  :                       1955  ADMIT DATE:       2023 1:33 PM  [address] DATE:  RESPONDING  PROVIDER #:        Sharmila Cohen MD          QUERY TEXT:    Pt admitted with fever. Pt noted to wear home oxygen. If possible, please   document in the progress notes and discharge summary if you are evaluating   and/or treating any of the following: The medical record reflects the following:  Risk Factors: Elderly, COPD, CA  Clinical Indicators: Wears oxygen 2LNC at home  Treatment: Oxygen 2LNC, monitoring    Thank You!   Manjit Rick RN, CRCR  RN Clinical Documentation Integrity  Options provided:  -- Chronic respiratory failure with hypoxia  -- Chronic respiratory failure with hypercapnia  -- Chronic respiratory failure with hypoxia and hypercapnia  -- Other - I will add my own diagnosis  -- Disagree - Not applicable / Not valid  -- Disagree - Clinically unable to determine / Unknown  -- Refer to Clinical Documentation Reviewer    PROVIDER RESPONSE TEXT:    COPD on 2l home oxygen    Query created by: Manjit Rick on 2023 10:48 AM      Electronically signed by:  Sharmila Cohen MD 2023 2:09 PM

## 2023-11-14 NOTE — PLAN OF CARE
Problem: Respiratory - Adult  Goal: Achieves optimal ventilation and oxygenation  Outcome: Progressing   Patient mutually agrees upon goal.

## 2023-11-15 LAB
BASOPHILS ABSOLUTE: 0 THOU/MM3 (ref 0–0.1)
BASOPHILS NFR BLD AUTO: 0 %
DEPRECATED RDW RBC AUTO: 45.2 FL (ref 35–45)
EOSINOPHIL NFR BLD AUTO: 0 %
EOSINOPHILS ABSOLUTE: 0 THOU/MM3 (ref 0–0.4)
ERYTHROCYTE [DISTWIDTH] IN BLOOD BY AUTOMATED COUNT: 14.7 % (ref 11.5–14.5)
HCT VFR BLD AUTO: 23.3 % (ref 42–52)
HGB BLD-MCNC: 7.9 GM/DL (ref 14–18)
IMM GRANULOCYTES # BLD AUTO: 0 THOU/MM3 (ref 0–0.07)
IMM GRANULOCYTES NFR BLD AUTO: 0 %
LYMPHOCYTES ABSOLUTE: 0.5 THOU/MM3 (ref 1–4.8)
LYMPHOCYTES NFR BLD AUTO: 95.6 %
MCH RBC QN AUTO: 28.4 PG (ref 26–33)
MCHC RBC AUTO-ENTMCNC: 33.9 GM/DL (ref 32.2–35.5)
MCV RBC AUTO: 83.8 FL (ref 80–94)
MONOCYTES ABSOLUTE: 0 THOU/MM3 (ref 0.4–1.3)
MONOCYTES NFR BLD AUTO: 2.2 %
NEUTROPHILS NFR BLD AUTO: 2.2 %
NRBC BLD AUTO-RTO: 0 /100 WBC
PLATELET # BLD AUTO: 14 THOU/MM3 (ref 130–400)
PLATELET BLD QL SMEAR: ABNORMAL
PMV BLD AUTO: 11.2 FL (ref 9.4–12.4)
RBC # BLD AUTO: 2.78 MILL/MM3 (ref 4.7–6.1)
ROULEAUX BLD QL SMEAR: SLIGHT
SCAN OF BLOOD SMEAR: NORMAL
SEGMENTED NEUTROPHILS ABSOLUTE COUNT: 0 THOU/MM3 (ref 1.8–7.7)
SPHEROCYTES BLD QL SMEAR: ABNORMAL
VARIANT LYMPHS BLD QL SMEAR: ABNORMAL %
WBC # BLD AUTO: 0.5 THOU/MM3 (ref 4.8–10.8)

## 2023-11-15 PROCEDURE — 2580000003 HC RX 258: Performed by: INTERNAL MEDICINE

## 2023-11-15 PROCEDURE — 2060000000 HC ICU INTERMEDIATE R&B

## 2023-11-15 PROCEDURE — 94640 AIRWAY INHALATION TREATMENT: CPT

## 2023-11-15 PROCEDURE — 6360000002 HC RX W HCPCS: Performed by: INTERNAL MEDICINE

## 2023-11-15 PROCEDURE — 6370000000 HC RX 637 (ALT 250 FOR IP): Performed by: INTERNAL MEDICINE

## 2023-11-15 PROCEDURE — 99233 SBSQ HOSP IP/OBS HIGH 50: CPT | Performed by: STUDENT IN AN ORGANIZED HEALTH CARE EDUCATION/TRAINING PROGRAM

## 2023-11-15 PROCEDURE — 6370000000 HC RX 637 (ALT 250 FOR IP): Performed by: PHYSICIAN ASSISTANT

## 2023-11-15 PROCEDURE — 36415 COLL VENOUS BLD VENIPUNCTURE: CPT

## 2023-11-15 PROCEDURE — 85025 COMPLETE CBC W/AUTO DIFF WBC: CPT

## 2023-11-15 RX ADMIN — CETIRIZINE HYDROCHLORIDE 10 MG: 10 TABLET ORAL at 08:58

## 2023-11-15 RX ADMIN — CEFEPIME 2000 MG: 2 INJECTION, POWDER, FOR SOLUTION INTRAVENOUS at 00:00

## 2023-11-15 RX ADMIN — METOPROLOL TARTRATE 12.5 MG: 25 TABLET, FILM COATED ORAL at 08:57

## 2023-11-15 RX ADMIN — FOLIC ACID 1 MG: 1 TABLET ORAL at 08:58

## 2023-11-15 RX ADMIN — Medication 400 MG: at 08:59

## 2023-11-15 RX ADMIN — QUETIAPINE FUMARATE 50 MG: 25 TABLET ORAL at 20:53

## 2023-11-15 RX ADMIN — Medication 1 TABLET: at 08:58

## 2023-11-15 RX ADMIN — ACETAMINOPHEN 650 MG: 325 TABLET ORAL at 20:53

## 2023-11-15 RX ADMIN — MOMETASONE FUROATE AND FORMOTEROL FUMARATE DIHYDRATE 2 PUFF: 200; 5 AEROSOL RESPIRATORY (INHALATION) at 18:19

## 2023-11-15 RX ADMIN — SODIUM CHLORIDE, PRESERVATIVE FREE 10 ML: 5 INJECTION INTRAVENOUS at 09:01

## 2023-11-15 RX ADMIN — TAMSULOSIN HYDROCHLORIDE 0.8 MG: 0.4 CAPSULE ORAL at 08:58

## 2023-11-15 RX ADMIN — TIOTROPIUM BROMIDE INHALATION SPRAY 2 PUFF: 3.12 SPRAY, METERED RESPIRATORY (INHALATION) at 07:50

## 2023-11-15 RX ADMIN — Medication 1250 MG: at 03:53

## 2023-11-15 RX ADMIN — POTASSIUM CHLORIDE 20 MEQ: 1500 TABLET, EXTENDED RELEASE ORAL at 08:58

## 2023-11-15 RX ADMIN — ACYCLOVIR 800 MG: 800 TABLET ORAL at 08:58

## 2023-11-15 RX ADMIN — MOMETASONE FUROATE AND FORMOTEROL FUMARATE DIHYDRATE 2 PUFF: 200; 5 AEROSOL RESPIRATORY (INHALATION) at 07:50

## 2023-11-15 RX ADMIN — CEFEPIME 2000 MG: 2 INJECTION, POWDER, FOR SOLUTION INTRAVENOUS at 06:33

## 2023-11-15 ASSESSMENT — PAIN SCALES - GENERAL
PAINLEVEL_OUTOF10: 0
PAINLEVEL_OUTOF10: 2
PAINLEVEL_OUTOF10: 0

## 2023-11-15 ASSESSMENT — PAIN DESCRIPTION - LOCATION: LOCATION: GENERALIZED

## 2023-11-15 ASSESSMENT — PAIN DESCRIPTION - FREQUENCY: FREQUENCY: CONTINUOUS

## 2023-11-15 ASSESSMENT — PAIN DESCRIPTION - DESCRIPTORS: DESCRIPTORS: ACHING

## 2023-11-15 ASSESSMENT — PAIN DESCRIPTION - ONSET: ONSET: ON-GOING

## 2023-11-15 ASSESSMENT — PAIN DESCRIPTION - ORIENTATION: ORIENTATION: OTHER (COMMENT)

## 2023-11-15 ASSESSMENT — PAIN - FUNCTIONAL ASSESSMENT: PAIN_FUNCTIONAL_ASSESSMENT: PREVENTS OR INTERFERES SOME ACTIVE ACTIVITIES AND ADLS

## 2023-11-15 ASSESSMENT — PAIN DESCRIPTION - PAIN TYPE: TYPE: CHRONIC PAIN

## 2023-11-15 NOTE — FLOWSHEET NOTE
11/15/23 [address] at bedside  (virtual nurse safety round)     VN called into patients room and introduced myself and role. Patient's visitor answered and declined video at this time. Pt's visitor did not respond when this VN questioned her relation to pt but did state that \"he is fine and does not need anything at this time\" she also stated that he had his call light and knew how to use it. Unable to complete education at this time d/t visitor declination of  services.

## 2023-11-15 NOTE — PLAN OF CARE
Increased patient's quetiapine from 25mg to 100mg. Concerns regarding such a large jump in dose. Will attempt a 50mg dose. If results are not adequate will add an additional 50mg in a few hours.     Gayla Box PA-C

## 2023-11-15 NOTE — CARE COORDINATION
11/15/23, 11:56 AM EST    DISCHARGE ON GOING EVALUATION    [address] [place] [address] day: 3  Location: -05/005-A Reason for admit: Thrombocytopenia ([address]) [D69.6]  Neutropenic fever ([address]) [D70.9, R50.81]  Symptomatic anemia [D64.9]  Pneumonia of both lungs due to infectious organism, unspecified part of lung [J18.9]  Acute myeloid leukemia not having achieved remission ([address]) [C92.00]   Procedure:   11/12 CXR 1. Patchy opacities are seen in the right lung. Left basal opacities are noted. Findings can relate to infiltrates in the right clinical setting. 2. Mild to moderate cardiomegaly. 3. No pleural effusions. No pneumothorax. 4. The bones are demineralized. Degenerative changes of the thoracic spine    Barriers to Discharge: WBC 0.5, Hgb 7.9, platelets 14, blood culture (-) x 48 hours. Oncology following. Zovirax, IV Maxipime, Zyrtec, Colace, folic acid, Mag ox, Lopressor, Dulera, MVI, Zofran prn, pain control, Seroquel, Flomax, IV Vancomycin, Spiriva. PCP: YESY Wong CNP  Readmission Risk Score: 36.1%  Patient Goals/Plan/Treatment Preferences: From home with ex-wife. Awaiting bed at [facility] for transfer.

## 2023-11-15 NOTE — PLAN OF CARE
Problem: Discharge Planning  Goal: Discharge to home or other facility with appropriate resources  Outcome: Progressing  Flowsheets (Taken 11/14/2023 2230)  Discharge to home or other facility with appropriate resources:   Identify barriers to discharge with patient and caregiver   Arrange for needed discharge resources and transportation as appropriate   Identify discharge learning needs (meds, wound care, etc)     Problem: Safety - Adult  Goal: Free from fall injury  Outcome: Progressing  Flowsheets (Taken 11/14/2023 2230)  Free From Fall Injury:   Instruct family/caregiver on patient safety   Based on caregiver fall risk screen, instruct family/caregiver to ask for assistance with transferring infant if caregiver noted to have fall risk factors     Problem: Pain  Goal: Verbalizes/displays adequate comfort level or baseline comfort level  Outcome: Progressing  Flowsheets (Taken 11/14/2023 2230)  Verbalizes/displays adequate comfort level or baseline comfort level:   Encourage patient to monitor pain and request assistance   Assess pain using appropriate pain scale   Administer analgesics based on type and severity of pain and evaluate response     Problem: Skin/Tissue Integrity  Goal: Absence of new skin breakdown  Description: 1. Monitor for areas of redness and/or skin breakdown  2. Assess vascular access sites hourly  3. Every 4-6 hours minimum:  Change oxygen saturation probe site  4. Every 4-6 hours:  If on nasal continuous positive airway pressure, respiratory therapy assess nares and determine need for appliance change or resting period. Outcome: Progressing  Note: No new evidence of skin breakdown.       Problem: Infection - Adult  Goal: Absence of fever/infection during anticipated neutropenic period  Outcome: Progressing  Flowsheets (Taken 11/14/2023 2230)  Absence of fever/infection during anticipated neutropenic period:   Monitor white blood cell count   Administer growth factors as ordered

## 2023-11-15 NOTE — PLAN OF CARE
Problem: Discharge Planning  Goal: Discharge to home or other facility with appropriate resources  Outcome: Progressing  Discharge to home or other facility with appropriate resources: Identify barriers to discharge with patient and caregiver  Note: Patient educated that discharge plan is still in progress. Transfer to [facility] pending. Problem: Safety - Adult  Goal: Free from fall injury  Outcome: Progressing  Free From Fall Injury: Instruct family/caregiver on patient safety  Note: Patient remains free from falls this shift. Fall precautions in place with bed/chair exit alarmed. Fall sign posted and fall armband in place. Nonskid footwear used with transferring. Educated patient to use call light when in need of staff assistance with transferring, ambulating, and other activities of daily living. Patient appropriately uses call light this shift. Problem: Pain  Goal: Verbalizes/displays adequate comfort level or baseline comfort level    Verbalizes/displays Outcome: Progressing adequate comfort level or baseline comfort level:   Assess pain using appropriate pain scale   Encourage patient to monitor pain and request assistance     Problem: Skin/Tissue Integrity  Goal: Absence of new skin breakdown  Description: 1. Monitor for areas of redness and/or skin breakdown  2. Assess vascular access sites hourly  3. Every 4-6 hours minimum:  Change oxygen saturation probe site  4. Every 4-6 hours:  If on nasal continuous positive airway pressure, respiratory therapy assess nares and determine need for appliance change or resting period. Outcome: Progressing  Note: Patient exhibits no new skin breakdown this shift. Patient repositioned Q2H and as needed with staff assistance. All skin integrity issuse charted in Flowsheets. Will continue to monitor.        Problem: Infection - Adult  Goal: Absence of fever/infection during anticipated neutropenic period  Outcome: Progressing  Absence of fever/infection during

## 2023-11-15 NOTE — PROGRESS NOTES
Met with Mr Marco Suggs and his wife (ex-wife). Reviewed hospice concepts and philosophies. Reviewed with them about the thing that hospice does cover, that hospice does not cover room and board with nursing home. Left referral packet with Lorraine Anthony. Encouraged them to call with any questions.

## 2023-11-15 NOTE — PROGRESS NOTES
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 With leukemia cutis, continue with pancytopenia with significant neutropenia, severe anemia and thrombocytopenia. Patient following up with oncologist in [place] near [facility]. Patient has CNS involvement. Patient was transferred to ICU during prior admission was treated with blood products and was asked to follow-up with primary oncologist.  -Patient has nodular densities in the right upper lung highly suspicious for malignancy, has enlarged mediastinal, right hilar and bilateral axillary lymph nodes highly suspicious for metastatic disease. I had extensive discussion with the patient, wife and son for CODE STATUS discussion, patient expressed concern that he does not want to remain full code and would like to pursue comfort measures and he does not want any aggressive work-up or even antibiotics or any blood products. Given the patient condition and worsening of primary malignancy leading to bone marrow failure we will change the CODE STATUS to DNR CC. We will just continue antibiotics. #Neutropenic fever. 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 -S/P multiple cycles of chemotherapy including spinal chemotherapy at [facility]. We will consult hospice. #COPD on 2l home oxygen.  -Started on DuoNeb. -Continue with home inhaler. #HTN. -Continue with home metoprolol. #BPH. -Continue with flomax. DVT Prophylaxis: SCD/ZOHRA  Code status: Full code  Diet: Regular. 11/15  Patient was seen and examined  Patient appears very tired, appears to be short of breath, family at bedside.   Patient expresses that he wants

## 2023-11-15 NOTE — FLOWSHEET NOTE
11/15/23 1047   Safe Environment   Safety Measures Bed in low position;Side rails X 2  (Virtual Safety Nurse Round Complete)     VN called into patients room and introduced myself and role. Patient did not answer. Video activated for safety check. Patient resting comfortably in bed. Patient voiced no needs or concerns at this time. Pt appeared free from pain or discomfort with relaxed and easy respirations. Call light not within reach. Call made via [email or web address] by this RN. Bedside nurse Breonna Henao answered on unit and said she would make bedside nurse César Valadez aware.

## 2023-11-16 PROCEDURE — 99232 SBSQ HOSP IP/OBS MODERATE 35: CPT | Performed by: STUDENT IN AN ORGANIZED HEALTH CARE EDUCATION/TRAINING PROGRAM

## 2023-11-16 PROCEDURE — 6370000000 HC RX 637 (ALT 250 FOR IP): Performed by: INTERNAL MEDICINE

## 2023-11-16 PROCEDURE — 94760 N-INVAS EAR/PLS OXIMETRY 1: CPT

## 2023-11-16 PROCEDURE — 94640 AIRWAY INHALATION TREATMENT: CPT

## 2023-11-16 PROCEDURE — 2700000000 HC OXYGEN THERAPY PER DAY

## 2023-11-16 PROCEDURE — 2060000000 HC ICU INTERMEDIATE R&B

## 2023-11-16 PROCEDURE — 6370000000 HC RX 637 (ALT 250 FOR IP): Performed by: PHYSICIAN ASSISTANT

## 2023-11-16 RX ADMIN — QUETIAPINE FUMARATE 50 MG: 25 TABLET ORAL at 19:51

## 2023-11-16 RX ADMIN — MOMETASONE FUROATE AND FORMOTEROL FUMARATE DIHYDRATE 2 PUFF: 200; 5 AEROSOL RESPIRATORY (INHALATION) at 18:08

## 2023-11-16 RX ADMIN — TAMSULOSIN HYDROCHLORIDE 0.8 MG: 0.4 CAPSULE ORAL at 07:52

## 2023-11-16 RX ADMIN — FOLIC ACID 1 MG: 1 TABLET ORAL at 07:52

## 2023-11-16 RX ADMIN — ACETAMINOPHEN 650 MG: 325 TABLET ORAL at 07:52

## 2023-11-16 RX ADMIN — MOMETASONE FUROATE AND FORMOTEROL FUMARATE DIHYDRATE 2 PUFF: 200; 5 AEROSOL RESPIRATORY (INHALATION) at 05:47

## 2023-11-16 RX ADMIN — POLYETHYLENE GLYCOL 3350 17 G: 17 POWDER, FOR SOLUTION ORAL at 19:51

## 2023-11-16 RX ADMIN — TIOTROPIUM BROMIDE INHALATION SPRAY 2 PUFF: 3.12 SPRAY, METERED RESPIRATORY (INHALATION) at 05:47

## 2023-11-16 ASSESSMENT — PAIN SCALES - GENERAL
PAINLEVEL_OUTOF10: 3
PAINLEVEL_OUTOF10: 0
PAINLEVEL_OUTOF10: 0

## 2023-11-16 ASSESSMENT — PAIN DESCRIPTION - LOCATION: LOCATION: GENERALIZED

## 2023-11-16 ASSESSMENT — PAIN - FUNCTIONAL ASSESSMENT: PAIN_FUNCTIONAL_ASSESSMENT: ACTIVITIES ARE NOT PREVENTED

## 2023-11-16 ASSESSMENT — PAIN DESCRIPTION - DESCRIPTORS: DESCRIPTORS: DISCOMFORT

## 2023-11-16 NOTE — FLOWSHEET NOTE
11/16/23 1047   Safe Environment   Safety Measures Nurse at bedside;Standard Safety Measures  (virtual nurse safety round complete)     VN called into patients room and introduced myself and role. Pt did not respond to voice, camera turned on for safety. Pt resting in bed with RN at bedside. Camera activated.  No apparent distress noted

## 2023-11-16 NOTE — PLAN OF CARE
Problem: Discharge Planning  Goal: Discharge to home or other facility with appropriate resources  Outcome: Progressing  Discharge to home or other facility with appropriate resources:   Identify barriers to discharge with patient and caregiver   Arrange for needed discharge resources and transportation as appropriate   Identify discharge learning needs (meds, wound care, etc)     Problem: Safety - Adult  Goal: Free from fall injury  Outcome: Progressing  Free From Fall Injury:   Instruct family/caregiver on patient safety   Based on caregiver fall risk screen, instruct family/caregiver to ask for assistance with transferring infant if caregiver noted to have fall risk factors     Problem: Pain  Goal: Verbalizes/displays adequate comfort level or baseline comfort level  Outcome: Progressing  Verbalizes/displays adequate comfort level or baseline comfort level:   Encourage patient to monitor pain and request assistance   Assess pain using appropriate pain scale   Administer analgesics based on type and severity of pain and evaluate response     Problem: Skin/Tissue Integrity  Goal: Absence of new skin breakdown  Description: 1. Monitor for areas of redness and/or skin breakdown  2. Assess vascular access sites hourly  3. Every 4-6 hours minimum:  Change oxygen saturation probe site  4. Every 4-6 hours:  If on nasal continuous positive airway pressure, respiratory therapy assess nares and determine need for appliance change or resting period. Outcome: Progressing  Note: No new evidence of skin breakdown.       Problem: Infection - Adult  Goal: Absence of fever/infection during anticipated neutropenic period  Outcome: Progressing  Absence of fever/infection during anticipated neutropenic period:   Monitor white blood cell count   Administer growth factors as ordered   Implement neutropenic guidelines     Problem: Chronic Conditions and Co-morbidities  Goal: Patient's chronic conditions and co-morbidity symptoms

## 2023-11-16 NOTE — PROGRESS NOTES
Primary nurse Chelsey Latham RN stated pt wishes to go home on hospice. Perales bedside visit. Pt stated wishes to go home on hospice and not wishing to come back to the hospital for treatments. Pt states wife is going to care for him in the home. DME: hospital bed, 02 and bedside table  Qualificaiton needs completed and approval by Medical Director. Scripts needs filled out and signed. Pt states he will be able to be transported by private vehicle. Pt states lost cell phone and his son is working on getting him a new one. Chelsey Latham RN updated. Call placed to Dr. Sofia Storm with update. Verbal consent OK to admit to [facility] hospice for AML. Dr. Sofia Storm will complete a med rec, comfort kit, home 02 order, sign DNR-CC, and wishing for pt to have dark towels due to high risk of bleeding.   2:43pm- JOSE JUAN Erwin RN notified of admit 11/17/23. Pt and life partner updated- call to son Radha Kaiser and no answer, voicemail left. Chelsey Latham RN primary nurse updated with plan for pt to discharge tomorrow and [facility] hospice nurse to meet pt once arrives home, family to transport pt by private vehicle, portable 02 already here for pt to wear home.

## 2023-11-16 NOTE — PROGRESS NOTES
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 With leukemia cutis, continue with pancytopenia with significant neutropenia, severe anemia and thrombocytopenia. Patient following up with oncologist in [place] near [facility]. Patient has CNS involvement. Patient was transferred to ICU during prior admission was treated with blood products and was asked to follow-up with primary oncologist.  -Patient has nodular densities in the right upper lung highly suspicious for malignancy, has enlarged mediastinal, right hilar and bilateral axillary lymph nodes highly suspicious for metastatic disease. I had extensive discussion with the patient, wife and son for CODE STATUS discussion, patient expressed concern that he does not want to remain full code and would like to pursue comfort measures and he does not want any aggressive work-up or even antibiotics or any blood products. Given the patient condition and worsening of primary malignancy leading to bone marrow failure we will change the CODE STATUS to DNR CC. We will just continue antibiotics. Continue comfort care measures. Awaiting hospice evaluation    #Neutropenic fever. 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 -S/P multiple cycles of chemotherapy including spinal chemotherapy at [facility]. We will consult hospice. #COPD on 2l home oxygen.  -Started on DuoNeb. -Continue with home inhaler. #HTN. -Continue with home metoprolol. #BPH. -Continue with flomax. DVT Prophylaxis: SCD/ZOHRA  Code status: Full code  Diet: Regular.       11/15  Patient was seen and examined  Patient appears very tired, appears to be

## 2023-11-16 NOTE — FLOWSHEET NOTE
11/15/23 2007   Safe Environment   Safety Measures Call light within reach;Standard Safety Measures  (virtual safety round completed)     Virtual nurse completed safety round with patient sleeping at the moment; safe in bed, and lights are off. Will follow up in the morning. Thank you.

## 2023-11-16 NOTE — FLOWSHEET NOTE
11/16/23 1312   Safe Environment   Safety Measures Bed in low position;Call light within reach; Family at bedside;Side rails X 2;Visitors at bedside     VN called into patients room and introduced myself and role. Patient answered and permitted video. Video activated. Patient resting comfortably in bed. Patient voiced no needs or concerns at this time. Pt denies pain. VN educated pt on utilizing call light if condition changes. Call light within reach.

## 2023-11-16 NOTE — CARE COORDINATION
11/16/23, 3:00 PM EST    DISCHARGE PLANNING EVALUATION   Patient will be discharged tomorrow to home with [facility]. Family will provide transport. Update 3:24 pm: Spoke with January Hurt at [facility]. Made her aware Billie Carlson is going to return home tomorrow with [address] [place] [address]. She will discharge him from their agency.

## 2023-11-17 VITALS
BODY MASS INDEX: 31.51 KG/M2 | SYSTOLIC BLOOD PRESSURE: 107 MMHG | DIASTOLIC BLOOD PRESSURE: 59 MMHG | TEMPERATURE: 98.1 F | OXYGEN SATURATION: 96 % | RESPIRATION RATE: 17 BRPM | WEIGHT: 225.09 LBS | HEART RATE: 76 BPM | HEIGHT: 71 IN

## 2023-11-17 LAB
BACTERIA BLD AEROBE CULT: NORMAL
BACTERIA BLD AEROBE CULT: NORMAL

## 2023-11-17 PROCEDURE — 94640 AIRWAY INHALATION TREATMENT: CPT

## 2023-11-17 PROCEDURE — 6370000000 HC RX 637 (ALT 250 FOR IP): Performed by: INTERNAL MEDICINE

## 2023-11-17 PROCEDURE — 99239 HOSP IP/OBS DSCHRG MGMT >30: CPT | Performed by: STUDENT IN AN ORGANIZED HEALTH CARE EDUCATION/TRAINING PROGRAM

## 2023-11-17 RX ADMIN — TIOTROPIUM BROMIDE INHALATION SPRAY 2 PUFF: 3.12 SPRAY, METERED RESPIRATORY (INHALATION) at 08:04

## 2023-11-17 RX ADMIN — MOMETASONE FUROATE AND FORMOTEROL FUMARATE DIHYDRATE 2 PUFF: 200; 5 AEROSOL RESPIRATORY (INHALATION) at 08:04

## 2023-11-17 NOTE — FLOWSHEET NOTE
11/17/23 1058   Safe Environment   Safety Measures Bed in low position;Call light within reach; Side rails X 2  (Virtual nurse safety round completed)     Call placed to patients room, patient responds to audio, permitted video. Patient alert and oriented. Patient denied any concerns/complaints at this time. Patient educated on utilizing call light. Call light within reach, no signs or symptoms of distress noted.

## 2023-11-17 NOTE — CARE COORDINATION
11/17/23, 12:27 PM EST    Patient goals/plan/ treatment preferences discussed by  and . Patient goals/plan/ treatment preferences reviewed with patient/ family. Patient/ family verbalize understanding of discharge plan and are in agreement with goal/plan/treatment preferences. Understanding was demonstrated using the teach back method. AVS provided by RN at time of discharge, which includes all necessary medical information pertaining to the patients current course of illness, treatment, post-discharge goals of care, and treatment preferences. Services At/After Discharge: Hospice       IMM Letter  IMM Letter given to Patient/Family/Significant other/Guardian/POA/by[de-identified] staff  IMM Letter date given[de-identified] 11/12/23  IMM Letter time given[de-identified] 0706     Janine Spicer is being discharged to home today with [facility]. Family will provide transport.

## 2023-11-17 NOTE — PROGRESS NOTES
Visit made to work on discharge home with hospice this date. Comfort medications and DNR-CC signed by hospice medical director Dr. Keanu Shaikh. Dr. Edward Kanner updated of plan of home with hospice and will complete discharge orders. Michelle Boards lying in bed, denied symptoms with no noted s/s of. Denied questions for nurse. Let him know that physician entering discharge orders and will likely be few hours prior to discharge completed. He will let his ride know. Primary nurse Maribel CARLISLE updated.  She will fax AVS to hospice office at [phone] and call hospice at [phone] when patient leaving hospital.

## 2023-11-17 NOTE — DISCHARGE SUMMARY
Hospital Medicine Discharge Summary      Patient Identification:   Betty Coleman   : 1955  MRN: [id number]   Account: [de-identified]   Patient's PCP: YESY Tim - CNP    Admit Date: 2023   Discharge Date: 2023      Admitting Physician: No admitting provider for patient encounter. 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 With leukemia cutis, continue with pancytopenia with significant neutropenia, severe anemia and thrombocytopenia. Patient following up with oncologist in [place] near [facility]. Patient has CNS involvement. Patient was transferred to ICU during prior admission was treated with blood products and was asked to follow-up with primary oncologist.  -Patient has nodular densities in the right upper lung highly suspicious for malignancy, has enlarged mediastinal, right hilar and bilateral axillary lymph nodes highly suspicious for metastatic disease. I had extensive discussion with the patient, wife and son for CODE STATUS discussion, patient expressed concern that he does not want to remain full code and would like to pursue comfort measures and he does not want any aggressive work-up or even antibiotics or any blood products. Given the patient condition and worsening of primary malignancy leading to bone marrow failure we will change the CODE STATUS to DNR CC. We will just continue antibiotics. Went home with hospice. #Neutropenic fever. 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 -S/P multiple cycles of chemotherapy including spinal chemotherapy at [facility]. We will consult hospice. #COPD on 2l home oxygen.  -Started on DuoNeb. -Continue with home inhaler. #HTN. -Continue with home metoprolol. #BPH. -Continue with flomax. DVT Prophylaxis: SCD/ZOHRA  Code status: Full code  Diet: Regular.        Hospital Course:

## 2023-11-19 LAB
EKG ATRIAL RATE: 81 BPM
EKG P AXIS: 57 DEGREES
EKG P-R INTERVAL: 154 MS
EKG Q-T INTERVAL: 364 MS
EKG QRS DURATION: 86 MS
EKG QTC CALCULATION (BAZETT): 422 MS
EKG R AXIS: 44 DEGREES
EKG T AXIS: 36 DEGREES
EKG VENTRICULAR RATE: 81 BPM

## 2023-11-21 NOTE — PROGRESS NOTES
Hospice Certification of Terminal Illness      80 yo with terminal dx of AML dx 5/16/23, this is transformation from CMML. He now has bone marrow failure and pancytopenia. WBC 0.5, Hgb 7.9, plt 14 after transfusion. He is refusing any further aggressive work up, antibiotics or blood products. He is requiring 2 L O2. PPS is  50% with performance status declined from weeks ago at 60%. Change in wt from 368# in Sept to now 226#. Comorbid Conditions include COPD. Former long term smoker. [facility]    I certify this pt has a life expectancy of 6 months or less if the disease follows it's normal expected course.     Jerry Nweton MD  Board Certified Hospice and [address] [place] [address] Director Certified

## 2023-11-27 ENCOUNTER — TELEPHONE (OUTPATIENT)
Dept: INFUSION THERAPY | Age: 68
End: 2023-11-27

## 2023-12-04 ENCOUNTER — TELEPHONE (OUTPATIENT)
Dept: INFUSION THERAPY | Age: 68
End: 2023-12-04

## 2023-12-29 ENCOUNTER — TELEPHONE (OUTPATIENT)
Dept: INFUSION THERAPY | Age: 68
End: 2023-12-29

## 2024-01-05 ENCOUNTER — TELEPHONE (OUTPATIENT)
Dept: INFUSION THERAPY | Age: 69
End: 2024-01-05

## 2024-01-05 NOTE — TELEPHONE ENCOUNTER
Phone call received from Dr. Marita Long of Hospice asking to speak to Dr. Lei. She has concerns about patient and would like to speak to  directly. She can be reached at [phone]
